# Patient Record
Sex: MALE | Race: WHITE | NOT HISPANIC OR LATINO | URBAN - METROPOLITAN AREA
[De-identification: names, ages, dates, MRNs, and addresses within clinical notes are randomized per-mention and may not be internally consistent; named-entity substitution may affect disease eponyms.]

---

## 2019-03-04 PROBLEM — Z00.00 ENCOUNTER FOR PREVENTIVE HEALTH EXAMINATION: Status: ACTIVE | Noted: 2019-03-04

## 2023-06-22 ENCOUNTER — INPATIENT (INPATIENT)
Facility: HOSPITAL | Age: 73
LOS: 7 days | Discharge: HOME | DRG: 374 | End: 2023-06-30
Attending: SURGERY | Admitting: SURGERY
Payer: COMMERCIAL

## 2023-06-22 VITALS
SYSTOLIC BLOOD PRESSURE: 116 MMHG | DIASTOLIC BLOOD PRESSURE: 77 MMHG | HEART RATE: 86 BPM | RESPIRATION RATE: 17 BRPM | OXYGEN SATURATION: 94 % | TEMPERATURE: 99 F

## 2023-06-22 LAB
ANION GAP SERPL CALC-SCNC: 6 MMOL/L — SIGNIFICANT CHANGE UP (ref 5–17)
APTT BLD: 21.5 SEC — LOW (ref 27.5–35.5)
BLD GP AB SCN SERPL QL: NEGATIVE — SIGNIFICANT CHANGE UP
BLD GP AB SCN SERPL QL: NEGATIVE — SIGNIFICANT CHANGE UP
BUN SERPL-MCNC: 6 MG/DL — LOW (ref 7–23)
CALCIUM SERPL-MCNC: 8.5 MG/DL — SIGNIFICANT CHANGE UP (ref 8.4–10.5)
CHLORIDE SERPL-SCNC: 106 MMOL/L — SIGNIFICANT CHANGE UP (ref 96–108)
CO2 SERPL-SCNC: 25 MMOL/L — SIGNIFICANT CHANGE UP (ref 22–31)
CREAT SERPL-MCNC: 0.81 MG/DL — SIGNIFICANT CHANGE UP (ref 0.5–1.3)
EGFR: 94 ML/MIN/1.73M2 — SIGNIFICANT CHANGE UP
GLUCOSE SERPL-MCNC: 97 MG/DL — SIGNIFICANT CHANGE UP (ref 70–99)
HCT VFR BLD CALC: 24.2 % — LOW (ref 39–50)
HCT VFR BLD CALC: 27.6 % — LOW (ref 39–50)
HGB BLD-MCNC: 6.7 G/DL — CRITICAL LOW (ref 13–17)
HGB BLD-MCNC: 7.8 G/DL — LOW (ref 13–17)
INR BLD: 1.19 — HIGH (ref 0.88–1.16)
MAGNESIUM SERPL-MCNC: 2 MG/DL — SIGNIFICANT CHANGE UP (ref 1.6–2.6)
MCHC RBC-ENTMCNC: 19.2 PG — LOW (ref 27–34)
MCHC RBC-ENTMCNC: 19.9 PG — LOW (ref 27–34)
MCHC RBC-ENTMCNC: 27.7 GM/DL — LOW (ref 32–36)
MCHC RBC-ENTMCNC: 28.3 GM/DL — LOW (ref 32–36)
MCV RBC AUTO: 69.3 FL — LOW (ref 80–100)
MCV RBC AUTO: 70.6 FL — LOW (ref 80–100)
NRBC # BLD: 0 /100 WBCS — SIGNIFICANT CHANGE UP (ref 0–0)
NRBC # BLD: 0 /100 WBCS — SIGNIFICANT CHANGE UP (ref 0–0)
PHOSPHATE SERPL-MCNC: 4.3 MG/DL — SIGNIFICANT CHANGE UP (ref 2.5–4.5)
PLATELET # BLD AUTO: 219 K/UL — SIGNIFICANT CHANGE UP (ref 150–400)
PLATELET # BLD AUTO: 231 K/UL — SIGNIFICANT CHANGE UP (ref 150–400)
POTASSIUM SERPL-MCNC: 3.8 MMOL/L — SIGNIFICANT CHANGE UP (ref 3.5–5.3)
POTASSIUM SERPL-SCNC: 3.8 MMOL/L — SIGNIFICANT CHANGE UP (ref 3.5–5.3)
PROTHROM AB SERPL-ACNC: 14.2 SEC — HIGH (ref 10.5–13.4)
RBC # BLD: 3.49 M/UL — LOW (ref 4.2–5.8)
RBC # BLD: 3.91 M/UL — LOW (ref 4.2–5.8)
RBC # FLD: 30.1 % — HIGH (ref 10.3–14.5)
RBC # FLD: 30.1 % — HIGH (ref 10.3–14.5)
RH IG SCN BLD-IMP: POSITIVE — SIGNIFICANT CHANGE UP
RH IG SCN BLD-IMP: POSITIVE — SIGNIFICANT CHANGE UP
SODIUM SERPL-SCNC: 137 MMOL/L — SIGNIFICANT CHANGE UP (ref 135–145)
WBC # BLD: 3.32 K/UL — LOW (ref 3.8–10.5)
WBC # BLD: 3.45 K/UL — LOW (ref 3.8–10.5)
WBC # FLD AUTO: 3.32 K/UL — LOW (ref 3.8–10.5)
WBC # FLD AUTO: 3.45 K/UL — LOW (ref 3.8–10.5)

## 2023-06-22 PROCEDURE — 74174 CTA ABD&PLVS W/CONTRAST: CPT | Mod: 26

## 2023-06-22 PROCEDURE — 93306 TTE W/DOPPLER COMPLETE: CPT | Mod: 26

## 2023-06-22 PROCEDURE — 99222 1ST HOSP IP/OBS MODERATE 55: CPT

## 2023-06-22 PROCEDURE — 71260 CT THORAX DX C+: CPT | Mod: 26

## 2023-06-22 PROCEDURE — 93970 EXTREMITY STUDY: CPT | Mod: 26

## 2023-06-22 RX ORDER — SERTRALINE 25 MG/1
50 TABLET, FILM COATED ORAL AT BEDTIME
Refills: 0 | Status: DISCONTINUED | OUTPATIENT
Start: 2023-06-22 | End: 2023-06-30

## 2023-06-22 RX ORDER — TAMSULOSIN HYDROCHLORIDE 0.4 MG/1
0.4 CAPSULE ORAL AT BEDTIME
Refills: 0 | Status: DISCONTINUED | OUTPATIENT
Start: 2023-06-22 | End: 2023-06-30

## 2023-06-22 RX ORDER — SODIUM CHLORIDE 9 MG/ML
1000 INJECTION, SOLUTION INTRAVENOUS
Refills: 0 | Status: DISCONTINUED | OUTPATIENT
Start: 2023-06-22 | End: 2023-06-22

## 2023-06-22 RX ORDER — IOHEXOL 300 MG/ML
30 INJECTION, SOLUTION INTRAVENOUS ONCE
Refills: 0 | Status: COMPLETED | OUTPATIENT
Start: 2023-06-22 | End: 2023-06-22

## 2023-06-22 RX ORDER — SOD SULF/SODIUM/NAHCO3/KCL/PEG
4000 SOLUTION, RECONSTITUTED, ORAL ORAL ONCE
Refills: 0 | Status: DISCONTINUED | OUTPATIENT
Start: 2023-06-22 | End: 2023-06-22

## 2023-06-22 RX ORDER — PANTOPRAZOLE SODIUM 20 MG/1
40 TABLET, DELAYED RELEASE ORAL DAILY
Refills: 0 | Status: DISCONTINUED | OUTPATIENT
Start: 2023-06-22 | End: 2023-06-30

## 2023-06-22 RX ORDER — SODIUM CHLORIDE 9 MG/ML
1000 INJECTION, SOLUTION INTRAVENOUS
Refills: 0 | Status: DISCONTINUED | OUTPATIENT
Start: 2023-06-22 | End: 2023-06-28

## 2023-06-22 RX ADMIN — SODIUM CHLORIDE 120 MILLILITER(S): 9 INJECTION, SOLUTION INTRAVENOUS at 22:02

## 2023-06-22 RX ADMIN — TAMSULOSIN HYDROCHLORIDE 0.4 MILLIGRAM(S): 0.4 CAPSULE ORAL at 22:00

## 2023-06-22 RX ADMIN — SERTRALINE 50 MILLIGRAM(S): 25 TABLET, FILM COATED ORAL at 22:01

## 2023-06-22 RX ADMIN — IOHEXOL 30 MILLILITER(S): 300 INJECTION, SOLUTION INTRAVENOUS at 17:47

## 2023-06-22 RX ADMIN — PANTOPRAZOLE SODIUM 40 MILLIGRAM(S): 20 TABLET, DELAYED RELEASE ORAL at 12:08

## 2023-06-22 NOTE — PATIENT PROFILE ADULT - FALL HARM RISK - HARM RISK INTERVENTIONS

## 2023-06-22 NOTE — PROGRESS NOTE ADULT - SUBJECTIVE AND OBJECTIVE BOX
INTERVAL HPI/OVERNIGHT EVENTS: transfer from [facility] for GI bleed. Hgb 6.7, transfuse 1U PRBC    SUBJECTIVE: Pt seen and examined at bedside this am by surgery team. +F, no blood per rectum or recent BM. Denies abdominal pain. Denies f/n/v/cp/sob.    MEDICATIONS  (STANDING):  lactated ringers. 1000 milliLiter(s) (125 mL/Hr) IV Continuous <Continuous>  pantoprazole  Injectable 40 milliGRAM(s) IV Push daily  polyethylene glycol/electrolyte Solution. 4000 milliLiter(s) Oral once  sertraline 50 milliGRAM(s) Oral at bedtime  tamsulosin 0.4 milliGRAM(s) Oral at bedtime    MEDICATIONS  (PRN):    Vital Signs Last 24 Hrs  T(C): 37.2 (22 Jun 2023 05:29), Max: 37.2 (22 Jun 2023 00:56)  T(F): 99 (22 Jun 2023 05:29), Max: 99 (22 Jun 2023 00:56)  HR: 84 (22 Jun 2023 05:29) (84 - 86)  BP: 133/83 (22 Jun 2023 05:29) (116/77 - 133/83)  BP(mean): 90 (22 Jun 2023 00:56) (90 - 90)  RR: 17 (22 Jun 2023 05:29) (17 - 17)  SpO2: 94% (22 Jun 2023 05:29) (94% - 94%)    Parameters below as of 22 Jun 2023 05:29  Patient On (Oxygen Delivery Method): room air    PHYSICAL EXAM:    Constitutional: A&Ox3, NAD    Respiratory: non labored breathing, no respiratory distress    Cardiovascular: NSR, RRR    Gastrointestinal: abdomen soft, mild distended, nt. No rebound or guarding     Extremities: wwp, no calf tenderness or edema. SCDs in place     I&O's Detail    21 Jun 2023 07:01  -  22 Jun 2023 07:00  --------------------------------------------------------  IN:    Lactated Ringers: 625 mL  Total IN: 625 mL    OUT:  Total OUT: 0 mL    Total NET: 625 mL          LABS:                        6.7    3.45  )-----------( 231      ( 22 Jun 2023 04:48 )             24.2     06-22    137  |  106  |  6<L>  ----------------------------<  97  3.8   |  25  |  0.81    Ca    8.5      22 Jun 2023 04:48  Phos  4.3     06-22  Mg     2.0     06-22      PT/INR - ( 22 Jun 2023 05:41 )   PT: 14.2 sec;   INR: 1.19          PTT - ( 22 Jun 2023 05:41 )  PTT:21.5 sec  Urinalysis Basic - ( 22 Jun 2023 04:48 )    Color: x / Appearance: x / SG: x / pH: x  Gluc: 97 mg/dL / Ketone: x  / Bili: x / Urobili: x   Blood: x / Protein: x / Nitrite: x   Leuk Esterase: x / RBC: x / WBC x   Sq Epi: x / Non Sq Epi: x / Bacteria: x        RADIOLOGY & ADDITIONAL STUDIES:

## 2023-06-22 NOTE — H&P ADULT - NSICDXPASTMEDICALHX_GEN_ALL_CORE_FT
PAST MEDICAL HISTORY:  BPH (benign prostatic hyperplasia)     Depression, major     Diverticulosis     Pulmonary embolism     Stroke

## 2023-06-22 NOTE — H&P ADULT - HISTORY OF PRESENT ILLNESS
74 y/o M w/ PMHx of stroke (R sided deficits), PE (Jan 2022, on Eliquis), BPH, depression, and diverticulosis and PSHx of colon tumor removal (1988) presents as a transfer from [facility] for management of GI bleed. Patient began to feel lethargic and wife noting him to be pale on 6/19. She held his dose of Eliquis that day and called his PCP. PCP ordered labs and Hgb resulted as 4.2. He was then instructed to go to OSH for further evaluation. He was transfused 3U PRBCs per wife. He had an EGD on 6/16 and no active bleed visualized. He had a colonoscopy on 6/19, which a large polyp was visualized but then aborted. He was then transferred to [facility] for further management. He denies nausea, vomiting, bloody bowel movements, melena, hematemesis, abdominal pain, dizziness, and unexpected weight loss over the last month. His home attendant does state that FREDI done at OSH was positive for blood. He has had a colonoscopy yearly since 39 y/o due to hx of colon tumor removal. No history of colon resection.

## 2023-06-22 NOTE — CONSULT NOTE ADULT - SUBJECTIVE AND OBJECTIVE BOX
INTERNAL MEDICINE - INITIAL CONSULT NOTE    TOPHER CHAO  [id number]    Patient is a 73y old  Male who presents with a chief complaint of GI bleed (22 Jun 2023 11:13)      HPI:  74 y/o M w/ PMHx of stroke (R sided deficits), PE (Jan 2022, on Eliquis), BPH, depression, and diverticulosis and PSHx of colon tumor removal (1988) presents as a transfer from [facility] for management of GI bleed. Patient began to feel lethargic and wife noting him to be pale on 6/19. She held his dose of Eliquis that day and called his PCP. PCP ordered labs and Hgb resulted as 4.2. He was then instructed to go to OSH for further evaluation. He was transfused 3U PRBCs per wife. He had an EGD on 6/16 and no active bleed visualized. He had a colonoscopy on 6/19, which a large polyp was visualized but then aborted. He was then transferred to [facility] for further management. He denies nausea, vomiting, bloody bowel movements, melena, hematemesis, abdominal pain, dizziness, and unexpected weight loss over the last month. His home attendant does state that FREDI done at OSH was positive for blood. He has had a colonoscopy yearly since 39 y/o due to hx of colon tumor removal. No history of colon resection.  (22 Jun 2023 01:51)      PAST MEDICAL & SURGICAL HISTORY:  Depression, major      Stroke      Pulmonary embolism      BPH (benign prostatic hyperplasia)      Diverticulosis          iohexol 300 mG (iodine)/mL Oral Solution 30 milliLiter(s) Oral once  lactated ringers. 1000 milliLiter(s) IV Continuous <Continuous>  pantoprazole  Injectable 40 milliGRAM(s) IV Push daily  sertraline 50 milliGRAM(s) Oral at bedtime  tamsulosin 0.4 milliGRAM(s) Oral at bedtime      FAMILY HISTORY:      REVIEW OF SYSTEMS:  CONSTITUTIONAL: No weakness, fever, or chills  EYES: No eye pain or discharge  ENMT:  No sinus or throat pain  NECK: No pain or stiffness  RESPIRATORY: No cough, wheezing, chills or hemoptysis; No shortness of breath  CARDIOVASCULAR: No chest pain, palpitations, dizziness, or leg swelling  GASTROINTESTINAL: No abdominal or epigastric pain. No nausea, vomiting, or hematemesis; No diarrhea or constipation. No melena or hematochezia.  GENITOURINARY: No dysuria or incontinence  NEUROLOGICAL: No headaches, memory loss, loss of strength, numbness, or tremors  SKIN: No new rashes  MUSCULOSKELETAL: No joint pain or swelling; No muscle, back, or extremity pain  HEME/LYMPH: No easy bruising, or bleeding gums      PHYSICAL EXAM:  T(C): 37 (06-22-23 @ 13:13), Max: 37.2 (06-22-23 @ 00:56)  HR: 79 (06-22-23 @ 13:13) (74 - 86)  BP: 131/68 (06-22-23 @ 13:13) (116/77 - 136/86)  RR: 17 (06-22-23 @ 13:13) (16 - 17)  SpO2: 97% (06-22-23 @ 13:13) (94% - 97%)    General: NAD, laying in bed, speaking in full sentences  HEENT: head NC/AT, no conjunctival injection, EOMI, MMM  Neck: supple, no JVD  Cardio: RRR, +S1/S2, no M/R/G  Resp: lungs CTAB, no cough/wheezes/rales/rhonchi  Abdo: soft, NT, ND, +bowel sounds x4, no organomegaly or palpable mass    Extremities: WWP, no edema/cyanosis/clubbing   Vasc: 2+ radial and DP pulses b/l  Neuro: A&Ox3  Psych: speech non-pressured, thoughts goal-oriented  Skin: dry, intact, no visible jaundice   MSK: no joint swelling      Consultant(s) Notes Reviewed:  [x ] YES  [ ] NO  Care Discussed with Consultants/Other Providers [ x] YES  [ ] NO    LABS:      06-21-23 @ 07:01  -  06-22-23 @ 07:00  --------------------------------------------------------  IN: 625 mL / OUT: 0 mL / NET: 625 mL    06-22-23 @ 07:01  -  06-22-23 @ 14:20  --------------------------------------------------------  IN: 0 mL / OUT: 800 mL / NET: -800 mL          RADIOLOGY & ADDITIONAL TESTS:    Imaging Personally Reviewed:  [X] YES  [ ] NO

## 2023-06-22 NOTE — CONSULT NOTE ADULT - SUBJECTIVE AND OBJECTIVE BOX
HPI:  72 y/o M w/ PMHx of stroke (R sided deficits), PE (Jan 2022, on Eliquis), BPH, depression, and diverticulosis and PSHx of colon tumor removal (1988) presents as a transfer from [facility] for management of GI bleed. Patient began to feel lethargic and wife noting him to be pale on 6/19. She held his dose of Eliquis that day and called his PCP. PCP ordered labs and Hgb resulted as 4.2. He was then instructed to go to OSH for further evaluation. He was transfused 3U PRBCs per wife. He had an EGD on 6/16 and no active bleed visualized. He had a colonoscopy on 6/19, which a large polyp was visualized but then aborted. He was then transferred to [facility] for further management. He denies nausea, vomiting, bloody bowel movements, melena, hematemesis, abdominal pain, dizziness, and unexpected weight loss over the last month. His home attendant does state that FREDI done at OSH was positive for blood. He has had a colonoscopy yearly since 39 y/o due to hx of colon tumor removal. No history of colon resection.  (22 Jun 2023 01:51)      VASCULAR ADDENDUM: History per above. In brief, 73M w/ PMHx CVA w/ right sided deficits and aphasia, two prior PEs, prior colon mass resection presenting with suspected lower GI bleed identified when the patient developed progressive weakness and pallor over the last two weeks. Patient was admitted to OSH w/ HGB 4.2, transfused 3 units, and underwent colonoscopy revealing large polyp, at which point procedure was aborted and he was transferred to [facility] for advanced GI. Per patient's wife, about 11 years ago he developed DVT/PE and was treated with coumadin for six months. Subsequently in January of last year, he had an apparently unprovoked PE and was hospitalized. At this time, he was placed on indefinite Eliquis This hospitalization occurred in [place], but afterwards given the patient's decline in functional status, his wife relocated them to [place] to be closer to their children. A few months ago, her hematologist from [place] called and suggested decreasing Eliquis dosing to 2.5mg BID, but her GP here disagreed and kept him on a dose of 5mg BID, which he took until about 7 days ago. The patient does have deficits from his stroke, but he is able to walk with a walker and spends little time immobile in bed. He feeds himself.     PAST MEDICAL & SURGICAL HISTORY:  Depression, major  Stroke  Pulmonary embolism  BPH (benign prostatic hyperplasia)  Diverticulosis    MEDICATIONS  (STANDING):  lactated ringers. 1000 milliLiter(s) (125 mL/Hr) IV Continuous <Continuous>  pantoprazole  Injectable 40 milliGRAM(s) IV Push daily  sertraline 50 milliGRAM(s) Oral at bedtime  tamsulosin 0.4 milliGRAM(s) Oral at bedtime    MEDICATIONS  (PRN):  Allergies  No Known Allergies  Intolerances    SOCIAL HISTORY:  FAMILY HISTORY:      Vital Signs Last 24 Hrs  T(C): 37.2 (22 Jun 2023 05:29), Max: 37.2 (22 Jun 2023 00:56)  T(F): 99 (22 Jun 2023 05:29), Max: 99 (22 Jun 2023 00:56)  HR: 84 (22 Jun 2023 05:29) (84 - 86)  BP: 133/83 (22 Jun 2023 05:29) (116/77 - 133/83)  BP(mean): 90 (22 Jun 2023 00:56) (90 - 90)  RR: 17 (22 Jun 2023 05:29) (17 - 17)  SpO2: 94% (22 Jun 2023 05:29) (94% - 94%)    Parameters below as of 22 Jun 2023 05:29  Patient On (Oxygen Delivery Method): room air        PHYSICAL EXAM:    Gen: Awake, alert. NAD.   CV: HDS, WWP  Pulm: On room air breathing comfortably  Abd: S/NT/ND.   Vascular: 2+ pulses fem/pop/pt/dp bilaterally.   Skin: Pallor      LABS:                        6.7    3.45  )-----------( 231      ( 22 Jun 2023 04:48 )             24.2     06-22    137  |  106  |  6<L>  ----------------------------<  97  3.8   |  25  |  0.81    Ca    8.5      22 Jun 2023 04:48  Phos  4.3     06-22  Mg     2.0     06-22      PT/INR - ( 22 Jun 2023 05:41 )   PT: 14.2 sec;   INR: 1.19          PTT - ( 22 Jun 2023 05:41 )  PTT:21.5 sec  Urinalysis Basic - ( 22 Jun 2023 04:48 )    Color: x / Appearance: x / SG: x / pH: x  Gluc: 97 mg/dL / Ketone: x  / Bili: x / Urobili: x   Blood: x / Protein: x / Nitrite: x   Leuk Esterase: x / RBC: x / WBC x   Sq Epi: x / Non Sq Epi: x / Bacteria: x    RADIOLOGY & ADDITIONAL STUDIES:    A/P: 73M w/ PMHx CVA w/ right sided deficits and aphasia, two prior PEs, prior colon mass resection presenting with suspected lower GI bleed identified who is not currently able to be anticoagulated.     - Patient's son is working on sharing thrombosis history collateral     *Incomplete note* HPI:  72 y/o M w/ PMHx of stroke (R sided deficits), PE (Jan 2022, on Eliquis), BPH, depression, and diverticulosis and PSHx of colon tumor removal (1988) presents as a transfer from [facility] for management of GI bleed. Patient began to feel lethargic and wife noting him to be pale on 6/19. She held his dose of Eliquis that day and called his PCP. PCP ordered labs and Hgb resulted as 4.2. He was then instructed to go to OSH for further evaluation. He was transfused 3U PRBCs per wife. He had an EGD on 6/16 and no active bleed visualized. He had a colonoscopy on 6/19, which a large polyp was visualized but then aborted. He was then transferred to [facility] for further management. He denies nausea, vomiting, bloody bowel movements, melena, hematemesis, abdominal pain, dizziness, and unexpected weight loss over the last month. His home attendant does state that FREDI done at OSH was positive for blood. He has had a colonoscopy yearly since 41 y/o due to hx of colon tumor removal. No history of colon resection.  (22 Jun 2023 01:51)      VASCULAR ADDENDUM: History per above. In brief, 73M w/ PMHx CVA w/ right sided deficits and aphasia, two prior PEs, prior colon mass resection presenting with suspected lower GI bleed identified when the patient developed progressive weakness and pallor over the last two weeks. Patient was admitted to OSH w/ HGB 4.2, transfused 3 units, and underwent colonoscopy revealing large polyp, at which point procedure was aborted and he was transferred to [facility] for advanced GI. Per patient's wife, about 11 years ago he developed DVT/PE and was treated with coumadin for six months. Subsequently in January of last year, he had an apparently unprovoked PE and was hospitalized. At this time, he was placed on indefinite Eliquis This hospitalization occurred in [place], but afterwards given the patient's decline in functional status, his wife relocated them to [place] to be closer to their children. A few months ago, her hematologist from [place] called and suggested decreasing Eliquis dosing to 2.5mg BID, but her GP here disagreed and kept him on a dose of 5mg BID, which he took until about 7 days ago. The patient does have deficits from his stroke, but he is able to walk with a walker and spends little time immobile in bed. He feeds himself.     PAST MEDICAL & SURGICAL HISTORY:  Depression, major  Stroke  Pulmonary embolism  BPH (benign prostatic hyperplasia)  Diverticulosis    MEDICATIONS  (STANDING):  lactated ringers. 1000 milliLiter(s) (125 mL/Hr) IV Continuous <Continuous>  pantoprazole  Injectable 40 milliGRAM(s) IV Push daily  sertraline 50 milliGRAM(s) Oral at bedtime  tamsulosin 0.4 milliGRAM(s) Oral at bedtime    MEDICATIONS  (PRN):  Allergies  No Known Allergies  Intolerances    SOCIAL HISTORY:  FAMILY HISTORY:      Vital Signs Last 24 Hrs  T(C): 37.2 (22 Jun 2023 05:29), Max: 37.2 (22 Jun 2023 00:56)  T(F): 99 (22 Jun 2023 05:29), Max: 99 (22 Jun 2023 00:56)  HR: 84 (22 Jun 2023 05:29) (84 - 86)  BP: 133/83 (22 Jun 2023 05:29) (116/77 - 133/83)  BP(mean): 90 (22 Jun 2023 00:56) (90 - 90)  RR: 17 (22 Jun 2023 05:29) (17 - 17)  SpO2: 94% (22 Jun 2023 05:29) (94% - 94%)    Parameters below as of 22 Jun 2023 05:29  Patient On (Oxygen Delivery Method): room air        PHYSICAL EXAM:    Gen: Awake, alert. NAD.   CV: HDS, WWP  Pulm: On room air breathing comfortably  Abd: S/NT/ND.   Vascular: 2+ pulses fem/pop/pt/dp bilaterally.   Skin: Pallor      LABS:                        6.7    3.45  )-----------( 231      ( 22 Jun 2023 04:48 )             24.2     06-22    137  |  106  |  6<L>  ----------------------------<  97  3.8   |  25  |  0.81    Ca    8.5      22 Jun 2023 04:48  Phos  4.3     06-22  Mg     2.0     06-22      PT/INR - ( 22 Jun 2023 05:41 )   PT: 14.2 sec;   INR: 1.19          PTT - ( 22 Jun 2023 05:41 )  PTT:21.5 sec  Urinalysis Basic - ( 22 Jun 2023 04:48 )    Color: x / Appearance: x / SG: x / pH: x  Gluc: 97 mg/dL / Ketone: x  / Bili: x / Urobili: x   Blood: x / Protein: x / Nitrite: x   Leuk Esterase: x / RBC: x / WBC x   Sq Epi: x / Non Sq Epi: x / Bacteria: x    RADIOLOGY & ADDITIONAL STUDIES:    A/P: 73M w/ PMHx CVA w/ right sided deficits and aphasia, two prior PEs, prior colon mass resection presenting with suspected lower GI bleed identified who is not currently able to be anticoagulated. Pending GI intervention and, if unssuccessful, colonic resection for suspected bleeding polyp.     - Patient's son is working on sharing thrombosis history collateral. Please obtain collateral related to history of thrombosis.   - Will follow up lower extremity duplex.   - Consider DVT prophylaxis  - Will follow    Discussed with chief. Staffed w/ Dr. Hough.  HPI:  74 y/o M w/ PMHx of stroke (R sided deficits), PE (Jan 2022, on Eliquis), BPH, depression, and diverticulosis and PSHx of colon tumor removal (1988) presents as a transfer from [facility] for management of GI bleed. Patient began to feel lethargic and wife noting him to be pale on 6/19. She held his dose of Eliquis that day and called his PCP. PCP ordered labs and Hgb resulted as 4.2. He was then instructed to go to OSH for further evaluation. He was transfused 3U PRBCs per wife. He had an EGD on 6/16 and no active bleed visualized. He had a colonoscopy on 6/19, which a large polyp was visualized but then aborted. He was then transferred to [facility] for further management. He denies nausea, vomiting, bloody bowel movements, melena, hematemesis, abdominal pain, dizziness, and unexpected weight loss over the last month. His home attendant does state that FREDI done at OSH was positive for blood. He has had a colonoscopy yearly since 39 y/o due to hx of colon tumor removal. No history of colon resection.  (22 Jun 2023 01:51)      VASCULAR ADDENDUM: History per above. In brief, 73M w/ PMHx CVA w/ right sided deficits and aphasia, two prior PEs, prior colon mass resection presenting with suspected lower GI bleed identified when the patient developed progressive weakness and pallor over the last two weeks. Patient was admitted to OSH w/ HGB 4.2, transfused 3 units, and underwent colonoscopy revealing large polyp, at which point procedure was aborted and he was transferred to [facility] for advanced GI. Per patient's wife, about 11 years ago he developed DVT/PE and was treated with coumadin for six months. Subsequently in January of last year, he had an apparently unprovoked PE and was hospitalized. At this time, he was placed on indefinite Eliquis This hospitalization occurred in [place], but afterwards given the patient's decline in functional status, his wife relocated them to [place] to be closer to their children. A few months ago, her hematologist from [place] called and suggested decreasing Eliquis dosing to 2.5mg BID, but her GP here disagreed and kept him on a dose of 5mg BID, which he took until about 7 days ago. The patient does have deficits from his stroke, but he is able to walk with a walker and spends little time immobile in bed. He feeds himself.     PAST MEDICAL & SURGICAL HISTORY:  Depression, major  Stroke  Pulmonary embolism  BPH (benign prostatic hyperplasia)  Diverticulosis    MEDICATIONS  (STANDING):  lactated ringers. 1000 milliLiter(s) (125 mL/Hr) IV Continuous <Continuous>  pantoprazole  Injectable 40 milliGRAM(s) IV Push daily  sertraline 50 milliGRAM(s) Oral at bedtime  tamsulosin 0.4 milliGRAM(s) Oral at bedtime    MEDICATIONS  (PRN):  Allergies  No Known Allergies  Intolerances    SOCIAL HISTORY:  FAMILY HISTORY:      Vital Signs Last 24 Hrs  T(C): 37.2 (22 Jun 2023 05:29), Max: 37.2 (22 Jun 2023 00:56)  T(F): 99 (22 Jun 2023 05:29), Max: 99 (22 Jun 2023 00:56)  HR: 84 (22 Jun 2023 05:29) (84 - 86)  BP: 133/83 (22 Jun 2023 05:29) (116/77 - 133/83)  BP(mean): 90 (22 Jun 2023 00:56) (90 - 90)  RR: 17 (22 Jun 2023 05:29) (17 - 17)  SpO2: 94% (22 Jun 2023 05:29) (94% - 94%)    Parameters below as of 22 Jun 2023 05:29  Patient On (Oxygen Delivery Method): room air        PHYSICAL EXAM:    Gen: Awake, alert. NAD.   CV: HDS, WWP  Pulm: On room air breathing comfortably  Abd: S/NT/ND.   Vascular: 2+ pulses fem/pop/pt/dp bilaterally.   Skin: Pallor      LABS:                        6.7    3.45  )-----------( 231      ( 22 Jun 2023 04:48 )             24.2     06-22    137  |  106  |  6<L>  ----------------------------<  97  3.8   |  25  |  0.81    Ca    8.5      22 Jun 2023 04:48  Phos  4.3     06-22  Mg     2.0     06-22      PT/INR - ( 22 Jun 2023 05:41 )   PT: 14.2 sec;   INR: 1.19          PTT - ( 22 Jun 2023 05:41 )  PTT:21.5 sec  Urinalysis Basic - ( 22 Jun 2023 04:48 )    Color: x / Appearance: x / SG: x / pH: x  Gluc: 97 mg/dL / Ketone: x  / Bili: x / Urobili: x   Blood: x / Protein: x / Nitrite: x   Leuk Esterase: x / RBC: x / WBC x   Sq Epi: x / Non Sq Epi: x / Bacteria: x    RADIOLOGY & ADDITIONAL STUDIES:    A/P: 73M w/ PMHx CVA w/ right sided deficits and aphasia, two prior PEs, prior colon mass resection presenting with suspected lower GI bleed identified who is not currently able to be anticoagulated. Pending GI intervention and, if unssuccessful, colonic resection for suspected bleeding polyp.     - Patient's son is working on sharing thrombosis history collateral. Please obtain collateral related to history of thrombosis.   - Will follow up lower extremity duplex.   - Assuming lower extremity duplex is negative for DVT, recommend to maintain patient on DVT prophylaxis pending GI/surgical workup.   - If at the conclusion of this workup the patient is not able to be anticoagulated for the long term, vascular surgery is available to place IVC Filter.     Discussed with chief. Staffed w/ Dr. Hough.  Azelaic Acid Pregnancy And Lactation Text: This medication is considered safe during pregnancy and breast feeding.

## 2023-06-22 NOTE — H&P ADULT - NSHPPHYSICALEXAM_GEN_ALL_CORE
General: NAD, resting comfortably in bed.  C/V: NSR.  Pulm: Nonlabored breathing, no respiratory distress on RA.  Abd: soft, nontender, nondistended.   Extrem: WWP, no edema, SCDs in place.

## 2023-06-22 NOTE — H&P ADULT - ASSESSMENT
74 y/o M w/ PMHx of stroke (R sided deficits), PE (Jan 2022, on Eliquis), BPH, depression, and diverticulosis and PSHx of colon tumor removal (1988) presents as a transfer from [facility] for management of GI bleed.    - NPO w/ mIVF   - Pantoprazole IV   - Restart home medications: zoloft and flomax   - Cont to hold Eliquis   - SCDs/IS/hold SQH   - AM labs     Plan discussed with chief resident, Dr. Olea

## 2023-06-22 NOTE — CONSULT NOTE ADULT - ASSESSMENT
HPI:  72 y/o M w/ PMHx of stroke (R sided deficits), PE (Jan 2022, on Eliquis), BPH, depression, and diverticulosis and PSHx of colon tumor removal (1988) presents as a transfer from [facility] for management of GI bleed. Patient began to feel lethargic and wife noting him to be pale on 6/19. She held his dose of Eliquis that day and called his PCP. PCP ordered labs and Hgb resulted as 4.2. He was then instructed to go to OSH for further evaluation. He was transfused 3U PRBCs per wife. He had an EGD on 6/16 and no active bleed visualized. He had a colonoscopy on 6/19, which a large polyp was visualized but then aborted. He was then transferred to [facility] for further management. He denies nausea, vomiting, bloody bowel movements, melena, hematemesis, abdominal pain, dizziness, and unexpected weight loss over the last month. His home attendant does state that FREDI done at OSH was positive for blood. He has had a colonoscopy yearly since 41 y/o due to hx of colon tumor removal. No history of colon resection.  (22 Jun 2023 01:51)      VASCULAR ADDENDUM: History per above. In brief, 73M w/ PMHx CVA w/ right sided deficits and aphasia, two prior PEs, prior colon mass resection presenting with suspected lower GI bleed identified when the patient developed progressive weakness and pallor over the last two weeks. Patient was admitted to OSH w/ HGB 4.2, transfused 3 units, and underwent colonoscopy revealing large polyp, at which point procedure was aborted and he was transferred to [facility] for advanced GI. Per patient's wife, about 11 years ago he developed DVT/PE and was treated with coumadin for six months.     PAST MEDICAL & SURGICAL HISTORY:  Depression, major      Stroke      Pulmonary embolism      BPH (benign prostatic hyperplasia)      Diverticulosis          MEDICATIONS  (STANDING):  lactated ringers. 1000 milliLiter(s) (125 mL/Hr) IV Continuous <Continuous>  pantoprazole  Injectable 40 milliGRAM(s) IV Push daily  sertraline 50 milliGRAM(s) Oral at bedtime  tamsulosin 0.4 milliGRAM(s) Oral at bedtime    MEDICATIONS  (PRN):      Allergies    No Known Allergies    Intolerances        SOCIAL HISTORY:    FAMILY HISTORY:      Vital Signs Last 24 Hrs  T(C): 37.2 (22 Jun 2023 05:29), Max: 37.2 (22 Jun 2023 00:56)  T(F): 99 (22 Jun 2023 05:29), Max: 99 (22 Jun 2023 00:56)  HR: 84 (22 Jun 2023 05:29) (84 - 86)  BP: 133/83 (22 Jun 2023 05:29) (116/77 - 133/83)  BP(mean): 90 (22 Jun 2023 00:56) (90 - 90)  RR: 17 (22 Jun 2023 05:29) (17 - 17)  SpO2: 94% (22 Jun 2023 05:29) (94% - 94%)    Parameters below as of 22 Jun 2023 05:29  Patient On (Oxygen Delivery Method): room air        PHYSICAL EXAM:      Constitutional:    Eyes:    ENMT:    Neck:    Breasts:    Back:    Respiratory:    Cardiovascular:    Gastrointestinal:    Genitourinary:    Rectal:    Extremities:    Vascular:    Neurological:    Skin:    Lymph Nodes:    Musculoskeletal:    Psychiatric:        LABS:                        6.7    3.45  )-----------( 231      ( 22 Jun 2023 04:48 )             24.2     06-22    137  |  106  |  6<L>  ----------------------------<  97  3.8   |  25  |  0.81    Ca    8.5      22 Jun 2023 04:48  Phos  4.3     06-22  Mg     2.0     06-22      PT/INR - ( 22 Jun 2023 05:41 )   PT: 14.2 sec;   INR: 1.19          PTT - ( 22 Jun 2023 05:41 )  PTT:21.5 sec  Urinalysis Basic - ( 22 Jun 2023 04:48 )    Color: x / Appearance: x / SG: x / pH: x  Gluc: 97 mg/dL / Ketone: x  / Bili: x / Urobili: x   Blood: x / Protein: x / Nitrite: x   Leuk Esterase: x / RBC: x / WBC x   Sq Epi: x / Non Sq Epi: x / Bacteria: x        RADIOLOGY & ADDITIONAL STUDIES:

## 2023-06-22 NOTE — CONSULT NOTE ADULT - ASSESSMENT
72 yo M w/ PMHx of CVA vs. TIA, (?R sided deficits), VTE on Eliquis, BPH, MDD, diverticulosis, CRC s/p sigmoidectomy, transferred to [facility] after admission at [facility] for anemia    Recommendations:  Obtain records of EGD / Colonoscopy  Workup anemia - Iron Studies, Ferritin, B12, Folate, Liver Enzymes, LDH, Haptoglobin, UA, Retic Count  Ok for Diet from GI standpoint  Transfusions per primary team  further endoscopic evaluation based on above workup, apparently never had overt GI bleeding    Thank you for the courtesy of this consult. We will follow along with you.    Daniel David M.D.  Gastroenterology Fellow  Weekday 7am-5pm Pager: [phone]  Weeknights/Weekend/Holiday Coverage: Please Call the  for contact info  Follow Up Questions welcome via [place] [employer] Teams Messenger

## 2023-06-22 NOTE — PATIENT PROFILE ADULT - FUNCTIONAL ASSESSMENT - BASIC MOBILITY 6.
2-calculated by average/Not able to assess (calculate score using [facility] averaging method)

## 2023-06-22 NOTE — PROGRESS NOTE ADULT - ASSESSMENT
74 y/o M w/ PMHx of stroke (R sided deficits), PE (Jan 2022, on Eliquis), BPH, depression, and diverticulosis and PSHx of colon tumor removal (1988) presents as a transfer from [facility] for management of GI bleed.    - NPO except meds/IVF  - Pain/nausea control prn  - Restart home medications: zoloft and flomax   - SCDs/holding SQH/Eliquis  - PPI  - F/u CT chest AP w/ IV con  - Stat 1U PRBC, trend CBC   - GI consult for scope on Mon 6/26, golytely prep x2d to start 6/24   - f/u CEA, CA 19-9  - f/u med consult  - f/u cards consult  - f/u vascular consult for possible IVC filter placement  - f/u B/L LE duplex to r/o DVT

## 2023-06-22 NOTE — CONSULT NOTE ADULT - ASSESSMENT
72yo man PMHx CVA (R sided deficits), PE (on eliquis, last taken on 6/18), BPH, depression, diverticulosis, PSHx of colon tumor (s/p ?endoscopic removal 1998 at OSH), who presents as a transfer from OSH for management of LGIB, medicine team consulted for co-management.    Plan/Recommendations:  #LGIB  Pt transferred from [facility] for concern LGIB, underwent colonoscopy however procedure aborted prior to completion since transferred 72yo man PMHx CVA (R sided deficits), PE (on eliquis, last taken on 6/18), BPH, depression, diverticulosis, PSHx of colon tumor (s/p ?endoscopic removal 1998 at OSH), who presents as a transfer from OSH for management of LGIB, medicine team consulted for co-management.    Plan/Recommendations:  #LGIB  Pt transferred from [facility] for concern LGIB, underwent colonoscopy however procedure aborted prior to completion since transferred here to [facility] for repeat endoscopic eval  - f/u post-transfusion CBC  - f/u GI recs for possible scope  - agree with holding home Eliquis for now    #Hx DVT/PE  Pt reports remote hx DVT/PE 11 years w/ recurrence Sept 2021 DVT and PE c/b thromboembolic stroke since w/ residual R sided weakness, and since persistently on Eliquis 5mg BID.  - agree with holding home Eliquis for now in setting of active GI bleed  - after stabilization of active bleed will weight risk vs. benefit of whether to restart Eliquis at reduced dose vs. consideration of IVC filter based on bleeding risk vs. thromboembolic risk

## 2023-06-22 NOTE — CONSULT NOTE ADULT - SUBJECTIVE AND OBJECTIVE BOX
GASTROENTEROLOGY CONSULT NOTE  HPI:  74 yo M w/ PMHx of CVA vs. TIA, (?R sided deficits), VTE on Eliquis, BPH, MDD, diverticulosis, CRC s/p sigmoidectomy, Initially presented to his PCP for pale appearance, cbc then with profound anemia, thus presented to [facility] for transfusion ;   Patient reportedly had EGD 6/16 no blood visualized.   Colonoscopy 6/19, poor prep, apparently some polyps removed, but larger polyp unable to be resected due to poor prep ; no reports of active bleeding     Apparently transferred to [facility] for further management.     Wife at bedside gives majority of the history.   Denies nausea, vomiting, bloody bowel movements, melena, hematemesis, abdominal pain, dizziness, weight loss     Typically has colonoscopy annually and has 2 day bowel prep with 2 gallons of GoLYTELY     Allergies    No Known Allergies    Intolerances      Home Medications:  Eliquis 5 mg oral tablet: 1 orally 2 times a day (22 Jun 2023 02:01)  Flomax 0.4 mg oral capsule: 1 orally once a day (at bedtime) (22 Jun 2023 02:01)  Zoloft 50 mg oral tablet: 1 orally once a day (at bedtime) (22 Jun 2023 02:01)    MEDICATIONS:  MEDICATIONS  (STANDING):  lactated ringers. 1000 milliLiter(s) (125 mL/Hr) IV Continuous <Continuous>  pantoprazole  Injectable 40 milliGRAM(s) IV Push daily  sertraline 50 milliGRAM(s) Oral at bedtime  tamsulosin 0.4 milliGRAM(s) Oral at bedtime    MEDICATIONS  (PRN):    PAST MEDICAL & SURGICAL HISTORY:  Depression, major      Stroke      Pulmonary embolism      BPH (benign prostatic hyperplasia)      Diverticulosis        FAMILY HISTORY: htn    SOCIAL HISTORY:  denies toxic habits    REVIEW OF SYSTEMS:  All other 10 review of systems is negative unless indicated above.    Vital Signs Last 24 Hrs  T(C): 37.2 (22 Jun 2023 05:29), Max: 37.2 (22 Jun 2023 00:56)  T(F): 99 (22 Jun 2023 05:29), Max: 99 (22 Jun 2023 00:56)  HR: 84 (22 Jun 2023 05:29) (84 - 86)  BP: 133/83 (22 Jun 2023 05:29) (116/77 - 133/83)  BP(mean): 90 (22 Jun 2023 00:56) (90 - 90)  RR: 17 (22 Jun 2023 05:29) (17 - 17)  SpO2: 94% (22 Jun 2023 05:29) (94% - 94%)    Parameters below as of 22 Jun 2023 05:29  Patient On (Oxygen Delivery Method): room air        06-21 @ 07:01  -  06-22 @ 07:00  --------------------------------------------------------  IN: 625 mL / OUT: 0 mL / NET: 625 mL        PHYSICAL EXAM:    General: lying in bed, in no acute distress  HEENT: Neck supple, mmm, no jvd  Lungs: Normal respiratory effort, no intercostal retractions  Cardiovascular: regular rate  Abdomen: Soft, non-tender non-distended; No rebound or guarding  Extremities: wwp, no cce  Neurological: ORDAZ, speech fluent  Skin: Warm and dry. No obvious rash    LABS:                        6.7    3.45  )-----------( 231      ( 22 Jun 2023 04:48 )             24.2     06-22    137  |  106  |  6<L>  ----------------------------<  97  3.8   |  25  |  0.81    Ca    8.5      22 Jun 2023 04:48  Phos  4.3     06-22  Mg     2.0     06-22          PT/INR - ( 22 Jun 2023 05:41 )   PT: 14.2 sec;   INR: 1.19          PTT - ( 22 Jun 2023 05:41 )  PTT:21.5 sec    RADIOLOGY & ADDITIONAL STUDIES:     Reviewed

## 2023-06-23 LAB
ALBUMIN SERPL ELPH-MCNC: 3.1 G/DL — LOW (ref 3.3–5)
ALP SERPL-CCNC: 55 U/L — SIGNIFICANT CHANGE UP (ref 40–120)
ALT FLD-CCNC: 8 U/L — LOW (ref 10–45)
ANION GAP SERPL CALC-SCNC: 11 MMOL/L — SIGNIFICANT CHANGE UP (ref 5–17)
AST SERPL-CCNC: 11 U/L — SIGNIFICANT CHANGE UP (ref 10–40)
BILIRUB SERPL-MCNC: 0.4 MG/DL — SIGNIFICANT CHANGE UP (ref 0.2–1.2)
BUN SERPL-MCNC: 4 MG/DL — LOW (ref 7–23)
CALCIUM SERPL-MCNC: 8.1 MG/DL — LOW (ref 8.4–10.5)
CANCER AG19-9 SERPL-ACNC: 19 U/ML — SIGNIFICANT CHANGE UP
CEA SERPL-MCNC: 1.9 NG/ML — SIGNIFICANT CHANGE UP (ref 0–3.8)
CHLORIDE SERPL-SCNC: 104 MMOL/L — SIGNIFICANT CHANGE UP (ref 96–108)
CO2 SERPL-SCNC: 23 MMOL/L — SIGNIFICANT CHANGE UP (ref 22–31)
CREAT SERPL-MCNC: 0.76 MG/DL — SIGNIFICANT CHANGE UP (ref 0.5–1.3)
EGFR: 96 ML/MIN/1.73M2 — SIGNIFICANT CHANGE UP
FERRITIN SERPL-MCNC: 175 NG/ML — SIGNIFICANT CHANGE UP (ref 30–400)
FOLATE SERPL-MCNC: 9.1 NG/ML — SIGNIFICANT CHANGE UP
GLUCOSE SERPL-MCNC: 115 MG/DL — HIGH (ref 70–99)
HAPTOGLOB SERPL-MCNC: 208 MG/DL — HIGH (ref 34–200)
HCT VFR BLD CALC: 28.1 % — LOW (ref 39–50)
HGB BLD-MCNC: 8.1 G/DL — LOW (ref 13–17)
IRON SATN MFR SERPL: 25 UG/DL — LOW (ref 45–165)
IRON SATN MFR SERPL: 7 % — LOW (ref 16–55)
LDH SERPL L TO P-CCNC: 114 U/L — SIGNIFICANT CHANGE UP (ref 50–242)
MAGNESIUM SERPL-MCNC: 2 MG/DL — SIGNIFICANT CHANGE UP (ref 1.6–2.6)
MCHC RBC-ENTMCNC: 20.1 PG — LOW (ref 27–34)
MCHC RBC-ENTMCNC: 28.8 GM/DL — LOW (ref 32–36)
MCV RBC AUTO: 69.9 FL — LOW (ref 80–100)
NRBC # BLD: 0 /100 WBCS — SIGNIFICANT CHANGE UP (ref 0–0)
PHOSPHATE SERPL-MCNC: 3.6 MG/DL — SIGNIFICANT CHANGE UP (ref 2.5–4.5)
PLATELET # BLD AUTO: 259 K/UL — SIGNIFICANT CHANGE UP (ref 150–400)
POTASSIUM SERPL-MCNC: 3.8 MMOL/L — SIGNIFICANT CHANGE UP (ref 3.5–5.3)
POTASSIUM SERPL-SCNC: 3.8 MMOL/L — SIGNIFICANT CHANGE UP (ref 3.5–5.3)
PROT SERPL-MCNC: 6.4 G/DL — SIGNIFICANT CHANGE UP (ref 6–8.3)
RBC # BLD: 4.02 M/UL — LOW (ref 4.2–5.8)
RBC # BLD: 4.02 M/UL — LOW (ref 4.2–5.8)
RBC # FLD: 29.9 % — HIGH (ref 10.3–14.5)
RETICS #: 67.8 K/UL — SIGNIFICANT CHANGE UP (ref 25–125)
RETICS/RBC NFR: 1.7 % — SIGNIFICANT CHANGE UP (ref 0.5–2.5)
SODIUM SERPL-SCNC: 138 MMOL/L — SIGNIFICANT CHANGE UP (ref 135–145)
TIBC SERPL-MCNC: 341 UG/DL — SIGNIFICANT CHANGE UP (ref 220–430)
TRANSFERRIN SERPL-MCNC: 283 MG/DL — SIGNIFICANT CHANGE UP (ref 200–360)
UIBC SERPL-MCNC: 316 UG/DL — SIGNIFICANT CHANGE UP (ref 110–370)
VIT B12 SERPL-MCNC: 574 PG/ML — SIGNIFICANT CHANGE UP (ref 232–1245)
WBC # BLD: 2.81 K/UL — LOW (ref 3.8–10.5)
WBC # FLD AUTO: 2.81 K/UL — LOW (ref 3.8–10.5)

## 2023-06-23 PROCEDURE — 99232 SBSQ HOSP IP/OBS MODERATE 35: CPT

## 2023-06-23 PROCEDURE — 99222 1ST HOSP IP/OBS MODERATE 55: CPT

## 2023-06-23 PROCEDURE — 99221 1ST HOSP IP/OBS SF/LOW 40: CPT

## 2023-06-23 RX ORDER — HEPARIN SODIUM 5000 [USP'U]/ML
5000 INJECTION INTRAVENOUS; SUBCUTANEOUS EVERY 8 HOURS
Refills: 0 | Status: DISCONTINUED | OUTPATIENT
Start: 2023-06-23 | End: 2023-06-27

## 2023-06-23 RX ADMIN — SERTRALINE 50 MILLIGRAM(S): 25 TABLET, FILM COATED ORAL at 22:41

## 2023-06-23 RX ADMIN — HEPARIN SODIUM 5000 UNIT(S): 5000 INJECTION INTRAVENOUS; SUBCUTANEOUS at 17:18

## 2023-06-23 RX ADMIN — TAMSULOSIN HYDROCHLORIDE 0.4 MILLIGRAM(S): 0.4 CAPSULE ORAL at 22:41

## 2023-06-23 RX ADMIN — PANTOPRAZOLE SODIUM 40 MILLIGRAM(S): 20 TABLET, DELAYED RELEASE ORAL at 12:20

## 2023-06-23 RX ADMIN — SODIUM CHLORIDE 120 MILLILITER(S): 9 INJECTION, SOLUTION INTRAVENOUS at 08:26

## 2023-06-23 RX ADMIN — HEPARIN SODIUM 5000 UNIT(S): 5000 INJECTION INTRAVENOUS; SUBCUTANEOUS at 09:51

## 2023-06-23 NOTE — PROGRESS NOTE ADULT - SUBJECTIVE AND OBJECTIVE BOX
SUBJECTIVE:   Patient seen and examined on am rounds. No new complaints. no blood per rectum. -n-v-cp-sob.    Vital Signs Last 24 Hrs  T(C): 36.8 (23 Jun 2023 08:15), Max: 37.2 (23 Jun 2023 05:00)  T(F): 98.3 (23 Jun 2023 08:15), Max: 99 (23 Jun 2023 05:00)  HR: 81 (23 Jun 2023 08:15) (74 - 85)  BP: 147/91 (23 Jun 2023 08:15) (121/76 - 154/87)  BP(mean): 91 (23 Jun 2023 05:00) (7 - 91)  RR: 17 (23 Jun 2023 08:15) (16 - 17)  SpO2: 93% (23 Jun 2023 08:15) (91% - 97%)    Parameters below as of 23 Jun 2023 08:15  Patient On (Oxygen Delivery Method): room air        I&O's Summary    22 Jun 2023 07:01  -  23 Jun 2023 07:00  --------------------------------------------------------  IN: 720 mL / OUT: 1300 mL / NET: -580 mL        Physical Exam:  General Appearance: Appears well, NAD  Pulmonary: Nonlabored breathing, no respiratory distress  Cardiovascular: NSR  Abdomen: Soft, nondistended, nontender.  Extremities: WWP, SCD's in place     LABS:                        8.1    2.81  )-----------( 259      ( 23 Jun 2023 05:30 )             28.1     06-23    138  |  104  |  4<L>  ----------------------------<  115<H>  3.8   |  23  |  0.76    Ca    8.1<L>      23 Jun 2023 05:30  Phos  3.6     06-23  Mg     2.0     06-23    TPro  6.4  /  Alb  3.1<L>  /  TBili  0.4  /  DBili  x   /  AST  11  /  ALT  8<L>  /  AlkPhos  55  06-23    PT/INR - ( 22 Jun 2023 05:41 )   PT: 14.2 sec;   INR: 1.19          PTT - ( 22 Jun 2023 05:41 )  PTT:21.5 sec  Urinalysis Basic - ( 23 Jun 2023 05:30 )    Color: x / Appearance: x / SG: x / pH: x  Gluc: 115 mg/dL / Ketone: x  / Bili: x / Urobili: x   Blood: x / Protein: x / Nitrite: x   Leuk Esterase: x / RBC: x / WBC x   Sq Epi: x / Non Sq Epi: x / Bacteria: x

## 2023-06-23 NOTE — PROGRESS NOTE ADULT - ASSESSMENT
72 y/o M w/ PMHx of stroke (R sided deficits), PE (Jan 2022, on Eliquis), BPH, depression, and diverticulosis and PSHx of colon tumor removal (1988) presents as a transfer from [facility] for management of GI bleed.    - NPO except meds/IVF  - Pain/nausea control prn  - Restart home medications: zoloft and flomax   - SCDs; holding SQH/Eliquis  - PPI  - F/u CT chest AP w/ IV con  - GI consult for scope on Mon 6/26, golytely prep x2d to start 6/24   - f/u CEA, CA 19-9  - f/u med recs  - f/u vascular consult for possible IVC filter placement

## 2023-06-23 NOTE — PROGRESS NOTE ADULT - ATTENDING COMMENTS
74yo man non-smoker, left handed, hx of colon ca more than 3 decades ago, localized had surgery done, no chemo, radiation ( family hx of colon ca )- PE 11 years ago treated with 6 months of Anticoagulation- , had CVA with Right hemiparesis/PE on September 2021- then was told heart ok , not Afib, is determined as thromboembolic stroke, has been on Elqiuis since then-  was noted to have anemia - admitted to OSH - where EGD/Colonoscopy was done ( polyp not removed ?-poor prep procedure aborted-) transferred for further care    record from osh (EGD/colonoscopy reviewed) sub-optimal prep, 6 mm polyp in TC, 8 mm in AC removed. 7 mm multilobulated polyp in Cecum not removed   Anemia -GI bleeding, likely from bleeding polyp- received transfusion , hb now above 8  ( Eliquis held due to bleeding and anemia- LE dvt screen negative 6/22- evaluated by vascular note read  evaluated by cardiology - ECHO - EF 55-60 %, LA normal size, card note read, no further work up, CT chest to moderate to severe arthrosclerosis  waiting for EKG   - intermediate risk for low to intermediate risk procedure.  - fu CBC and transfuse prn   - iron study reviewed, iron is 25, iron saturation is 7 % ( single digit )- ferritin in 175 ( on lower end)- Haptoglobin is high ( no hemolysis )  can give Iv iron venofer 200 mg iv daily for 5 days.     - CVA with right andres- stated was thromboembolic stroke  - PE recurrent - first episode 11 yrs ago, second PE during the time of stroke - has been on eliquis now held due to anemia requiring transfusion  - currently ambulatory with walker , weaker now due to anemia/ hospitalization - need assist in sitting up in bed right now  - fu GI evaluation   - dvt prophylasix   thank you for allowing medicine to participate in the care, dw wife, Dr. Herr.

## 2023-06-23 NOTE — PHYSICAL THERAPY INITIAL EVALUATION ADULT - ADDITIONAL COMMENTS
Pt reports living in house with 2 ED, with wife. Reports ambulating with use of RW and wife for assistance. Reports having HHA over the past year for 4 hours per day to assist with ADLs since prior CVA. Pt reports having grab bars and wheel chair.

## 2023-06-23 NOTE — PROGRESS NOTE ADULT - SUBJECTIVE AND OBJECTIVE BOX
SUBJECTIVE/OVERNIGHT EVENTS: No acute overnight events. Pt seen in AM at bedside, resting comfortably in bed, and does not appear to be in any acute distress. When asked, pt denies any recent or active fever, chills, nausea, vomiting, headache, acute sob, chest pain, abdominal pain, genitourinary sx, extremity pain or swelling.    VITAL SIGNS:  Vital Signs Last 24 Hrs  T(C): 36.8 (23 Jun 2023 08:15), Max: 37.2 (23 Jun 2023 05:00)  T(F): 98.3 (23 Jun 2023 08:15), Max: 99 (23 Jun 2023 05:00)  HR: 81 (23 Jun 2023 08:15) (77 - 85)  BP: 147/91 (23 Jun 2023 08:15) (121/76 - 154/87)  BP(mean): 91 (23 Jun 2023 05:00) (7 - 91)  RR: 17 (23 Jun 2023 08:15) (16 - 17)  SpO2: 93% (23 Jun 2023 08:15) (91% - 94%)    Parameters below as of 23 Jun 2023 08:15  Patient On (Oxygen Delivery Method): room air        PHYSICAL EXAM:  General: NAD; speaking in full sentences  HEENT: NC/AT; PERRL; EOMI; MMM  Neck: supple; no JVD  Cardiac: RRR; +S1/S2  Pulm: CTA B/L; no W/R/R  GI: soft, NT/ND, +BS  Extremities: WWP; no edema, clubbing or cyanosis  Vasc: 2+ radial, DP pulses B/L  Neuro: AAOx3    MEDICATIONS:  MEDICATIONS  (STANDING):  dextrose 5% + sodium chloride 0.45%. 1000 milliLiter(s) (120 mL/Hr) IV Continuous <Continuous>  heparin   Injectable 5000 Unit(s) SubCutaneous every 8 hours  pantoprazole  Injectable 40 milliGRAM(s) IV Push daily  sertraline 50 milliGRAM(s) Oral at bedtime  tamsulosin 0.4 milliGRAM(s) Oral at bedtime    MEDICATIONS  (PRN):      ALLERGIES:  Allergies    No Known Allergies    Intolerances        LABS:                        8.1    2.81  )-----------( 259      ( 23 Jun 2023 05:30 )             28.1     06-23    138  |  104  |  4<L>  ----------------------------<  115<H>  3.8   |  23  |  0.76    Ca    8.1<L>      23 Jun 2023 05:30  Phos  3.6     06-23  Mg     2.0     06-23    TPro  6.4  /  Alb  3.1<L>  /  TBili  0.4  /  DBili  x   /  AST  11  /  ALT  8<L>  /  AlkPhos  55  06-23    PT/INR - ( 22 Jun 2023 05:41 )   PT: 14.2 sec;   INR: 1.19          PTT - ( 22 Jun 2023 05:41 )  PTT:21.5 sec    RADIOLOGY & ADDITIONAL TESTS: Reviewed.

## 2023-06-23 NOTE — CHART NOTE - NSCHARTNOTEFT_GEN_A_CORE
Discussed in afternoon rounds. Patient is for colonoscopy on Monday, which will aim to address the root cause of his anemic episode. His DVT ultrasound is negative. Primary team plans to resume anticoagulation following this procedure if it is successful. If it is not, he will likely need operative intervention, after which he would be able to resume anticoagulation after the perioperative period.     - Recommend to resume anticoagulation as soon as is safe.   - Continue DVT prophylaxis (Right now on mechanical and chemical prophylaxis).   - Please reconsult if the patient is unable to resume anticoagulation.  - Vascular surgery to sign off. Please reconsult PRN. Discussed in afternoon rounds. Patient is for colonoscopy on Monday, which will aim to address the root cause of his anemic episode. His DVT ultrasound is negative. Primary team plans to resume anticoagulation following this procedure if it is successful. If it is not, he will likely need operative intervention, after which he would be able to resume anticoagulation after the perioperative period.     - Recommend to resume anticoagulation as soon as is safe.   - Continue DVT prophylaxis (Right now on mechanical and chemical prophylaxis).   - Please reconsult if the patient is unable to resume anticoagulation, in which case it would be reasonable to reconsider IVC filter.   - Vascular surgery to sign off. Please reconsult PRN.

## 2023-06-23 NOTE — PHYSICAL THERAPY INITIAL EVALUATION ADULT - PERTINENT HX OF CURRENT PROBLEM, REHAB EVAL
72 y/o M w/ PMHx of stroke (R sided deficits), PE (Jan 2022, on Eliquis), BPH, depression, and diverticulosis and PSHx of colon tumor removal (1988) presents as a transfer from [facility] for management of GI bleed.

## 2023-06-23 NOTE — CONSULT NOTE ADULT - ASSESSMENT
74 y/o M w/ PMHx of stroke (R sided deficits), PE (Jan 2022, on Eliquis), BPH, depression, and diverticulosis and PSHx of colon tumor removal (1988) presents as a transfer from [facility] for management of GI bleed, awaiting colonoscopy and possible ex lap.       #Pre op  TTE: Normal LV function. Reduced RV function, mild-mod MR  CT chest with moderate-severe coronary calcification  Limited exercise capacity  Patient without recent or current cardiovascular symptoms  Euvolemic on exam  -Intermediate risk for low-intermediate risk procedure  -No further pre-op cardiac workup  -Obtain EKG

## 2023-06-23 NOTE — PHYSICAL THERAPY INITIAL EVALUATION ADULT - GAIT DEVIATIONS NOTED, PT EVAL
impaired ability to lift and advance RLE; pt c/o fatigue/decreased pedro/decreased step length/decreased weight-shifting ability

## 2023-06-23 NOTE — PHYSICAL THERAPY INITIAL EVALUATION ADULT - NSPTDISCHREC_GEN_A_CORE
Subacute Rehab // Pt wife at bedside refusing LALITHA. PT educated pt wife on pt requiring two-person assist for ambulation, and pt wife endorsing increasing HHA once D/C from [facility]. If pt were to return home, pt to require home PT.

## 2023-06-23 NOTE — CONSULT NOTE ADULT - SUBJECTIVE AND OBJECTIVE BOX
HPI:  72 y/o M w/ PMHx of stroke (R sided deficits), PE (Jan 2022, on Eliquis), BPH, depression, and diverticulosis and PSHx of colon tumor removal (1988) presents as a transfer from [facility] for management of GI bleed. Patient began to feel lethargic and wife noting him to be pale on 6/19. She held his dose of Eliquis that day and called his PCP. PCP ordered labs and Hgb resulted as 4.2. He was then instructed to go to OSH for further evaluation. He was transfused 3U PRBCs per wife. He had an EGD on 6/16 and no active bleed visualized. He had a colonoscopy on 6/19, which a large polyp was visualized but then aborted. He was then transferred to [facility] for further management. He denies nausea, vomiting, bloody bowel movements, melena, hematemesis, abdominal pain, dizziness, and unexpected weight loss over the last month. His home attendant does state that FREDI done at OSH was positive for blood. He has had a colonoscopy yearly since 39 y/o due to hx of colon tumor removal. No history of colon resection.  (22 Jun 2023 01:51)    Pt denies ever having chest pain, and denies orthopnea, PND, leg swelling. Exercise tolerance limited as he uses a walker.     ROS: A 10-point review of systems was otherwise negative.    PAST MEDICAL & SURGICAL HISTORY:  Depression, major      Stroke      Pulmonary embolism      BPH (benign prostatic hyperplasia)      Diverticulosis          SOCIAL HISTORY:  FAMILY HISTORY:      ALLERGIES: 	  No Known Allergies            MEDICATIONS:  dextrose 5% + sodium chloride 0.45%. 1000 milliLiter(s) IV Continuous <Continuous>  heparin   Injectable 5000 Unit(s) SubCutaneous every 8 hours  pantoprazole  Injectable 40 milliGRAM(s) IV Push daily  sertraline 50 milliGRAM(s) Oral at bedtime  tamsulosin 0.4 milliGRAM(s) Oral at bedtime      HOME MEDICATIONS:  Eliquis 5 mg oral tablet: 1 orally 2 times a day  Flomax 0.4 mg oral capsule: 1 orally once a day (at bedtime)  Zoloft 50 mg oral tablet: 1 orally once a day (at bedtime)      PHYSICAL EXAM:      I/O Summary 24H    IN: 1440 mL / OUT: 1800 mL / NET: -360 mL        T(F): 98.3 (06-23-23 @ 08:15), Max: 99 (06-23-23 @ 05:00)  HR: 81 (06-23-23 @ 08:15) (77 - 85)  BP: 147/91 (06-23-23 @ 08:15) (121/76 - 154/87)  BP(mean): 91 (06-23-23 @ 05:00) (7 - 91)  ABP: --  ABP(mean): --  RR: 17 (06-23-23 @ 08:15) (16 - 17)  SpO2: 93% (06-23-23 @ 08:15) (91% - 94%)    GEN: Awake, comfortable. NAD.   HEENT: NCAT, PERRL, EOMI. Mucosa moist. No JVD.   RESP: CTA b/l  CV: RRR, normal s1/s2. No m/r/g.  ABD: Soft, NTND. BS+  EXT: Warm. No edema, clubbing, or cyanosis.   NEURO: AAOx3. No focal deficits.      	  LABS:	 	    Cardiac Markers             CBC 06-23-23 @ 05:30                        8.1    2.81  )-----------( 259                   28.1       Hgb trend: 8.1 <-- , 7.8 <-- , 6.7 <--   WBC trend: 2.81 <-- , 3.32 <-- , 3.45 <--     CMP 06-23-23 @ 05:30    138  |  104  |  4<L>  ----------------------------<  115<H>  3.8   |  23  |  0.76    Ca    8.1<L>      06-23-23 @ 05:30  Phos  3.6     06-23  Mg     2.0     06-23    TPro  6.4  /  Alb  3.1<L>  /  TBili  0.4  /  DBili  x   /  AST  11  /  ALT  8<L>  /  AlkPhos  55  06-23      Serum Cr trend: 0.76 <-- , 0.81 <--   proBNP:   Lipid Profile:   HgA1c:   TSH:     TELEMETRY: 	    ECG:  	  RADIOLOGY:   ECHO:  STRESS:  CATH:

## 2023-06-23 NOTE — PHYSICAL THERAPY INITIAL EVALUATION ADULT - MANUAL MUSCLE TESTING RESULTS, REHAB EVAL
MMT performed: (L)UE and (L)LE 5/5 for all major pivots; (R)shoulder flexion 2-/5, (R)elbow flexion 4+/5, (R)elbow extension 4+/5, (R)wrist extension N/T, (R)wrist flexion N/T; (R)hip flexion 3/5, (R)knee extension 3-/5, (R)knee flexion 4/5, (R)ankle DF 2-/5, (R)ankle PF 3/5

## 2023-06-23 NOTE — PROGRESS NOTE ADULT - SUBJECTIVE AND OBJECTIVE BOX
DAILY PROGRESS NOTE    Interval events: Patient started on DVT prophylaxis w/ HGB stable.     S: No complaints. Discussed plan with wife, who is at bedside.      O:     T(C): 36.8 (06-23-23 @ 08:15), Max: 37.2 (06-23-23 @ 05:00)  HR: 81 (06-23-23 @ 08:15) (76 - 85)  BP: 147/91 (06-23-23 @ 08:15) (121/76 - 154/87)  RR: 17 (06-23-23 @ 08:15) (16 - 17)  SpO2: 93% (06-23-23 @ 08:15) (91% - 97%)     PHYSICAL EXAM:    Gen: Awake, alert. NAD.   CV: HDS, WWP  Pulm: On room air breathing comfortably  Abd: S/NT/ND.   Vascular: 2+ pulses fem/pop/pt/dp bilaterally.   Skin: Pallor    Labs: HGB       Other studies:     A/P:      DAILY PROGRESS NOTE    Interval events: Patient started on DVT prophylaxis w/ HGB stable.     S: No complaints. Discussed plan with wife, who is at bedside.      O:     T(C): 36.8 (06-23-23 @ 08:15), Max: 37.2 (06-23-23 @ 05:00)  HR: 81 (06-23-23 @ 08:15) (76 - 85)  BP: 147/91 (06-23-23 @ 08:15) (121/76 - 154/87)  RR: 17 (06-23-23 @ 08:15) (16 - 17)  SpO2: 93% (06-23-23 @ 08:15) (91% - 97%)     PHYSICAL EXAM:    Gen: Awake, alert. NAD.   CV: HDS, WWP  Pulm: On room air breathing comfortably  Abd: S/NT/ND.   Vascular: 2+ pulses fem/pop/pt/dp bilaterally.   Skin: Pallor    Labs: HGB 6.7 0445 6/22 + 1u PRBC -> 7.8 6/22 1714 -> 8.1 6/23 053 & SQH started    A/P: 73M w/ PMHx CVA w/ right sided deficits and aphasia, two prior PEs, prior colon mass resection presenting with suspected lower GI bleed identified who is not currently able to be anticoagulated. Pending GI intervention and, if unssuccessful, colonic resection for suspected bleeding polyp. DVT ultrasound negative this admission.     - No plan for filter this admission  - Continue mechanical and, if possible, chemical DVT prophylaxis  - Resume anticoagulation once safe, per primary team  - Will follow    Discussed w/ Chief. Staffed w/ Dr. Hough.

## 2023-06-23 NOTE — PHYSICAL THERAPY INITIAL EVALUATION ADULT - GENERAL OBSERVATIONS, REHAB EVAL
PT IE completed. Chart reviewed. Pt received seated EOB, NAD, +IV heplock, wife and HHA at bedside. RN Jadyn cleared pt for PT.

## 2023-06-23 NOTE — PROGRESS NOTE ADULT - ASSESSMENT
72yo man PMHx CVA (R sided deficits), PE (on eliquis, last taken on 6/18), BPH, depression, diverticulosis, PSHx of colon tumor (s/p ?endoscopic removal 1998 at OSH), who presents as a transfer from OSH for management of LGIB, medicine team consulted for co-management.    Plan/Recommendations:  #LGIB  Pt transferred from [facility] for concern LGIB, underwent colonoscopy however procedure aborted prior to completion since transferred here to 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  - agree with holding home Eliquis for now in setting of active GI bleed  - after stabilization of active bleed will weight risk vs. benefit of whether to restart Eliquis at reduced dose vs. consideration of IVC filter based on bleeding risk vs. thromboembolic risk  - f/u TTE to evaluate if there is any underlying Afib

## 2023-06-24 LAB
ALBUMIN SERPL ELPH-MCNC: 3.4 G/DL — SIGNIFICANT CHANGE UP (ref 3.3–5)
ALP SERPL-CCNC: 55 U/L — SIGNIFICANT CHANGE UP (ref 40–120)
ALT FLD-CCNC: 7 U/L — LOW (ref 10–45)
ANION GAP SERPL CALC-SCNC: 13 MMOL/L — SIGNIFICANT CHANGE UP (ref 5–17)
ANION GAP SERPL CALC-SCNC: 9 MMOL/L — SIGNIFICANT CHANGE UP (ref 5–17)
AST SERPL-CCNC: 9 U/L — LOW (ref 10–40)
BILIRUB SERPL-MCNC: 0.3 MG/DL — SIGNIFICANT CHANGE UP (ref 0.2–1.2)
BUN SERPL-MCNC: 4 MG/DL — LOW (ref 7–23)
BUN SERPL-MCNC: 5 MG/DL — LOW (ref 7–23)
CALCIUM SERPL-MCNC: 8.2 MG/DL — LOW (ref 8.4–10.5)
CALCIUM SERPL-MCNC: 8.4 MG/DL — SIGNIFICANT CHANGE UP (ref 8.4–10.5)
CHLORIDE SERPL-SCNC: 104 MMOL/L — SIGNIFICANT CHANGE UP (ref 96–108)
CHLORIDE SERPL-SCNC: 108 MMOL/L — SIGNIFICANT CHANGE UP (ref 96–108)
CK MB CFR SERPL CALC: 1.2 NG/ML — SIGNIFICANT CHANGE UP (ref 0–6.7)
CK SERPL-CCNC: 43 U/L — SIGNIFICANT CHANGE UP (ref 30–200)
CO2 SERPL-SCNC: 23 MMOL/L — SIGNIFICANT CHANGE UP (ref 22–31)
CO2 SERPL-SCNC: 25 MMOL/L — SIGNIFICANT CHANGE UP (ref 22–31)
CREAT SERPL-MCNC: 0.82 MG/DL — SIGNIFICANT CHANGE UP (ref 0.5–1.3)
CREAT SERPL-MCNC: 0.9 MG/DL — SIGNIFICANT CHANGE UP (ref 0.5–1.3)
EGFR: 91 ML/MIN/1.73M2 — SIGNIFICANT CHANGE UP
EGFR: 94 ML/MIN/1.73M2 — SIGNIFICANT CHANGE UP
GLUCOSE SERPL-MCNC: 104 MG/DL — HIGH (ref 70–99)
GLUCOSE SERPL-MCNC: 152 MG/DL — HIGH (ref 70–99)
HCT VFR BLD CALC: 28.9 % — LOW (ref 39–50)
HCT VFR BLD CALC: 30.6 % — LOW (ref 39–50)
HGB BLD-MCNC: 8.3 G/DL — LOW (ref 13–17)
HGB BLD-MCNC: 8.7 G/DL — LOW (ref 13–17)
MAGNESIUM SERPL-MCNC: 2.1 MG/DL — SIGNIFICANT CHANGE UP (ref 1.6–2.6)
MCHC RBC-ENTMCNC: 20.2 PG — LOW (ref 27–34)
MCHC RBC-ENTMCNC: 20.5 PG — LOW (ref 27–34)
MCHC RBC-ENTMCNC: 28.4 GM/DL — LOW (ref 32–36)
MCHC RBC-ENTMCNC: 28.7 GM/DL — LOW (ref 32–36)
MCV RBC AUTO: 71 FL — LOW (ref 80–100)
MCV RBC AUTO: 71.4 FL — LOW (ref 80–100)
NRBC # BLD: 0 /100 WBCS — SIGNIFICANT CHANGE UP (ref 0–0)
NRBC # BLD: 0 /100 WBCS — SIGNIFICANT CHANGE UP (ref 0–0)
PHOSPHATE SERPL-MCNC: 3.9 MG/DL — SIGNIFICANT CHANGE UP (ref 2.5–4.5)
PLATELET # BLD AUTO: 250 K/UL — SIGNIFICANT CHANGE UP (ref 150–400)
PLATELET # BLD AUTO: 322 K/UL — SIGNIFICANT CHANGE UP (ref 150–400)
POTASSIUM SERPL-MCNC: 3.7 MMOL/L — SIGNIFICANT CHANGE UP (ref 3.5–5.3)
POTASSIUM SERPL-MCNC: 4 MMOL/L — SIGNIFICANT CHANGE UP (ref 3.5–5.3)
POTASSIUM SERPL-SCNC: 3.7 MMOL/L — SIGNIFICANT CHANGE UP (ref 3.5–5.3)
POTASSIUM SERPL-SCNC: 4 MMOL/L — SIGNIFICANT CHANGE UP (ref 3.5–5.3)
PROT SERPL-MCNC: 6.6 G/DL — SIGNIFICANT CHANGE UP (ref 6–8.3)
RBC # BLD: 4.05 M/UL — LOW (ref 4.2–5.8)
RBC # BLD: 4.31 M/UL — SIGNIFICANT CHANGE UP (ref 4.2–5.8)
RBC # FLD: 30.2 % — HIGH (ref 10.3–14.5)
RBC # FLD: 30.5 % — HIGH (ref 10.3–14.5)
SODIUM SERPL-SCNC: 138 MMOL/L — SIGNIFICANT CHANGE UP (ref 135–145)
SODIUM SERPL-SCNC: 144 MMOL/L — SIGNIFICANT CHANGE UP (ref 135–145)
TROPONIN T, HIGH SENSITIVITY RESULT: 11 NG/L — SIGNIFICANT CHANGE UP (ref 0–51)
WBC # BLD: 3.15 K/UL — LOW (ref 3.8–10.5)
WBC # BLD: 8.27 K/UL — SIGNIFICANT CHANGE UP (ref 3.8–10.5)
WBC # FLD AUTO: 3.15 K/UL — LOW (ref 3.8–10.5)
WBC # FLD AUTO: 8.27 K/UL — SIGNIFICANT CHANGE UP (ref 3.8–10.5)

## 2023-06-24 PROCEDURE — 99222 1ST HOSP IP/OBS MODERATE 55: CPT | Mod: GC

## 2023-06-24 PROCEDURE — 99232 SBSQ HOSP IP/OBS MODERATE 35: CPT

## 2023-06-24 RX ORDER — IRON SUCROSE 20 MG/ML
200 INJECTION, SOLUTION INTRAVENOUS EVERY 24 HOURS
Refills: 0 | Status: COMPLETED | OUTPATIENT
Start: 2023-06-24 | End: 2023-06-28

## 2023-06-24 RX ORDER — SODIUM CHLORIDE 9 MG/ML
1000 INJECTION, SOLUTION INTRAVENOUS ONCE
Refills: 0 | Status: COMPLETED | OUTPATIENT
Start: 2023-06-24 | End: 2023-06-24

## 2023-06-24 RX ORDER — POTASSIUM CHLORIDE 20 MEQ
20 PACKET (EA) ORAL ONCE
Refills: 0 | Status: COMPLETED | OUTPATIENT
Start: 2023-06-24 | End: 2023-06-24

## 2023-06-24 RX ORDER — SOD SULF/SODIUM/NAHCO3/KCL/PEG
4000 SOLUTION, RECONSTITUTED, ORAL ORAL ONCE
Refills: 0 | Status: COMPLETED | OUTPATIENT
Start: 2023-06-24 | End: 2023-06-24

## 2023-06-24 RX ADMIN — SODIUM CHLORIDE 60 MILLILITER(S): 9 INJECTION, SOLUTION INTRAVENOUS at 01:01

## 2023-06-24 RX ADMIN — IRON SUCROSE 200 MILLIGRAM(S): 20 INJECTION, SOLUTION INTRAVENOUS at 19:20

## 2023-06-24 RX ADMIN — SERTRALINE 50 MILLIGRAM(S): 25 TABLET, FILM COATED ORAL at 11:39

## 2023-06-24 RX ADMIN — Medication 10 MILLIGRAM(S): at 23:25

## 2023-06-24 RX ADMIN — HEPARIN SODIUM 5000 UNIT(S): 5000 INJECTION INTRAVENOUS; SUBCUTANEOUS at 14:46

## 2023-06-24 RX ADMIN — SODIUM CHLORIDE 60 MILLILITER(S): 9 INJECTION, SOLUTION INTRAVENOUS at 19:27

## 2023-06-24 RX ADMIN — SODIUM CHLORIDE 1000 MILLILITER(S): 9 INJECTION, SOLUTION INTRAVENOUS at 22:50

## 2023-06-24 RX ADMIN — PANTOPRAZOLE SODIUM 40 MILLIGRAM(S): 20 TABLET, DELAYED RELEASE ORAL at 11:39

## 2023-06-24 RX ADMIN — HEPARIN SODIUM 5000 UNIT(S): 5000 INJECTION INTRAVENOUS; SUBCUTANEOUS at 01:01

## 2023-06-24 RX ADMIN — TAMSULOSIN HYDROCHLORIDE 0.4 MILLIGRAM(S): 0.4 CAPSULE ORAL at 23:24

## 2023-06-24 RX ADMIN — HEPARIN SODIUM 5000 UNIT(S): 5000 INJECTION INTRAVENOUS; SUBCUTANEOUS at 23:25

## 2023-06-24 RX ADMIN — HEPARIN SODIUM 5000 UNIT(S): 5000 INJECTION INTRAVENOUS; SUBCUTANEOUS at 09:44

## 2023-06-24 RX ADMIN — Medication 4000 MILLILITER(S): at 11:21

## 2023-06-24 RX ADMIN — Medication 20 MILLIEQUIVALENT(S): at 12:34

## 2023-06-24 NOTE — PROGRESS NOTE ADULT - ASSESSMENT
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 Plan for c-scope for polp removal 6/26. cea and ca 19-9 wnl.    - CLD, IVF  - golytely day 1 (history of incomplete prep)  - Pain/nausea control prn  - Restart home medications: zoloft and flomax   - SCDs; holding SQH  - PPI  - GI consult for scope on Mon 6/26  - Cards consult   - Dispo; PT LALITHA  (pt refused), home pt, OT pending

## 2023-06-24 NOTE — PROGRESS NOTE ADULT - ASSESSMENT
72yo man PMHx CVA (R sided deficits), PE (on eliquis, last taken on 6/18), BPH, depression, diverticulosis, PSHx of colon tumor (s/p ?endoscopic removal 1998 at OSH), who presents as a transfer from OSH for management of LGIB, medicine team consulted for co-management.    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 TTE 6/22 negative for signs of afib or LA dilatation, otherwise moderate MR and normal EF.  - agree with holding home Eliquis for now in setting of active GI bleed  - after stabilization of active bleed will weight risk vs. benefit of whether to restart Eliquis at reduced dose vs. consideration of IVC filter based on bleeding risk vs. thromboembolic risk

## 2023-06-24 NOTE — PROGRESS NOTE ADULT - SUBJECTIVE AND OBJECTIVE BOX
SUBJECTIVE/OVERNIGHT EVENTS: No acute overnight events. Pt seen in AM at bedside, resting comfortably in bed, and does not appear to be in any acute distress. When asked, pt denies any recent or active fever, chills, nausea, vomiting, headache, acute sob, chest pain, abdominal pain, genitourinary sx, extremity pain or swelling.    VITAL SIGNS:  Vital Signs Last 24 Hrs  T(C): 37.1 (24 Jun 2023 08:19), Max: 37.1 (24 Jun 2023 08:19)  T(F): 98.7 (24 Jun 2023 08:19), Max: 98.7 (24 Jun 2023 08:19)  HR: 87 (24 Jun 2023 08:19) (83 - 95)  BP: 127/83 (24 Jun 2023 08:19) (120/70 - 135/91)  BP(mean): --  RR: 17 (24 Jun 2023 08:19) (17 - 18)  SpO2: 93% (24 Jun 2023 08:19) (93% - 96%)    Parameters below as of 24 Jun 2023 08:19  Patient On (Oxygen Delivery Method): room air      PHYSICAL EXAM:  General: NAD; elderly; frail-appearing  HEENT: NC/AT; PERRL; EOMI; MMM  Neck: supple; no JVD  Cardiac: RRR; +S1/S2  Pulm: CTA B/L; no W/R/R  GI: soft, NT/ND, +BS  Extremities: WWP; no edema, clubbing or cyanosis  Vasc: 2+ radial, DP pulses B/L  Neuro: AAOx3    MEDICATIONS:  MEDICATIONS  (STANDING):  dextrose 5% + sodium chloride 0.45%. 1000 milliLiter(s) (60 mL/Hr) IV Continuous <Continuous>  heparin   Injectable 5000 Unit(s) SubCutaneous every 8 hours  pantoprazole  Injectable 40 milliGRAM(s) IV Push daily  potassium chloride   Powder 20 milliEquivalent(s) Oral once  sertraline 50 milliGRAM(s) Oral at bedtime  tamsulosin 0.4 milliGRAM(s) Oral at bedtime    MEDICATIONS  (PRN):      ALLERGIES:  Allergies    No Known Allergies    Intolerances        LABS:                        8.3    3.15  )-----------( 250      ( 24 Jun 2023 05:30 )             28.9     06-24    138  |  104  |  5<L>  ----------------------------<  104<H>  3.7   |  25  |  0.90    Ca    8.2<L>      24 Jun 2023 05:30  Phos  3.9     06-24  Mg     2.1     06-24    TPro  6.6  /  Alb  3.4  /  TBili  0.3  /  DBili  x   /  AST  9<L>  /  ALT  7<L>  /  AlkPhos  55  06-24        RADIOLOGY & ADDITIONAL TESTS: Reviewed.

## 2023-06-24 NOTE — PROGRESS NOTE ADULT - ATTENDING COMMENTS
74yo man non-smoker, left handed, hx of colon ca more than 3 decades ago, localized had surgery done, no chemo, radiation ( family hx of colon ca )- PE 11 years ago treated with 6 months of Anticoagulation- , had CVA with Right hemiparesis/PE on September 2021- then was told heart ok , not Afib, is determined as thromboembolic stroke, has been on Elqiuis since then-  was noted to have anemia - admitted to OSH - where EGD/Colonoscopy was done ( polyp not removed ?-poor prep procedure aborted-) transferred for further care  record from osh (EGD/colonoscopy reviewed) sub-optimal prep, 6 mm polyp in TC, 8 mm in AC removed. 7 mm multilobulated polyp in Cecum not removed     pt seen with Dr. Herr, wife and care giver at bedside  seen sitting on bedside chair -praying  no chest pain  good air entry bilaterally -able to lean forward by himself for exam to lung.  no edema on lower ext.       Anemia -GI bleeding, likely from bleeding polyp- received transfusion , hb now above 8  - JAGUAR - ( iron saturating is 7 % single digit, iron is 25 , ferritin is 175 - though obtained after transfusion still considered JAGUAR-   please give iron Venofer 200 iv daily for 5 doses.  ( Eliquis held due to bleeding and anemia- LE dvt screen negative 6/22- evaluated by vascular note read-no plan for IVC  he was rec for long term AC as per doctor in [place] and pmd given recurrent PE -  - PE recurrent - first episode 11 yrs ago, second PE during the time of stroke - has been on eliquis now held due to anemia requiring transfusion    evaluated by cardiology - ECHO - EF 55-60 %, LA normal size, card note read, no further work up, CT chest to moderate to severe arthrosclerosis  waiting for EKG   - intermediate risk for low to intermediate risk procedure.  - fu CBC and transfuse prn     - CVA with right andres- stated was thromboembolic stroke    - currently ambulatory with walker , weaker now due to anemia/ hospitalization - need assist in sitting up in bed right now  - fu GI evaluation -2 day bowel prep - aim for Colonoscopy on Monday   - cw protonix iv daily  home meds resumed   sertraline 50 milliGRAM(s) Oral at bedtime  tamsulosin 0.4 milliGRAM(s) Oral at bedtime    - dvt prophylasix   thank you for allowing medicine to participate in the care, dw wife, Dr. Herr. 74yo man non-smoker, left handed, hx of colon ca more than 3 decades ago, localized had surgery done, no chemo, radiation ( family hx of colon ca )- PE 11 years ago treated with 6 months of Anticoagulation- , had CVA with Right hemiparesis/PE on September 2021- then was told heart ok , not Afib, is determined as thromboembolic stroke, has been on Elqiuis since then-  was noted to have anemia - admitted to OSH - where EGD/Colonoscopy was done ( polyp not removed ?-poor prep procedure aborted-) transferred for further care  record from osh (EGD/colonoscopy reviewed) sub-optimal prep, 6 mm polyp in TC, 8 mm in AC removed. 7 mm multilobulated polyp in Cecum not removed     pt seen with Dr. Herr, wife and care giver at bedside  seen sitting on bedside chair -praying  no chest pain  good air entry bilaterally -able to lean forward by himself for exam to lung.  no edema on lower ext.       Anemia -GI bleeding, likely from bleeding polyp- received transfusion , hb now above 8, CEA/CA 19-9 ( tumor marker negative )  - JAGUAR - ( iron saturating is 7 % single digit, iron is 25 , ferritin is 175 - though obtained after transfusion still considered JAGUAR-   please give iron Venofer 200 iv daily for 5 doses.  ( Eliquis held due to bleeding and anemia- LE dvt screen negative 6/22- evaluated by vascular note read-no plan for IVC  he was rec for long term AC as per doctor in [place] and pmd given recurrent PE -  - PE recurrent - first episode 11 yrs ago, second PE during the time of stroke - has been on eliquis now held due to anemia requiring transfusion    evaluated by cardiology - ECHO - EF 55-60 %, LA normal size, card note read, no further work up, CT chest to moderate to severe arthrosclerosis  waiting for EKG   - intermediate risk for low to intermediate risk procedure.  - fu CBC and transfuse prn     - CVA with right andres- stated was thromboembolic stroke    - currently ambulatory with walker , weaker now due to anemia/ hospitalization - need assist in sitting up in bed right now  - fu GI evaluation -2 day bowel prep - aim for Colonoscopy on Monday   - cw protonix iv daily  home meds resumed   sertraline 50 milliGRAM(s) Oral at bedtime  tamsulosin 0.4 milliGRAM(s) Oral at bedtime    - dvt prophylasix   thank you for allowing medicine to participate in the care, dw wife, Dr. Herr.

## 2023-06-24 NOTE — PROGRESS NOTE ADULT - ASSESSMENT
Patient seen and examined, discussed with surgery resident team.  No evidence of ongoing significant GI bleeding.  Tolerating liquids, no abdominal pain or nausea.  Starting a two day bowel prep today in anticipation of repeat colonoscopy with Dr. Jha Monday.

## 2023-06-24 NOTE — PROGRESS NOTE ADULT - SUBJECTIVE AND OBJECTIVE BOX
SUBJECTIVE:   Patient seen and examined on am rounds. No new complaints. Tolerated diet. OOB w assist. no bloody bm. -n-v-cp-sob.    Vital Signs Last 24 Hrs  T(C): 37.1 (24 Jun 2023 12:38), Max: 37.1 (24 Jun 2023 08:19)  T(F): 98.7 (24 Jun 2023 12:38), Max: 98.7 (24 Jun 2023 08:19)  HR: 78 (24 Jun 2023 12:38) (78 - 95)  BP: 129/83 (24 Jun 2023 12:38) (120/70 - 135/91)  BP(mean): --  RR: 18 (24 Jun 2023 12:38) (17 - 18)  SpO2: 95% (24 Jun 2023 12:38) (93% - 96%)    Parameters below as of 24 Jun 2023 12:38  Patient On (Oxygen Delivery Method): room air        I&O's Summary    23 Jun 2023 07:01  -  24 Jun 2023 07:00  --------------------------------------------------------  IN: 1920 mL / OUT: 650 mL / NET: 1270 mL    24 Jun 2023 07:01  -  24 Jun 2023 12:53  --------------------------------------------------------  IN: 660 mL / OUT: 200 mL / NET: 460 mL        Physical Exam:  General Appearance: Appears well, NAD  Pulmonary: Nonlabored breathing, no respiratory distress  Cardiovascular: NSR  Abdomen: Soft, nondistended, nontender.  Extremities: WWP, SCD's in place     LABS:                        8.3    3.15  )-----------( 250      ( 24 Jun 2023 05:30 )             28.9     06-24    138  |  104  |  5<L>  ----------------------------<  104<H>  3.7   |  25  |  0.90    Ca    8.2<L>      24 Jun 2023 05:30  Phos  3.9     06-24  Mg     2.1     06-24    TPro  6.6  /  Alb  3.4  /  TBili  0.3  /  DBili  x   /  AST  9<L>  /  ALT  7<L>  /  AlkPhos  55  06-24      Urinalysis Basic - ( 24 Jun 2023 05:30 )    Color: x / Appearance: x / SG: x / pH: x  Gluc: 104 mg/dL / Ketone: x  / Bili: x / Urobili: x   Blood: x / Protein: x / Nitrite: x   Leuk Esterase: x / RBC: x / WBC x   Sq Epi: x / Non Sq Epi: x / Bacteria: x

## 2023-06-25 LAB
ALBUMIN SERPL ELPH-MCNC: 3 G/DL — LOW (ref 3.3–5)
ALP SERPL-CCNC: 52 U/L — SIGNIFICANT CHANGE UP (ref 40–120)
ALT FLD-CCNC: 7 U/L — LOW (ref 10–45)
ANION GAP SERPL CALC-SCNC: 9 MMOL/L — SIGNIFICANT CHANGE UP (ref 5–17)
AST SERPL-CCNC: 10 U/L — SIGNIFICANT CHANGE UP (ref 10–40)
BILIRUB SERPL-MCNC: 0.3 MG/DL — SIGNIFICANT CHANGE UP (ref 0.2–1.2)
BUN SERPL-MCNC: 3 MG/DL — LOW (ref 7–23)
CALCIUM SERPL-MCNC: 7.8 MG/DL — LOW (ref 8.4–10.5)
CHLORIDE SERPL-SCNC: 108 MMOL/L — SIGNIFICANT CHANGE UP (ref 96–108)
CO2 SERPL-SCNC: 23 MMOL/L — SIGNIFICANT CHANGE UP (ref 22–31)
CREAT SERPL-MCNC: 0.78 MG/DL — SIGNIFICANT CHANGE UP (ref 0.5–1.3)
EGFR: 95 ML/MIN/1.73M2 — SIGNIFICANT CHANGE UP
GLUCOSE SERPL-MCNC: 116 MG/DL — HIGH (ref 70–99)
HCT VFR BLD CALC: 27.6 % — LOW (ref 39–50)
HGB BLD-MCNC: 7.8 G/DL — LOW (ref 13–17)
MAGNESIUM SERPL-MCNC: 2 MG/DL — SIGNIFICANT CHANGE UP (ref 1.6–2.6)
MCHC RBC-ENTMCNC: 20.2 PG — LOW (ref 27–34)
MCHC RBC-ENTMCNC: 28.3 GM/DL — LOW (ref 32–36)
MCV RBC AUTO: 71.3 FL — LOW (ref 80–100)
NRBC # BLD: 0 /100 WBCS — SIGNIFICANT CHANGE UP (ref 0–0)
PHOSPHATE SERPL-MCNC: 3 MG/DL — SIGNIFICANT CHANGE UP (ref 2.5–4.5)
PLATELET # BLD AUTO: 269 K/UL — SIGNIFICANT CHANGE UP (ref 150–400)
POTASSIUM SERPL-MCNC: 3.7 MMOL/L — SIGNIFICANT CHANGE UP (ref 3.5–5.3)
POTASSIUM SERPL-SCNC: 3.7 MMOL/L — SIGNIFICANT CHANGE UP (ref 3.5–5.3)
PROT SERPL-MCNC: 6.3 G/DL — SIGNIFICANT CHANGE UP (ref 6–8.3)
RBC # BLD: 3.87 M/UL — LOW (ref 4.2–5.8)
RBC # FLD: 30.9 % — HIGH (ref 10.3–14.5)
SODIUM SERPL-SCNC: 140 MMOL/L — SIGNIFICANT CHANGE UP (ref 135–145)
WBC # BLD: 4.85 K/UL — SIGNIFICANT CHANGE UP (ref 3.8–10.5)
WBC # FLD AUTO: 4.85 K/UL — SIGNIFICANT CHANGE UP (ref 3.8–10.5)

## 2023-06-25 PROCEDURE — 99232 SBSQ HOSP IP/OBS MODERATE 35: CPT

## 2023-06-25 RX ORDER — SOD SULF/SODIUM/NAHCO3/KCL/PEG
4000 SOLUTION, RECONSTITUTED, ORAL ORAL ONCE
Refills: 0 | Status: COMPLETED | OUTPATIENT
Start: 2023-06-25 | End: 2023-06-25

## 2023-06-25 RX ORDER — POTASSIUM CHLORIDE 20 MEQ
40 PACKET (EA) ORAL ONCE
Refills: 0 | Status: COMPLETED | OUTPATIENT
Start: 2023-06-25 | End: 2023-06-25

## 2023-06-25 RX ADMIN — SODIUM CHLORIDE 130 MILLILITER(S): 9 INJECTION, SOLUTION INTRAVENOUS at 21:09

## 2023-06-25 RX ADMIN — TAMSULOSIN HYDROCHLORIDE 0.4 MILLIGRAM(S): 0.4 CAPSULE ORAL at 21:08

## 2023-06-25 RX ADMIN — Medication 4000 MILLILITER(S): at 10:12

## 2023-06-25 RX ADMIN — SODIUM CHLORIDE 130 MILLILITER(S): 9 INJECTION, SOLUTION INTRAVENOUS at 17:13

## 2023-06-25 RX ADMIN — SODIUM CHLORIDE 130 MILLILITER(S): 9 INJECTION, SOLUTION INTRAVENOUS at 00:36

## 2023-06-25 RX ADMIN — PANTOPRAZOLE SODIUM 40 MILLIGRAM(S): 20 TABLET, DELAYED RELEASE ORAL at 12:19

## 2023-06-25 RX ADMIN — HEPARIN SODIUM 5000 UNIT(S): 5000 INJECTION INTRAVENOUS; SUBCUTANEOUS at 05:30

## 2023-06-25 RX ADMIN — SERTRALINE 50 MILLIGRAM(S): 25 TABLET, FILM COATED ORAL at 21:08

## 2023-06-25 RX ADMIN — Medication 40 MILLIEQUIVALENT(S): at 10:11

## 2023-06-25 RX ADMIN — HEPARIN SODIUM 5000 UNIT(S): 5000 INJECTION INTRAVENOUS; SUBCUTANEOUS at 13:14

## 2023-06-25 RX ADMIN — SODIUM CHLORIDE 130 MILLILITER(S): 9 INJECTION, SOLUTION INTRAVENOUS at 10:12

## 2023-06-25 RX ADMIN — HEPARIN SODIUM 5000 UNIT(S): 5000 INJECTION INTRAVENOUS; SUBCUTANEOUS at 21:08

## 2023-06-25 RX ADMIN — IRON SUCROSE 200 MILLIGRAM(S): 20 INJECTION, SOLUTION INTRAVENOUS at 19:00

## 2023-06-25 RX ADMIN — Medication 10 MILLIGRAM(S): at 13:14

## 2023-06-25 NOTE — PROGRESS NOTE ADULT - ASSESSMENT
Tolerating the first part of the bowel prep yesterday, moving bowels with very liquid output, not clear yet.  Will complete second half of bowel prep today, plan for colonoscopy with Dr. Jha tomorrow.

## 2023-06-25 NOTE — PROGRESS NOTE ADULT - SUBJECTIVE AND OBJECTIVE BOX
SUBJECTIVE: Pt seen and examined by chief resident. Pt is doing well, resting comfortably on bed. Tolerating bowel prep, having liquid BMs, still brown. No nausea or vomiting. No complaints at this time.    Vital Signs Last 24 Hrs  T(C): 37.1 (25 Jun 2023 05:48), Max: 37.6 (24 Jun 2023 21:46)  T(F): 98.7 (25 Jun 2023 05:48), Max: 99.7 (24 Jun 2023 21:46)  HR: 87 (25 Jun 2023 05:48) (78 - 124)  BP: 129/80 (25 Jun 2023 05:48) (120/79 - 144/82)  BP(mean): --  RR: 18 (25 Jun 2023 05:48) (17 - 18)  SpO2: 95% (25 Jun 2023 05:48) (93% - 95%)    Parameters below as of 25 Jun 2023 05:48  Patient On (Oxygen Delivery Method): room air        I&O's Summary    24 Jun 2023 07:01  -  25 Jun 2023 07:00  --------------------------------------------------------  IN: 3660 mL / OUT: 200 mL / NET: 3460 mL        Physical Exam:  General Appearance: Appears well, NAD  Pulmonary: Nonlabored breathing, no respiratory distress  Cardiovascular: NSR  Abdomen: Soft, nondisteded, nontender  Extremities: WWP, SCD's in place     LABS:                        7.8    4.85  )-----------( 269      ( 25 Jun 2023 08:05 )             27.6     06-24    144  |  108  |  4<L>  ----------------------------<  152<H>  4.0   |  23  |  0.82    Ca    8.4      24 Jun 2023 21:49  Phos  3.9     06-24  Mg     2.1     06-24    TPro  6.6  /  Alb  3.4  /  TBili  0.3  /  DBili  x   /  AST  9<L>  /  ALT  7<L>  /  AlkPhos  55  06-24      Urinalysis Basic - ( 24 Jun 2023 21:49 )    Color: x / Appearance: x / SG: x / pH: x  Gluc: 152 mg/dL / Ketone: x  / Bili: x / Urobili: x   Blood: x / Protein: x / Nitrite: x   Leuk Esterase: x / RBC: x / WBC x   Sq Epi: x / Non Sq Epi: x / Bacteria: x

## 2023-06-25 NOTE — PROGRESS NOTE ADULT - SUBJECTIVE AND OBJECTIVE BOX
TOPHER CHAO , [id number],  [facility] [id number]    Time of encounter  9:50 am.     seen with care giver at bedside.  he appear comfortable, praying  on 2 day bowel prep  per care giver the stool is mostly yellow brown color.  hb stable.       T(C): 36.7 (06-25-23 @ 13:04), Max: 37.6 (06-24-23 @ 21:46)  HR: 82 (06-25-23 @ 13:04) (82 - 124)  BP: 145/84 (06-25-23 @ 13:04) (120/79 - 145/84)  RR: 17 (06-25-23 @ 13:04) (17 - 18)  SpO2: 94% (06-25-23 @ 13:04) (93% - 95%)    bisacodyl Suppository 10 milliGRAM(s) Rectal at bedtime  dextrose 5% + sodium chloride 0.45%. 1000 milliLiter(s) IV Continuous <Continuous>  heparin   Injectable 5000 Unit(s) SubCutaneous every 8 hours  iron sucrose Injectable 200 milliGRAM(s) IV Push every 24 hours  pantoprazole  Injectable 40 milliGRAM(s) IV Push daily  sertraline 50 milliGRAM(s) Oral at bedtime  tamsulosin 0.4 milliGRAM(s) Oral at bedtime      Physical Exam :    General exam :  sitting in bed, praying  RRR   good air entry bilaterally.   right hemiparesis    CBC Full  -  ( 25 Jun 2023 08:05 )  WBC Count : 4.85 K/uL  RBC Count : 3.87 M/uL  Hemoglobin : 7.8 g/dL  Hematocrit : 27.6 %  Platelet Count - Automated : 269 K/uL  Mean Cell Volume : 71.3 fl  Mean Cell Hemoglobin : 20.2 pg  Mean Cell Hemoglobin Concentration : 28.3 gm/dL  Auto Neutrophil # : x  Auto Lymphocyte # : x  Auto Monocyte # : x  Auto Eosinophil # : x  Auto Basophil # : x  Auto Neutrophil % : x  Auto Lymphocyte % : x  Auto Monocyte % : x  Auto Eosinophil % : x  Auto Basophil % : x    CARDIAC MARKERS ( 24 Jun 2023 21:49 )  x     / x     / 43 U/L / x     / 1.2 ng/mL      06-25    140  |  108  |  3<L>  ----------------------------<  116<H>  3.7   |  23  |  0.78    Ca    7.8<L>      25 Jun 2023 08:05  Phos  3.0     06-25  Mg     2.0     06-25    TPro  6.3  /  Alb  3.0<L>  /  TBili  0.3  /  DBili  x   /  AST  10  /  ALT  7<L>  /  AlkPhos  52  06-25    Daily     Daily   CAPILLARY BLOOD GLUCOSE          Urinalysis Basic - ( 25 Jun 2023 08:05 )    Color: x / Appearance: x / SG: x / pH: x  Gluc: 116 mg/dL / Ketone: x  / Bili: x / Urobili: x   Blood: x / Protein: x / Nitrite: x   Leuk Esterase: x / RBC: x / WBC x   Sq Epi: x / Non Sq Epi: x / Bacteria: x

## 2023-06-25 NOTE — PROGRESS NOTE ADULT - ASSESSMENT
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 Plan for c-scope for polp removal 6/26     - CLD/ IVF  - [id number] for cscope tomorrow  - Day 2 of bowel prep today for cscope  - Pain/nausea control prn  - Restart home medications: zoloft and flomax   - SCDs; holding SQH  - PPI  - f/u med recs  - 5d IV iron 200 started 6/24  - Dispo; PT LALITHA  (pt refused), home pt, OT pending

## 2023-06-25 NOTE — PROGRESS NOTE ADULT - ATTENDING COMMENTS
72yo man non-smoker, left handed, hx of colon ca more than 3 decades ago, localized had surgery done, no chemo, radiation ( family hx of colon ca )- PE 11 years ago treated with 6 months of Anticoagulation- , had CVA with Right hemiparesis/PE on September 2021- then was told heart ok , not Afib, is determined as thromboembolic stroke, has been on Elqiuis since then-  was noted to have anemia - admitted to OSH - where EGD/Colonoscopy was done ( polyp not removed ?-poor prep procedure aborted-) transferred for further care  record from osh (EGD/colonoscopy reviewed) sub-optimal prep, 6 mm polyp in TC, 8 mm in AC removed. 7 mm multilobulated polyp in Cecum not removed       Anemia -GI bleeding, likely from bleeding polyp- received transfusion ,   - CEA/CA 19-9 ( tumor marker negative )  - Hb 7.8 6/24   - JAGUAR - ( iron saturating is 7 % single digit, iron is 25 , ferritin is 175 - though obtained after transfusion still considered JAGUAR-   please give iron Venofer 200 iv daily for 5 doses.  ( Eliquis held due to bleeding and anemia- LE dvt screen negative 6/22- evaluated by vascular note read-no plan for IVC  he was rec for long term AC as per doctor in [place] and pmd given recurrent PE -  - PE recurrent - first episode 11 yrs ago, second PE during the time of stroke - has been on eliquis now held due to anemia requiring transfusion    evaluated by cardiology - ECHO - EF 55-60 %, LA normal size, card note read, no further work up, CT chest to moderate to severe arthrosclerosis  waiting for EKG   - intermediate risk for low to intermediate risk procedure.  - fu CBC and transfuse prn     - CVA with right andres- stated was thromboembolic stroke , no known hx of cardiac problem    - currently ambulatory with walker , weaker now due to anemia/ hospitalization - need assist in sitting up in bed right now  - fu GI evaluation -2 day bowel prep - aim for Colonoscopy on Monday   - cw protonix iv daily  home meds resumed   sertraline 50 milliGRAM(s) Oral at bedtime  tamsulosin 0.4 milliGRAM(s) Oral at bedtime    - dvt prophylasix   thank you for allowing medicine to participate in the care, dw care giver.

## 2023-06-26 LAB
ALBUMIN SERPL ELPH-MCNC: 3 G/DL — LOW (ref 3.3–5)
ALP SERPL-CCNC: 54 U/L — SIGNIFICANT CHANGE UP (ref 40–120)
ALT FLD-CCNC: 9 U/L — LOW (ref 10–45)
ANION GAP SERPL CALC-SCNC: 10 MMOL/L — SIGNIFICANT CHANGE UP (ref 5–17)
APTT BLD: 32.2 SEC — SIGNIFICANT CHANGE UP (ref 27.5–35.5)
AST SERPL-CCNC: 13 U/L — SIGNIFICANT CHANGE UP (ref 10–40)
BILIRUB SERPL-MCNC: 0.3 MG/DL — SIGNIFICANT CHANGE UP (ref 0.2–1.2)
BLD GP AB SCN SERPL QL: NEGATIVE — SIGNIFICANT CHANGE UP
BUN SERPL-MCNC: 2 MG/DL — LOW (ref 7–23)
CALCIUM SERPL-MCNC: 8.4 MG/DL — SIGNIFICANT CHANGE UP (ref 8.4–10.5)
CHLORIDE SERPL-SCNC: 109 MMOL/L — HIGH (ref 96–108)
CO2 SERPL-SCNC: 22 MMOL/L — SIGNIFICANT CHANGE UP (ref 22–31)
CREAT SERPL-MCNC: 0.82 MG/DL — SIGNIFICANT CHANGE UP (ref 0.5–1.3)
EGFR: 94 ML/MIN/1.73M2 — SIGNIFICANT CHANGE UP
GLUCOSE SERPL-MCNC: 100 MG/DL — HIGH (ref 70–99)
HCT VFR BLD CALC: 29.5 % — LOW (ref 39–50)
HGB BLD-MCNC: 8.3 G/DL — LOW (ref 13–17)
INR BLD: 1.29 — HIGH (ref 0.88–1.16)
MAGNESIUM SERPL-MCNC: 2 MG/DL — SIGNIFICANT CHANGE UP (ref 1.6–2.6)
MCHC RBC-ENTMCNC: 20.3 PG — LOW (ref 27–34)
MCHC RBC-ENTMCNC: 28.1 GM/DL — LOW (ref 32–36)
MCV RBC AUTO: 72.3 FL — LOW (ref 80–100)
NRBC # BLD: 0 /100 WBCS — SIGNIFICANT CHANGE UP (ref 0–0)
PHOSPHATE SERPL-MCNC: 2.8 MG/DL — SIGNIFICANT CHANGE UP (ref 2.5–4.5)
PLATELET # BLD AUTO: 275 K/UL — SIGNIFICANT CHANGE UP (ref 150–400)
POTASSIUM SERPL-MCNC: 4 MMOL/L — SIGNIFICANT CHANGE UP (ref 3.5–5.3)
POTASSIUM SERPL-SCNC: 4 MMOL/L — SIGNIFICANT CHANGE UP (ref 3.5–5.3)
PROT SERPL-MCNC: 6 G/DL — SIGNIFICANT CHANGE UP (ref 6–8.3)
PROTHROM AB SERPL-ACNC: 15.4 SEC — HIGH (ref 10.5–13.4)
RBC # BLD: 4.08 M/UL — LOW (ref 4.2–5.8)
RBC # FLD: 31.4 % — HIGH (ref 10.3–14.5)
RH IG SCN BLD-IMP: POSITIVE — SIGNIFICANT CHANGE UP
SODIUM SERPL-SCNC: 141 MMOL/L — SIGNIFICANT CHANGE UP (ref 135–145)
WBC # BLD: 3.31 K/UL — LOW (ref 3.8–10.5)
WBC # FLD AUTO: 3.31 K/UL — LOW (ref 3.8–10.5)

## 2023-06-26 PROCEDURE — 99232 SBSQ HOSP IP/OBS MODERATE 35: CPT

## 2023-06-26 PROCEDURE — 88305 TISSUE EXAM BY PATHOLOGIST: CPT | Mod: 26

## 2023-06-26 DEVICE — CLIP RESOLUTION 360 235CM: Type: IMPLANTABLE DEVICE | Status: FUNCTIONAL

## 2023-06-26 RX ADMIN — IRON SUCROSE 200 MILLIGRAM(S): 20 INJECTION, SOLUTION INTRAVENOUS at 18:39

## 2023-06-26 RX ADMIN — SODIUM CHLORIDE 130 MILLILITER(S): 9 INJECTION, SOLUTION INTRAVENOUS at 05:14

## 2023-06-26 RX ADMIN — HEPARIN SODIUM 5000 UNIT(S): 5000 INJECTION INTRAVENOUS; SUBCUTANEOUS at 22:14

## 2023-06-26 RX ADMIN — HEPARIN SODIUM 5000 UNIT(S): 5000 INJECTION INTRAVENOUS; SUBCUTANEOUS at 05:15

## 2023-06-26 RX ADMIN — SERTRALINE 50 MILLIGRAM(S): 25 TABLET, FILM COATED ORAL at 22:14

## 2023-06-26 RX ADMIN — SODIUM CHLORIDE 60 MILLILITER(S): 9 INJECTION, SOLUTION INTRAVENOUS at 22:14

## 2023-06-26 RX ADMIN — Medication 10 MILLIGRAM(S): at 22:15

## 2023-06-26 RX ADMIN — HEPARIN SODIUM 5000 UNIT(S): 5000 INJECTION INTRAVENOUS; SUBCUTANEOUS at 13:59

## 2023-06-26 RX ADMIN — TAMSULOSIN HYDROCHLORIDE 0.4 MILLIGRAM(S): 0.4 CAPSULE ORAL at 22:15

## 2023-06-26 RX ADMIN — PANTOPRAZOLE SODIUM 40 MILLIGRAM(S): 20 TABLET, DELAYED RELEASE ORAL at 13:00

## 2023-06-26 RX ADMIN — SODIUM CHLORIDE 60 MILLILITER(S): 9 INJECTION, SOLUTION INTRAVENOUS at 13:01

## 2023-06-26 NOTE — PROGRESS NOTE ADULT - SUBJECTIVE AND OBJECTIVE BOX
SUBJECTIVE: Pt seen and examined at bedside with chief. Pt denies any complaints. Pain well controlled. Tolerated golyetly last night, admits of clear Bms    MEDICATIONS  (STANDING):  bisacodyl Suppository 10 milliGRAM(s) Rectal at bedtime  dextrose 5% + sodium chloride 0.45%. 1000 milliLiter(s) (130 mL/Hr) IV Continuous <Continuous>  heparin   Injectable 5000 Unit(s) SubCutaneous every 8 hours  iron sucrose Injectable 200 milliGRAM(s) IV Push every 24 hours  pantoprazole  Injectable 40 milliGRAM(s) IV Push daily  sertraline 50 milliGRAM(s) Oral at bedtime  tamsulosin 0.4 milliGRAM(s) Oral at bedtime    MEDICATIONS  (PRN):      Vital Signs Last 24 Hrs  T(C): 37.1 (26 Jun 2023 04:32), Max: 37.1 (26 Jun 2023 04:32)  T(F): 98.7 (26 Jun 2023 04:32), Max: 98.7 (26 Jun 2023 04:32)  HR: 91 (26 Jun 2023 04:32) (74 - 91)  BP: 142/90 (26 Jun 2023 04:32) (128/78 - 145/84)  BP(mean): 91 (25 Jun 2023 22:41) (91 - 91)  RR: 17 (26 Jun 2023 04:32) (17 - 18)  SpO2: 93% (26 Jun 2023 04:32) (93% - 96%)    Parameters below as of 26 Jun 2023 04:32  Patient On (Oxygen Delivery Method): room air        PHYSICAL EXAM:      Constitutional: A&Ox3    Respiratory: non labored breathing, no respiratory distress    Cardiovascular: NSR, RRR    Gastrointestinal: Soft ND, NT, no rebound or guarding    Genitourinary: Voiding     Extremities: (-) edema                  I&O's Detail    25 Jun 2023 07:01  -  26 Jun 2023 07:00  --------------------------------------------------------  IN:    dextrose 5% + sodium chloride 0.45%: 1560 mL    Oral Fluid: 180 mL  Total IN: 1740 mL    OUT:    Voided (mL): 1050 mL  Total OUT: 1050 mL    Total NET: 690 mL          LABS:                        8.3    3.31  )-----------( 275      ( 26 Jun 2023 05:30 )             29.5     06-26    141  |  109<H>  |  2<L>  ----------------------------<  100<H>  4.0   |  22  |  0.82    Ca    8.4      26 Jun 2023 05:30  Phos  2.8     06-26  Mg     2.0     06-26    TPro  6.0  /  Alb  3.0<L>  /  TBili  0.3  /  DBili  x   /  AST  13  /  ALT  9<L>  /  AlkPhos  54  06-26    PT/INR - ( 26 Jun 2023 05:30 )   PT: 15.4 sec;   INR: 1.29          PTT - ( 26 Jun 2023 05:30 )  PTT:32.2 sec  Urinalysis Basic - ( 26 Jun 2023 05:30 )    Color: x / Appearance: x / SG: x / pH: x  Gluc: 100 mg/dL / Ketone: x  / Bili: x / Urobili: x   Blood: x / Protein: x / Nitrite: x   Leuk Esterase: x / RBC: x / WBC x   Sq Epi: x / Non Sq Epi: x / Bacteria: x        RADIOLOGY & ADDITIONAL STUDIES:

## 2023-06-26 NOTE — PROGRESS NOTE ADULT - ASSESSMENT
74 y/o M w/ PMHx of Mejia syndrome, stroke (R sided deficits), PE (Jan 2022, on Eliquis), BPH, depression, and diverticulosis and PSHx of colon tumor removal (1988) presents as a transfer from [facility] for management of GI bleed. Plan for c-scope for polp removal today    NPO for c-scope, may adv post procedure  IVF  Pain/nausea control prn  Restart home medications: zoloft and flomax   5d IV iron 200 started 6/24  PPI  SCDs; holding SQH  f/u med recs  f/u cards consult   f/u vascular consult for possible IVC filter placement  Dispo; PT LALITHA  (pt refused), home pt, OT pending

## 2023-06-26 NOTE — PROGRESS NOTE ADULT - ATTENDING COMMENTS
74yo man non-smoker, left handed, hx of colon ca more than 3 decades ago, localized had surgery done, no chemo, radiation ( family hx of colon ca )- PE 11 years ago treated with 6 months of Anticoagulation- , had CVA with Right hemiparesis/PE on September 2021- then was told heart ok , not Afib, is determined as thromboembolic stroke, has been on Elqiuis since then-  was noted to have anemia - admitted to OSH - where EGD/Colonoscopy was done ( polyp not removed ?-poor prep procedure aborted-) transferred for further care  record from osh (EGD/colonoscopy reviewed) sub-optimal prep, 6 mm polyp in TC, 8 mm in AC removed. 7 mm multilobulated polyp in Cecum not removed     had colonoscopy today, 5 polyp removed, could not reach cecum polyp  plan for capsule endoscopy tomorrow  pt seen post procedure  tolerating oral jelly - hb is 7.8 today   good air entry bilaterally, RRR   right hemiparesis       Anemia -GI bleeding, likely from bleeding polyp- received transfusion , - acute blood loss anemia   - CEA/CA 19-9 ( tumor marker negative )  - Hb 7.8 6/24 , 7.8 6/26  - JAGUAR - ( iron saturating is 7 % single digit, iron is 25 , ferritin is 175 - though obtained after transfusion still considered JAGUAR-   please give iron Venofer 200 iv daily for 5 doses.  ( Eliquis held due to bleeding and anemia- LE dvt screen negative 6/22- evaluated by vascular note read-no plan for IVC  he was rec for long term AC as per doctor in [place] and pmd given recurrent PE -  - PE recurrent - first episode 11 yrs ago, second PE during the time of stroke - has been on eliquis now held due to anemia requiring transfusion    evaluated by cardiology - ECHO - EF 55-60 %, LA normal size, card note read, no further work up, CT chest to moderate to severe arthrosclerosis  waiting for EKG   - intermediate risk for low to intermediate risk procedure.  - fu CBC and transfuse prn     - CVA with right hemiparesis - stated was thromboembolic stroke , no known hx of cardiac problem  physical therapy evaluation.     - prior to admission- ambulatory with walker , weaker now due to anemia/ hospitalization - need assist in sitting up in bed right now  - fu GI evaluation -2 day bowel prep - aim for Colonoscopy again on Wednesday ( to reach cecum polyp- per wife trying to avoid surgical option )  - cw protonix iv daily  home meds resumed   sertraline 50 milliGRAM(s) Oral at bedtime  tamsulosin 0.4 milliGRAM(s) Oral at bedtime    - dvt prophylasix   thank you for allowing medicine to participate in the care, dw wife, GI fellow. .

## 2023-06-26 NOTE — CHART NOTE - NSCHARTNOTEFT_GEN_A_CORE
Patient is s/p Colonoscopy performed in Endoscopy     Findings:  Two sessile polyps ranging in size from 8 mm to 1.4 cm were found in the descending colon and transverse colon. A single-piece polypectomy was performed using a hot snare over a saline pillow in the descending colon and transverse colon. The polyps were completely removed. The polyps were completely retrieved. One endoclip was successfully applied to the transverse colon for the purpose of hemostasis.	  Two sessile polyps ranging in size from 6 mm to 1.2 cm were found in the ascending colon. In an area of prior polypectomy, overlying tattoo and stellate scar. A single-piece polypectomy was performed using a cold snare in the ascending colon. The polyps were completely removed.	  The colon was extremely lengthy and had multiple loops, requiring extensive abdominal pressure, change in patient position, however, cecum was identified but unable to be intubated.  There were 3 cecal polyps identified, one of which was partially resected with Cold Snare  colonoscopy was unable to be completed despite extensive maneuvering and multiple endoscopists involvement    Plan:  Clear Liquid Diet  Avoid AC / Antiplatelets for 3 days  NPO at MN for VCE tomorrow  Repeat GoLYTELY tomorrow evening for repeat Colonoscopy 6/28/23    Thank you for the courtesy of this consult. We will follow along with you.    Daniel David M.D.  Gastroenterology Fellow  Weekday 7am-5pm Pager: [phone]  Weeknights/Weekend/Holiday Coverage: Please Call the  for contact info  Follow Up Questions welcome via [place] [employer] Teams Messenger.

## 2023-06-26 NOTE — PROGRESS NOTE ADULT - ASSESSMENT
72yo man PMHx CVA (R sided deficits), PE (on eliquis, last taken on 6/18), BPH, depression, diverticulosis, PSHx of colon tumor (s/p ?endoscopic removal 1998 at OSH), who presents as a transfer from OSH for management of LGIB, medicine team consulted for co-management.    Plan/Recommendations:  #LGIB  Pt transferred from [facility] for concern LGIB, underwent colonoscopy however procedure aborted prior to completion since transferred here to [facility] for repeat endoscopic eval  - underwent scope 6/26 w/ removal of 4 polyps however unable to reach last polyp, planned to undergo capsule endocopy on 6/28  - agree with holding home Eliquis    #Hx DVT/PE  Pt reports remote hx DVT/PE 11 years w/ recurrence Sept 2021 DVT and PE c/b thromboembolic stroke since w/ residual R sided weakness, and since persistently on Eliquis 5mg BID. TTE 6/22 negative for signs of afib or LA dilatation, otherwise moderate MR and normal EF.  - agree with holding home Eliquis for now in setting of active GI bleed  - after stabilization of active bleed will weight risk vs. benefit of whether to restart Eliquis at reduced dose vs. consideration of IVC filter based on bleeding risk vs. thromboembolic risk

## 2023-06-26 NOTE — PROGRESS NOTE ADULT - SUBJECTIVE AND OBJECTIVE BOX
SUBJECTIVE/OVERNIGHT EVENTS: No acute overnight events. Pt seen in AM at bedside, resting comfortably in bed, and does not appear to be in any acute distress. When asked, pt denies any recent or active fever, chills, nausea, vomiting, headache, acute sob, chest pain, abdominal pain, genitourinary sx, extremity pain or swelling.    VITAL SIGNS:  Vital Signs Last 24 Hrs  T(C): 36.4 (26 Jun 2023 13:27), Max: 37.1 (26 Jun 2023 04:32)  T(F): 97.6 (26 Jun 2023 13:27), Max: 98.7 (26 Jun 2023 04:32)  HR: 67 (26 Jun 2023 13:27) (67 - 91)  BP: 133/83 (26 Jun 2023 13:27) (128/78 - 142/90)  BP(mean): 91 (25 Jun 2023 22:41) (91 - 91)  RR: 17 (26 Jun 2023 13:27) (17 - 18)  SpO2: 95% (26 Jun 2023 13:27) (93% - 96%)    Parameters below as of 26 Jun 2023 13:27  Patient On (Oxygen Delivery Method): room air        PHYSICAL EXAM:  General: NAD; speaking in full sentences  HEENT: NC/AT; PERRL; EOMI; MMM  Neck: supple; no JVD  Cardiac: RRR; +S1/S2  Pulm: CTA B/L; no W/R/R  GI: soft, NT/ND, +BS  Extremities: WWP; no edema, clubbing or cyanosis  Vasc: 2+ radial, DP pulses B/L  Neuro: AAOx3; no focal deficits    MEDICATIONS:  MEDICATIONS  (STANDING):  bisacodyl Suppository 10 milliGRAM(s) Rectal at bedtime  dextrose 5% + sodium chloride 0.45%. 1000 milliLiter(s) (60 mL/Hr) IV Continuous <Continuous>  heparin   Injectable 5000 Unit(s) SubCutaneous every 8 hours  iron sucrose Injectable 200 milliGRAM(s) IV Push every 24 hours  pantoprazole  Injectable 40 milliGRAM(s) IV Push daily  sertraline 50 milliGRAM(s) Oral at bedtime  tamsulosin 0.4 milliGRAM(s) Oral at bedtime    MEDICATIONS  (PRN):      ALLERGIES:  Allergies    No Known Allergies    Intolerances        LABS:                        8.3    3.31  )-----------( 275      ( 26 Jun 2023 05:30 )             29.5     06-26    141  |  109<H>  |  2<L>  ----------------------------<  100<H>  4.0   |  22  |  0.82    Ca    8.4      26 Jun 2023 05:30  Phos  2.8     06-26  Mg     2.0     06-26    TPro  6.0  /  Alb  3.0<L>  /  TBili  0.3  /  DBili  x   /  AST  13  /  ALT  9<L>  /  AlkPhos  54  06-26    PT/INR - ( 26 Jun 2023 05:30 )   PT: 15.4 sec;   INR: 1.29          PTT - ( 26 Jun 2023 05:30 )  PTT:32.2 sec    RADIOLOGY & ADDITIONAL TESTS: Reviewed.

## 2023-06-27 ENCOUNTER — TRANSCRIPTION ENCOUNTER (OUTPATIENT)
Age: 73
End: 2023-06-27

## 2023-06-27 DIAGNOSIS — D50.0 IRON DEFICIENCY ANEMIA SECONDARY TO BLOOD LOSS (CHRONIC): ICD-10-CM

## 2023-06-27 LAB
ANION GAP SERPL CALC-SCNC: 10 MMOL/L — SIGNIFICANT CHANGE UP (ref 5–17)
ANISOCYTOSIS BLD QL: SIGNIFICANT CHANGE UP
APPEARANCE UR: CLEAR — SIGNIFICANT CHANGE UP
BILIRUB UR-MCNC: NEGATIVE — SIGNIFICANT CHANGE UP
BUN SERPL-MCNC: 3 MG/DL — LOW (ref 7–23)
BURR CELLS BLD QL SMEAR: PRESENT — SIGNIFICANT CHANGE UP
CALCIUM SERPL-MCNC: 8.9 MG/DL — SIGNIFICANT CHANGE UP (ref 8.4–10.5)
CHLORIDE SERPL-SCNC: 104 MMOL/L — SIGNIFICANT CHANGE UP (ref 96–108)
CO2 SERPL-SCNC: 22 MMOL/L — SIGNIFICANT CHANGE UP (ref 22–31)
COLOR SPEC: YELLOW — SIGNIFICANT CHANGE UP
CREAT SERPL-MCNC: 0.82 MG/DL — SIGNIFICANT CHANGE UP (ref 0.5–1.3)
DACRYOCYTES BLD QL SMEAR: SLIGHT — SIGNIFICANT CHANGE UP
DIFF PNL FLD: NEGATIVE — SIGNIFICANT CHANGE UP
EGFR: 94 ML/MIN/1.73M2 — SIGNIFICANT CHANGE UP
FERRITIN SERPL-MCNC: 571 NG/ML — HIGH (ref 30–400)
GLUCOSE SERPL-MCNC: 103 MG/DL — HIGH (ref 70–99)
GLUCOSE UR QL: NEGATIVE — SIGNIFICANT CHANGE UP
HAPTOGLOB SERPL-MCNC: 225 MG/DL — HIGH (ref 34–200)
HCT VFR BLD CALC: 33.1 % — LOW (ref 39–50)
HGB BLD-MCNC: 9.3 G/DL — LOW (ref 13–17)
HYPOCHROMIA BLD QL: SIGNIFICANT CHANGE UP
IRON SATN MFR SERPL: 18 % — SIGNIFICANT CHANGE UP (ref 16–55)
IRON SATN MFR SERPL: 61 UG/DL — SIGNIFICANT CHANGE UP (ref 45–165)
KETONES UR-MCNC: NEGATIVE — SIGNIFICANT CHANGE UP
LDH SERPL L TO P-CCNC: 126 U/L — SIGNIFICANT CHANGE UP (ref 50–242)
LEUKOCYTE ESTERASE UR-ACNC: NEGATIVE — SIGNIFICANT CHANGE UP
MACROCYTES BLD QL: SLIGHT — SIGNIFICANT CHANGE UP
MAGNESIUM SERPL-MCNC: 1.9 MG/DL — SIGNIFICANT CHANGE UP (ref 1.6–2.6)
MANUAL SMEAR VERIFICATION: SIGNIFICANT CHANGE UP
MCHC RBC-ENTMCNC: 20.5 PG — LOW (ref 27–34)
MCHC RBC-ENTMCNC: 28.1 GM/DL — LOW (ref 32–36)
MCV RBC AUTO: 73.1 FL — LOW (ref 80–100)
MICROCYTES BLD QL: SIGNIFICANT CHANGE UP
NITRITE UR-MCNC: NEGATIVE — SIGNIFICANT CHANGE UP
NRBC # BLD: 0 /100 WBCS — SIGNIFICANT CHANGE UP (ref 0–0)
OVALOCYTES BLD QL SMEAR: SIGNIFICANT CHANGE UP
PH UR: 6 — SIGNIFICANT CHANGE UP (ref 5–8)
PHOSPHATE SERPL-MCNC: 3.4 MG/DL — SIGNIFICANT CHANGE UP (ref 2.5–4.5)
PLAT MORPH BLD: ABNORMAL
PLATELET # BLD AUTO: 296 K/UL — SIGNIFICANT CHANGE UP (ref 150–400)
POIKILOCYTOSIS BLD QL AUTO: SIGNIFICANT CHANGE UP
POLYCHROMASIA BLD QL SMEAR: SLIGHT — SIGNIFICANT CHANGE UP
POTASSIUM SERPL-MCNC: 3.8 MMOL/L — SIGNIFICANT CHANGE UP (ref 3.5–5.3)
POTASSIUM SERPL-SCNC: 3.8 MMOL/L — SIGNIFICANT CHANGE UP (ref 3.5–5.3)
PROT UR-MCNC: NEGATIVE MG/DL — SIGNIFICANT CHANGE UP
RBC # BLD: 4.53 M/UL — SIGNIFICANT CHANGE UP (ref 4.2–5.8)
RBC # FLD: 31.8 % — HIGH (ref 10.3–14.5)
RBC BLD AUTO: ABNORMAL
SCHISTOCYTES BLD QL AUTO: SIGNIFICANT CHANGE UP
SODIUM SERPL-SCNC: 136 MMOL/L — SIGNIFICANT CHANGE UP (ref 135–145)
SP GR SPEC: 1.01 — SIGNIFICANT CHANGE UP (ref 1–1.03)
SPHEROCYTES BLD QL SMEAR: SLIGHT — SIGNIFICANT CHANGE UP
TARGETS BLD QL SMEAR: SLIGHT — SIGNIFICANT CHANGE UP
TIBC SERPL-MCNC: 348 UG/DL — SIGNIFICANT CHANGE UP (ref 220–430)
TRANSFERRIN SERPL-MCNC: 284 MG/DL — SIGNIFICANT CHANGE UP (ref 200–360)
UIBC SERPL-MCNC: 287 UG/DL — SIGNIFICANT CHANGE UP (ref 110–370)
UROBILINOGEN FLD QL: 0.2 E.U./DL — SIGNIFICANT CHANGE UP
WBC # BLD: 4.92 K/UL — SIGNIFICANT CHANGE UP (ref 3.8–10.5)
WBC # FLD AUTO: 4.92 K/UL — SIGNIFICANT CHANGE UP (ref 3.8–10.5)

## 2023-06-27 PROCEDURE — 99222 1ST HOSP IP/OBS MODERATE 55: CPT

## 2023-06-27 PROCEDURE — 99232 SBSQ HOSP IP/OBS MODERATE 35: CPT | Mod: GC

## 2023-06-27 PROCEDURE — 99232 SBSQ HOSP IP/OBS MODERATE 35: CPT

## 2023-06-27 RX ORDER — POTASSIUM CHLORIDE 20 MEQ
20 PACKET (EA) ORAL ONCE
Refills: 0 | Status: COMPLETED | OUTPATIENT
Start: 2023-06-27 | End: 2023-06-27

## 2023-06-27 RX ORDER — HEPARIN SODIUM 5000 [USP'U]/ML
5000 INJECTION INTRAVENOUS; SUBCUTANEOUS ONCE
Refills: 0 | Status: COMPLETED | OUTPATIENT
Start: 2023-06-27 | End: 2023-06-27

## 2023-06-27 RX ORDER — SOD SULF/SODIUM/NAHCO3/KCL/PEG
4000 SOLUTION, RECONSTITUTED, ORAL ORAL ONCE
Refills: 0 | Status: COMPLETED | OUTPATIENT
Start: 2023-06-27 | End: 2023-06-27

## 2023-06-27 RX ADMIN — SERTRALINE 50 MILLIGRAM(S): 25 TABLET, FILM COATED ORAL at 22:25

## 2023-06-27 RX ADMIN — Medication 10 MILLIGRAM(S): at 22:25

## 2023-06-27 RX ADMIN — Medication 20 MILLIEQUIVALENT(S): at 10:46

## 2023-06-27 RX ADMIN — SODIUM CHLORIDE 125 MILLILITER(S): 9 INJECTION, SOLUTION INTRAVENOUS at 13:42

## 2023-06-27 RX ADMIN — TAMSULOSIN HYDROCHLORIDE 0.4 MILLIGRAM(S): 0.4 CAPSULE ORAL at 22:25

## 2023-06-27 RX ADMIN — IRON SUCROSE 200 MILLIGRAM(S): 20 INJECTION, SOLUTION INTRAVENOUS at 18:29

## 2023-06-27 RX ADMIN — HEPARIN SODIUM 5000 UNIT(S): 5000 INJECTION INTRAVENOUS; SUBCUTANEOUS at 13:42

## 2023-06-27 RX ADMIN — PANTOPRAZOLE SODIUM 40 MILLIGRAM(S): 20 TABLET, DELAYED RELEASE ORAL at 13:42

## 2023-06-27 RX ADMIN — HEPARIN SODIUM 5000 UNIT(S): 5000 INJECTION INTRAVENOUS; SUBCUTANEOUS at 22:25

## 2023-06-27 RX ADMIN — SODIUM CHLORIDE 125 MILLILITER(S): 9 INJECTION, SOLUTION INTRAVENOUS at 04:44

## 2023-06-27 RX ADMIN — Medication 4000 MILLILITER(S): at 16:42

## 2023-06-27 RX ADMIN — HEPARIN SODIUM 5000 UNIT(S): 5000 INJECTION INTRAVENOUS; SUBCUTANEOUS at 05:01

## 2023-06-27 NOTE — CONSULT NOTE ADULT - SUBJECTIVE AND OBJECTIVE BOX
Hematology Oncology Consult Note      HPI:  72 y/o M w/ PMHx of stroke (R sided deficits), PE (Jan 2022, on Eliquis), BPH, depression, and diverticulosis and PSHx of colon tumor removal (1988) presents as a transfer from [facility] for management of GI bleed. Patient began to feel lethargic and wife noting him to be pale on 6/19. She held his dose of Eliquis that day and called his PCP. PCP ordered labs and Hgb resulted as 4.2. He was then instructed to go to OSH for further evaluation. He was transfused 3U PRBCs per wife. He had an EGD on 6/16 and no active bleed visualized. He had a colonoscopy on 6/19, which a large polyp was visualized but then aborted. He was then transferred to [facility] for further management. He denies nausea, vomiting, bloody bowel movements, melena, hematemesis, abdominal pain, dizziness, and unexpected weight loss over the last month. His home attendant does state that FREDI done at OSH was positive for blood. He has had a colonoscopy yearly since 41 y/o due to hx of colon tumor removal. No history of colon resection.  (22 Jun 2023 01:51)      Interval History: s/p colonoscopy    SUBJECTIVE: Patient seen and examined at bedside. Denies fever, headache, nausea, vomiting, chest pain, shortness of breath, abdominal pain, changes in bowel movements or urination.     OBJECTIVE:    VITAL SIGNS:  ICU Vital Signs Last 24 Hrs  T(C): 37.1 (27 Jun 2023 13:39), Max: 37.1 (27 Jun 2023 04:35)  T(F): 98.7 (27 Jun 2023 13:39), Max: 98.8 (27 Jun 2023 04:35)  HR: 89 (27 Jun 2023 13:39) (79 - 93)  BP: 118/82 (27 Jun 2023 13:39) (100/65 - 138/78)  BP(mean): --  ABP: --  ABP(mean): --  RR: 17 (27 Jun 2023 13:39) (17 - 17)  SpO2: 96% (27 Jun 2023 13:39) (93% - 96%)    O2 Parameters below as of 27 Jun 2023 13:39  Patient On (Oxygen Delivery Method): room air              06-26 @ 07:01 - 06-27 @ 07:00  --------------------------------------------------------  IN: 1020 mL / OUT: 900 mL / NET: 120 mL    06-27 @ 07:01 - 06-27 @ 17:22  --------------------------------------------------------  IN: 1275 mL / OUT: 900 mL / NET: 375 mL      CAPILLARY BLOOD GLUCOSE          PHYSICAL EXAM:  General: Pale, NAD, answering questions, pleasantly conversant  HEENT: NC/AT; PERRL, clear conjunctiva  Neck: supple  Respiratory: CTA b/l  Cardiovascular: +S1/S2; RRR  Abdomen: soft, NT/ND; +BS x4  Extremities: WWP, 2+ peripheral pulses b/l; no LE edema  Skin: normal color and turgor; no rash  Neurological: AAOx3    MEDICATIONS:  MEDICATIONS  (STANDING):  bisacodyl Suppository 10 milliGRAM(s) Rectal at bedtime  dextrose 5% + sodium chloride 0.45%. 1000 milliLiter(s) (125 mL/Hr) IV Continuous <Continuous>  heparin   Injectable 5000 Unit(s) SubCutaneous once  iron sucrose Injectable 200 milliGRAM(s) IV Push every 24 hours  pantoprazole  Injectable 40 milliGRAM(s) IV Push daily  sertraline 50 milliGRAM(s) Oral at bedtime  tamsulosin 0.4 milliGRAM(s) Oral at bedtime    MEDICATIONS  (PRN):      ALLERGIES:  Allergies    No Known Allergies    Intolerances        LABS:                        9.3    4.92  )-----------( 296      ( 27 Jun 2023 08:23 )             33.1     06-27    136  |  104  |  3<L>  ----------------------------<  103<H>  3.8   |  22  |  0.82    Ca    8.9      27 Jun 2023 08:23  Phos  3.4     06-27  Mg     1.9     06-27    TPro  6.0  /  Alb  3.0<L>  /  TBili  0.3  /  DBili  x   /  AST  13  /  ALT  9<L>  /  AlkPhos  54  06-26    PT/INR - ( 26 Jun 2023 05:30 )   PT: 15.4 sec;   INR: 1.29          PTT - ( 26 Jun 2023 05:30 )  PTT:32.2 sec  Urinalysis Basic - ( 27 Jun 2023 08:23 )    Color: x / Appearance: x / SG: x / pH: x  Gluc: 103 mg/dL / Ketone: x  / Bili: x / Urobili: x   Blood: x / Protein: x / Nitrite: x   Leuk Esterase: x / RBC: x / WBC x   Sq Epi: x / Non Sq Epi: x / Bacteria: x                  RADIOLOGY & ADDITIONAL TESTS: Reviewed.

## 2023-06-27 NOTE — OCCUPATIONAL THERAPY INITIAL EVALUATION ADULT - NSOTDISCHREC_GEN_A_CORE
LALITHA. However Pt's family is adamant for home d/c. It pt were to be d/c'd home, would benefit from Home OT w/ cont'd HHA assist and family support.

## 2023-06-27 NOTE — DISCHARGE NOTE PROVIDER - CARE PROVIDER_API CALL
Saul Spann  Surgery  [facility]  [place] [address]  Phone: [phone]  Fax: [phone]  Follow Up Time:    Saul Spann  Surgery  [facility]  [place] [address]  Phone: [phone]  Fax: [phone]  Follow Up Time:     Fantasma hJa  Gastroenterology  [address] [place] [address]  Phone: [phone]  Fax: [phone]  Follow Up Time:     Lety Rodrigues  Medical Oncology  [address]  [place] [address]  Phone: [phone]  Fax: [phone]  Follow Up Time:

## 2023-06-27 NOTE — PROGRESS NOTE ADULT - SUBJECTIVE AND OBJECTIVE BOX
SUBJECTIVE: Pt seen and examined at bedside with chief. Pt denies any complaints. Pain well controlled. Tolerating diet without N/V, has been NPO since MN for capsule study. Admits to flatus, denies any BMs since yesterday morning.    MEDICATIONS  (STANDING):  bisacodyl Suppository 10 milliGRAM(s) Rectal at bedtime  dextrose 5% + sodium chloride 0.45%. 1000 milliLiter(s) (125 mL/Hr) IV Continuous <Continuous>  heparin   Injectable 5000 Unit(s) SubCutaneous every 8 hours  iron sucrose Injectable 200 milliGRAM(s) IV Push every 24 hours  pantoprazole  Injectable 40 milliGRAM(s) IV Push daily  sertraline 50 milliGRAM(s) Oral at bedtime  tamsulosin 0.4 milliGRAM(s) Oral at bedtime    MEDICATIONS  (PRN):      Vital Signs Last 24 Hrs  T(C): 37.1 (27 Jun 2023 04:35), Max: 37.1 (27 Jun 2023 04:35)  T(F): 98.8 (27 Jun 2023 04:35), Max: 98.8 (27 Jun 2023 04:35)  HR: 88 (27 Jun 2023 04:35) (67 - 88)  BP: 119/78 (27 Jun 2023 04:35) (119/78 - 143/81)  BP(mean): --  RR: 17 (27 Jun 2023 04:35) (17 - 17)  SpO2: 93% (27 Jun 2023 04:35) (93% - 96%)    Parameters below as of 27 Jun 2023 04:35  Patient On (Oxygen Delivery Method): room air        PHYSICAL EXAM:      Constitutional: A&Ox3    Respiratory: non labored breathing, no respiratory distress    Cardiovascular: NSR, RRR    Gastrointestinal: Soft ND,NT    Genitourinary: Voiding    Extremities: (-) edema                  I&O's Detail    26 Jun 2023 07:01  -  27 Jun 2023 07:00  --------------------------------------------------------  IN:    dextrose 5% + sodium chloride 0.45%: 1020 mL  Total IN: 1020 mL    OUT:    Voided (mL): 900 mL  Total OUT: 900 mL    Total NET: 120 mL          LABS:                        8.3    3.31  )-----------( 275      ( 26 Jun 2023 05:30 )             29.5     06-26    141  |  109<H>  |  2<L>  ----------------------------<  100<H>  4.0   |  22  |  0.82    Ca    8.4      26 Jun 2023 05:30  Phos  2.8     06-26  Mg     2.0     06-26    TPro  6.0  /  Alb  3.0<L>  /  TBili  0.3  /  DBili  x   /  AST  13  /  ALT  9<L>  /  AlkPhos  54  06-26    PT/INR - ( 26 Jun 2023 05:30 )   PT: 15.4 sec;   INR: 1.29          PTT - ( 26 Jun 2023 05:30 )  PTT:32.2 sec  Urinalysis Basic - ( 26 Jun 2023 05:30 )    Color: x / Appearance: x / SG: x / pH: x  Gluc: 100 mg/dL / Ketone: x  / Bili: x / Urobili: x   Blood: x / Protein: x / Nitrite: x   Leuk Esterase: x / RBC: x / WBC x   Sq Epi: x / Non Sq Epi: x / Bacteria: x        RADIOLOGY & ADDITIONAL STUDIES:

## 2023-06-27 NOTE — DISCHARGE NOTE PROVIDER - NSDCMRMEDTOKEN_GEN_ALL_CORE_FT
Eliquis 5 mg oral tablet: 1 orally 2 times a day  Flomax 0.4 mg oral capsule: 1 orally once a day (at bedtime)  Zoloft 50 mg oral tablet: 1 orally once a day (at bedtime)   acetaminophen 325 mg oral tablet: 2 tab(s) orally every 6 hours As needed Temp greater or equal to 38C (100.4F), Mild Pain (1 - 3), Moderate Pain (4 - 6), Severe Pain (7 - 10)  apixaban 2.5 mg oral tablet: 1 tab(s) orally every 12 hours Last dose 7/2/2023 evening. Stop taking Eliquis starting 7/3/2023.  apixaban 2.5 mg oral tablet: 1 tab(s) orally every 12 hours  Flomax 0.4 mg oral capsule: 1 orally once a day (at bedtime)  Home Physical Therapy, OT, Speech Therapy: Continue home care  pantoprazole 40 mg oral delayed release tablet: 1 tab(s) orally once a day (at bedtime)  Zoloft 50 mg oral tablet: 1 orally once a day (at bedtime)   acetaminophen 325 mg oral tablet: 2 tab(s) orally every 6 hours As needed Temp greater or equal to 38C (100.4F), Mild Pain (1 - 3), Moderate Pain (4 - 6), Severe Pain (7 - 10)  Flomax 0.4 mg oral capsule: 1 orally once a day (at bedtime)  Home Physical Therapy, OT, Speech Therapy: Continue home care  pantoprazole 40 mg oral delayed release tablet: 1 tab(s) orally once a day (at bedtime)  Zoloft 50 mg oral tablet: 1 orally once a day (at bedtime)

## 2023-06-27 NOTE — CHART NOTE - NSCHARTNOTEFT_GEN_A_CORE
Video Capsule Administered by Endoscopy RN this am 0710    NPO for first 2 hours  Ok for clears and meds at hour 2 and on  Capsule recorder will be picked up this afternoon at Hour 8, interpretation to follow    Daniel David M.D.  Gastroenterology Fellow  Weekday 7am-5pm Pager: [phone]  Weeknights/Weekend/Holiday Coverage: Please Call the  for contact info  Follow Up Questions welcome via [facility] [employer] Messenger Video Capsule Administered by Endoscopy RN this am 0710    NPO for first 2 hours  Ok for clears and meds at hour 2 and on  Capsule recorder will be picked up this afternoon at Hour 8, interpretation to follow  No MRIs until capsule excreted    Daniel David M.D.  Gastroenterology Fellow  Weekday 7am-5pm Pager: [phone]  Weeknights/Weekend/Holiday Coverage: Please Call the  for contact info  Follow Up Questions welcome via [facility] [employer] Messenger

## 2023-06-27 NOTE — DISCHARGE NOTE PROVIDER - PROVIDER TOKENS
PROVIDER:[TOKEN:[[id number] PROVIDER:[TOKEN:[[id number]]],PROVIDER:[TOKEN:[[id number]]],PROVIDER:[TOKEN:[[id number]]]

## 2023-06-27 NOTE — DISCHARGE NOTE PROVIDER - NSDCCPTREATMENT_GEN_ALL_CORE_FT
PRINCIPAL PROCEDURE  Procedure: Colonoscopy  Findings and Treatment:       SECONDARY PROCEDURE  Procedure: EGD, with push enteroscopy  Findings and Treatment:

## 2023-06-27 NOTE — DIETITIAN INITIAL EVALUATION ADULT - ADD RECOMMEND
1. Continue with current diet order (CLD)  > diet progression per team  2. Monitor PO intake/tolerance; honor pt food preferences as able   3. Monitor GI fxn, skin integrity, wts, chemistry   4. RD to re-attempt full nutrition assessment at f/u as able   5. RD to remain available for recs adjustment prn or will follow up pt per organizational policy  1. Continue with current diet order (CLD)  > diet progression per team  2. Monitor PO intake/tolerance; honor pt food preferences as able   3. Monitor GI fxn, skin integrity, wts, chemistry   4. RD to re-attempt full nutrition assessment at f/u as able   5. RD to remain available for recs adjustment prn or will follow up pt per organizational policy     *Team paged

## 2023-06-27 NOTE — PROGRESS NOTE ADULT - ASSESSMENT
74yo man PMHx CVA (R sided deficits), PE (on eliquis, last taken on 6/18), BPH, depression, diverticulosis, PSHx of colon tumor (s/p ?endoscopic removal 1998 at OSH), who presents as a transfer from OSH for management of LGIB, medicine team consulted for co-management.    Plan/Recommendations:  #LGIB  Pt transferred from [facility] for concern LGIB, underwent colonoscopy however procedure aborted prior to completion since transferred here to [facility] for repeat endoscopic eval  - underwent scope 6/26 w/ removal of 4 polyps however unable to reach last polyp, planned to undergo capsule endocopy on 6/28  - agree with holding home Eliquis    #Hx DVT/PE  Pt reports remote hx DVT/PE 11 years w/ recurrence Sept 2021 DVT and PE c/b thromboembolic stroke since w/ residual R sided weakness, and since persistently on Eliquis 5mg BID. TTE 6/22 negative for signs of afib or LA dilatation, otherwise moderate MR and normal EF.  - agree with holding home Eliquis for now in setting of active GI bleed  - after stabilization of active bleed will weight risk vs. benefit of whether to restart Eliquis at reduced dose vs. consideration of IVC filter based on bleeding risk vs. thromboembolic risk

## 2023-06-27 NOTE — PROGRESS NOTE ADULT - ASSESSMENT
74 yo M w/ PMHx of Mejia, CVA vs. TIA, (?R sided deficits), VTE on Eliquis, BPH, MDD, diverticulosis, CRC s/p sigmoidectomy, transferred to [facility] after admission at [facility] for anemia    Patient with extremely difficult colonoscopy to complete ;     Colonoscopy 6/26/23  Two sessile polyps ranging in size from 8 mm to 1.4 cm were found in the descending colon and transverse colon. A single-piece polypectomy was performed using a hot snare over a saline pillow in the descending colon and transverse colon. The polyps were completely removed. The polyps were completely retrieved. One endoclip was successfully applied to the transverse colon for the purpose of hemostasis.	  Two sessile polyps ranging in size from 6 mm to 1.2 cm were found in the ascending colon. In an area of prior polypectomy, overlying tattoo and stellate scar. A single-piece polypectomy was performed using a cold snare in the ascending colon. The polyps were completely removed.	  The colon was extremely lengthy and had multiple loops, requiring extensive abdominal pressure, change in patient position, however, cecum was identified but unable to be intubated.  There were 3 cecal polyps identified, one of which was partially resected with Cold Snare  colonoscopy was unable to be completed despite extensive maneuvering and multiple endoscopists involvement    Plan:  Clear Liquid Diet  Avoid AC / Antiplatelets given multiple polypectomies   NPO at [facility] for Colonoscopy tomorrow  VCE interpretation to follow  GoLYTELY this evening for repeat Colonoscopy 6/28/23    Thank you for the courtesy of this consult. We will follow along with you.    Daniel David M.D.  Gastroenterology Fellow  Weekday 7am-5pm Pager: [phone]  Weeknights/Weekend/Holiday Coverage: Please Call the  for contact info  Follow Up Questions welcome via [employer] Teams Messenger   72 yo M w/ PMHx of Mejia, CVA vs. TIA, (?R sided deficits), VTE on Eliquis, BPH, MDD, diverticulosis, CRC s/p sigmoidectomy, transferred to [facility] after admission at [facility] for anemia    Patient with extremely difficult colonoscopy to complete ;     Colonoscopy 6/26/23  Two sessile polyps ranging in size from 8 mm to 1.4 cm were found in the descending colon and transverse colon. A single-piece polypectomy was performed using a hot snare over a saline pillow in the descending colon and transverse colon. The polyps were completely removed. The polyps were completely retrieved. One endoclip was successfully applied to the transverse colon for the purpose of hemostasis.	  Two sessile polyps ranging in size from 6 mm to 1.2 cm were found in the ascending colon. In an area of prior polypectomy, overlying tattoo and stellate scar. A single-piece polypectomy was performed using a cold snare in the ascending colon. The polyps were completely removed.	  The colon was extremely lengthy and had multiple loops, requiring extensive abdominal pressure, change in patient position, however, cecum was identified but unable to be intubated.  There were 3 cecal polyps identified, one of which was partially resected with Cold Snare  colonoscopy was unable to be completed despite extensive maneuvering and multiple endoscopists involvement    Plan:  Clear Liquid Diet  Avoid AC / Antiplatelets given multiple polypectomies   NPO at MN for Colonoscopy tomorrow  VCE interpretation to follow  GoLYTELY this evening for repeat Colonoscopy 6/28/23    Thank you for the courtesy of this consult. We will follow along with you.    Daniel David M.D.  Gastroenterology Fellow  Weekday 7am-5pm Pager: [phone]  Weeknights/Weekend/Holiday Coverage: Please Call the  for contact info  Follow Up Questions welcome via [employer] Teams Messenger   74 yo M w/ PMHx of Mejia, CVA vs. TIA, (?R sided deficits), VTE on Eliquis, BPH, MDD, diverticulosis, CRC s/p sigmoidectomy, transferred to [facility] after admission at [facility] for anemia    Patient with extremely difficult colonoscopy to complete ;     Colonoscopy 6/26/23  Two sessile polyps ranging in size from 8 mm to 1.4 cm were found in the descending colon and transverse colon. A single-piece polypectomy was performed using a hot snare over a saline pillow in the descending colon and transverse colon. The polyps were completely removed. The polyps were completely retrieved. One endoclip was successfully applied to the transverse colon for the purpose of hemostasis.	  Two sessile polyps ranging in size from 6 mm to 1.2 cm were found in the ascending colon. In an area of prior polypectomy, overlying tattoo and stellate scar. A single-piece polypectomy was performed using a cold snare in the ascending colon. The polyps were completely removed.	  The colon was extremely lengthy and had multiple loops, requiring extensive abdominal pressure, change in patient position, however, cecum was identified but unable to be intubated.  There were 3 cecal polyps identified, one of which was partially resected with Cold Snare  colonoscopy was unable to be completed despite extensive maneuvering and multiple endoscopists involvement    Plan:  Clear Liquid Diet  Avoid AC / Antiplatelets given multiple polypectomies   NPO at MN for Colonoscopy tomorrow  VCE interpretation to follow  GoLYTELY this evening for repeat Colonoscopy 6/28/23    Thank you for the courtesy of this consult. We will follow along with you.    Daniel David M.D.  Gastroenterology Fellow  Weekday 7am-5pm Pager: [phone]  Weeknights/Weekend/Holiday Coverage: Please Call the  for contact info  Follow Up Questions welcome via [employer] Teams Messenger

## 2023-06-27 NOTE — PROGRESS NOTE ADULT - ATTENDING COMMENTS
74yo man non-smoker, left handed, hx of colon ca more than 3 decades ago, localized had surgery done, no chemo, radiation ( family hx of colon ca )- PE 11 years ago treated with 6 months of Anticoagulation- , had CVA with Right hemiparesis/PE on September 2021- then was told heart ok , not Afib, is determined as thromboembolic stroke, has been on Elqiuis since then-  was noted to have anemia - admitted to OSH - where EGD/Colonoscopy was done ( polyp not removed ?-poor prep procedure aborted-) transferred for further care  record from osh (EGD/colonoscopy reviewed) sub-optimal prep, 6 mm polyp in TC, 8 mm in AC removed. 7 mm multilobulated polyp in Cecum not removed     pt seen and examined with Dr. Herr  wife and care giver at bedside  has capsule endoscopy on -  getting ivf  for another bowel prep for colonoscopy tomorrow.      Anemia -GI bleeding, likely from bleeding polyp- received transfusion , - acute blood loss anemia   - CEA/CA 19-9 ( tumor marker negative )  - Hb 7.8 6/24 , 7.8 6/26, 9 on 6/27   - JAGUAR - ( iron saturating is 7 % single digit, iron is 25 , ferritin is 175 - though obtained after transfusion still considered JAGUAR-   please give iron Venofer 200 iv daily for 5 doses.  ( Eliquis held due to bleeding and anemia- LE dvt screen negative 6/22- evaluated by vascular note read-no plan for IVC  he was rec for long term AC as per doctor in [place] and pmd given recurrent PE -  - PE recurrent - first episode 11 yrs ago, second PE during the time of stroke - has been on eliquis now held due to anemia requiring transfusion, o decide to restart when GI work up complete.    evaluated by cardiology - ECHO - EF 55-60 %, LA normal size, card note read, no further work up, CT chest to moderate to severe arthrosclerosis  waiting for EKG   - intermediate risk for low to intermediate risk procedure.  - fu CBC and transfuse prn   - IVF prn -caution for fluid overload, currently lung clear.     - CVA with right hemiparesis - stated was thromboembolic stroke , no known hx of cardiac problem  physical therapy evaluation.     - prior to admission- ambulatory with walker , weaker now due to anemia/ hospitalization - need assist in sitting up in bed right now  - fu GI evaluation -2 day bowel prep - aim for Colonoscopy again on Wednesday ( to reach cecum polyp- per wife trying to avoid surgical option )  - cw protonix iv daily  home meds resumed   sertraline 50 milliGRAM(s) Oral at bedtime  tamsulosin 0.4 milliGRAM(s) Oral at bedtime    - dvt prophylasix   thank you for allowing medicine to participate in the care, dw wife, Dr. Herr.

## 2023-06-27 NOTE — DIETITIAN INITIAL EVALUATION ADULT - OTHER CALCULATIONS
Dr. Donato Pop- patient is going to be scheduled in April for peripheral angiogram.   Is patient clear? Do we need to prehydrate? Ideal body weight used to calculate energy needs due to pt's current body weight >120% ideal body weight. Adjusted for age, clinical course

## 2023-06-27 NOTE — DISCHARGE NOTE PROVIDER - HOSPITAL COURSE
73M PMHx CVA (R sided deficits), PE (on eliquis, last taken on 6/18), BPH, depression, diverticulosis, PSHx of colon tumor (s/p ?endoscopic removal 1998 at OSH), who presents as a transfer from outside hospital for management of lower GI bleed. At outside hospital: Hgb 4.2, was given 3U pRBCs, had negative EGD on 6/16, colonoscopy on 6/19 showing large polyp but no intervention was performed (report not sent with patient). Hgb prior to transfer was 7.3. Admitted to surgery for further management. On admission patient was asymptomatic and denied recent melena, hematochezia, dizziness, chest pain, unexpected wt loss. Plan for close monitoring by surgery team with serial exams, serial CBC checks, hold home Eliquis, transfused 1U PRBC on day of admission for acute anemia.  Gastroenterology team was consulted, iron studies were obtained and patient was appropriately prepped for repeat colonoscopy. Medicine team and vascular teams were consulted given medical history, bilateral lower extremity duplex was performed showing no evidence of active DVTs therefore IVC filter not indicated. IV iron was started x 5 day course. CT chest abdomen pelvis was obtained: no evidence of new collection, no AAA or aortic dissection, no local tumor recurrence. Cardiology consulted for pre procedure clearance, no further workup needed. Patient taken for colonoscopy on 6/26: multiple polypectomies performed in ascending, transverse and descending colon, no evidence of active bleeding, 3 cecal polyps identified but unable to remove. Tolerated procedure well. Hematology was consulted for anemia of unknown origin. Taken for capsule study on 6/27 and repeat completion colonoscopy on 6/28..........        **LAST UPDATED 6/27**     73M PMHx CVA (R sided deficits), PE (on eliquis, last taken on 6/18), BPH, depression, diverticulosis, PSHx of colon tumor (s/p ?endoscopic removal 1998 at OSH), who presents as a transfer from outside hospital for management of lower GI bleed. At outside hospital: Hgb 4.2, was given 3U pRBCs, had negative EGD on 6/16, colonoscopy on 6/19 showing large polyp but no intervention was performed (report not sent with patient). Hgb prior to transfer was 7.3. Admitted to surgery for further management. On admission patient was asymptomatic and denied recent melena, hematochezia, dizziness, chest pain, unexpected wt loss. Plan for close monitoring by surgery team with serial exams, serial CBC checks, hold home Eliquis, transfused 1U PRBC on day of admission for acute anemia.  Gastroenterology team was consulted, iron studies were obtained and patient was appropriately prepped for repeat colonoscopy. Medicine team and vascular teams were consulted given medical history, bilateral lower extremity duplex was performed showing no evidence of active DVTs therefore IVC filter not indicated. IV iron was started x 5 day course. CT chest abdomen pelvis was obtained: no evidence of new collection, no AAA or aortic dissection, no local tumor recurrence. Cardiology consulted for pre procedure clearance, no further workup needed. Patient taken for colonoscopy on 6/26: multiple polypectomies performed in ascending, transverse and descending colon, no evidence of active bleeding, 3 cecal polyps identified but unable to remove. Tolerated procedure well. Hematology was consulted for anemia of unknown origin. Taken for capsule study on 6/27 and repeat completion colonoscopy on 6/28 showed 2 polyps in ascending colon, 1 polyp in cecum s/p polypectomies, L colon dilated/atonic, overall difficult to traverse colon. Course complicated by fever, treated with tylenol, repeat labs unremarkable, CBC stable. Fever resolved. Patient was taken for enteroscopy on 6/29 which noted non erosive gastritis and mid jejunal mass w/ recent bleeding likely source of anemia. Diet advanced as tolerated. Eliquis restarted on 6/30, will instruct end date 7/2. Plan for ex lap, small bowel resection on 7/6/2023, cardiology and medicine pre op clearance were obtained on 6/30.    At time of discharge pt is tolerating diet, pain well controlled, pt is ambulating/voiding freely, having adequate bowel function. Pt is HD stable and medically ready for discharge.         73M PMHx CVA (R sided deficits), PE (on eliquis, last taken on 6/18), BPH, depression, diverticulosis, PSHx of colon tumor (s/p ?endoscopic removal 1998 at OSH), who presents as a transfer from outside hospital for management of lower GI bleed. At outside hospital: Hgb 4.2, was given 3U pRBCs, had negative EGD on 6/16, colonoscopy on 6/19 showing large polyp but no intervention was performed (report not sent with patient). Hgb prior to transfer was 7.3. Admitted to surgery for further management. On admission patient was asymptomatic and denied recent melena, hematochezia, dizziness, chest pain, unexpected wt loss. Plan for close monitoring by surgery team with serial exams, serial CBC checks, hold home Eliquis, transfused 1U PRBC on day of admission for acute anemia.  Gastroenterology team was consulted, iron studies were obtained and patient was appropriately prepped for repeat colonoscopy. Medicine team and vascular teams were consulted given medical history, bilateral lower extremity duplex was performed showing no evidence of active DVTs therefore IVC filter not indicated. IV iron was started x 5 day course. CT chest abdomen pelvis was obtained: no evidence of new collection, no AAA or aortic dissection, no local tumor recurrence. Cardiology consulted for pre procedure clearance, no further workup needed. Patient taken for colonoscopy on 6/26: multiple polypectomies performed in ascending, transverse and descending colon, no evidence of active bleeding, 3 cecal polyps identified but unable to remove. Tolerated procedure well. Hematology was consulted for anemia of unknown origin. Taken for capsule study on 6/27 and repeat completion colonoscopy on 6/28 showed 2 polyps in ascending colon, 1 polyp in cecum s/p polypectomies, L colon dilated/atonic, overall difficult to traverse colon. Course complicated by fever, treated with tylenol, repeat labs unremarkable, CBC stable. Fever resolved. Patient was taken for enteroscopy on 6/29 which noted non erosive gastritis and mid jejunal mass w/ recent bleeding likely source of anemia. Diet advanced as tolerated. Plan for ex lap, small bowel resection on 7/6/2023, pre op clearance was obtained on 6/30.    At time of discharge pt is tolerating diet, pain well controlled, pt is ambulating/voiding freely, having adequate bowel function. Pt is HD stable and medically ready for discharge.

## 2023-06-27 NOTE — DIETITIAN INITIAL EVALUATION ADULT - OTHER INFO
72 y/o M w/ PMHx of Mejia syndrome, stroke (R sided deficits), PE (Jan 2022, on Eliquis), BPH, depression, and diverticulosis and PSHx of colon tumor removal (1988) presents as a transfer from [facility] for management of GI bleed. s/p c-scope w/ multiple polypectomies    Visited pt at bedside this AM on 9UR, caregiver in the room. On assessment pt is awake and alert however not amenable to interview. Unable to obtain diet/wt hx at this time given pt not amenable. RD to re-attempt at f/u prn. Currently on CLD (kosher). Current diet order does not meet pt estimated nutritional needs. Discussed trialing oral nutrition supplement regimen to better meet Kcal/energy needs. View recs below. Denies nvdc; bowel regimen ordered. Nutrition related labs reviewed. No overt fat/muscle wasting noted. No edema noted; no PUs noted; valentine scale 16. No pain noted during interview. Please view full recs below. Clinical nutrition services will continue to follow up pt per organizational policy. Please place new consult for any acute nutrition-related issues that may arise prior to follow up

## 2023-06-27 NOTE — DISCHARGE NOTE PROVIDER - NSDCFUADDAPPT_GEN_ALL_CORE_FT
-Please follow up with Dr. Jha (GI) in 1-2 weeks from discharge or as directed. Call to schedule an appt.  -Please follow up with Dr. Rodrigues (hematology) in 1 week from discharge or as directed. Call to schedule an appt or you may follow up with a hematologist at [facility] ([facility]) which have offices near [place] to trend your hemoglobin and to see if repeat Iron Infusions are required  -Please follow up with your PCP after discharge. You were cleared for surgery by inpatient medical services at [facility].  -Please follow up with your outpatient cardiologist after discharge. You were cleared for surgery by inpatient cardiology services at [facility].

## 2023-06-27 NOTE — OCCUPATIONAL THERAPY INITIAL EVALUATION ADULT - ADDITIONAL COMMENTS
Pt lives w/ his wife in private house w/ 2 stairs to enter. Pt has [facility] assist 4hrs daily for ADLs and transfers. Pt's wife is able to provide support/assist for the rest of the day. Pt has a RW< w/c and  chair.

## 2023-06-27 NOTE — DIETITIAN INITIAL EVALUATION ADULT - PERTINENT LABORATORY DATA
06-27    136  |  104  |  3<L>  ----------------------------<  103<H>  3.8   |  22  |  0.82    Ca    8.9      27 Jun 2023 08:23  Phos  3.4     06-27  Mg     1.9     06-27    TPro  6.0  /  Alb  3.0<L>  /  TBili  0.3  /  DBili  x   /  AST  13  /  ALT  9<L>  /  AlkPhos  54  06-26

## 2023-06-27 NOTE — DISCHARGE NOTE PROVIDER - NSDCFUADDINST_GEN_ALL_CORE_FT
Follow up with Dr. Spann in 1-2 weeks. Call the office at [phone] to schedule your appointment. You should be urinating at least 3-4x per day. Call the office if you experience increasing abdominal pain, nausea, vomiting, or temperature >101 F.    Warning Signs:  Please call your doctor or nurse practitioner if you experience the following:  *You experience new chest pain, pressure, squeezing or tightness.  *New or worsening cough, shortness of breath, or wheeze.  *If you are vomiting and cannot keep down fluids or your medications.  *You are getting dehydrated due to continued vomiting, diarrhea, or other reasons. Signs of dehydration include dry mouth, rapid heartbeat, or feeling dizzy or faint when standing.  *You see blood or dark/black material when you vomit or have a bowel movement.  *You experience burning when you urinate, have blood in your urine, or experience a discharge.  *Your pain is not improving within 8-12 hours or is not gone within 24 hours. Call or return immediately if your pain is getting worse, changes location, or moves to your chest or back.  *You have shaking chills, or fever greater than 101.5 degrees Fahrenheit or 38 degrees Celsius.  *Any change in your symptoms, or any new symptoms that concern you.    New medications:  Follow up with Dr. Spann in 1-2 weeks. Call the office at [phone] to schedule your appointment. You should be urinating at least 3-4x per day. Call the office if you experience increasing abdominal pain, nausea, vomiting, or temperature >101 F.    Your surgery is planned for Thursday 7/6/2023. Please remain NPO (nothing by mouth) after midnight prior to surgery date. Call Dr. Spann's office for further instructions.     Warning Signs:  Please call your doctor or nurse practitioner if you experience the following:  *You experience new chest pain, pressure, squeezing or tightness.  *New or worsening cough, shortness of breath, or wheeze.  *If you are vomiting and cannot keep down fluids or your medications.  *You are getting dehydrated due to continued vomiting, diarrhea, or other reasons. Signs of dehydration include dry mouth, rapid heartbeat, or feeling dizzy or faint when standing.  *You see blood or dark/black material when you vomit or have a bowel movement.  *You experience burning when you urinate, have blood in your urine, or experience a discharge.  *Your pain is not improving within 8-12 hours or is not gone within 24 hours. Call or return immediately if your pain is getting worse, changes location, or moves to your chest or back.  *You have shaking chills, or fever greater than 101.5 degrees Fahrenheit or 38 degrees Celsius.  *Any change in your symptoms, or any new symptoms that concern you.    New medications:   - Take protonix 1 tablet once daily   - Continue taking Eliquis 2.5mg every 12 hours through Sunday 7/2/23. Last dose will be 7/2/23 PM. Please hold off on taking Eliquis starting Monday 7/3/23. You will need to discontinue it a few days prior to surgery date.  - Take over the counter tylenol 1000mg every 6 hours as needed for pain control. Do not exceed 4000mg of tylenol in 24 hours.  Follow up with Dr. Spann in 1-2 weeks. Call the office at [phone] to schedule your appointment. You should be urinating at least 3-4x per day. Call the office if you experience increasing abdominal pain, nausea, vomiting, or temperature >101 F.    Your surgery is planned for Thursday 7/6/2023. Please remain NPO (nothing by mouth) after midnight prior to surgery date. Call Dr. Spann's office for further instructions.     Warning Signs:  Please call your doctor or nurse practitioner if you experience the following:  *You experience new chest pain, pressure, squeezing or tightness.  *New or worsening cough, shortness of breath, or wheeze.  *If you are vomiting and cannot keep down fluids or your medications.  *You are getting dehydrated due to continued vomiting, diarrhea, or other reasons. Signs of dehydration include dry mouth, rapid heartbeat, or feeling dizzy or faint when standing.  *You see blood or dark/black material when you vomit or have a bowel movement.  *You experience burning when you urinate, have blood in your urine, or experience a discharge.  *Your pain is not improving within 8-12 hours or is not gone within 24 hours. Call or return immediately if your pain is getting worse, changes location, or moves to your chest or back.  *You have shaking chills, or fever greater than 101.5 degrees Fahrenheit or 38 degrees Celsius.  *Any change in your symptoms, or any new symptoms that concern you.    New medications:   - Take protonix 1 tablet once daily   - Discontinue taking Eliquis.  - Take over the counter tylenol 1000mg every 6 hours as needed for pain control. Do not exceed 4000mg of tylenol in 24 hours.

## 2023-06-27 NOTE — CONSULT NOTE ADULT - ASSESSMENT
72 y/o M w/ PMHx of stroke (R sided deficits), PE (Jan 2022, on Eliquis), BPH, depression, and diverticulosis and PSHx of colon tumor removal (1988) presents as a transfer from [facility] for management of GI bleed. Hematology consulted for anemia.    # Anemia, Microcytic  Reports annual colonoscopies after "tumor removal" in 1980s. Polyps removed, but non cancerous. Reported to have normal EGD in past month. Presents with hemoglobin 6.7 with appropriate correction post transfusion. MCV noted to be very microcytic. Iron studies consistent with some degree of iron deficiency Iron Sat 7%, but ferritin 175 (can be acute phase reactant). No bleed identified on colonoscopy, but incomplete exam. Multiple polyps taken.  and Haptolgobin 225 argue against hemolysis. B12 and Folate WNL.    Plan:  - Agree with iron sucrose 200mg IV x 5 days  - Will follow up pathology from polyps  - Will follow results of VCE and repeat colonoscopy  - When medically ready for discharge, patient should follow up with a hematologist to trend Hgb and to see if repeat Iron Infusions are required. As patient lives in [place], recommend assisting family by calling for an appointment with [facility] ([facility] which have offices near [place]    Discussed with hematology oncology attending Dr. Lety Rodrigues. Recommendations are considered final after attending attestation.

## 2023-06-27 NOTE — OCCUPATIONAL THERAPY INITIAL EVALUATION ADULT - GENERAL OBSERVATIONS, REHAB EVAL
Pt's RN Alley aware of intent to eval/tx; cleared Pt. Pt received in chair - +IVs, heplock, GI ext camera. Pt's private aide present. PT Elvria ARMSTRONG. present.

## 2023-06-27 NOTE — PROGRESS NOTE ADULT - SUBJECTIVE AND OBJECTIVE BOX
GASTROENTEROLOGY PROGRESS NOTE  Patient seen and examined at bedside. VCE administered this morning. DW wife at bedside.     PERTINENT REVIEW OF SYSTEMS:  CONSTITUTIONAL: No weakness, fevers or chills  HEENT: No visual changes; No vertigo or throat pain   GASTROINTESTINAL: As above.  NEUROLOGICAL: No numbness or weakness  SKIN: No itching, burning, rashes, or lesions     Allergies    No Known Allergies    Intolerances      MEDICATIONS:  MEDICATIONS  (STANDING):  bisacodyl Suppository 10 milliGRAM(s) Rectal at bedtime  dextrose 5% + sodium chloride 0.45%. 1000 milliLiter(s) (125 mL/Hr) IV Continuous <Continuous>  heparin   Injectable 5000 Unit(s) SubCutaneous every 8 hours  iron sucrose Injectable 200 milliGRAM(s) IV Push every 24 hours  pantoprazole  Injectable 40 milliGRAM(s) IV Push daily  polyethylene glycol/electrolyte Solution. 4000 milliLiter(s) Oral once  sertraline 50 milliGRAM(s) Oral at bedtime  tamsulosin 0.4 milliGRAM(s) Oral at bedtime    MEDICATIONS  (PRN):    Vital Signs Last 24 Hrs  T(C): 37.1 (27 Jun 2023 13:39), Max: 37.1 (27 Jun 2023 04:35)  T(F): 98.7 (27 Jun 2023 13:39), Max: 98.8 (27 Jun 2023 04:35)  HR: 89 (27 Jun 2023 13:39) (79 - 93)  BP: 118/82 (27 Jun 2023 13:39) (100/65 - 143/81)  BP(mean): --  RR: 17 (27 Jun 2023 13:39) (17 - 17)  SpO2: 96% (27 Jun 2023 13:39) (93% - 96%)    Parameters below as of 27 Jun 2023 13:39  Patient On (Oxygen Delivery Method): room air        06-26 @ 07:01 - 06-27 @ 07:00  --------------------------------------------------------  IN: 1020 mL / OUT: 900 mL / NET: 120 mL    06-27 @ 07:01  -  06-27 @ 15:45  --------------------------------------------------------  IN: 1275 mL / OUT: 900 mL / NET: 375 mL      PHYSICAL EXAM:    General: lying in bed, in no acute distress  HEENT: MMM, conjunctiva and sclera clear  Gastrointestinal: Soft non-tender non-distended; No rebound or guarding  Skin: Warm and dry. No obvious rash    LABS:                        9.3    4.92  )-----------( 296      ( 27 Jun 2023 08:23 )             33.1     06-27    136  |  104  |  3<L>  ----------------------------<  103<H>  3.8   |  22  |  0.82    Ca    8.9      27 Jun 2023 08:23  Phos  3.4     06-27  Mg     1.9     06-27    TPro  6.0  /  Alb  3.0<L>  /  TBili  0.3  /  DBili  x   /  AST  13  /  ALT  9<L>  /  AlkPhos  54  06-26    PT/INR - ( 26 Jun 2023 05:30 )   PT: 15.4 sec;   INR: 1.29          PTT - ( 26 Jun 2023 05:30 )  PTT:32.2 sec      Urinalysis Basic - ( 27 Jun 2023 08:23 )    Color: x / Appearance: x / SG: x / pH: x  Gluc: 103 mg/dL / Ketone: x  / Bili: x / Urobili: x   Blood: x / Protein: x / Nitrite: x   Leuk Esterase: x / RBC: x / WBC x   Sq Epi: x / Non Sq Epi: x / Bacteria: x                RADIOLOGY & ADDITIONAL STUDIES:  Reviewed

## 2023-06-27 NOTE — CONSULT NOTE ADULT - ATTENDING COMMENTS
Initial attending contact date  6/23/23    . See fellow note written above for details. I reviewed the fellow documentation. I have personally seen and examined this patient. I reviewed vitals, labs, medications, cardiac studies, and additional imaging. I agree with the above fellow's findings and plans as written above with the following additions/statements.    73 M hx of colon ca more than 3 decades ago, localized had surgery done, no chemo, radiation ( family hx of colon ca )- PE 11 years ago treated with 6 months of Anticoagulation- , had CVA with Right hemiparesis/PE on September 2021 on eliquis admitted to OSH - where EGD/Colonoscopy was done ( polyp not removed ?-poor prep) procedure aborted- transferred for further care to [facility]  -Cardiology consulted for pre-op risk assessment for C-scope, poss ex lap  -pt with limited functional capacity however denies anginal equivalents  -Pls obtain baseline EKG  -CT chest - moderate-severe coronary calcifications noted, however ECHO with normal LVEF  -No need for ischemic eval at this time, can consider as outpatient  -Pt considered intermediate risk for low/intermediate risk procedure
74yo man non-smoker, left handed, hx of colon ca more than 3 decades ago, localized had surgery done, no chemo, radiation ( family hx of colon ca )- PE 11 years ago treated with 6 months of Anticoagulation- , had CVA with Right hemiparesis/PE on September 2021- then was told heart ok , not Afib, is determined as thromboembolic stroke, has been on Elqiuis since then-  was noted to have anemia - admitted to OSH - where EGD/Colonoscopy was done ( polyp not removed ?-poor prep procedure aborted- transferred for further care, anemia getting blood transfusion    - anemia -getting blood transfusion, Eliquis held ,   - CVA with right andres- stated was thromboembolic stroke  - PE recurrent - first episode 11 yrs ago, second PE during the time of stroke - has been on eliquis now held due to anemia requiring transfusion  - currently ambulatory with walker , weaker now due to anemia/ hospitalization - need assist in sitting up in bed right now  - fu GI evaluation   - EKG   - please get TTE - would need to access cardiac function , LA size etc   - team getting collateral from OSH.  - LE duplex sonogram.  - closely following cbc and transfuse goal to keep hb >7   - no lovenox /no heparin, depending on record/ risk -to decide on further anticoagulation vs need for IVC filter.  thank you for allowing medicine to participate in the care, dw surgery , wife, Dr. Herr.
I evaluated the patient with the Hematology fellow, Dr Padlila.  The patient is admitted w/ severe anemia.  Labs (post transfusion) suggest a component of iron deficiency.  This is being addressed w/ parenteral iron.  GI is evaluating for a cause of blood loss - pt has had a colonoscopy and capsule endoscopy is ongoing.  Plan of care as above by Dr Padilla.  Strongly encourage outpt f/u with Hematology, perhaps closer to his home in [place] if that is more practical.
Patient seen and d/w Dr. David. Agree with plan above.

## 2023-06-27 NOTE — PROGRESS NOTE ADULT - ASSESSMENT
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 s/p c-scope w/ multiple polypectomies on 6/26    Capsule study with GI today  NPO for capsule study today/IVF  Pain/nausea control prn  Restart home medications: zoloft and flomax   5d IV iron 200 started 6/24  PPI  SCDs; holding SQH  f/u med recs  f/u cards consult   f/u vascular consult for possible IVC filter placement  Dispo; PT LALITHA  (pt refused), home pt

## 2023-06-27 NOTE — DIETITIAN INITIAL EVALUATION ADULT - NS FNS DIET ORDER
Diet, Clear Liquid:   Kosher (06-27-23 @ 10:15)  Diet, NPO after Midnight:      NPO Start Date: 27-Jun-2023,   NPO Start Time: 23:59 (06-27-23 @ 10:02)

## 2023-06-27 NOTE — PROGRESS NOTE ADULT - SUBJECTIVE AND OBJECTIVE BOX
SUBJECTIVE/OVERNIGHT EVENTS: No acute overnight events. Pt seen in AM at bedside, resting comfortably in bed, and does not appear to be in any acute distress. When asked, pt denies any recent or active fever, chills, nausea, vomiting, headache, acute sob, chest pain, abdominal pain, genitourinary sx, extremity pain or swelling.    VITAL SIGNS:  Vital Signs Last 24 Hrs  T(C): 36.6 (27 Jun 2023 09:20), Max: 37.1 (27 Jun 2023 04:35)  T(F): 97.8 (27 Jun 2023 09:20), Max: 98.8 (27 Jun 2023 04:35)  HR: 93 (27 Jun 2023 09:20) (67 - 93)  BP: 100/65 (27 Jun 2023 09:20) (100/65 - 143/81)  BP(mean): --  RR: 17 (27 Jun 2023 09:20) (17 - 17)  SpO2: 95% (27 Jun 2023 09:20) (93% - 96%)    Parameters below as of 27 Jun 2023 09:20  Patient On (Oxygen Delivery Method): room air        PHYSICAL EXAM:  General: NAD; speaking in full sentences  HEENT: NC/AT; PERRL; EOMI; MMM  Neck: supple; no JVD  Cardiac: RRR; +S1/S2  Pulm: CTA B/L; no W/R/R  GI: soft, NT/ND, +BS  Extremities: WWP; no edema, clubbing or cyanosis  Vasc: 2+ radial, DP pulses B/L  Neuro: AAOx3; no focal deficits    MEDICATIONS:  MEDICATIONS  (STANDING):  bisacodyl Suppository 10 milliGRAM(s) Rectal at bedtime  dextrose 5% + sodium chloride 0.45%. 1000 milliLiter(s) (125 mL/Hr) IV Continuous <Continuous>  heparin   Injectable 5000 Unit(s) SubCutaneous every 8 hours  iron sucrose Injectable 200 milliGRAM(s) IV Push every 24 hours  pantoprazole  Injectable 40 milliGRAM(s) IV Push daily  sertraline 50 milliGRAM(s) Oral at bedtime  tamsulosin 0.4 milliGRAM(s) Oral at bedtime    MEDICATIONS  (PRN):      ALLERGIES:  Allergies    No Known Allergies    Intolerances        LABS:                        9.3    4.92  )-----------( 296      ( 27 Jun 2023 08:23 )             33.1     06-27    136  |  104  |  3<L>  ----------------------------<  103<H>  3.8   |  22  |  0.82    Ca    8.9      27 Jun 2023 08:23  Phos  3.4     06-27  Mg     1.9     06-27    TPro  6.0  /  Alb  3.0<L>  /  TBili  0.3  /  DBili  x   /  AST  13  /  ALT  9<L>  /  AlkPhos  54  06-26    PT/INR - ( 26 Jun 2023 05:30 )   PT: 15.4 sec;   INR: 1.29          PTT - ( 26 Jun 2023 05:30 )  PTT:32.2 sec    RADIOLOGY & ADDITIONAL TESTS: Reviewed.

## 2023-06-27 NOTE — DISCHARGE NOTE PROVIDER - NSDCCPCAREPLAN_GEN_ALL_CORE_FT
PRINCIPAL DISCHARGE DIAGNOSIS  Diagnosis: Anemia  Assessment and Plan of Treatment:      PRINCIPAL DISCHARGE DIAGNOSIS  Diagnosis: Anemia  Assessment and Plan of Treatment:       SECONDARY DISCHARGE DIAGNOSES  Diagnosis: Neoplasm of jejunum  Assessment and Plan of Treatment:

## 2023-06-27 NOTE — OCCUPATIONAL THERAPY INITIAL EVALUATION ADULT - PERTINENT HX OF CURRENT PROBLEM, REHAB EVAL
74yo man PMHx CVA (R sided deficits), PE (on eliquis, last taken on 6/18), BPH, depression, diverticulosis, PSHx of colon tumor (s/p ?endoscopic removal 1998 at OSH), who presents as a transfer from OSH for management of LGIB, medicine team consulted for co-management.

## 2023-06-28 ENCOUNTER — TRANSCRIPTION ENCOUNTER (OUTPATIENT)
Age: 73
End: 2023-06-28

## 2023-06-28 ENCOUNTER — RESULT REVIEW (OUTPATIENT)
Age: 73
End: 2023-06-28

## 2023-06-28 ENCOUNTER — APPOINTMENT (OUTPATIENT)
Dept: COLORECTAL SURGERY | Facility: HOSPITAL | Age: 73
End: 2023-06-28

## 2023-06-28 LAB
ANION GAP SERPL CALC-SCNC: 8 MMOL/L — SIGNIFICANT CHANGE UP (ref 5–17)
BUN SERPL-MCNC: 2 MG/DL — LOW (ref 7–23)
CALCIUM SERPL-MCNC: 8.4 MG/DL — SIGNIFICANT CHANGE UP (ref 8.4–10.5)
CHLORIDE SERPL-SCNC: 108 MMOL/L — SIGNIFICANT CHANGE UP (ref 96–108)
CO2 SERPL-SCNC: 25 MMOL/L — SIGNIFICANT CHANGE UP (ref 22–31)
CREAT SERPL-MCNC: 0.78 MG/DL — SIGNIFICANT CHANGE UP (ref 0.5–1.3)
EGFR: 95 ML/MIN/1.73M2 — SIGNIFICANT CHANGE UP
FOLATE SERPL-MCNC: 8.7 NG/ML — SIGNIFICANT CHANGE UP
GLUCOSE SERPL-MCNC: 115 MG/DL — HIGH (ref 70–99)
HCT VFR BLD CALC: 31.4 % — LOW (ref 39–50)
HGB BLD-MCNC: 8.9 G/DL — LOW (ref 13–17)
MAGNESIUM SERPL-MCNC: 2 MG/DL — SIGNIFICANT CHANGE UP (ref 1.6–2.6)
MCHC RBC-ENTMCNC: 20.7 PG — LOW (ref 27–34)
MCHC RBC-ENTMCNC: 28.3 GM/DL — LOW (ref 32–36)
MCV RBC AUTO: 73 FL — LOW (ref 80–100)
NRBC # BLD: 0 /100 WBCS — SIGNIFICANT CHANGE UP (ref 0–0)
PHOSPHATE SERPL-MCNC: 2.4 MG/DL — LOW (ref 2.5–4.5)
PLATELET # BLD AUTO: 282 K/UL — SIGNIFICANT CHANGE UP (ref 150–400)
POTASSIUM SERPL-MCNC: 3.7 MMOL/L — SIGNIFICANT CHANGE UP (ref 3.5–5.3)
POTASSIUM SERPL-SCNC: 3.7 MMOL/L — SIGNIFICANT CHANGE UP (ref 3.5–5.3)
RBC # BLD: 4.3 M/UL — SIGNIFICANT CHANGE UP (ref 4.2–5.8)
RBC # FLD: 31.8 % — HIGH (ref 10.3–14.5)
SODIUM SERPL-SCNC: 141 MMOL/L — SIGNIFICANT CHANGE UP (ref 135–145)
VIT B12 SERPL-MCNC: 628 PG/ML — SIGNIFICANT CHANGE UP (ref 232–1245)
WBC # BLD: 4.04 K/UL — SIGNIFICANT CHANGE UP (ref 3.8–10.5)
WBC # FLD AUTO: 4.04 K/UL — SIGNIFICANT CHANGE UP (ref 3.8–10.5)

## 2023-06-28 PROCEDURE — 88305 TISSUE EXAM BY PATHOLOGIST: CPT | Mod: 26

## 2023-06-28 PROCEDURE — 45390 COLONOSCOPY W/RESECTION: CPT | Mod: 59

## 2023-06-28 PROCEDURE — 99232 SBSQ HOSP IP/OBS MODERATE 35: CPT | Mod: GC

## 2023-06-28 PROCEDURE — 45385 COLONOSCOPY W/LESION REMOVAL: CPT | Mod: 59

## 2023-06-28 PROCEDURE — 45388 COLONOSCOPY W/ABLATION: CPT | Mod: 59

## 2023-06-28 PROCEDURE — 99232 SBSQ HOSP IP/OBS MODERATE 35: CPT

## 2023-06-28 DEVICE — GEL PURASTAT 3ML: Type: IMPLANTABLE DEVICE | Status: FUNCTIONAL

## 2023-06-28 RX ORDER — SODIUM CHLORIDE 9 MG/ML
1000 INJECTION, SOLUTION INTRAVENOUS
Refills: 0 | Status: DISCONTINUED | OUTPATIENT
Start: 2023-06-28 | End: 2023-06-30

## 2023-06-28 RX ORDER — TAMSULOSIN HYDROCHLORIDE 0.4 MG/1
1 CAPSULE ORAL
Refills: 0 | DISCHARGE

## 2023-06-28 RX ORDER — SERTRALINE 25 MG/1
1 TABLET, FILM COATED ORAL
Refills: 0 | DISCHARGE

## 2023-06-28 RX ORDER — SODIUM,POTASSIUM PHOSPHATES 278-250MG
1 POWDER IN PACKET (EA) ORAL ONCE
Refills: 0 | Status: COMPLETED | OUTPATIENT
Start: 2023-06-28 | End: 2023-06-28

## 2023-06-28 RX ORDER — ACETAMINOPHEN 500 MG
650 TABLET ORAL EVERY 6 HOURS
Refills: 0 | Status: DISCONTINUED | OUTPATIENT
Start: 2023-06-28 | End: 2023-06-30

## 2023-06-28 RX ADMIN — SODIUM CHLORIDE 120 MILLILITER(S): 9 INJECTION, SOLUTION INTRAVENOUS at 21:51

## 2023-06-28 RX ADMIN — PANTOPRAZOLE SODIUM 40 MILLIGRAM(S): 20 TABLET, DELAYED RELEASE ORAL at 13:03

## 2023-06-28 RX ADMIN — SODIUM CHLORIDE 125 MILLILITER(S): 9 INJECTION, SOLUTION INTRAVENOUS at 09:39

## 2023-06-28 RX ADMIN — TAMSULOSIN HYDROCHLORIDE 0.4 MILLIGRAM(S): 0.4 CAPSULE ORAL at 21:08

## 2023-06-28 RX ADMIN — Medication 650 MILLIGRAM(S): at 22:07

## 2023-06-28 RX ADMIN — Medication 650 MILLIGRAM(S): at 21:07

## 2023-06-28 RX ADMIN — Medication 1 PACKET(S): at 10:10

## 2023-06-28 RX ADMIN — IRON SUCROSE 200 MILLIGRAM(S): 20 INJECTION, SOLUTION INTRAVENOUS at 18:46

## 2023-06-28 RX ADMIN — SERTRALINE 50 MILLIGRAM(S): 25 TABLET, FILM COATED ORAL at 21:08

## 2023-06-28 NOTE — PROGRESS NOTE ADULT - ASSESSMENT
72yo man PMHx CVA (R sided deficits), PE (on eliquis, last taken on 6/18), BPH, depression, diverticulosis, PSHx of colon tumor (s/p ?endoscopic removal 1998 at OSH), who presents as a transfer from OSH for management of LGIB, medicine team consulted for co-management.    Plan/Recommendations:  #LGIB  #Anemia  Pt transferred from [facility] for concern LGIB, underwent colonoscopy however procedure aborted prior to completion since transferred here to [facility] for repeat endoscopic eval  - underwent scope 6/26 w/ removal of 4 polyps however unable to reach last polyp. Repeat C-scope in AM of 6/28 with polypectomy -- 2 poyps in ascending colon and one in cecum which was able to be reached. Pathology sent, awaiting results.  - agree with holding home Eliquis per GI recs -- will resume per GI recs. Holding at this time also i/s/o polypectomy in AM   - diet advanced to clears per GI -- IV fluids held at this time.   - continue to monitor for bowel movements -- none at this time per patient   - Pt receiving IV Venofer 200mg IV qd -- last dose today to complete 5 days total     #Hx DVT/PE  Pt reports remote hx DVT/PE 11 years w/ recurrence Sept 2021 DVT and PE c/b thromboembolic stroke since w/ residual R sided weakness, and since persistently on Eliquis 5mg BID. TTE 6/22 negative for signs of afib or LA dilatation, otherwise moderate MR and normal EF.  - agree with holding home Eliquis for now in setting of active GI bleed  - after stabilization of active bleed will weight risk vs. benefit of whether to restart Eliquis at reduced dose vs. consideration of IVC filter based on bleeding risk vs. thromboembolic risk. Will discuss with GI as well.     Final recommendations pending attending attestation

## 2023-06-28 NOTE — PROGRESS NOTE ADULT - SUBJECTIVE AND OBJECTIVE BOX
SUBJECTIVE: Pt seen and examined at bedside with chief. Pt denies any complaints. Pain well controlled. Tolerating bowel prep without N/V. Admits to clear BMs    MEDICATIONS  (STANDING):  bisacodyl Suppository 10 milliGRAM(s) Rectal at bedtime  dextrose 5% + sodium chloride 0.45%. 1000 milliLiter(s) (125 mL/Hr) IV Continuous <Continuous>  iron sucrose Injectable 200 milliGRAM(s) IV Push every 24 hours  pantoprazole  Injectable 40 milliGRAM(s) IV Push daily  sertraline 50 milliGRAM(s) Oral at bedtime  tamsulosin 0.4 milliGRAM(s) Oral at bedtime    MEDICATIONS  (PRN):      Vital Signs Last 24 Hrs  T(C): 36.9 (2023 04:54), Max: 37.4 (2023 17:28)  T(F): 98.4 (2023 04:54), Max: 99.4 (2023 17:28)  HR: 99 (2023 04:54) (88 - 100)  BP: 119/82 (2023 04:54) (100/65 - 136/92)  BP(mean): --  RR: 18 (2023 04:54) (17 - 18)  SpO2: 95% (2023 04:54) (95% - 96%)    Parameters below as of 2023 04:54  Patient On (Oxygen Delivery Method): room air        PHYSICAL EXAM:      Constitutional: A&Ox3    Respiratory: non labored breathing, no respiratory distress    Cardiovascular: NSR, RRR    Gastrointestinal: soft ND, NT                  Genitourinary: VOIDING     Extremities: (-) edema                  I&O's Detail    2023 07:01  -  2023 07:00  --------------------------------------------------------  IN:    dextrose 5% + sodium chloride 0.45%: 2875 mL    Oral Fluid: 2320 mL  Total IN: 5195 mL    OUT:    Voided (mL): 1100 mL  Total OUT: 1100 mL    Total NET: 4095 mL          LABS:                        9.3    4.92  )-----------( 296      ( 2023 08:23 )             33.1         136  |  104  |  3<L>  ----------------------------<  103<H>  3.8   |  22  |  0.82    Ca    8.9      2023 08:23  Phos  3.4       Mg     1.9             Urinalysis Basic - ( 2023 17:08 )    Color: Yellow / Appearance: Clear / S.015 / pH: x  Gluc: x / Ketone: NEGATIVE  / Bili: Negative / Urobili: 0.2 E.U./dL   Blood: x / Protein: NEGATIVE mg/dL / Nitrite: NEGATIVE   Leuk Esterase: NEGATIVE / RBC: x / WBC x   Sq Epi: x / Non Sq Epi: x / Bacteria: x        RADIOLOGY & ADDITIONAL STUDIES:

## 2023-06-28 NOTE — CHART NOTE - NSCHARTNOTEFT_GEN_A_CORE
Hematology Off Service Chart Note    74 y/o M w/ PMHx of stroke (R sided deficits), PE (Jan 2022, on Eliquis), BPH, depression, and diverticulosis and PSHx of colon tumor removal (1988) presents as a transfer from [facility] for management of GI bleed. Hematology consulted for anemia.    # Anemia, Microcytic  Reports annual colonoscopies after "tumor removal" in 1980s. Polyps removed, but non cancerous. Reported to have normal EGD in past month. Presents with hemoglobin 6.7 with appropriate correction post transfusion. MCV noted to be very microcytic. Iron studies consistent with some degree of iron deficiency Iron Sat 7%, but ferritin 175 (can be acute phase reactant). No bleed identified on colonoscopy, but incomplete exam. Multiple polyps taken.  and Haptoglobin 225 argue against hemolysis. B12 and Folate WNL. Polyps on initial biopsy showing tubular adenomas    Plan:  - Agree with iron sucrose 200mg IV x 5 days  - Follow results of VCE and repeat colonoscopy  - When medically ready for discharge, patient should follow up with a hematologist to trend Hgb and to see if repeat Iron Infusions are required. As patient lives in [place], recommend assisting family by calling for an appointment with [facility] ([facility] which have offices near [place]. Provided [facility] information to patient's wife. They can continue to monitor CBC and assess for further IV iron infusions.    Will sign off. Please re-consult with questions. Discussed with hematology oncology attending Dr. Lety Rodrigues.

## 2023-06-28 NOTE — PROGRESS NOTE ADULT - SUBJECTIVE AND OBJECTIVE BOX
TOPHER CHAO, 73y, Male  MRN-[id number]  Patient is a 73y old  Male who presents with a chief complaint of GI bleed (28 Jun 2023 07:27)      SUBJECTIVE: NAEO. Pt seen/examined at bedside. Family at bedside as well. Pt stating he feels well after his colonoscopy this AM. Denies any abdominal pain, nausea/vomiting, cough, SOB. Pt stating he has not had any additional bowel movements at this time post-procedure. Wife at bedside concerned about resuming anticoagulation.     12 Point ROS Negative unless noted otherwise above.  -------------------------------------------------------------------------------  VITAL SIGNS:  Vital Signs Last 24 Hrs  T(C): 36.6 (28 Jun 2023 09:15), Max: 37.4 (27 Jun 2023 17:28)  T(F): 97.8 (28 Jun 2023 09:15), Max: 99.4 (27 Jun 2023 17:28)  HR: 75 (28 Jun 2023 09:15) (75 - 100)  BP: 122/70 (28 Jun 2023 09:15) (118/82 - 136/92)  BP(mean): 91 (28 Jun 2023 07:20) (91 - 91)  RR: 18 (28 Jun 2023 09:15) (17 - 18)  SpO2: 95% (28 Jun 2023 09:15) (95% - 96%)    Parameters below as of 28 Jun 2023 09:15  Patient On (Oxygen Delivery Method): room air      I&O's Summary    27 Jun 2023 07:01  -  28 Jun 2023 07:00  --------------------------------------------------------  IN: 5195 mL / OUT: 1100 mL / NET: 4095 mL    28 Jun 2023 07:01  -  28 Jun 2023 11:54  --------------------------------------------------------  IN: 250 mL / OUT: 125 mL / NET: 125 mL        PHYSICAL EXAM:    General: NAD  HEENT: NC/AT; moist mucosal membranes.  Neck: supple, trachea midline  Cardiovascular: RRR, +S1/S2; NO M/R/G  Respiratory: CTA B/L; no W/R/R  Gastrointestinal: soft, NT/ND; +BSx4  Extremities: WWP; no edema or cyanosis  Vascular: 2+ radial, DP/PT pulses B/L  Neurological: AAOx3    ALLERGIES:  Allergies    No Known Allergies    Intolerances      MEDICATIONS:  MEDICATIONS  (STANDING):  bisacodyl Suppository 10 milliGRAM(s) Rectal at bedtime  iron sucrose Injectable 200 milliGRAM(s) IV Push every 24 hours  pantoprazole  Injectable 40 milliGRAM(s) IV Push daily  sertraline 50 milliGRAM(s) Oral at bedtime  tamsulosin 0.4 milliGRAM(s) Oral at bedtime    MEDICATIONS  (PRN):      -------------------------------------------------------------------------------  LABS:                        8.9    4.04  )-----------( 282      ( 28 Jun 2023 07:22 )             31.4     06-28    141  |  108  |  2<L>  ----------------------------<  115<H>  3.7   |  25  |  0.78    Ca    8.4      28 Jun 2023 07:22  Phos  2.4     06-28  Mg     2.0     06-28          Urinalysis Basic - ( 28 Jun 2023 07:22 )    Color: x / Appearance: x / SG: x / pH: x  Gluc: 115 mg/dL / Ketone: x  / Bili: x / Urobili: x   Blood: x / Protein: x / Nitrite: x   Leuk Esterase: x / RBC: x / WBC x   Sq Epi: x / Non Sq Epi: x / Bacteria: x      CAPILLARY BLOOD GLUCOSE              RADIOLOGY & ADDITIONAL TESTS: Reviewed.

## 2023-06-28 NOTE — PROGRESS NOTE ADULT - ATTENDING COMMENTS
Initial attending contact date  6/23/23    . See fellow note written above for details. I reviewed the fellow documentation. I have personally seen and examined this patient. I reviewed vitals, labs, medications, cardiac studies, and additional imaging. I agree with the above fellow's findings and plans as written above with the following additions/statements.    73 M hx of colon ca more than 3 decades ago, localized had surgery done, no chemo, radiation ( family hx of colon ca )- PE 11 years ago treated with 6 months of Anticoagulation- , had CVA with Right hemiparesis/PE on September 2021 on eliquis admitted to OSH - where EGD/Colonoscopy was done ( polyp not removed ?-poor prep) procedure aborted- transferred for further care to [facility]  -Cardiology consulted for pre-op risk assessment for C-scope  - now s/p scope  -Without active cardiac issues, euvolemic on exam  -Pls reconsult as needed

## 2023-06-28 NOTE — PROGRESS NOTE ADULT - SUBJECTIVE AND OBJECTIVE BOX
INTERVAL EVENTS: No o/n events. Denies CP, dyspnea, palpitations, presyncope, syncope, f/c/n/v.     s/p colonoscopy. Feels well w/o edema or sob or cp    REVIEW OF SYSTEMS:  10 systems reviewed and negative unless otherwise stated.      PHYSICAL EXAM:  GENERAL: No acute distress  ENT: JVP 5 cmH2O  CHEST/LUNG: Clear to auscultation bilaterally  HEART:  No murmurs, S1+s2   ABDOMEN: Soft, Nontender  EXTREMITIES: Warm and well perfused. No edema  NEUROLOGY: AAOx3    LABS:                        8.9    4.04  )-----------( 282      ( 28 Jun 2023 07:22 )             31.4     06-28    141  |  108  |  2<L>  ----------------------------<  115<H>  3.7   |  25  |  0.78    Ca    8.4      28 Jun 2023 07:22  Phos  2.4     06-28  Mg     2.0     06-28        Radiographic Imaging, ECG, echocardiography personally reviewed.

## 2023-06-28 NOTE — PROGRESS NOTE ADULT - ATTENDING COMMENTS
72yo man non-smoker, left handed, hx of colon ca more than 3 decades ago, localized had surgery done, no chemo, radiation ( family hx of colon ca )- PE 11 years ago treated with 6 months of Anticoagulation- , had CVA with Right hemiparesis/PE on September 2021- then was told heart ok , not Afib, is determined as thromboembolic stroke, has been on Elqiuis since then-  was noted to have anemia - admitted to OSH - where EGD/Colonoscopy was done ( polyp not removed ?-poor prep procedure aborted-) transferred for further care  record from osh (EGD/colonoscopy reviewed) sub-optimal prep, 6 mm polyp in TC, 8 mm in AC removed. 7 mm multilobulated polyp in Cecum not removed -requires 2 days bowel prep with colonoscopy on 6/26- 5 polyps removed not able to reach cecum , capsule endoscopy 6/27, repeat colonoscopy 6/28 with successful removal of cecum polyp-     pt seen and examined with Dr. Gomez  wife and care giver at bedside  had colonoscopy with polyp removal this am ( including polyp in cecum)  good air entry, awake, alert.       Anemia -GI bleeding, likely from bleeding polyp- received transfusion , - acute blood loss anemia   - CEA/CA 19-9 ( tumor marker negative )  - Hb 7.8 6/24 , 7.8 6/26, 9 on 6/27   - JAGUAR - ( iron saturating is 7 % single digit, iron is 25 , ferritin is 175 - though obtained after transfusion still considered JAGUAR-   please give iron Venofer 200 iv daily for 5 doses.- # 5/5  ( Eliquis held due to bleeding and anemia- LE dvt screen negative 6/22- evaluated by vascular note read-no plan for IVC  he was rec for long term AC as per doctor in [facility] and pmd given recurrent PE -  - PE recurrent - first episode 11 yrs ago, second PE during the time of stroke - has been on eliquis now held due to anemia requiring transfusion, o decide to restart when GI work up complete.    evaluated by cardiology - ECHO - EF 55-60 %, LA normal size, card note read, no further work up, CT chest to moderate to severe arthrosclerosis  waiting for EKG   - intermediate risk for low to intermediate risk procedure.  - fu CBC and transfuse prn   - IVF prn -caution for fluid overload, currently lung clear.     - CVA with right hemiparesis - stated was thromboembolic stroke , no known hx of cardiac problem  physical therapy evaluation.     - prior to admission- ambulatory with walker , weaker now due to anemia/ hospitalization - need assist in sitting up in bed right now  - 2nd colonoscopy this admission , removal of polyp, fu capsule endoscopy result, as per GI continue to hold AC   when GI felt safe to resume AC     home meds resumed   sertraline 50 milliGRAM(s) Oral at bedtime  tamsulosin 0.4 milliGRAM(s) Oral at bedtime    - dvt prophylasix   thank you for allowing medicine to participate in the care, dw wife, Dr. Gomez and surgery team 1 PA.

## 2023-06-28 NOTE — CHART NOTE - NSCHARTNOTEFT_GEN_A_CORE
Patient is s/p Colonoscopy performed in Endoscopy Unit    Impressions:  VCE was noted in the transverse colon. Preliminary Interpretation without obvious small bowel lesion to explain anemia  Polyp (1.5 cm to 2 cm) in the ascending colon. (Polypectomy, Hemostasis).  Polyp (9 mm to 1.5 cm) in the cecum. (Polypectomy, Hemostasis).  Polyp (5 mm to 1 cm) in the ascending colon. (Polypectomy, Hemostasis).  The left side of the colon was dilated and appeared atonic.  The colon was particularly difficult to traverse. Stiffening Wire, position change, and extensive abdominal pressure utilized to intubate the cecum.    Plan:	  Avoid AC and Anti-platelets post polypectomy  Advance diet as tolerated  Await pathology results.  Return to floor for further management  Repeat Colonoscopy in 6-12 months    Thank you for the courtesy of this consult. We will follow along with you.    Daniel David M.D.  Gastroenterology Fellow  Weekday 7am-5pm Pager: [phone]  Weeknights/Weekend/Holiday Coverage: Please Call the  for contact info  Follow Up Questions welcome via [employer] Teams Messenger Patient is s/p Colonoscopy performed in Endoscopy Unit    Impressions:  VCE was noted in the transverse colon. Preliminary Interpretation without obvious small bowel lesion to explain anemia  Polyp (1.5 cm to 2 cm) in the ascending colon. (Polypectomy, Hemostasis).  Polyp (9 mm to 1.5 cm) in the cecum. (Polypectomy, Hemostasis).  Polyp (5 mm to 1 cm) in the ascending colon. (Polypectomy, Hemostasis).  The left side of the colon was dilated and appeared atonic.  The colon was particularly difficult to traverse. Stiffening Wire, position change, and extensive abdominal pressure utilized to intubate the cecum.    Plan:	  Avoid AC and Anti-platelets post polypectomy  Advance diet as tolerated, liquids today to ensure hgb stability post polypectomy  Await pathology results.  Return to floor for further management  Repeat Colonoscopy in 6-12 months    Thank you for the courtesy of this consult. We will follow along with you.    Daniel David M.D.  Gastroenterology Fellow  Weekday 7am-5pm Pager: [phone]  Weeknights/Weekend/Holiday Coverage: Please Call the  for contact info  Follow Up Questions welcome via [facility] Teams Messenger Patient is s/p Colonoscopy performed in Endoscopy Unit    Impressions:  VCE was noted in the transverse colon.   Polyp (1.5 cm to 2 cm) in the ascending colon. (Polypectomy, Hemostasis).  Polyp (9 mm to 1.5 cm) in the cecum. (Polypectomy, Hemostasis).  Polyp (5 mm to 1 cm) in the ascending colon. (Polypectomy, Hemostasis).  The left side of the colon was dilated and appeared atonic.  The colon was particularly difficult to traverse. Stiffening Wire, position change, and extensive abdominal pressure utilized to intubate the cecum.    Plan:	  Avoid AC and Anti-platelets post polypectomy  Advance diet as tolerated, liquids today to ensure hgb stability post polypectomy  Await pathology results.  Return to floor for further management  Repeat Colonoscopy in 6-12 months    ---VCE reviewed and small amount of red blood noted in likely mid - proximal jejunum  Plan for Push Enteroscopy tomorrow, please make NPO at [facility]---    Thank you for the courtesy of this consult. We will follow along with you.    Daniel David M.D.  Gastroenterology Fellow  Weekday 7am-5pm Pager: [phone]  Weeknights/Weekend/Holiday Coverage: Please Call the  for contact info  Follow Up Questions welcome via [facility] Teams Messenger

## 2023-06-28 NOTE — PRE-ANESTHESIA EVALUATION ADULT - NSRADCARDRESULTSFT_GEN_ALL_CORE
TTE 6/22/2023  "CONCLUSIONS:     1. Technically difficult study.   2. Normal left ventricular size and systolic function.   3. Reduced right ventricular systolic function.   4. Normal atria.   5. Mild-to-moderate mitral regurgitation.   6. No evidence of pulmonary hypertension.   7. No pericardial effusion.   8. No prior echo is available for comparison."

## 2023-06-28 NOTE — PROGRESS NOTE ADULT - ASSESSMENT
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 s/p c-scope w/ multiple polypectomies on 6/26 and capsule study on 6/27.    NPO for c-scope  Possibl d/c home post procedure  IVF  Pain/nausea control prn  Restart home medications: zoloft and flomax   5d IV iron 200 started 6/24  PPI  SCDs; holding HSQ for cscope 6/28  f/u med recs  f/u cards recs  f/u vascular recs  Dispo; PT [facility]  (pt refused), home pt

## 2023-06-28 NOTE — PROGRESS NOTE ADULT - ASSESSMENT
73 M hx of colon ca localized had surgery done, no chemo, radiation ( family hx of colon ca )- PE,  CVA with admitted to OSH - where EGD/Colonoscopy was done which was challenging so transferred here for colonsocopy    Patient is euvolemic, doing well post op without cardiac symptoms.    Please re consult as needed

## 2023-06-29 ENCOUNTER — RESULT REVIEW (OUTPATIENT)
Age: 73
End: 2023-06-29

## 2023-06-29 LAB
ANION GAP SERPL CALC-SCNC: 10 MMOL/L — SIGNIFICANT CHANGE UP (ref 5–17)
BUN SERPL-MCNC: 3 MG/DL — LOW (ref 7–23)
CALCIUM SERPL-MCNC: 8.5 MG/DL — SIGNIFICANT CHANGE UP (ref 8.4–10.5)
CHLORIDE SERPL-SCNC: 105 MMOL/L — SIGNIFICANT CHANGE UP (ref 96–108)
CO2 SERPL-SCNC: 23 MMOL/L — SIGNIFICANT CHANGE UP (ref 22–31)
CREAT SERPL-MCNC: 0.84 MG/DL — SIGNIFICANT CHANGE UP (ref 0.5–1.3)
EGFR: 93 ML/MIN/1.73M2 — SIGNIFICANT CHANGE UP
GLUCOSE SERPL-MCNC: 95 MG/DL — SIGNIFICANT CHANGE UP (ref 70–99)
HCT VFR BLD CALC: 31.2 % — LOW (ref 39–50)
HCT VFR BLD CALC: 31.9 % — LOW (ref 39–50)
HGB BLD-MCNC: 8.9 G/DL — LOW (ref 13–17)
HGB BLD-MCNC: 9 G/DL — LOW (ref 13–17)
MAGNESIUM SERPL-MCNC: 1.8 MG/DL — SIGNIFICANT CHANGE UP (ref 1.6–2.6)
MCHC RBC-ENTMCNC: 20.9 PG — LOW (ref 27–34)
MCHC RBC-ENTMCNC: 21 PG — LOW (ref 27–34)
MCHC RBC-ENTMCNC: 28.2 GM/DL — LOW (ref 32–36)
MCHC RBC-ENTMCNC: 28.5 GM/DL — LOW (ref 32–36)
MCV RBC AUTO: 73.4 FL — LOW (ref 80–100)
MCV RBC AUTO: 74.4 FL — LOW (ref 80–100)
NRBC # BLD: 0 /100 WBCS — SIGNIFICANT CHANGE UP (ref 0–0)
NRBC # BLD: 0 /100 WBCS — SIGNIFICANT CHANGE UP (ref 0–0)
PHOSPHATE SERPL-MCNC: 3.1 MG/DL — SIGNIFICANT CHANGE UP (ref 2.5–4.5)
PLATELET # BLD AUTO: 272 K/UL — SIGNIFICANT CHANGE UP (ref 150–400)
PLATELET # BLD AUTO: 305 K/UL — SIGNIFICANT CHANGE UP (ref 150–400)
POTASSIUM SERPL-MCNC: 3.8 MMOL/L — SIGNIFICANT CHANGE UP (ref 3.5–5.3)
POTASSIUM SERPL-SCNC: 3.8 MMOL/L — SIGNIFICANT CHANGE UP (ref 3.5–5.3)
RBC # BLD: 4.25 M/UL — SIGNIFICANT CHANGE UP (ref 4.2–5.8)
RBC # BLD: 4.29 M/UL — SIGNIFICANT CHANGE UP (ref 4.2–5.8)
RBC # FLD: 32.1 % — HIGH (ref 10.3–14.5)
RBC # FLD: SIGNIFICANT CHANGE UP (ref 10.3–14.5)
SODIUM SERPL-SCNC: 138 MMOL/L — SIGNIFICANT CHANGE UP (ref 135–145)
SURGICAL PATHOLOGY STUDY: SIGNIFICANT CHANGE UP
WBC # BLD: 7.37 K/UL — SIGNIFICANT CHANGE UP (ref 3.8–10.5)
WBC # BLD: 8.56 K/UL — SIGNIFICANT CHANGE UP (ref 3.8–10.5)
WBC # FLD AUTO: 7.37 K/UL — SIGNIFICANT CHANGE UP (ref 3.8–10.5)
WBC # FLD AUTO: 8.56 K/UL — SIGNIFICANT CHANGE UP (ref 3.8–10.5)

## 2023-06-29 PROCEDURE — 99232 SBSQ HOSP IP/OBS MODERATE 35: CPT

## 2023-06-29 PROCEDURE — 99233 SBSQ HOSP IP/OBS HIGH 50: CPT | Mod: GC

## 2023-06-29 PROCEDURE — 99233 SBSQ HOSP IP/OBS HIGH 50: CPT

## 2023-06-29 RX ORDER — MAGNESIUM SULFATE 500 MG/ML
1 VIAL (ML) INJECTION ONCE
Refills: 0 | Status: COMPLETED | OUTPATIENT
Start: 2023-06-29 | End: 2023-06-29

## 2023-06-29 RX ORDER — POTASSIUM CHLORIDE 20 MEQ
10 PACKET (EA) ORAL
Refills: 0 | Status: COMPLETED | OUTPATIENT
Start: 2023-06-29 | End: 2023-06-29

## 2023-06-29 RX ADMIN — SERTRALINE 50 MILLIGRAM(S): 25 TABLET, FILM COATED ORAL at 21:38

## 2023-06-29 RX ADMIN — Medication 100 MILLIEQUIVALENT(S): at 15:22

## 2023-06-29 RX ADMIN — Medication 100 MILLIEQUIVALENT(S): at 16:56

## 2023-06-29 RX ADMIN — Medication 100 GRAM(S): at 18:18

## 2023-06-29 RX ADMIN — SODIUM CHLORIDE 120 MILLILITER(S): 9 INJECTION, SOLUTION INTRAVENOUS at 05:27

## 2023-06-29 RX ADMIN — TAMSULOSIN HYDROCHLORIDE 0.4 MILLIGRAM(S): 0.4 CAPSULE ORAL at 21:38

## 2023-06-29 NOTE — PROGRESS NOTE ADULT - SUBJECTIVE AND OBJECTIVE BOX
INTERVAL HPI/OVERNIGHT EVENTS: tachycardic, temp 102. tylenol and ice packs ordered. started on mIVF. temp improved, but continued tachycardia. STAT CBC (WBC 8, H/H 8.9/31). npo @ [facility].     STATUS POST:    6/26: c-scope w/ multiple Polypectomies  6/28: completion c-scope, polypectomy x3    SUBJECTIVE: Pt seen and examined at bedside this am by surgery team. No acute complaints. +F/-BM, denies bloody BMs. Pain well controlled. Denies f/n/v/cp/sob.    MEDICATIONS  (STANDING):  lactated ringers. 1000 milliLiter(s) (120 mL/Hr) IV Continuous <Continuous>  magnesium sulfate  IVPB 1 Gram(s) IV Intermittent once  pantoprazole  Injectable 40 milliGRAM(s) IV Push daily  potassium chloride  10 mEq/100 mL IVPB 10 milliEquivalent(s) IV Intermittent every 1 hour  sertraline 50 milliGRAM(s) Oral at bedtime  tamsulosin 0.4 milliGRAM(s) Oral at bedtime    MEDICATIONS  (PRN):  acetaminophen     Tablet .. 650 milliGRAM(s) Oral every 6 hours PRN Temp greater or equal to 38C (100.4F), Mild Pain (1 - 3), Moderate Pain (4 - 6), Severe Pain (7 - 10)      Vital Signs Last 24 Hrs  T(C): 36.8 (29 Jun 2023 14:29), Max: 38.9 (28 Jun 2023 20:35)  T(F): 98.3 (29 Jun 2023 14:29), Max: 102.1 (28 Jun 2023 20:35)  HR: 87 (29 Jun 2023 14:29) (78 - 117)  BP: 126/80 (29 Jun 2023 14:29) (112/76 - 143/84)  BP(mean): 91 (29 Jun 2023 11:51) (91 - 91)  RR: 18 (29 Jun 2023 14:29) (16 - 18)  SpO2: 94% (29 Jun 2023 14:29) (93% - 97%)    Parameters below as of 29 Jun 2023 14:29  Patient On (Oxygen Delivery Method): room air    PHYSICAL EXAM:    Constitutional: A&Ox3, NAD    Respiratory: non labored breathing, no respiratory distress    Cardiovascular: NSR, RRR    Gastrointestinal: abdomen soft, nd, mildly ttp to L abdomen, no rebound or guarding     Extremities: wwp, no calf tenderness or edema. SCDs in place     I&O's Detail    28 Jun 2023 07:01  -  29 Jun 2023 07:00  --------------------------------------------------------  IN:    dextrose 5% + sodium chloride 0.45%: 250 mL    Lactated Ringers: 1200 mL    Oral Fluid: 930 mL  Total IN: 2380 mL    OUT:    Voided (mL): 525 mL  Total OUT: 525 mL    Total NET: 1855 mL      29 Jun 2023 07:01  -  29 Jun 2023 15:42  --------------------------------------------------------  IN:    Lactated Ringers: 120 mL  Total IN: 120 mL    OUT:  Total OUT: 0 mL    Total NET: 120 mL          LABS:                        9.0    7.37  )-----------( 272      ( 29 Jun 2023 05:30 )             31.9     06-29    138  |  105  |  3<L>  ----------------------------<  95  3.8   |  23  |  0.84    Ca    8.5      29 Jun 2023 05:30  Phos  3.1     06-29  Mg     1.8     06-29        Urinalysis Basic - ( 29 Jun 2023 05:30 )    Color: x / Appearance: x / SG: x / pH: x  Gluc: 95 mg/dL / Ketone: x  / Bili: x / Urobili: x   Blood: x / Protein: x / Nitrite: x   Leuk Esterase: x / RBC: x / WBC x   Sq Epi: x / Non Sq Epi: x / Bacteria: x        RADIOLOGY & ADDITIONAL STUDIES:

## 2023-06-29 NOTE — PROGRESS NOTE ADULT - ATTENDING COMMENTS
74yo man non-smoker, left handed, hx of colon ca more than 3 decades ago, localized had surgery done, no chemo, radiation ( family hx of colon ca )- PE 11 years ago treated with 6 months of Anticoagulation- , had CVA with Right hemiparesis/PE on September 2021- then was told heart ok , not Afib, is determined as thromboembolic stroke, has been on Elqiuis since then-  was noted to have anemia - admitted to OSH - where EGD/Colonoscopy was done ( polyp not removed ?-poor prep procedure aborted-) transferred for further care  record from osh (EGD/colonoscopy reviewed) sub-optimal prep, 6 mm polyp in TC, 8 mm in AC removed. 7 mm multilobulated polyp in Cecum not removed -requires 2 days bowel prep with colonoscopy on 6/26- 5 polyps removed not able to reach cecum , capsule endoscopy 6/27, repeat colonoscopy 6/28 with successful removal of cecum polyp-     pt seen and examined with Dr. Jason eisenberg for push enteroscopy today  hb 9.0   do not endorse any pain, no shortness of breath.        Anemia -GI bleeding, likely from bleeding polyp/ capsule endoscopy with bleeding seen in mid-jejunal area, plan for push enteroscopy 6/29  - received transfusion , - acute blood loss anemia   - path from polyp removed 6/26- all tubular adenoma.  - CEA/CA 19-9 ( tumor marker negative )  - Hb 7.8 6/24 , 7.8 6/26, 9 on 6/27 , 9.0 on 6/29  - JAGUAR - ( iron saturating is 7 % single digit, iron is 25 , ferritin is 175 - though obtained after transfusion still considered JAGUAR-   please give iron Venofer 200 iv daily for 5 doses.- # 5/5  heme/onc evaluated patient -pt to follow up with heme for evaluation for further need for iv iron infusion as out-patient.    ( Eliquis held due to bleeding and anemia- LE dvt screen negative 6/22- evaluated by vascular note read-no plan for IVC  he was rec for long term AC as per doctor in [place] and pmd given recurrent PE -  - PE recurrent - first episode 11 yrs ago, second PE during the time of stroke - has been on eliquis now held due to anemia requiring transfusion, o decide to restart when GI work up complete.    evaluated by cardiology - ECHO - EF 55-60 %, LA normal size, card note read, no further work up, CT chest to moderate to severe arthrosclerosis  waiting for EKG   - intermediate risk for low to intermediate risk procedure.  - fu CBC and transfuse prn   - IVF prn -caution for fluid overload, currently lung clear.     - CVA with right hemiparesis - stated was thromboembolic stroke , no known hx of cardiac problem  physical therapy evaluation.     - prior to admission- ambulatory with walker , weaker now due to anemia/ hospitalization - need assist in sitting up in bed right now  - 2nd colonoscopy this admission , removal of polyp, fu capsule endoscopy result, as per GI continue to hold AC   when GI felt safe to resume AC     home meds resumed   sertraline 50 milliGRAM(s) Oral at bedtime  tamsulosin 0.4 milliGRAM(s) Oral at bedtime    - dvt prophylasix   thank you for allowing medicine to participate in the care, dw wife, Dr. Gomez

## 2023-06-29 NOTE — PROGRESS NOTE ADULT - ASSESSMENT
74yo man PMHx CVA (R sided deficits), PE (on eliquis, last taken on 6/18), BPH, depression, diverticulosis, PSHx of colon tumor (s/p ?endoscopic removal 1998 at OSH), who presents as a transfer from OSH for management of LGIB, medicine team consulted for co-management.    Plan/Recommendations:  #LGIB  #Anemia  Pt transferred from [facility] for concern LGIB, underwent colonoscopy however procedure aborted prior to completion since transferred here to [facility] for repeat endoscopic eval. S/p IV Venofer 200mg IV x5 days.   - underwent scope 6/26 w/ removal of 4 polyps however unable to reach last polyp. Repeat C-scope in AM of 6/28 with polypectomy -- 2 poyps in ascending colon and one in cecum which was able to be reached. Pathology sent, awaiting results.  - Capsule study showing blood in jejunum -- patient undergoing push enteroscopy today 6/29 with GI. NPO since midnight.   - agree with holding home Eliquis per GI recs   - continue to monitor for bowel movements -- none at this time per patient       #Hx DVT/PE  Pt reports remote hx DVT/PE 11 years w/ recurrence Sept 2021 DVT and PE c/b thromboembolic stroke since w/ residual R sided weakness, and since persistently on Eliquis 5mg BID. TTE 6/22 negative for signs of afib or LA dilatation, otherwise moderate MR and normal EF.  - agree with holding home Eliquis for now in setting of active GI bleed  - after stabilization of active bleed will weight risk vs. benefit of whether to restart Eliquis at reduced dose vs. consideration of IVC filter based on bleeding risk vs. thromboembolic risk. Will discuss with GI as well.     Final recommendations pending attending attestation

## 2023-06-29 NOTE — PRE-ANESTHESIA EVALUATION ADULT - NSANTHPMHFT_GEN_ALL_CORE
Cardiac: Denies HTN, HLD, MI/Angina/Heart Failure, Arrhythmia, Murmur/Valvular Disorder. <4 METS  Pulmonary: Positive for pulmonary embolism January 2022 on eliquis. Denies Asthma, COPD, CARYN  Renal: Positive for BPH. Denies kidney dysfunction  Hepatic: Denies liver dysfunction  Gastrointestinal: Concern for gastrointestinal bleeding and history of diverticulosis with past surgical history of colon tumor removal 1988.   Endocrine: Denies DM or thyroid dysfunction  Neurologic: Past cerebrovascular accident with residual right sided deficits  Hematologic: Positive for eliquis use secondary to pumonary embolism.   Psychiatric: Positive for depression.    PSH: Colonoscopy, colon tumor removal
Reviewed

## 2023-06-29 NOTE — PRE-ANESTHESIA EVALUATION ADULT - NSANTHPEFT_GEN_ALL_CORE
normal respiratory effort, hemodynamically stable normal respiratory effort, hemodynamically stable, RUE/RLE weakness

## 2023-06-29 NOTE — PROGRESS NOTE ADULT - SUBJECTIVE AND OBJECTIVE BOX
TOPHER CHAO, 73y, Male  [id number]  Patient is a 73y old  Male who presents with a chief complaint of GI bleed (28 Jun 2023 15:43)      OVERNIGHT EVENTS: NAEO. Pt NPO for procedure today.     SUBJECTIVE: Pt seen.examined at bedside. Pt denying any abdominal pain, nausea/ Vomitting. Pt stating he is ready to undergo push enteroscopy today with GI. No other acute changes at this time. ROS negative.     12 Point ROS Negative unless noted otherwise above.  -------------------------------------------------------------------------------  VITAL SIGNS:  Vital Signs Last 24 Hrs  T(C): 36.8 (29 Jun 2023 09:00), Max: 38.9 (28 Jun 2023 20:35)  T(F): 98.3 (29 Jun 2023 09:00), Max: 102.1 (28 Jun 2023 20:35)  HR: 99 (29 Jun 2023 09:00) (78 - 117)  BP: 112/76 (29 Jun 2023 09:00) (112/76 - 143/84)  BP(mean): --  RR: 18 (29 Jun 2023 09:00) (16 - 18)  SpO2: 95% (29 Jun 2023 09:00) (93% - 97%)    Parameters below as of 29 Jun 2023 09:00  Patient On (Oxygen Delivery Method): room air      I&O's Summary    28 Jun 2023 07:01  -  29 Jun 2023 07:00  --------------------------------------------------------  IN: 2380 mL / OUT: 525 mL / NET: 1855 mL    29 Jun 2023 07:01  -  29 Jun 2023 11:31  --------------------------------------------------------  IN: 120 mL / OUT: 0 mL / NET: 120 mL        PHYSICAL EXAM:    General: NAD  HEENT: NC/AT; moist mucosal membranes.  Cardiovascular: RRR, +S1/S2; NO M/R/G  Respiratory: CTA B/L; no W/R/R  Gastrointestinal: soft, NT; +BSx4; mild distension noted   Extremities: WWP; no edema or cyanosis  Vascular: 2+ radial, DP/PT pulses B/L  Neurological: AAOx3; no focal deficits    ALLERGIES:  Allergies    No Known Allergies    Intolerances    MEDICATIONS:  MEDICATIONS  (STANDING):  lactated ringers. 1000 milliLiter(s) (120 mL/Hr) IV Continuous <Continuous>  pantoprazole  Injectable 40 milliGRAM(s) IV Push daily  sertraline 50 milliGRAM(s) Oral at bedtime  tamsulosin 0.4 milliGRAM(s) Oral at bedtime    MEDICATIONS  (PRN):  acetaminophen     Tablet .. 650 milliGRAM(s) Oral every 6 hours PRN Temp greater or equal to 38C (100.4F), Mild Pain (1 - 3), Moderate Pain (4 - 6), Severe Pain (7 - 10)  -------------------------------------------------------------------------------  LABS:                        9.0    7.37  )-----------( 272      ( 29 Jun 2023 05:30 )             31.9     06-29    138  |  105  |  3<L>  ----------------------------<  95  3.8   |  23  |  0.84    Ca    8.5      29 Jun 2023 05:30  Phos  3.1     06-29  Mg     1.8     06-29          Urinalysis Basic - ( 29 Jun 2023 05:30 )    Color: x / Appearance: x / SG: x / pH: x  Gluc: 95 mg/dL / Ketone: x  / Bili: x / Urobili: x   Blood: x / Protein: x / Nitrite: x   Leuk Esterase: x / RBC: x / WBC x   Sq Epi: x / Non Sq Epi: x / Bacteria: x      CAPILLARY BLOOD GLUCOSE              RADIOLOGY & ADDITIONAL TESTS: Reviewed.

## 2023-06-29 NOTE — CHART NOTE - NSCHARTNOTEFT_GEN_A_CORE
Patient is s/p Enteroscopy performed in Endoscopy Unit    Findings:  Esophagus  Normal esophagus.    Stomach  Diffuse mild erythema of the mucosa was noted in the stomach. These findings are compatible with non-erosive gastritis.    Jejunum  Localized nodule with ulceration and heaped up edges of the mucosa with stigmata of recent bleeding was noted in the mid jejunum. Located approximately 90cm from incisors, and approximately 20cm from the Ligament of Treitz. Suspicious for the source of Anemia. Multiple cold forceps both targeted and random biopsies were performed for histology in the mid jejunum. Tattooed proximally and distally    Plan:  clear liquid diet  f/u path  return to floor for further management per Surgical Services    Thank you for the courtesy of this consult. We will follow along with you.    Daniel David M.D.  Gastroenterology Fellow  Weekday 7am-5pm Pager: [phone]  Weeknights/Weekend/Holiday Coverage: Please Call the  for contact info  Follow Up Questions welcome via [employer] Teams Messenger

## 2023-06-29 NOTE — PRE-ANESTHESIA EVALUATION ADULT - NSANTHOSAYNRD_GEN_A_CORE
No. CARYN screening performed.  STOP BANG Legend: 0-2 = LOW Risk; 3-4 = INTERMEDIATE Risk; 5-8 = HIGH Risk

## 2023-06-29 NOTE — PROGRESS NOTE ADULT - ASSESSMENT
74 y/o M w/ PMHx of Mejia syndrome, stroke (R sided deficits), PE (Jan 2022, on Eliquis), BPH, depression, and diverticulosis and PSHx of colon tumor removal (1988) presents as a transfer from [facility] for management of GI bleed. s/p c-scope w/ multiple polypectomies on 6/27. S/p completion c-scope, polypectomy x3 on 6/28.     CLD/NPO @ [facility] for GI enteroscopy 6/29  Pain/nausea control prn  Restart home medications: zoloft and flomax   5d IV iron 200 started 6/24  PPI  SCDs; holding HSQ   f/u med recs  f/u cards recs  f/u vascular recs  Dispo; PT [facility]  (pt refused), home pt

## 2023-06-30 ENCOUNTER — TRANSCRIPTION ENCOUNTER (OUTPATIENT)
Age: 73
End: 2023-06-30

## 2023-06-30 VITALS
HEART RATE: 94 BPM | OXYGEN SATURATION: 97 % | RESPIRATION RATE: 18 BRPM | SYSTOLIC BLOOD PRESSURE: 118 MMHG | TEMPERATURE: 98 F | DIASTOLIC BLOOD PRESSURE: 75 MMHG

## 2023-06-30 PROBLEM — F32.9 MAJOR DEPRESSIVE DISORDER, SINGLE EPISODE, UNSPECIFIED: Chronic | Status: ACTIVE | Noted: 2023-06-22

## 2023-06-30 PROBLEM — I63.9 CEREBRAL INFARCTION, UNSPECIFIED: Chronic | Status: ACTIVE | Noted: 2023-06-22

## 2023-06-30 PROBLEM — N40.0 BENIGN PROSTATIC HYPERPLASIA WITHOUT LOWER URINARY TRACT SYMPTOMS: Chronic | Status: ACTIVE | Noted: 2023-06-22

## 2023-06-30 PROBLEM — K57.90 DIVERTICULOSIS OF INTESTINE, PART UNSPECIFIED, WITHOUT PERFORATION OR ABSCESS WITHOUT BLEEDING: Chronic | Status: ACTIVE | Noted: 2023-06-22

## 2023-06-30 PROBLEM — I26.99 OTHER PULMONARY EMBOLISM WITHOUT ACUTE COR PULMONALE: Chronic | Status: ACTIVE | Noted: 2023-06-22

## 2023-06-30 LAB
ANION GAP SERPL CALC-SCNC: 10 MMOL/L — SIGNIFICANT CHANGE UP (ref 5–17)
BLD GP AB SCN SERPL QL: NEGATIVE — SIGNIFICANT CHANGE UP
BUN SERPL-MCNC: 3 MG/DL — LOW (ref 7–23)
CALCIUM SERPL-MCNC: 8.3 MG/DL — LOW (ref 8.4–10.5)
CHLORIDE SERPL-SCNC: 104 MMOL/L — SIGNIFICANT CHANGE UP (ref 96–108)
CO2 SERPL-SCNC: 24 MMOL/L — SIGNIFICANT CHANGE UP (ref 22–31)
CREAT SERPL-MCNC: 0.83 MG/DL — SIGNIFICANT CHANGE UP (ref 0.5–1.3)
EGFR: 94 ML/MIN/1.73M2 — SIGNIFICANT CHANGE UP
GLUCOSE SERPL-MCNC: 97 MG/DL — SIGNIFICANT CHANGE UP (ref 70–99)
HCT VFR BLD CALC: 31.2 % — LOW (ref 39–50)
HGB BLD-MCNC: 8.9 G/DL — LOW (ref 13–17)
MAGNESIUM SERPL-MCNC: 2.1 MG/DL — SIGNIFICANT CHANGE UP (ref 1.6–2.6)
MCHC RBC-ENTMCNC: 21.4 PG — LOW (ref 27–34)
MCHC RBC-ENTMCNC: 28.5 GM/DL — LOW (ref 32–36)
MCV RBC AUTO: 75 FL — LOW (ref 80–100)
NRBC # BLD: 0 /100 WBCS — SIGNIFICANT CHANGE UP (ref 0–0)
PHOSPHATE SERPL-MCNC: 3.1 MG/DL — SIGNIFICANT CHANGE UP (ref 2.5–4.5)
PLATELET # BLD AUTO: 275 K/UL — SIGNIFICANT CHANGE UP (ref 150–400)
POTASSIUM SERPL-MCNC: 3.9 MMOL/L — SIGNIFICANT CHANGE UP (ref 3.5–5.3)
POTASSIUM SERPL-SCNC: 3.9 MMOL/L — SIGNIFICANT CHANGE UP (ref 3.5–5.3)
RBC # BLD: 4.16 M/UL — LOW (ref 4.2–5.8)
RBC # FLD: SIGNIFICANT CHANGE UP (ref 10.3–14.5)
RH IG SCN BLD-IMP: POSITIVE — SIGNIFICANT CHANGE UP
SODIUM SERPL-SCNC: 138 MMOL/L — SIGNIFICANT CHANGE UP (ref 135–145)
SURGICAL PATHOLOGY STUDY: SIGNIFICANT CHANGE UP
WBC # BLD: 5.79 K/UL — SIGNIFICANT CHANGE UP (ref 3.8–10.5)
WBC # FLD AUTO: 5.79 K/UL — SIGNIFICANT CHANGE UP (ref 3.8–10.5)

## 2023-06-30 PROCEDURE — 99233 SBSQ HOSP IP/OBS HIGH 50: CPT | Mod: GC

## 2023-06-30 PROCEDURE — 99232 SBSQ HOSP IP/OBS MODERATE 35: CPT

## 2023-06-30 RX ORDER — ACETAMINOPHEN 500 MG
2 TABLET ORAL
Qty: 0 | Refills: 0 | DISCHARGE
Start: 2023-06-30

## 2023-06-30 RX ORDER — APIXABAN 2.5 MG/1
1 TABLET, FILM COATED ORAL
Qty: 0 | Refills: 0 | DISCHARGE
Start: 2023-06-30

## 2023-06-30 RX ORDER — APIXABAN 2.5 MG/1
1 TABLET, FILM COATED ORAL
Refills: 0 | DISCHARGE

## 2023-06-30 RX ORDER — APIXABAN 2.5 MG/1
1 TABLET, FILM COATED ORAL
Qty: 5 | Refills: 0
Start: 2023-06-30 | End: 2023-07-02

## 2023-06-30 RX ORDER — APIXABAN 2.5 MG/1
2.5 TABLET, FILM COATED ORAL EVERY 12 HOURS
Refills: 0 | Status: DISCONTINUED | OUTPATIENT
Start: 2023-06-30 | End: 2023-06-30

## 2023-06-30 RX ORDER — PANTOPRAZOLE SODIUM 20 MG/1
1 TABLET, DELAYED RELEASE ORAL
Qty: 30 | Refills: 0
Start: 2023-06-30 | End: 2023-07-29

## 2023-06-30 RX ADMIN — PANTOPRAZOLE SODIUM 40 MILLIGRAM(S): 20 TABLET, DELAYED RELEASE ORAL at 13:35

## 2023-06-30 RX ADMIN — APIXABAN 2.5 MILLIGRAM(S): 2.5 TABLET, FILM COATED ORAL at 09:55

## 2023-06-30 NOTE — PROGRESS NOTE ADULT - ATTENDING COMMENTS
72yo man non-smoker, left handed, hx of colon ca more than 3 decades ago, localized had surgery done, no chemo, radiation ( family hx of colon ca )- PE 11 years ago treated with 6 months of Anticoagulation- , had CVA with Right hemiparesis/PE on September 2021- then was told heart ok , not Afib, is determined as thromboembolic stroke, has been on Elqiuis since then-  was noted to have anemia - admitted to OSH - where EGD/Colonoscopy was done ( polyp not removed ?-poor prep procedure aborted-) transferred for further care  record from osh (EGD/colonoscopy reviewed) sub-optimal prep, 6 mm polyp in TC, 8 mm in AC removed. 7 mm multilobulated polyp in Cecum not removed -requires 2 days bowel prep with colonoscopy on 6/26- 5 polyps removed not able to reach cecum , capsule endoscopy 6/27, repeat colonoscopy 6/28 with successful removal of cecum polyp- capsule endoscopy with bleeding seen in mid-jejunal area, 6/29 push enteroscopy  -ulcer in jejunum with possible recent bleeding area tatooed,      pt seen and examined with Dr. Gomez  wife at bedside  was given eliquis lower dose 2.5 mg this am  clinically stable, no abdominal pain reported.  good air entry bilaterally and no edema noted on bilateral lower ext.        Anemia -GI bleeding, likely from bleeding polyp/ capsule endoscopy with bleeding seen in mid-jejunal area, plan for push enteroscopy 6/29  - received transfusion , - acute blood loss anemia   - path from polyp removed 6/26- all tubular adenoma.  - CEA/CA 19-9 ( tumor marker negative )  - Hb 7.8 6/24 , 7.8 6/26, 9 on 6/27 , 9.0 on 6/29  - JAGUAR - ( iron saturating is 7 % single digit, iron is 25 , ferritin is 175 - though obtained after transfusion still considered JAGUAR-   please give iron Venofer 200 iv daily for 5 doses.- # 5/5 completed  heme/onc evaluated patient -pt to follow up with heme for evaluation for further need for iv iron infusion as out-patient.    ( Eliquis held due to bleeding and anemia- LE dvt screen negative 6/22- evaluated by vascular note read-no plan for IVC  he was rec for long term AC as per doctor in [place] and pmd given recurrent PE -  - PE recurrent - first episode 11 yrs ago, second PE during the time of stroke -since admission- eliquis  held due to anemia requiring transfusion,, risk of bleeding and risk of PE , family discussed with surgery team -now restarted on eliquis 2.5 mg ( lower dosage ) bid till Sunday and then to hold for anticipated surgery thursday ( pending path )- pt and family educated to watch out for bowel movement ( no bleeding since admission )    evaluated by cardiology - ECHO - EF 55-60 %, LA normal size, card note read, no further work up, CT chest to moderate to severe arthrosclerosis  - CVA with right hemiparesis - stated was thromboembolic stroke , no known hx of cardiac problem  physical therapy evaluation.     - prior to admission- ambulatory with walker , weaker now due to anemia/ hospitalization - need assist in sitting up in bed right now  - 2nd colonoscopy this admission , removal of polyp, fu capsule endoscopy result, as per GI continue to hold AC   when GI felt safe to resume AC     home meds resumed   sertraline 50 milliGRAM(s) Oral at bedtime  tamsulosin 0.4 milliGRAM(s) Oral at bedtime    - tentative plan for surgery thursday ( potential resection of partial -affected part of jejunum  pt is at low to intermediate risk for intermediate risk for surgery   to hold Eliquis 3 days before procedure  avoid ACEI/ ARB  pt is medically optimized       - dvt prophylasix   thank you for allowing medicine to participate in the care, dw wife, Dr. Gomez, colorectal surgery

## 2023-06-30 NOTE — PROGRESS NOTE ADULT - SUBJECTIVE AND OBJECTIVE BOX
GASTROENTEROLOGY PROGRESS NOTE  Patient seen and examined at bedside. Planning on being discharged. Got a dose of eliquis.    PERTINENT REVIEW OF SYSTEMS:  CONSTITUTIONAL: No weakness, fevers or chills  HEENT: No visual changes; No vertigo or throat pain   GASTROINTESTINAL: As above.  NEUROLOGICAL: No numbness or weakness  SKIN: No itching, burning, rashes, or lesions     Allergies    No Known Allergies    Intolerances      MEDICATIONS:  MEDICATIONS  (STANDING):  apixaban 2.5 milliGRAM(s) Oral every 12 hours  lactated ringers. 1000 milliLiter(s) (120 mL/Hr) IV Continuous <Continuous>  pantoprazole  Injectable 40 milliGRAM(s) IV Push daily  sertraline 50 milliGRAM(s) Oral at bedtime  tamsulosin 0.4 milliGRAM(s) Oral at bedtime    MEDICATIONS  (PRN):  acetaminophen     Tablet .. 650 milliGRAM(s) Oral every 6 hours PRN Temp greater or equal to 38C (100.4F), Mild Pain (1 - 3), Moderate Pain (4 - 6), Severe Pain (7 - 10)    Vital Signs Last 24 Hrs  T(C): 36.9 (30 Jun 2023 08:33), Max: 37.2 (29 Jun 2023 17:53)  T(F): 98.5 (30 Jun 2023 08:33), Max: 99 (30 Jun 2023 05:17)  HR: 94 (30 Jun 2023 08:33) (85 - 99)  BP: 118/75 (30 Jun 2023 08:33) (114/75 - 126/80)  BP(mean): --  RR: 18 (30 Jun 2023 08:33) (17 - 18)  SpO2: 97% (30 Jun 2023 08:33) (93% - 97%)    Parameters below as of 30 Jun 2023 08:33  Patient On (Oxygen Delivery Method): room air        06-29 @ 07:01  -  06-30 @ 07:00  --------------------------------------------------------  IN: 1560 mL / OUT: 800 mL / NET: 760 mL    06-30 @ 07:01  -  06-30 @ 12:44  --------------------------------------------------------  IN: 720 mL / OUT: 0 mL / NET: 720 mL      PHYSICAL EXAM:    General: lying in bed, in no acute distress  HEENT: MMM, conjunctiva and sclera clear  Gastrointestinal: Soft non-tender non-distended; No rebound or guarding  Skin: Warm and dry. No obvious rash    LABS:                        8.9    5.79  )-----------( 275      ( 30 Jun 2023 05:30 )             31.2     06-30    138  |  104  |  3<L>  ----------------------------<  97  3.9   |  24  |  0.83    Ca    8.3<L>      30 Jun 2023 05:30  Phos  3.1     06-30  Mg     2.1     06-30            Urinalysis Basic - ( 30 Jun 2023 05:30 )    Color: x / Appearance: x / SG: x / pH: x  Gluc: 97 mg/dL / Ketone: x  / Bili: x / Urobili: x   Blood: x / Protein: x / Nitrite: x   Leuk Esterase: x / RBC: x / WBC x   Sq Epi: x / Non Sq Epi: x / Bacteria: x                RADIOLOGY & ADDITIONAL STUDIES:  Reviewed

## 2023-06-30 NOTE — PROGRESS NOTE ADULT - SUBJECTIVE AND OBJECTIVE BOX
INTERVAL HPI/OVERNIGHT EVENTS: paramjit CLD. afebrile. unsaved voids.     STATUS POST:    6/26: c-scope w/ multiple Polypectomies  6/28: completion c-scope, polypectomy x3  6/29: Enteroscopy     SUBJECTIVE: Pt seen and examined at bedside this am by surgery team. No acute complaints. Tolerating diet, pain well controlled. +F/-BM. Denies f/n/v/cp/sob.    MEDICATIONS  (STANDING):  apixaban 2.5 milliGRAM(s) Oral every 12 hours  lactated ringers. 1000 milliLiter(s) (120 mL/Hr) IV Continuous <Continuous>  pantoprazole  Injectable 40 milliGRAM(s) IV Push daily  sertraline 50 milliGRAM(s) Oral at bedtime  tamsulosin 0.4 milliGRAM(s) Oral at bedtime    MEDICATIONS  (PRN):  acetaminophen     Tablet .. 650 milliGRAM(s) Oral every 6 hours PRN Temp greater or equal to 38C (100.4F), Mild Pain (1 - 3), Moderate Pain (4 - 6), Severe Pain (7 - 10)      Vital Signs Last 24 Hrs  T(C): 36.9 (30 Jun 2023 08:33), Max: 37.2 (29 Jun 2023 17:53)  T(F): 98.5 (30 Jun 2023 08:33), Max: 99 (30 Jun 2023 05:17)  HR: 94 (30 Jun 2023 08:33) (85 - 99)  BP: 118/75 (30 Jun 2023 08:33) (112/76 - 126/80)  BP(mean): 91 (29 Jun 2023 11:51) (91 - 91)  RR: 18 (30 Jun 2023 08:33) (17 - 18)  SpO2: 97% (30 Jun 2023 08:33) (93% - 97%)    Parameters below as of 30 Jun 2023 08:33  Patient On (Oxygen Delivery Method): room air    PHYSICAL EXAM:    Constitutional: A&Ox3, NAD    Respiratory: non labored breathing, no respiratory distress    Cardiovascular: NSR, RRR    Gastrointestinal: abdomen soft, nd, nt, no rebound or guarding     Extremities: wwp, no calf tenderness or edema. SCDs in place       I&O's Detail    29 Jun 2023 07:01  -  30 Jun 2023 07:00  --------------------------------------------------------  IN:    Lactated Ringers: 1560 mL  Total IN: 1560 mL    OUT:    Voided (mL): 800 mL  Total OUT: 800 mL    Total NET: 760 mL          LABS:                        8.9    5.79  )-----------( 275      ( 30 Jun 2023 05:30 )             31.2     06-30    138  |  104  |  3<L>  ----------------------------<  97  3.9   |  24  |  0.83    Ca    8.3<L>      30 Jun 2023 05:30  Phos  3.1     06-30  Mg     2.1     06-30        Urinalysis Basic - ( 30 Jun 2023 05:30 )    Color: x / Appearance: x / SG: x / pH: x  Gluc: 97 mg/dL / Ketone: x  / Bili: x / Urobili: x   Blood: x / Protein: x / Nitrite: x   Leuk Esterase: x / RBC: x / WBC x   Sq Epi: x / Non Sq Epi: x / Bacteria: x        RADIOLOGY & ADDITIONAL STUDIES:

## 2023-06-30 NOTE — PROGRESS NOTE ADULT - SUBJECTIVE AND OBJECTIVE BOX
TOPHER CHAO, 73y, Male  [id number]  Patient is a 73y old  Male who presents with a chief complaint of GI bleed (30 Jun 2023 11:34)      OVERNIGHT EVENTS: NAEO     SUBJECTIVE: Pt seen/examined with wife at bedside. Pt denying any acute pain and/or bloody bowel movements at this time. Per wife, she is concerned about getting patient home in timely manner. Pt denying cough, SOB, abdominal pain, N/V.     12 Point ROS Negative unless noted otherwise above.  -------------------------------------------------------------------------------  VITAL SIGNS:  Vital Signs Last 24 Hrs  T(C): 36.9 (30 Jun 2023 08:33), Max: 37.2 (29 Jun 2023 17:53)  T(F): 98.5 (30 Jun 2023 08:33), Max: 99 (30 Jun 2023 05:17)  HR: 94 (30 Jun 2023 08:33) (85 - 99)  BP: 118/75 (30 Jun 2023 08:33) (114/75 - 126/80)  RR: 18 (30 Jun 2023 08:33) (17 - 18)  SpO2: 97% (30 Jun 2023 08:33) (93% - 97%)    Parameters below as of 30 Jun 2023 08:33  Patient On (Oxygen Delivery Method): room air    I&O's Summary    29 Jun 2023 07:01  -  30 Jun 2023 07:00  --------------------------------------------------------  IN: 1560 mL / OUT: 800 mL / NET: 760 mL        PHYSICAL EXAM:    General: NAD   HEENT: NC/AT; moist mucosal membranes.  Cardiovascular: RRR, +S1/S2; NO M/R/G  Respiratory: CTA B/L; no W/R/R  Gastrointestinal: soft, NT/ND; +BSx4  Extremities: WWP; no edema or cyanosis  Vascular: 2+ radial, DP/PT pulses B/L  Neurological: AAOx3; no focal deficits    ALLERGIES:  Allergies    No Known Allergies    Intolerances      MEDICATIONS:  MEDICATIONS  (STANDING):  apixaban 2.5 milliGRAM(s) Oral every 12 hours  lactated ringers. 1000 milliLiter(s) (120 mL/Hr) IV Continuous <Continuous>  pantoprazole  Injectable 40 milliGRAM(s) IV Push daily  sertraline 50 milliGRAM(s) Oral at bedtime  tamsulosin 0.4 milliGRAM(s) Oral at bedtime    MEDICATIONS  (PRN):  acetaminophen     Tablet .. 650 milliGRAM(s) Oral every 6 hours PRN Temp greater or equal to 38C (100.4F), Mild Pain (1 - 3), Moderate Pain (4 - 6), Severe Pain (7 - 10)      -------------------------------------------------------------------------------  LABS:                        8.9    5.79  )-----------( 275      ( 30 Jun 2023 05:30 )             31.2     06-30    138  |  104  |  3<L>  ----------------------------<  97  3.9   |  24  |  0.83    Ca    8.3<L>      30 Jun 2023 05:30  Phos  3.1     06-30  Mg     2.1     06-30        Urinalysis Basic - ( 30 Jun 2023 05:30 )    Color: x / Appearance: x / SG: x / pH: x  Gluc: 97 mg/dL / Ketone: x  / Bili: x / Urobili: x   Blood: x / Protein: x / Nitrite: x   Leuk Esterase: x / RBC: x / WBC x   Sq Epi: x / Non Sq Epi: x / Bacteria: x      CAPILLARY BLOOD GLUCOSE              RADIOLOGY & ADDITIONAL TESTS: Reviewed.

## 2023-06-30 NOTE — PROGRESS NOTE ADULT - PROVIDER SPECIALTY LIST ADULT
Hospitalist
Internal Medicine
Vascular Surgery
Colorectal Surgery
Gastroenterology
Internal Medicine
Surgery
Colorectal Surgery
Surgery
Internal Medicine
Surgery
Gastroenterology
Cardiology

## 2023-06-30 NOTE — CHART NOTE - NSCHARTNOTESELECT_GEN_ALL_CORE
Cardiology/Event Note
GI - Colonoscopy
Hematology/Off Service Note
GI - Colonoscopy
GI - Enteroscopy
GI - VCE
Sign off

## 2023-06-30 NOTE — PROGRESS NOTE ADULT - ASSESSMENT
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 s/p c-scope w/ multiple polypectomies on 6/27. S/p completion c-scope, polypectomy x3 on 6/28. Pt is now s/p enteroscopy on 6/28 showing mid jejunal mass, likely source of anemia.    Regular diet  Pain/nausea control prn  Restart home medications: zoloft and flomax   IV iron (6/24-6/28)  PPI  SCDs; Eliquis  f/u med pre op clearance   f/u cards pre op clearance  f/u vascular recs  Dispo: refused [facility], continue home PT/OT/speech   Plan for dc home today and plan for re admission next wk for OR (ex lap, SBR)

## 2023-06-30 NOTE — DISCHARGE NOTE NURSING/CASE MANAGEMENT/SOCIAL WORK - NSDCPEFALRISK_GEN_ALL_CORE
For information on Fall & Injury Prevention, visit: [email or web address] OR  [email or web address] OR  https://www.cdc.gov/steadi/patient.html

## 2023-06-30 NOTE — DISCHARGE NOTE NURSING/CASE MANAGEMENT/SOCIAL WORK - NSDCFUADDAPPT_GEN_ALL_CORE_FT
-Please follow up with Dr. Jha (GI) in 1-2 weeks from discharge or as directed. Call to schedule an appt.  -Please follow up with Dr. Rodrigues (hematology) in 1 week from discharge or as directed. Call to schedule an appt or you may follow up with a hematologist at [facility] ([facility]) which have offices near [place] to trend your hemoglobin and to see if repeat Iron Infusions are required  -Please follow up with your PCP after discharge. You were cleared for surgery by inpatient medical services at [facility].  -Please follow up with your outpatient cardiologist after discharge. You were cleared for surgery by inpatient cardiology services at [facility].

## 2023-06-30 NOTE — PROGRESS NOTE ADULT - ASSESSMENT
74yo man PMHx CVA (R sided deficits), PE (on eliquis, last taken on 6/18), BPH, depression, diverticulosis, PSHx of colon tumor (s/p ?endoscopic removal 1998 at OSH), who presents as a transfer from OSH for management of LGIB, medicine team consulted for co-management.    Plan/Recommendations:  #LGIB  #Anemia  Pt transferred from [facility] for concern LGIB, underwent colonoscopy however procedure aborted prior to completion since transferred here to [facility] for repeat endoscopic eval. S/p IV Venofer 200mg IV x5 days.   - underwent scope 6/26 w/ removal of 4 polyps however unable to reach last polyp. Repeat C-scope in AM of 6/28 with polypectomy -- 2 poyps in ascending colon and one in cecum which was able to be reached. Pathology sent, awaiting results.  - Push enteroscopy on 6/29 showing blood in jejunum -- cold forceps and tattooed. Diet advanced per GI.   - Eliquis 2.5mg BID resumed per primary team -- educated family on monitoring for bloody/tarry bowel movements       #Hx DVT/PE  Pt reports remote hx DVT/PE 11 years w/ recurrence Sept 2021 DVT and PE c/b thromboembolic stroke since w/ residual R sided weakness, and since persistently on Eliquis 5mg BID. TTE 6/22 negative for signs of afib or LA dilatation, otherwise moderate MR and normal EF.  - Eliquis 2.5mg BID resumed per primary team     #Pre-Op Clearance  Pt to undergo exploratory laparotomy next week with Colorectal Surgery. RCRI 2. Michel 0. No adverse reactions to anesthesia in the past.   - Pt to undergo clearance from Cardiology prior to procedure  - Patient intermediate risk for intermediate risk procedure.     Final recommendations pending attending attestation

## 2023-06-30 NOTE — DISCHARGE NOTE NURSING/CASE MANAGEMENT/SOCIAL WORK - PATIENT PORTAL LINK FT
You can access the FollowMyHealth Patient Portal offered by [facility] by registering at the following website: [email or web address]. By joining [employer]’s FollowMyHealth portal, you will also be able to view your health information using other applications (apps) compatible with our system.

## 2023-06-30 NOTE — PROGRESS NOTE ADULT - ASSESSMENT
72 yo M w/ PMHx of Mejia, CVA vs. TIA, (?R sided deficits), VTE on Eliquis, BPH, MDD, diverticulosis, CRC s/p sigmoidectomy, transferred to [facility] after admission at [facility] for anemia    Patient with extremely difficult colonoscopy to complete ;     Colonoscopy 6/26/23  Two sessile polyps ranging in size from 8 mm to 1.4 cm were found in the descending colon and transverse colon. A single-piece polypectomy was performed using a hot snare over a saline pillow in the descending colon and transverse colon. The polyps were completely removed. The polyps were completely retrieved. One endoclip was successfully applied to the transverse colon for the purpose of hemostasis.	  Two sessile polyps ranging in size from 6 mm to 1.2 cm were found in the ascending colon. In an area of prior polypectomy, overlying tattoo and stellate scar. A single-piece polypectomy was performed using a cold snare in the ascending colon. The polyps were completely removed.	  The colon was extremely lengthy and had multiple loops, requiring extensive abdominal pressure, change in patient position, however, cecum was identified but unable to be intubated.  There were 3 cecal polyps identified, one of which was partially resected with Cold Snare  colonoscopy was unable to be completed despite extensive maneuvering and multiple endoscopists involvement    Colonoscopy 6/27/23   3 polypectomies with purastat applied for prophylaxis    Enteroscopy 6/28/23  Jejunal nodule suspicious for source of Anemia , s/p multiple biopsies    Plan:  Advance diet   Would generally Avoid full AC for 72 hours post polypectomy, especially in light of difficult nature to re-access if re-bleeding occurs, consider Lovenox BID as alternative  f/u path  Surgical planning per Surgical Services    Thank you for allowing us to participate in the care of this patient. GI will sign off the case.  Please call us if you have any further questions or concerns    Daniel David M.D.  Gastroenterology Fellow  Weekday 7am-5pm Pager: [phone]  Weeknights/Weekend/Holiday Coverage: Please Call the  for contact info  Follow Up Questions welcome via [employer] Teams Messenger   72 yo M w/ PMHx of Mejia, CVA vs. TIA, (?R sided deficits), VTE on Eliquis, BPH, MDD, diverticulosis, CRC s/p sigmoidectomy, transferred to [facility] after admission at [facility] for anemia    Patient with extremely difficult colonoscopy to complete ;     Colonoscopy 6/26/23  Two sessile polyps ranging in size from 8 mm to 1.4 cm were found in the descending colon and transverse colon. A single-piece polypectomy was performed using a hot snare over a saline pillow in the descending colon and transverse colon. The polyps were completely removed. The polyps were completely retrieved. One endoclip was successfully applied to the transverse colon for the purpose of hemostasis.	  Two sessile polyps ranging in size from 6 mm to 1.2 cm were found in the ascending colon. In an area of prior polypectomy, overlying tattoo and stellate scar. A single-piece polypectomy was performed using a cold snare in the ascending colon. The polyps were completely removed.	  The colon was extremely lengthy and had multiple loops, requiring extensive abdominal pressure, change in patient position, however, cecum was identified but unable to be intubated.  There were 3 cecal polyps identified, one of which was partially resected with Cold Snare  colonoscopy was unable to be completed despite extensive maneuvering and multiple endoscopists involvement    Colonoscopy 6/27/23   3 polypectomies with purastat applied for prophylaxis    Enteroscopy 6/28/23  Jejunal nodule suspicious for source of Anemia , s/p multiple biopsies    Plan:  Advance diet   Would generally Avoid full AC for 72 hours post polypectomy, especially in light of difficult nature to re-access if re-bleeding occurs, consider Lovenox BID as alternative  f/u path  Surgical planning per Surgical Services    Thank you for allowing us to participate in the care of this patient. GI will sign off the case.  Please call us if you have any further questions or concerns    Daniel David M.D.  Gastroenterology Fellow  Weekday 7am-5pm Pager: [phone]  Weeknights/Weekend/Holiday Coverage: Please Call the  for contact info  Follow Up Questions welcome via [employer] Teams Messenger   72 yo M w/ PMHx of Mejia, CVA vs. TIA, (?R sided deficits), VTE on Eliquis, BPH, MDD, diverticulosis, CRC s/p sigmoidectomy, transferred to [facility] after admission at [facility] for anemia    Patient with extremely difficult colonoscopy to complete ;     Colonoscopy 6/26/23  Two sessile polyps ranging in size from 8 mm to 1.4 cm were found in the descending colon and transverse colon. A single-piece polypectomy was performed using a hot snare over a saline pillow in the descending colon and transverse colon. The polyps were completely removed. The polyps were completely retrieved. One endoclip was successfully applied to the transverse colon for the purpose of hemostasis.	  Two sessile polyps ranging in size from 6 mm to 1.2 cm were found in the ascending colon. In an area of prior polypectomy, overlying tattoo and stellate scar. A single-piece polypectomy was performed using a cold snare in the ascending colon. The polyps were completely removed.	  The colon was extremely lengthy and had multiple loops, requiring extensive abdominal pressure, change in patient position, however, cecum was identified but unable to be intubated.  There were 3 cecal polyps identified, one of which was partially resected with Cold Snare  colonoscopy was unable to be completed despite extensive maneuvering and multiple endoscopists involvement    Colonoscopy 6/27/23   3 polypectomies with purastat applied for prophylaxis    Enteroscopy 6/28/23  Jejunal nodule suspicious for source of Anemia , s/p multiple biopsies    Plan:  Advance diet   Would generally Avoid full AC for 72 hours post polypectomy, especially in light of difficult nature to re-access if re-bleeding occurs, consider Lovenox BID as alternative  f/u path  Surgical planning per Surgical Services    Thank you for allowing us to participate in the care of this patient. GI will sign off the case.  Please call us if you have any further questions or concerns    Daniel David M.D.  Gastroenterology Fellow  Weekday 7am-5pm Pager: [phone]  Weeknights/Weekend/Holiday Coverage: Please Call the  for contact info  Follow Up Questions welcome via [place] [employer] Teams Messenger

## 2023-06-30 NOTE — CHART NOTE - NSCHARTNOTEFT_GEN_A_CORE
73 M hx of colon ca localized had surgery done, no chemo, radiation ( family hx of colon ca )- PE,  CVA with admitted to OSH - where EGD/Colonoscopy was done which was challenging so transferred here for colonsocopy  now s/p colonosocpy and polypectomy    plan for ex lap and small bowel resection.     Patient w/ no active cardiac issues. No cardiac contraindications to proceed with small bowel resection. Skin Substitute Units (Will Override Primary Defect Units If Greater Than 0): 0

## 2023-07-03 ENCOUNTER — TRANSCRIPTION ENCOUNTER (OUTPATIENT)
Age: 73
End: 2023-07-03

## 2023-07-05 ENCOUNTER — TRANSCRIPTION ENCOUNTER (OUTPATIENT)
Age: 73
End: 2023-07-05

## 2023-07-05 ENCOUNTER — INPATIENT (INPATIENT)
Facility: HOSPITAL | Age: 73
LOS: 25 days | Discharge: HOME HEALTH | DRG: 329 | End: 2023-07-31
Attending: SURGERY | Admitting: SURGERY
Payer: COMMERCIAL

## 2023-07-05 VITALS
OXYGEN SATURATION: 95 % | WEIGHT: 195.11 LBS | DIASTOLIC BLOOD PRESSURE: 80 MMHG | RESPIRATION RATE: 18 BRPM | HEART RATE: 74 BPM | TEMPERATURE: 98 F | SYSTOLIC BLOOD PRESSURE: 127 MMHG | HEIGHT: 67 IN

## 2023-07-05 LAB
ALBUMIN SERPL ELPH-MCNC: 3.5 G/DL — SIGNIFICANT CHANGE UP (ref 3.3–5)
ALP SERPL-CCNC: 60 U/L — SIGNIFICANT CHANGE UP (ref 40–120)
ALT FLD-CCNC: 13 U/L — SIGNIFICANT CHANGE UP (ref 10–45)
ANION GAP SERPL CALC-SCNC: 11 MMOL/L — SIGNIFICANT CHANGE UP (ref 5–17)
ANISOCYTOSIS BLD QL: SIGNIFICANT CHANGE UP
APTT BLD: 30.6 SEC — SIGNIFICANT CHANGE UP (ref 27.5–35.5)
AST SERPL-CCNC: 17 U/L — SIGNIFICANT CHANGE UP (ref 10–40)
BASOPHILS # BLD AUTO: 0 K/UL — SIGNIFICANT CHANGE UP (ref 0–0.2)
BASOPHILS NFR BLD AUTO: 0 % — SIGNIFICANT CHANGE UP (ref 0–2)
BILIRUB SERPL-MCNC: 0.3 MG/DL — SIGNIFICANT CHANGE UP (ref 0.2–1.2)
BLD GP AB SCN SERPL QL: NEGATIVE — SIGNIFICANT CHANGE UP
BUN SERPL-MCNC: 11 MG/DL — SIGNIFICANT CHANGE UP (ref 7–23)
BURR CELLS BLD QL SMEAR: PRESENT — SIGNIFICANT CHANGE UP
CALCIUM SERPL-MCNC: 8.9 MG/DL — SIGNIFICANT CHANGE UP (ref 8.4–10.5)
CHLORIDE SERPL-SCNC: 104 MMOL/L — SIGNIFICANT CHANGE UP (ref 96–108)
CO2 SERPL-SCNC: 24 MMOL/L — SIGNIFICANT CHANGE UP (ref 22–31)
CREAT SERPL-MCNC: 0.79 MG/DL — SIGNIFICANT CHANGE UP (ref 0.5–1.3)
DACRYOCYTES BLD QL SMEAR: SLIGHT — SIGNIFICANT CHANGE UP
EGFR: 95 ML/MIN/1.73M2 — SIGNIFICANT CHANGE UP
ELLIPTOCYTES BLD QL SMEAR: SLIGHT — SIGNIFICANT CHANGE UP
EOSINOPHIL # BLD AUTO: 0.1 K/UL — SIGNIFICANT CHANGE UP (ref 0–0.5)
EOSINOPHIL NFR BLD AUTO: 1.7 % — SIGNIFICANT CHANGE UP (ref 0–6)
GLUCOSE SERPL-MCNC: 105 MG/DL — HIGH (ref 70–99)
HCT VFR BLD CALC: 37.6 % — LOW (ref 39–50)
HGB BLD-MCNC: 10.7 G/DL — LOW (ref 13–17)
HYPOCHROMIA BLD QL: SIGNIFICANT CHANGE UP
INR BLD: 1.17 — HIGH (ref 0.88–1.16)
LIDOCAIN IGE QN: 37 U/L — SIGNIFICANT CHANGE UP (ref 7–60)
LYMPHOCYTES # BLD AUTO: 0.27 K/UL — LOW (ref 1–3.3)
LYMPHOCYTES # BLD AUTO: 4.4 % — LOW (ref 13–44)
MACROCYTES BLD QL: SLIGHT — SIGNIFICANT CHANGE UP
MANUAL SMEAR VERIFICATION: SIGNIFICANT CHANGE UP
MCHC RBC-ENTMCNC: 22.2 PG — LOW (ref 27–34)
MCHC RBC-ENTMCNC: 28.5 GM/DL — LOW (ref 32–36)
MCV RBC AUTO: 77.8 FL — LOW (ref 80–100)
MICROCYTES BLD QL: SIGNIFICANT CHANGE UP
MONOCYTES # BLD AUTO: 1.23 K/UL — HIGH (ref 0–0.9)
MONOCYTES NFR BLD AUTO: 20.2 % — HIGH (ref 2–14)
NEUTROPHILS # BLD AUTO: 4.5 K/UL — SIGNIFICANT CHANGE UP (ref 1.8–7.4)
NEUTROPHILS NFR BLD AUTO: 73.7 % — SIGNIFICANT CHANGE UP (ref 43–77)
OVALOCYTES BLD QL SMEAR: SIGNIFICANT CHANGE UP
PLAT MORPH BLD: NORMAL — SIGNIFICANT CHANGE UP
PLATELET # BLD AUTO: 264 K/UL — SIGNIFICANT CHANGE UP (ref 150–400)
POIKILOCYTOSIS BLD QL AUTO: SIGNIFICANT CHANGE UP
POLYCHROMASIA BLD QL SMEAR: SLIGHT — SIGNIFICANT CHANGE UP
POTASSIUM SERPL-MCNC: 4 MMOL/L — SIGNIFICANT CHANGE UP (ref 3.5–5.3)
POTASSIUM SERPL-SCNC: 4 MMOL/L — SIGNIFICANT CHANGE UP (ref 3.5–5.3)
PROT SERPL-MCNC: 7.2 G/DL — SIGNIFICANT CHANGE UP (ref 6–8.3)
PROTHROM AB SERPL-ACNC: 14 SEC — HIGH (ref 10.5–13.4)
RBC # BLD: 4.83 M/UL — SIGNIFICANT CHANGE UP (ref 4.2–5.8)
RBC # FLD: SIGNIFICANT CHANGE UP (ref 10.3–14.5)
RBC BLD AUTO: ABNORMAL
RH IG SCN BLD-IMP: POSITIVE — SIGNIFICANT CHANGE UP
SCHISTOCYTES BLD QL AUTO: SLIGHT — SIGNIFICANT CHANGE UP
SODIUM SERPL-SCNC: 139 MMOL/L — SIGNIFICANT CHANGE UP (ref 135–145)
SPHEROCYTES BLD QL SMEAR: SLIGHT — SIGNIFICANT CHANGE UP
WBC # BLD: 6.1 K/UL — SIGNIFICANT CHANGE UP (ref 3.8–10.5)
WBC # FLD AUTO: 6.1 K/UL — SIGNIFICANT CHANGE UP (ref 3.8–10.5)

## 2023-07-05 PROCEDURE — 71045 X-RAY EXAM CHEST 1 VIEW: CPT | Mod: 26

## 2023-07-05 PROCEDURE — 99285 EMERGENCY DEPT VISIT HI MDM: CPT

## 2023-07-05 RX ORDER — HEPARIN SODIUM 5000 [USP'U]/ML
5000 INJECTION INTRAVENOUS; SUBCUTANEOUS EVERY 8 HOURS
Refills: 0 | Status: DISCONTINUED | OUTPATIENT
Start: 2023-07-05 | End: 2023-07-06

## 2023-07-05 RX ORDER — ACETAMINOPHEN 500 MG
650 TABLET ORAL EVERY 6 HOURS
Refills: 0 | Status: DISCONTINUED | OUTPATIENT
Start: 2023-07-05 | End: 2023-07-06

## 2023-07-05 RX ORDER — ONDANSETRON 8 MG/1
4 TABLET, FILM COATED ORAL ONCE
Refills: 0 | Status: DISCONTINUED | OUTPATIENT
Start: 2023-07-05 | End: 2023-07-06

## 2023-07-05 RX ORDER — ONDANSETRON 8 MG/1
4 TABLET, FILM COATED ORAL ONCE
Refills: 0 | Status: DISCONTINUED | OUTPATIENT
Start: 2023-07-05 | End: 2023-07-05

## 2023-07-05 RX ORDER — SODIUM CHLORIDE 9 MG/ML
1000 INJECTION, SOLUTION INTRAVENOUS
Refills: 0 | Status: DISCONTINUED | OUTPATIENT
Start: 2023-07-05 | End: 2023-07-05

## 2023-07-05 RX ORDER — SODIUM CHLORIDE 9 MG/ML
1000 INJECTION, SOLUTION INTRAVENOUS
Refills: 0 | Status: DISCONTINUED | OUTPATIENT
Start: 2023-07-05 | End: 2023-07-06

## 2023-07-05 RX ADMIN — HEPARIN SODIUM 5000 UNIT(S): 5000 INJECTION INTRAVENOUS; SUBCUTANEOUS at 19:14

## 2023-07-05 RX ADMIN — SODIUM CHLORIDE 120 MILLILITER(S): 9 INJECTION, SOLUTION INTRAVENOUS at 11:05

## 2023-07-05 RX ADMIN — SODIUM CHLORIDE 120 MILLILITER(S): 9 INJECTION, SOLUTION INTRAVENOUS at 23:59

## 2023-07-05 RX ADMIN — HEPARIN SODIUM 5000 UNIT(S): 5000 INJECTION INTRAVENOUS; SUBCUTANEOUS at 11:05

## 2023-07-05 NOTE — H&P ADULT - NSHPADDITIONALINFOADULT_GEN_ALL_CORE
***Chief Addendum***    Agree w above assessment and plan. Briefly ***Chief Addendum***    Agree w above assessment and plan. Briefly, 72yo M remote history CVA residual R hemiparesis, PE (on eliquis, last dose 6/20), PSx sigmoid colectomy 2/2 diverticular disease, recent admission to surgical service 6/22-6/30 2/2 GIB, During w/up for GIB, cscope (6/26) multiple polyps noted t/out colon, w polypectomies performed in descending, transverse, ascending colon, 3 polyps noted in cecum but not amenable to endoscopic resection. Enteroscopy performed 6/29 notable for jejunal mass w stigmata recent bleeding, Bx proven mod differentiated jejunal adenocarcinoma. Pt medically optimized/stratified by medicine + cards, intermittent risk for intermittent procedure. Pt represents today for planned admission, VSS, labs pending. Will admit, begin CLD/IVF, followed by NPO at [facility]. pt scheduled for OR tomorrow, SBR. Discussed w attending surgeon ***Chief Addendum***    Agree w above assessment and plan. Briefly, 72yo M remote history CVA residual R hemiparesis, PE (on eliquis, last dose 6/20), PSx sigmoid colectomy 2/2 diverticular disease, recent admission to surgical service 6/22-6/30 2/2 GIB, During w/up for GIB, cscope (6/26) multiple polyps noted t/out colon, w polypectomies performed in descending, transverse, ascending colon, 3 polyps noted in cecum but not amenable to endoscopic resection. Enteroscopy performed 6/29 notable for jejunal mass w stigmata recent bleeding, Bx proven mod differentiated jejunal adenocarcinoma. Pt medically optimized/stratified by medicine + cards, intermittent risk for intermittent procedure. Pt represents today for planned admission, VSS, benign exam, labs pending. Will admit, begin CLD/IVF, followed by NPO at [facility]. pt scheduled for OR tomorrow, SBR. Discussed w attending surgeon

## 2023-07-05 NOTE — ED PROVIDER NOTE - OBJECTIVE STATEMENT
73M PMHx CVA (R sided deficits), PE (on eliquis, last taken on 6/18), BPH, depression, diverticulosis, PSHx of colon tumor (s/p ?endoscopic removal 1998 at OSH),recent admission for GI, colonoscopy on 6/26- jejunal mass +adenocarcnioma, here for admission for resection tomorrow with Dr. pSann. Admits to generalized abd pain since yesterday. No f/c, cp, sob, n/v, black or boody stools. Pt has not had a BM since prior to the colonoscopy but is still passing gas.

## 2023-07-05 NOTE — H&P ADULT - ASSESSMENT
73 year old male with pmh of stroke (R hemiparesis), recurrent PE (last Jan 2022), diverticulosis and PSHx of colon tumor removal (1988) presenting with abdominal pain going  to OR 7/6 for Jejunal mass resection.     -CLD/IVF  -Pain/nausea control   -SCDs, OOB/A, IS  -HSQ DVT ppx  -Pre op labs   -OR tomorrow for bowel resection        Patient seen and case discussed in detail with chief resident Diaz Barcenas

## 2023-07-05 NOTE — H&P ADULT - HISTORY OF PRESENT ILLNESS
73 year old male with pmh of stroke (R hemiparesis), recurrent PE (last Jan 2022), diverticulosis and PSHx of colon tumor removal (1988) presenting to the emergency department this morning with abdominal pain. Patient describes the pain as a mild discomfort that started last night, pt saw DR Spann today and was instructed to come to the ER. Patient was transferred from [facility] to [facility] on June 22 for GI bleed. Patient during hospital stay given 3U pRBC. Patient had colonoscopy 6/26 where multiple polypectomies were performed in ascending, transverse, and descending colon, 3 cecal polyps were found and unable to be removed. 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 Patient cleared by cardiology and internal medicine during his stay at [facility] on 6/30. Patient’s last bowel movement was June 28. Wife reports he is still passing gas. Patient takes eliquis for PE prophylaxis, reports his last dose was June 20th. Patient denies any current nausea, vomiting, bloody bowel movements, melena, hematemesis, dizziness, chest pain, shortness of breath.      Home medications-   Zoloft 50 mg PO once a day   Pantoprazole 40 mg once a day   Flomax 0.4 mg once a day   Eliquis 5 mg PO tablet, last dose taken June 20th    Physical exam:    General- NAD, resting comfortably in bed.  C/V- Normal rate and rhythm.  Pulm- Breath sounds are equal bilaterally. No adventitious sounds.  ABD- Bowel sounds normoactive. Abdomen is soft, nontender, and nondistended.  Extremities: no edema, pulses 2+ upper and lower extremities.      Vital Signs Last 24 Hrs  T(C): 36.9 (05 Jul 2023 11:27), Max: 36.9 (05 Jul 2023 11:27)  T(F): 98.5 (05 Jul 2023 11:27), Max: 98.5 (05 Jul 2023 11:27)  HR: 69 (05 Jul 2023 11:27) (69 - 74)  BP: 122/81 (05 Jul 2023 11:27) (122/81 - 127/80)  BP(mean): --  RR: 16 (05 Jul 2023 11:27) (16 - 18)  SpO2: 95% (05 Jul 2023 11:27) (95% - 95%)    Parameters below as of 05 Jul 2023 11:27  Patient On (Oxygen Delivery Method): room air                            10.7   6.10  )-----------( 264      ( 05 Jul 2023 10:48 )             37.6     07-05    139  |  104  |  11  ----------------------------<  105<H>  4.0   |  24  |  0.79    Ca    8.9      05 Jul 2023 10:48    TPro  7.2  /  Alb  3.5  /  TBili  0.3  /  DBili  x   /  AST  17  /  ALT  13  /  AlkPhos  60  07-05

## 2023-07-05 NOTE — ED ADULT NURSE NOTE - NSFALLHARMRISKINTERV_ED_ALL_ED

## 2023-07-05 NOTE — PATIENT PROFILE ADULT - FALL HARM RISK - HARM RISK INTERVENTIONS

## 2023-07-05 NOTE — ED PROVIDER NOTE - CLINICAL SUMMARY MEDICAL DECISION MAKING FREE TEXT BOX
73M PMHx CVA (R sided deficits), PE (on eliquis, last taken on 6/18), BPH, depression, diverticulosis, PSHx of colon tumor (s/p ?endoscopic removal 1998 at OSH),recent admission for GI, colonoscopy on 6/26- jejunal mass +adenocarcnioma, here for admission for resection tomorrow with Dr. Spann. Admits to generalized abd pain since yesterday. No f/c, cp, sob, n/v, black or boody stools. Pt has not had aBM since prior to the colonoscopy but is still passing gas.  VSS. Abd soft, nt, nd. preop, admission to surg

## 2023-07-05 NOTE — PATIENT PROFILE ADULT - FUNCTIONAL ASSESSMENT - BASIC MOBILITY 6.
2-calculated by average/Not able to assess (calculate score using [facility] averaging method)

## 2023-07-05 NOTE — ED ADULT TRIAGE NOTE - BIRTH SEX
78yMale c/o R wrist pain s/p a fall today. Patient denies head hit or LOC. Patient denies numbness or tingling in the RUE. Patient denies any other injuries.    PMH:  Urinary urgency  Urinary frequency  HLD (hyperlipidemia)  Overactive bladder  HTN (hypertension)  Type 2 diabetes mellitus    PSH:  H/O colonoscopy with polypectomy  History of prostate surgery  S/P TURP (status post transurethral resection of prostate)  H/O left inguinal hernia repair    AH:    Meds: See med rec    T(C): 36.6 (03-17-19 @ 10:30)  HR: 66 (03-17-19 @ 10:30)  BP: 159/87 (03-17-19 @ 10:30)  RR: 18 (03-17-19 @ 10:30)  SpO2: 98% (03-17-19 @ 10:30)  Wt(kg): --    Gen: NAD  PE RUE:  Skin intact,   SILT med/rad/ulnar/axillary  +AIN/PIN/Ulnar/Radial/Musc/Median,   +shoulder/elbow ROM,   wrist ROM limited 2/2 pain,   +radial pulse, soft compartments,    Secondary:  No TTP over bony landmarks, SILT BL, ROM intact BL, distal pulses palpable.    Imaging:  XR demonstrating R distal radius fracture    Procedure:  A well molded fiberglass cast was placed The patient tolerated the procedure well and there we no complications. The patient was neurovascularly intact following casting. Post-casting xrays demonstrated acceptable alignment. Male

## 2023-07-05 NOTE — ED ADULT NURSE NOTE - OBJECTIVE STATEMENT
Pt arrived to ED c/o abdominal pain. Pt stated "Dr Spann told me to come to the ED for admission". Team 1 surgery at bedside with pt. Pt to be admitted pre-op for OR for tomorrow for SBO. Pt reports pain in bl lower abdomen. Pt unsteady on feet, as per daughter pt ambulates with walker. Pt HHA at bedside. Pt arrived to ED c/o abdominal pain. Pt stated "Dr Spann told me to come to the ED for admission". Team 1 surgery at bedside with pt. Pt to be admitted pre-op for OR for tomorrow for SBO. Pt reports last BM 6/28. Pt reports pain in bl lower abdomen. Pt unsteady on feet, as per daughter pt ambulates with walker. Pt HHA at bedside.

## 2023-07-05 NOTE — ED ADULT NURSE NOTE - NS ED NOTE ABUSE RESPONSE YN
DATE OF SERVICE:  01/10/2018    FINDINGS:  1.  _____ 2.65 liters, which is 78% of predicted.  The FEV1/FVC ratio was   normal.  MVV is 62% of predicted.  2.  After the administration of an inhaled bronchodilator, FEV1 improves to   3.17 liters, which is 94% of predicted.  This is a relative 19% improvement.  3.  Lung volumes demonstrate mild hyperinflation.  4.  Diffusing capacity is normal.  5.  Airway resistance is increased.    IMPRESSION:  Moderate obstructive lung disease consistent with a clinical   diagnosis of asthma.  Compared to a prior study in 2013, there has been a   decrease in baseline FEV1.       ____________________________________     MD KARIE GREY / NTS    DD:  01/11/2018 13:12:26  DT:  01/11/2018 14:54:53    D#:  [id number]  Job#:  [id number]    cc: Sonja Lin MD  
Yes

## 2023-07-06 ENCOUNTER — TRANSCRIPTION ENCOUNTER (OUTPATIENT)
Age: 73
End: 2023-07-06

## 2023-07-06 ENCOUNTER — APPOINTMENT (OUTPATIENT)
Dept: COLORECTAL SURGERY | Facility: HOSPITAL | Age: 73
End: 2023-07-06

## 2023-07-06 ENCOUNTER — RESULT REVIEW (OUTPATIENT)
Age: 73
End: 2023-07-06

## 2023-07-06 LAB
ANION GAP SERPL CALC-SCNC: 11 MMOL/L — SIGNIFICANT CHANGE UP (ref 5–17)
BLD GP AB SCN SERPL QL: NEGATIVE — SIGNIFICANT CHANGE UP
BUN SERPL-MCNC: 7 MG/DL — SIGNIFICANT CHANGE UP (ref 7–23)
CALCIUM SERPL-MCNC: 8.7 MG/DL — SIGNIFICANT CHANGE UP (ref 8.4–10.5)
CHLORIDE SERPL-SCNC: 105 MMOL/L — SIGNIFICANT CHANGE UP (ref 96–108)
CO2 SERPL-SCNC: 23 MMOL/L — SIGNIFICANT CHANGE UP (ref 22–31)
CREAT SERPL-MCNC: 0.83 MG/DL — SIGNIFICANT CHANGE UP (ref 0.5–1.3)
EGFR: 94 ML/MIN/1.73M2 — SIGNIFICANT CHANGE UP
GLUCOSE SERPL-MCNC: 80 MG/DL — SIGNIFICANT CHANGE UP (ref 70–99)
HCT VFR BLD CALC: 34.1 % — LOW (ref 39–50)
HCV AB S/CO SERPL IA: 0.04 S/CO — SIGNIFICANT CHANGE UP
HCV AB SERPL-IMP: SIGNIFICANT CHANGE UP
HGB BLD-MCNC: 10 G/DL — LOW (ref 13–17)
MAGNESIUM SERPL-MCNC: 2 MG/DL — SIGNIFICANT CHANGE UP (ref 1.6–2.6)
MCHC RBC-ENTMCNC: 23 PG — LOW (ref 27–34)
MCHC RBC-ENTMCNC: 29.3 GM/DL — LOW (ref 32–36)
MCV RBC AUTO: 78.4 FL — LOW (ref 80–100)
NRBC # BLD: 0 /100 WBCS — SIGNIFICANT CHANGE UP (ref 0–0)
PHOSPHATE SERPL-MCNC: 2.9 MG/DL — SIGNIFICANT CHANGE UP (ref 2.5–4.5)
PLATELET # BLD AUTO: 289 K/UL — SIGNIFICANT CHANGE UP (ref 150–400)
POTASSIUM SERPL-MCNC: 4.2 MMOL/L — SIGNIFICANT CHANGE UP (ref 3.5–5.3)
POTASSIUM SERPL-SCNC: 4.2 MMOL/L — SIGNIFICANT CHANGE UP (ref 3.5–5.3)
RBC # BLD: 4.35 M/UL — SIGNIFICANT CHANGE UP (ref 4.2–5.8)
RBC # FLD: SIGNIFICANT CHANGE UP (ref 10.3–14.5)
RH IG SCN BLD-IMP: POSITIVE — SIGNIFICANT CHANGE UP
SODIUM SERPL-SCNC: 139 MMOL/L — SIGNIFICANT CHANGE UP (ref 135–145)
WBC # BLD: 3.43 K/UL — LOW (ref 3.8–10.5)
WBC # FLD AUTO: 3.43 K/UL — LOW (ref 3.8–10.5)

## 2023-07-06 PROCEDURE — 71260 CT THORAX DX C+: CPT

## 2023-07-06 PROCEDURE — 83735 ASSAY OF MAGNESIUM: CPT

## 2023-07-06 PROCEDURE — 82550 ASSAY OF CK (CPK): CPT

## 2023-07-06 PROCEDURE — 83540 ASSAY OF IRON: CPT

## 2023-07-06 PROCEDURE — 99232 SBSQ HOSP IP/OBS MODERATE 35: CPT | Mod: 57

## 2023-07-06 PROCEDURE — 81003 URINALYSIS AUTO W/O SCOPE: CPT

## 2023-07-06 PROCEDURE — 85610 PROTHROMBIN TIME: CPT

## 2023-07-06 PROCEDURE — 86901 BLOOD TYPING SEROLOGIC RH(D): CPT

## 2023-07-06 PROCEDURE — 88309 TISSUE EXAM BY PATHOLOGIST: CPT | Mod: 26

## 2023-07-06 PROCEDURE — 86301 IMMUNOASSAY TUMOR CA 19-9: CPT

## 2023-07-06 PROCEDURE — C8929: CPT

## 2023-07-06 PROCEDURE — 44120 REMOVAL OF SMALL INTESTINE: CPT | Mod: GC,62

## 2023-07-06 PROCEDURE — 74174 CTA ABD&PLVS W/CONTRAST: CPT

## 2023-07-06 PROCEDURE — 86923 COMPATIBILITY TEST ELECTRIC: CPT

## 2023-07-06 PROCEDURE — 85045 AUTOMATED RETICULOCYTE COUNT: CPT

## 2023-07-06 PROCEDURE — 84484 ASSAY OF TROPONIN QUANT: CPT

## 2023-07-06 PROCEDURE — 97110 THERAPEUTIC EXERCISES: CPT

## 2023-07-06 PROCEDURE — 82553 CREATINE MB FRACTION: CPT

## 2023-07-06 PROCEDURE — P9016: CPT

## 2023-07-06 PROCEDURE — 88305 TISSUE EXAM BY PATHOLOGIST: CPT

## 2023-07-06 PROCEDURE — 36415 COLL VENOUS BLD VENIPUNCTURE: CPT

## 2023-07-06 PROCEDURE — 83010 ASSAY OF HAPTOGLOBIN QUANT: CPT

## 2023-07-06 PROCEDURE — 97116 GAIT TRAINING THERAPY: CPT

## 2023-07-06 PROCEDURE — 84466 ASSAY OF TRANSFERRIN: CPT

## 2023-07-06 PROCEDURE — 83550 IRON BINDING TEST: CPT

## 2023-07-06 PROCEDURE — 93970 EXTREMITY STUDY: CPT

## 2023-07-06 PROCEDURE — 82607 VITAMIN B-12: CPT

## 2023-07-06 PROCEDURE — 36430 TRANSFUSION BLD/BLD COMPNT: CPT

## 2023-07-06 PROCEDURE — 97161 PT EVAL LOW COMPLEX 20 MIN: CPT

## 2023-07-06 PROCEDURE — 93010 ELECTROCARDIOGRAM REPORT: CPT

## 2023-07-06 PROCEDURE — 85730 THROMBOPLASTIN TIME PARTIAL: CPT

## 2023-07-06 PROCEDURE — 80053 COMPREHEN METABOLIC PANEL: CPT

## 2023-07-06 PROCEDURE — 86850 RBC ANTIBODY SCREEN: CPT

## 2023-07-06 PROCEDURE — 82378 CARCINOEMBRYONIC ANTIGEN: CPT

## 2023-07-06 PROCEDURE — 80048 BASIC METABOLIC PNL TOTAL CA: CPT

## 2023-07-06 PROCEDURE — 86900 BLOOD TYPING SEROLOGIC ABO: CPT

## 2023-07-06 PROCEDURE — 85027 COMPLETE CBC AUTOMATED: CPT

## 2023-07-06 PROCEDURE — 83615 LACTATE (LD) (LDH) ENZYME: CPT

## 2023-07-06 PROCEDURE — 82728 ASSAY OF FERRITIN: CPT

## 2023-07-06 PROCEDURE — 84100 ASSAY OF PHOSPHORUS: CPT

## 2023-07-06 PROCEDURE — 88341 IMHCHEM/IMCYTCHM EA ADD ANTB: CPT | Mod: 26

## 2023-07-06 PROCEDURE — C1889: CPT

## 2023-07-06 PROCEDURE — 82962 GLUCOSE BLOOD TEST: CPT

## 2023-07-06 PROCEDURE — 97165 OT EVAL LOW COMPLEX 30 MIN: CPT

## 2023-07-06 PROCEDURE — 88342 IMHCHEM/IMCYTCHM 1ST ANTB: CPT | Mod: 26

## 2023-07-06 PROCEDURE — 82746 ASSAY OF FOLIC ACID SERUM: CPT

## 2023-07-06 RX ORDER — PANTOPRAZOLE SODIUM 20 MG/1
40 TABLET, DELAYED RELEASE ORAL DAILY
Refills: 0 | Status: DISCONTINUED | OUTPATIENT
Start: 2023-07-06 | End: 2023-07-19

## 2023-07-06 RX ORDER — HYDROMORPHONE HYDROCHLORIDE 2 MG/ML
0.5 INJECTION INTRAMUSCULAR; INTRAVENOUS; SUBCUTANEOUS EVERY 4 HOURS
Refills: 0 | Status: DISCONTINUED | OUTPATIENT
Start: 2023-07-06 | End: 2023-07-12

## 2023-07-06 RX ORDER — SODIUM CHLORIDE 9 MG/ML
1000 INJECTION, SOLUTION INTRAVENOUS
Refills: 0 | Status: DISCONTINUED | OUTPATIENT
Start: 2023-07-06 | End: 2023-07-07

## 2023-07-06 RX ORDER — KETOROLAC TROMETHAMINE 30 MG/ML
15 SYRINGE (ML) INJECTION EVERY 8 HOURS
Refills: 0 | Status: DISCONTINUED | OUTPATIENT
Start: 2023-07-06 | End: 2023-07-08

## 2023-07-06 RX ORDER — HEPARIN SODIUM 5000 [USP'U]/ML
5000 INJECTION INTRAVENOUS; SUBCUTANEOUS EVERY 8 HOURS
Refills: 0 | Status: DISCONTINUED | OUTPATIENT
Start: 2023-07-06 | End: 2023-07-11

## 2023-07-06 RX ORDER — ONDANSETRON 8 MG/1
4 TABLET, FILM COATED ORAL EVERY 6 HOURS
Refills: 0 | Status: DISCONTINUED | OUTPATIENT
Start: 2023-07-06 | End: 2023-07-31

## 2023-07-06 RX ORDER — KETOROLAC TROMETHAMINE 30 MG/ML
15 SYRINGE (ML) INJECTION EVERY 6 HOURS
Refills: 0 | Status: DISCONTINUED | OUTPATIENT
Start: 2023-07-06 | End: 2023-07-06

## 2023-07-06 RX ORDER — KETOROLAC TROMETHAMINE 30 MG/ML
15 SYRINGE (ML) INJECTION EVERY 8 HOURS
Refills: 0 | Status: DISCONTINUED | OUTPATIENT
Start: 2023-07-06 | End: 2023-07-06

## 2023-07-06 RX ORDER — ACETAMINOPHEN 500 MG
650 TABLET ORAL EVERY 6 HOURS
Refills: 0 | Status: DISCONTINUED | OUTPATIENT
Start: 2023-07-06 | End: 2023-07-07

## 2023-07-06 RX ADMIN — Medication 650 MILLIGRAM(S): at 18:08

## 2023-07-06 RX ADMIN — PANTOPRAZOLE SODIUM 40 MILLIGRAM(S): 20 TABLET, DELAYED RELEASE ORAL at 12:41

## 2023-07-06 RX ADMIN — SODIUM CHLORIDE 100 MILLILITER(S): 9 INJECTION, SOLUTION INTRAVENOUS at 18:08

## 2023-07-06 RX ADMIN — Medication 15 MILLIGRAM(S): at 12:41

## 2023-07-06 RX ADMIN — HEPARIN SODIUM 5000 UNIT(S): 5000 INJECTION INTRAVENOUS; SUBCUTANEOUS at 03:26

## 2023-07-06 RX ADMIN — HEPARIN SODIUM 5000 UNIT(S): 5000 INJECTION INTRAVENOUS; SUBCUTANEOUS at 18:08

## 2023-07-06 RX ADMIN — Medication 15 MILLIGRAM(S): at 22:21

## 2023-07-06 RX ADMIN — HYDROMORPHONE HYDROCHLORIDE 0.5 MILLIGRAM(S): 2 INJECTION INTRAMUSCULAR; INTRAVENOUS; SUBCUTANEOUS at 10:23

## 2023-07-06 RX ADMIN — SODIUM CHLORIDE 100 MILLILITER(S): 9 INJECTION, SOLUTION INTRAVENOUS at 12:41

## 2023-07-06 RX ADMIN — HYDROMORPHONE HYDROCHLORIDE 0.5 MILLIGRAM(S): 2 INJECTION INTRAMUSCULAR; INTRAVENOUS; SUBCUTANEOUS at 21:15

## 2023-07-06 RX ADMIN — HYDROMORPHONE HYDROCHLORIDE 0.5 MILLIGRAM(S): 2 INJECTION INTRAMUSCULAR; INTRAVENOUS; SUBCUTANEOUS at 21:00

## 2023-07-06 RX ADMIN — HYDROMORPHONE HYDROCHLORIDE 0.5 MILLIGRAM(S): 2 INJECTION INTRAMUSCULAR; INTRAVENOUS; SUBCUTANEOUS at 10:53

## 2023-07-06 NOTE — BRIEF OPERATIVE NOTE - OPERATION/FINDINGS
Midline laparotomy through previous scar, combination of sharp dissection + electrocautery used to perform MONE. Tattooed margins of proximal jejunum identified both proximally + distally to jejunal mass. Mesentery of involved segment divided w Ligasure. Proximal + distal margins sharply divided, electrocautery used to achieved hemostasis. Handsewn end to end small anastomosis created using 2-0 PDO quill suture x2. Mesenteric defect closed w 2-0 PDO quill x1. Punctate bleeders oversewn w 3-0 vicryl. Hemostasis achieved. Fascia closed w running #1 PDS. Skin stapled. Indication: Jejunal adenocarcinoma, stage pending path.    Midline laparotomy through previous scar, combination of sharp dissection + electrocautery used to perform MONE. Tattooed margins of proximal jejunum identified both proximally + distally to jejunal mass. Mesentery of involved segment divided w Ligasure. Proximal + distal margins sharply divided, electrocautery used to achieved hemostasis. Handsewn end to end small anastomosis created using 2-0 PDO quill suture x2. Mesenteric defect closed w 2-0 PDO quill x1. Punctate bleeders oversewn w 3-0 vicryl. Hemostasis achieved. Fascia closed w running #1 PDS. Skin stapled.

## 2023-07-06 NOTE — PROGRESS NOTE ADULT - SUBJECTIVE AND OBJECTIVE BOX
INTERVAL HPI/OVERNIGHT EVENTS: paramjit CLD. NPO @ midnight.     SUBJECTIVE: Pt seen and examined at bedside this am by surgery team. No acute complaints. No abd pain. Denies f/n/v/cp/sob. Plan for OR today    MEDICATIONS  (STANDING):  heparin   Injectable 5000 Unit(s) SubCutaneous every 8 hours  lactated ringers. 1000 milliLiter(s) (120 mL/Hr) IV Continuous <Continuous>    MEDICATIONS  (PRN):  acetaminophen     Tablet .. 650 milliGRAM(s) Oral every 6 hours PRN Severe Pain (7 - 10)  ondansetron Injectable 4 milliGRAM(s) IV Push once PRN Nausea    Vital Signs Last 24 Hrs  T(C): 36.6 (2023 07:11), Max: 37 (2023 20:40)  T(F): 97.8 (2023 05:16), Max: 98.6 (2023 20:40)  HR: 81 (2023 07:11) (67 - 86)  BP: 142/87 (2023 07:11) (119/83 - 142/87)  BP(mean): --  RR: 17 (2023 07:11) (16 - 18)  SpO2: 94% (2023 07:11) (93% - 95%)    Parameters below as of 2023 05:16  Patient On (Oxygen Delivery Method): room air    PHYSICAL EXAM:    Constitutional: A&Ox3, NAD    Respiratory: non labored breathing, no respiratory distress    Cardiovascular: NSR, RRR    Gastrointestinal: abdomen soft, nd, nt. No rebound or guarding     Extremities: wwp, no calf tenderness or edema. SCDs in place    I&O's Detail    2023 07:01  -  2023 07:00  --------------------------------------------------------  IN:    Lactated Ringers: 720 mL    Lactated Ringers: 840 mL    Oral Fluid: 480 mL  Total IN: 2040 mL    OUT:    Voided (mL): 450 mL  Total OUT: 450 mL    Total NET: 1590 mL          LABS:                        10.7   6.10  )-----------( 264      ( 2023 10:48 )             37.6     07-    139  |  104  |  11  ----------------------------<  105<H>  4.0   |  24  |  0.79    Ca    8.9      2023 10:48    TPro  7.2  /  Alb  3.5  /  TBili  0.3  /  DBili  x   /  AST  17  /  ALT  13  /  AlkPhos  60  07-05    PT/INR - ( 2023 10:54 )   PT: 14.0 sec;   INR: 1.17          PTT - ( 2023 10:54 )  PTT:30.6 sec  Urinalysis Basic - ( 2023 12:30 )    Color: Yellow / Appearance: Clear / S.020 / pH: x  Gluc: x / Ketone: NEGATIVE  / Bili: Negative / Urobili: 1.0 E.U./dL   Blood: x / Protein: NEGATIVE mg/dL / Nitrite: NEGATIVE   Leuk Esterase: NEGATIVE / RBC: x / WBC x   Sq Epi: x / Non Sq Epi: x / Bacteria: x        RADIOLOGY & ADDITIONAL STUDIES:

## 2023-07-06 NOTE — BRIEF OPERATIVE NOTE - NSICDXBRIEFPROCEDURE_GEN_ALL_CORE_FT
PROCEDURES:  Exploratory laparotomy with small bowel resection 06-Jul-2023 09:54:38  Diaz Barcenas

## 2023-07-06 NOTE — PROGRESS NOTE ADULT - ASSESSMENT
73 year old male with pmh of stroke (R hemiparesis), recurrent PE (last Jan 2022), diverticulosis and PSHx of colon tumor removal (1988), known jejunal mass presenting with abdominal pain. Pt is now s/p ex lap, SBR, primary anastamosis on 7/6.     -NPO/IVF   -Pain/nausea control   -HSQ/SCDs  -OOBA/IS  -Hernandez

## 2023-07-06 NOTE — PRE-ANESTHESIA EVALUATION ADULT - NSRADCARDRESULTSFT_GEN_ALL_CORE
CONCLUSIONS: 6/22/23  1. Technically difficult study.  2. Normal left ventricular size and systolic function.  3. Reduced right ventricular systolic function.  4. Normal atria.  5. Mild-to-moderate mitral regurgitation.  6. No evidence of pulmonary hypertension.  7. No pericardial effusion.  8. No prior echo is available for comparison.

## 2023-07-06 NOTE — PRE-ANESTHESIA EVALUATION ADULT - NSANTHPEFT_GEN_ALL_CORE
General: Appearance is consistent with chronological age. No abnormal facies.  Constitutional: Alert and in no acute distress.  Eyes: The sclera and conjunctiva were normal, pupils were equal in size, round, and reactive to light and extraocular movements were intact.   ENT: The ears and nose were normal in appearance; oropharynx clear, moist mucus membranes.  Neck: The appearance of the neck was normal, no neck mass was observed. There was no jugular-venous distention.   Cardiovascular:  Rate and rhythm evaluated.  Respiratory: Unlabored breathing.  Neurological: No focal deficit, moves all extremities.  Psychiatric: Oriented to person, place, and time, insight and judgment were intact and the affect was normal.  Exercise Tolerance: MET<4., ambulates with walker

## 2023-07-06 NOTE — PROGRESS NOTE ADULT - SUBJECTIVE AND OBJECTIVE BOX
Team 1 Surgery Post-Op Note, PCN:     Pre-Op Dx: Jejunal adenocarcinoma  Procedure: Exploratory laparotomy with small bowel resection      Surgeon: Dr. Spann  Co-surgeon: Dr. Stafford    Subjective:  Patient seen at bedside. Patient sleepy but able to communicated with one to two word answers. Patient denies any nausea or vomiting. Patient denies any flatus or BM.       Vital Signs Last 24 Hrs  T(C): 36.6 (06 Jul 2023 10:22), Max: 37 (05 Jul 2023 20:40)  T(F): 97.8 (06 Jul 2023 10:22), Max: 98.6 (05 Jul 2023 20:40)  HR: 74 (06 Jul 2023 10:37) (67 - 86)  BP: 159/93 (06 Jul 2023 10:13) (119/83 - 163/102)  BP(mean): 118 (06 Jul 2023 10:13) (113 - 127)  RR: 17 (06 Jul 2023 10:37) (17 - 21)  SpO2: 100% (06 Jul 2023 10:37) (93% - 100%)    Parameters below as of 06 Jul 2023 10:22  Patient On (Oxygen Delivery Method): mask, nonrebreather  O2 Flow (L/min): 15      Physical Exam:  General: NAD, resting comfortably in bed, with Non rebreather mask  Pulmonary: Nonlabored breathing, no respiratory distress  Cardiovascular: NSR  Abdominal: soft, ND, Appropriate incisional tenderness,   Extremities: WWP, normal strength  Neuro: A/O to self and place, did not know the year or season   Pulses: palpable distal pulses      LABS:                        10.0   3.43  )-----------( 289      ( 06 Jul 2023 08:25 )             34.1     07-06    139  |  105  |  7   ----------------------------<  80  4.2   |  23  |  0.83    Ca    8.7      06 Jul 2023 09:21  Phos  2.9     07-06  Mg     2.0     07-06    TPro  7.2  /  Alb  3.5  /  TBili  0.3  /  DBili  x   /  AST  17  /  ALT  13  /  AlkPhos  60  07-05    PT/INR - ( 05 Jul 2023 10:54 )   PT: 14.0 sec;   INR: 1.17          PTT - ( 05 Jul 2023 10:54 )  PTT:30.6 sec  CAPILLARY BLOOD GLUCOSE      POCT Blood Glucose.: 125 mg/dL (06 Jul 2023 09:32)    Urinalysis Basic - ( 06 Jul 2023 09:21 )    Color: x / Appearance: x / SG: x / pH: x  Gluc: 80 mg/dL / Ketone: x  / Bili: x / Urobili: x   Blood: x / Protein: x / Nitrite: x   Leuk Esterase: x / RBC: x / WBC x   Sq Epi: x / Non Sq Epi: x / Bacteria: x      LIVER FUNCTIONS - ( 05 Jul 2023 10:48 )  Alb: 3.5 g/dL / Pro: 7.2 g/dL / ALK PHOS: 60 U/L / ALT: 13 U/L / AST: 17 U/L / GGT: x           ABO Interpretation: A (07-06 @ 08:37)        Radiology and Additional Studies:

## 2023-07-06 NOTE — PRE-ANESTHESIA EVALUATION ADULT - NSANTHPMHFT_GEN_ALL_CORE
73 year old male with pmh of stroke 2021 (R hemiparesis), recurrent PE (last Jan 2022), diverticulosis and PSHx of colon tumor removal (1988) presenting to the emergency department this morning with abdominal pain. Patient describes the pain as a mild discomfort that started last night, pt saw DR Spann today and was instructed to come to the ER. Patient was transferred from [facility] to [facility] on June 22 for GI bleed. Patient during hospital stay given 3U pRBC. Patient had colonoscopy 6/26 where multiple polypectomies were performed in ascending, transverse, and descending colon, 3 cecal polyps were found and unable to be removed. 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 Patient cleared by cardiology and internal medicine during his stay at [facility] on 6/30. Patient’s last bowel movement was June 28. Wife reports he is still passing gas. Patient takes eliquis for PE prophylaxis, reports his last dose was June 20th. Patient denies any current nausea, vomiting, bloody bowel movements, melena, hematemesis, dizziness, chest pain, shortness of breath.

## 2023-07-06 NOTE — PROGRESS NOTE ADULT - ASSESSMENT
73 year old male with pmh of stroke (R hemiparesis), recurrent PE (last Jan 2022), diverticulosis and PSHx of colon tumor removal (1988) presenting with abdominal pain going  to OR 7/6 for Jejunal mass resection.     -NPO @ [place]/IVF  -Pain/nausea control prn  -SCDs, OOB/A, IS  -HSQ DVT ppx  -OR 7/6 for bowel resection

## 2023-07-07 LAB
ANION GAP SERPL CALC-SCNC: 11 MMOL/L — SIGNIFICANT CHANGE UP (ref 5–17)
BUN SERPL-MCNC: 10 MG/DL — SIGNIFICANT CHANGE UP (ref 7–23)
CALCIUM SERPL-MCNC: 8.3 MG/DL — LOW (ref 8.4–10.5)
CHLORIDE SERPL-SCNC: 102 MMOL/L — SIGNIFICANT CHANGE UP (ref 96–108)
CO2 SERPL-SCNC: 23 MMOL/L — SIGNIFICANT CHANGE UP (ref 22–31)
CREAT SERPL-MCNC: 0.81 MG/DL — SIGNIFICANT CHANGE UP (ref 0.5–1.3)
EGFR: 94 ML/MIN/1.73M2 — SIGNIFICANT CHANGE UP
GLUCOSE SERPL-MCNC: 111 MG/DL — HIGH (ref 70–99)
HCT VFR BLD CALC: 34.3 % — LOW (ref 39–50)
HGB BLD-MCNC: 10 G/DL — LOW (ref 13–17)
MAGNESIUM SERPL-MCNC: 1.7 MG/DL — SIGNIFICANT CHANGE UP (ref 1.6–2.6)
MCHC RBC-ENTMCNC: 22.6 PG — LOW (ref 27–34)
MCHC RBC-ENTMCNC: 29.2 GM/DL — LOW (ref 32–36)
MCV RBC AUTO: 77.6 FL — LOW (ref 80–100)
NRBC # BLD: 0 /100 WBCS — SIGNIFICANT CHANGE UP (ref 0–0)
PHOSPHATE SERPL-MCNC: 2.7 MG/DL — SIGNIFICANT CHANGE UP (ref 2.5–4.5)
PLATELET # BLD AUTO: 294 K/UL — SIGNIFICANT CHANGE UP (ref 150–400)
POTASSIUM SERPL-MCNC: 4 MMOL/L — SIGNIFICANT CHANGE UP (ref 3.5–5.3)
POTASSIUM SERPL-SCNC: 4 MMOL/L — SIGNIFICANT CHANGE UP (ref 3.5–5.3)
RBC # BLD: 4.42 M/UL — SIGNIFICANT CHANGE UP (ref 4.2–5.8)
RBC # FLD: SIGNIFICANT CHANGE UP (ref 10.3–14.5)
SODIUM SERPL-SCNC: 136 MMOL/L — SIGNIFICANT CHANGE UP (ref 135–145)
WBC # BLD: 9.67 K/UL — SIGNIFICANT CHANGE UP (ref 3.8–10.5)
WBC # FLD AUTO: 9.67 K/UL — SIGNIFICANT CHANGE UP (ref 3.8–10.5)

## 2023-07-07 RX ORDER — ACETAMINOPHEN 500 MG
650 TABLET ORAL EVERY 6 HOURS
Refills: 0 | Status: DISCONTINUED | OUTPATIENT
Start: 2023-07-08 | End: 2023-07-11

## 2023-07-07 RX ORDER — ACETAMINOPHEN 500 MG
1000 TABLET ORAL ONCE
Refills: 0 | Status: COMPLETED | OUTPATIENT
Start: 2023-07-08 | End: 2023-07-08

## 2023-07-07 RX ORDER — SODIUM CHLORIDE 9 MG/ML
1000 INJECTION, SOLUTION INTRAVENOUS
Refills: 0 | Status: DISCONTINUED | OUTPATIENT
Start: 2023-07-07 | End: 2023-07-12

## 2023-07-07 RX ORDER — MAGNESIUM SULFATE 500 MG/ML
1 VIAL (ML) INJECTION ONCE
Refills: 0 | Status: COMPLETED | OUTPATIENT
Start: 2023-07-07 | End: 2023-07-07

## 2023-07-07 RX ORDER — TAMSULOSIN HYDROCHLORIDE 0.4 MG/1
0.4 CAPSULE ORAL AT BEDTIME
Refills: 0 | Status: DISCONTINUED | OUTPATIENT
Start: 2023-07-07 | End: 2023-07-11

## 2023-07-07 RX ADMIN — Medication 650 MILLIGRAM(S): at 19:06

## 2023-07-07 RX ADMIN — Medication 15 MILLIGRAM(S): at 06:32

## 2023-07-07 RX ADMIN — Medication 650 MILLIGRAM(S): at 13:00

## 2023-07-07 RX ADMIN — Medication 15 MILLIGRAM(S): at 13:00

## 2023-07-07 RX ADMIN — PANTOPRAZOLE SODIUM 40 MILLIGRAM(S): 20 TABLET, DELAYED RELEASE ORAL at 13:00

## 2023-07-07 RX ADMIN — HEPARIN SODIUM 5000 UNIT(S): 5000 INJECTION INTRAVENOUS; SUBCUTANEOUS at 09:35

## 2023-07-07 RX ADMIN — Medication 100 GRAM(S): at 09:34

## 2023-07-07 RX ADMIN — SODIUM CHLORIDE 100 MILLILITER(S): 9 INJECTION, SOLUTION INTRAVENOUS at 12:59

## 2023-07-07 RX ADMIN — Medication 15 MILLIGRAM(S): at 21:58

## 2023-07-07 RX ADMIN — HYDROMORPHONE HYDROCHLORIDE 0.5 MILLIGRAM(S): 2 INJECTION INTRAMUSCULAR; INTRAVENOUS; SUBCUTANEOUS at 21:22

## 2023-07-07 RX ADMIN — Medication 650 MILLIGRAM(S): at 18:36

## 2023-07-07 RX ADMIN — HYDROMORPHONE HYDROCHLORIDE 0.5 MILLIGRAM(S): 2 INJECTION INTRAMUSCULAR; INTRAVENOUS; SUBCUTANEOUS at 21:07

## 2023-07-07 RX ADMIN — TAMSULOSIN HYDROCHLORIDE 0.4 MILLIGRAM(S): 0.4 CAPSULE ORAL at 21:59

## 2023-07-07 RX ADMIN — Medication 62.5 MILLIMOLE(S): at 11:58

## 2023-07-07 RX ADMIN — SODIUM CHLORIDE 100 MILLILITER(S): 9 INJECTION, SOLUTION INTRAVENOUS at 14:21

## 2023-07-07 RX ADMIN — HEPARIN SODIUM 5000 UNIT(S): 5000 INJECTION INTRAVENOUS; SUBCUTANEOUS at 02:08

## 2023-07-07 RX ADMIN — HEPARIN SODIUM 5000 UNIT(S): 5000 INJECTION INTRAVENOUS; SUBCUTANEOUS at 18:36

## 2023-07-07 NOTE — PHYSICAL THERAPY INITIAL EVALUATION ADULT - IMPAIRED TRANSFERS: SIT/STAND, REHAB EVAL
inability to achieve full knee extension on BLE (R>L)/impaired balance/narrow base of support/impaired postural control/decreased strength

## 2023-07-07 NOTE — PHYSICAL THERAPY INITIAL EVALUATION ADULT - GENERAL OBSERVATIONS, REHAB EVAL
PT IE completed. Chart reviewed. Pt received semi-supine, NAD, +abdominal dressing C/D/I, +IV, HHA at bedside. MEHDI Mclean cleared pt for PT.

## 2023-07-07 NOTE — PROGRESS NOTE ADULT - SUBJECTIVE AND OBJECTIVE BOX
INTERVAL HPI/OVERNIGHT EVENTS:  mildly tachy 105, c/o abd discomfort. given breakthrough dilaudid. EKG done showing sinus tachy. afebrile, normotensive.     STATUS POST:  7/6: Ex lap with small bowel resection and anastomosis, EBL 10cc, IVF 1L,     POST OPERATIVE DAY #: 1    SUBJECTIVE: Pt seen and examined at bedside this am by surgery team. No acute complaints. +F/-BMs. Pain well controlled. Denies f/n/v/cp/sob.    MEDICATIONS  (STANDING):  acetaminophen     Tablet .. 650 milliGRAM(s) Oral every 6 hours  heparin   Injectable 5000 Unit(s) SubCutaneous every 8 hours  ketorolac   Injectable 15 milliGRAM(s) IV Push every 8 hours  lactated ringers. 1000 milliLiter(s) (100 mL/Hr) IV Continuous <Continuous>  pantoprazole  Injectable 40 milliGRAM(s) IV Push daily    MEDICATIONS  (PRN):  HYDROmorphone  Injectable 0.5 milliGRAM(s) IV Push every 4 hours PRN Severe Pain (7 - 10)  ondansetron Injectable 4 milliGRAM(s) IV Push every 6 hours PRN Nausea and/or Vomiting    Vital Signs Last 24 Hrs  T(C): 36.6 (07 Jul 2023 04:57), Max: 37.4 (06 Jul 2023 23:38)  T(F): 97.9 (07 Jul 2023 04:57), Max: 99.3 (06 Jul 2023 23:38)  HR: 101 (07 Jul 2023 04:57) (74 - 105)  BP: 114/79 (07 Jul 2023 04:57) (108/76 - 163/102)  BP(mean): 118 (06 Jul 2023 10:13) (113 - 127)  RR: 17 (07 Jul 2023 04:57) (16 - 21)  SpO2: 95% (07 Jul 2023 04:57) (94% - 100%)    Parameters below as of 07 Jul 2023 04:57  Patient On (Oxygen Delivery Method): room air    PHYSICAL EXAM:    Constitutional: A&Ox3, NAD    Respiratory: non labored breathing, no respiratory distress    Cardiovascular: NSR, RRR    Gastrointestinal: abdomen soft, nd, appropriately ttp to incisions. Dressings c/d/i. No rebound or guarding    Genitourinary: Hernandez in place draining clear yellow urine     Extremities: wwp, no calf tenderness or edema. SCDs in place        I&O's Detail    06 Jul 2023 07:01  -  07 Jul 2023 07:00  --------------------------------------------------------  IN:    Lactated Ringers: 3000 mL  Total IN: 3000 mL    OUT:    Blood Loss (mL): 10 mL    Indwelling Catheter - Urethral (mL): 1400 mL    Oral Fluid: 0 mL  Total OUT: 1410 mL    Total NET: 1590 mL          LABS:                        10.0   9.67  )-----------( 294      ( 07 Jul 2023 06:10 )             34.3     07-07    136  |  102  |  10  ----------------------------<  111<H>  4.0   |  23  |  0.81    Ca    8.3<L>      07 Jul 2023 06:10  Phos  2.7     07-07  Mg     1.7     07-07    TPro  7.2  /  Alb  3.5  /  TBili  0.3  /  DBili  x   /  AST  17  /  ALT  13  /  AlkPhos  60  07-05    PT/INR - ( 05 Jul 2023 10:54 )   PT: 14.0 sec;   INR: 1.17          PTT - ( 05 Jul 2023 10:54 )  PTT:30.6 sec  Urinalysis Basic - ( 07 Jul 2023 06:10 )    Color: x / Appearance: x / SG: x / pH: x  Gluc: 111 mg/dL / Ketone: x  / Bili: x / Urobili: x   Blood: x / Protein: x / Nitrite: x   Leuk Esterase: x / RBC: x / WBC x   Sq Epi: x / Non Sq Epi: x / Bacteria: x        RADIOLOGY & ADDITIONAL STUDIES:

## 2023-07-07 NOTE — PHYSICAL THERAPY INITIAL EVALUATION ADULT - PERTINENT HX OF CURRENT PROBLEM, REHAB EVAL
Patient arrives via EMS with chief complaint of abdominal pain and episodes of syncope. Per EMS, patient went to [facility] for abdominal pain and called EMS after discovering it was not open. Upon EMS arrival patient had originally refused care but then had syncopal episode in car. 73 year old male with pmh of stroke (R hemiparesis), recurrent PE (last Jan 2022), diverticulosis and PSHx of colon tumor removal (1988), known jejunal mass presenting with abdominal pain. Pt is now s/p ex lap, SBR, primary anastamosis on 7/6.

## 2023-07-07 NOTE — PHYSICAL THERAPY INITIAL EVALUATION ADULT - THERAPY FREQUENCY, PT EVAL
Patient educated on frequency of inpatient physical therapy at [facility], patient verbalized understanding./2-3x/week

## 2023-07-07 NOTE — PROGRESS NOTE ADULT - ASSESSMENT
73 year old male with pmh of stroke (R hemiparesis), recurrent PE (last Jan 2022), diverticulosis and PSHx of colon tumor removal (1988), known jejunal mass presenting with abdominal pain. Pt is now s/p ex lap, SBR, primary anastamosis on 7/6.     -NPO except meds/IVF   -Pain/nausea control prn  -HSQ/SCDs  -OOBA/IS  -Hernandez removed, TOV pending   -Dispo; PT pending

## 2023-07-07 NOTE — PHYSICAL THERAPY INITIAL EVALUATION ADULT - LEVEL OF INDEPENDENCE: GAIT, REHAB EVAL
Pt unable to maintain standing position after two trials of sit to stand transfers/unable to perform

## 2023-07-07 NOTE — PHYSICAL THERAPY INITIAL EVALUATION ADULT - ADDITIONAL COMMENTS
Pt reports living in house with 2 ED, with wife. Reports ambulating with use of RW and wife + HHA for assistance. Reports having HHA over the past year for 4 hours per day to assist with ADLs since prior CVA. Pt reports having grab bars and wheel chair.

## 2023-07-08 LAB
ANION GAP SERPL CALC-SCNC: 9 MMOL/L — SIGNIFICANT CHANGE UP (ref 5–17)
BUN SERPL-MCNC: 8 MG/DL — SIGNIFICANT CHANGE UP (ref 7–23)
CALCIUM SERPL-MCNC: 8.4 MG/DL — SIGNIFICANT CHANGE UP (ref 8.4–10.5)
CHLORIDE SERPL-SCNC: 103 MMOL/L — SIGNIFICANT CHANGE UP (ref 96–108)
CO2 SERPL-SCNC: 23 MMOL/L — SIGNIFICANT CHANGE UP (ref 22–31)
CREAT SERPL-MCNC: 0.84 MG/DL — SIGNIFICANT CHANGE UP (ref 0.5–1.3)
EGFR: 93 ML/MIN/1.73M2 — SIGNIFICANT CHANGE UP
GLUCOSE SERPL-MCNC: 118 MG/DL — HIGH (ref 70–99)
HCT VFR BLD CALC: 34.4 % — LOW (ref 39–50)
HGB BLD-MCNC: 9.9 G/DL — LOW (ref 13–17)
MAGNESIUM SERPL-MCNC: 2 MG/DL — SIGNIFICANT CHANGE UP (ref 1.6–2.6)
MCHC RBC-ENTMCNC: 23.1 PG — LOW (ref 27–34)
MCHC RBC-ENTMCNC: 28.8 GM/DL — LOW (ref 32–36)
MCV RBC AUTO: 80.4 FL — SIGNIFICANT CHANGE UP (ref 80–100)
NRBC # BLD: 0 /100 WBCS — SIGNIFICANT CHANGE UP (ref 0–0)
PHOSPHATE SERPL-MCNC: 3 MG/DL — SIGNIFICANT CHANGE UP (ref 2.5–4.5)
PLATELET # BLD AUTO: 204 K/UL — SIGNIFICANT CHANGE UP (ref 150–400)
POTASSIUM SERPL-MCNC: 3.8 MMOL/L — SIGNIFICANT CHANGE UP (ref 3.5–5.3)
POTASSIUM SERPL-SCNC: 3.8 MMOL/L — SIGNIFICANT CHANGE UP (ref 3.5–5.3)
RBC # BLD: 4.28 M/UL — SIGNIFICANT CHANGE UP (ref 4.2–5.8)
RBC # FLD: SIGNIFICANT CHANGE UP (ref 10.3–14.5)
SODIUM SERPL-SCNC: 135 MMOL/L — SIGNIFICANT CHANGE UP (ref 135–145)
WBC # BLD: 8.18 K/UL — SIGNIFICANT CHANGE UP (ref 3.8–10.5)
WBC # FLD AUTO: 8.18 K/UL — SIGNIFICANT CHANGE UP (ref 3.8–10.5)

## 2023-07-08 RX ADMIN — Medication 400 MILLIGRAM(S): at 01:10

## 2023-07-08 RX ADMIN — SODIUM CHLORIDE 100 MILLILITER(S): 9 INJECTION, SOLUTION INTRAVENOUS at 21:10

## 2023-07-08 RX ADMIN — PANTOPRAZOLE SODIUM 40 MILLIGRAM(S): 20 TABLET, DELAYED RELEASE ORAL at 13:19

## 2023-07-08 RX ADMIN — HEPARIN SODIUM 5000 UNIT(S): 5000 INJECTION INTRAVENOUS; SUBCUTANEOUS at 10:13

## 2023-07-08 RX ADMIN — Medication 650 MILLIGRAM(S): at 18:00

## 2023-07-08 RX ADMIN — Medication 400 MILLIGRAM(S): at 07:14

## 2023-07-08 RX ADMIN — HEPARIN SODIUM 5000 UNIT(S): 5000 INJECTION INTRAVENOUS; SUBCUTANEOUS at 17:50

## 2023-07-08 RX ADMIN — Medication 650 MILLIGRAM(S): at 13:24

## 2023-07-08 RX ADMIN — Medication 15 MILLIGRAM(S): at 06:13

## 2023-07-08 RX ADMIN — HEPARIN SODIUM 5000 UNIT(S): 5000 INJECTION INTRAVENOUS; SUBCUTANEOUS at 02:08

## 2023-07-08 RX ADMIN — HYDROMORPHONE HYDROCHLORIDE 0.5 MILLIGRAM(S): 2 INJECTION INTRAMUSCULAR; INTRAVENOUS; SUBCUTANEOUS at 05:22

## 2023-07-08 RX ADMIN — TAMSULOSIN HYDROCHLORIDE 0.4 MILLIGRAM(S): 0.4 CAPSULE ORAL at 21:15

## 2023-07-08 RX ADMIN — Medication 650 MILLIGRAM(S): at 13:19

## 2023-07-08 RX ADMIN — Medication 650 MILLIGRAM(S): at 17:50

## 2023-07-08 RX ADMIN — HYDROMORPHONE HYDROCHLORIDE 0.5 MILLIGRAM(S): 2 INJECTION INTRAMUSCULAR; INTRAVENOUS; SUBCUTANEOUS at 05:07

## 2023-07-08 NOTE — PROGRESS NOTE ADULT - SUBJECTIVE AND OBJECTIVE BOX
SUBJECTIVE:      MEDICATIONS  (STANDING):  acetaminophen     Tablet .. 650 milliGRAM(s) Oral every 6 hours  dextrose 5% + sodium chloride 0.45%. 1000 milliLiter(s) (100 mL/Hr) IV Continuous <Continuous>  heparin   Injectable 5000 Unit(s) SubCutaneous every 8 hours  pantoprazole  Injectable 40 milliGRAM(s) IV Push daily  tamsulosin 0.4 milliGRAM(s) Oral at bedtime    MEDICATIONS  (PRN):  HYDROmorphone  Injectable 0.5 milliGRAM(s) IV Push every 4 hours PRN Severe Pain (7 - 10)  ondansetron Injectable 4 milliGRAM(s) IV Push every 6 hours PRN Nausea and/or Vomiting      Vital Signs Last 24 Hrs  T(C): 36.9 (08 Jul 2023 05:03), Max: 37.8 (07 Jul 2023 20:35)  T(F): 98.4 (08 Jul 2023 05:03), Max: 100 (07 Jul 2023 20:35)  HR: 89 (08 Jul 2023 05:03) (89 - 115)  BP: 127/83 (08 Jul 2023 05:03) (121/82 - 139/91)  BP(mean): --  RR: 17 (08 Jul 2023 05:03) (17 - 18)  SpO2: 97% (08 Jul 2023 05:03) (93% - 97%)    Parameters below as of 08 Jul 2023 05:03  Patient On (Oxygen Delivery Method): nasal cannula        Physical Exam:  General: NAD, resting comfortably in bed  Pulmonary: Nonlabored breathing, no respiratory distress  Cardiovascular: NSR  Abdominal: soft, NT/ND  Extremities: WWP, normal strength  Neuro: A/O x 3, CNs II-XII grossly intact, no focal deficits    I&O's Summary    07 Jul 2023 07:01  -  08 Jul 2023 07:00  --------------------------------------------------------  IN: 2000 mL / OUT: 450 mL / NET: 1550 mL        LABS:                        9.9    8.18  )-----------( 204      ( 08 Jul 2023 05:01 )             34.4     07-08    135  |  103  |  8   ----------------------------<  118<H>  3.8   |  23  |  0.84    Ca    8.4      08 Jul 2023 05:01  Phos  3.0     07-08  Mg     2.0     07-08        Urinalysis Basic - ( 08 Jul 2023 05:01 )    Color: x / Appearance: x / SG: x / pH: x  Gluc: 118 mg/dL / Ketone: x  / Bili: x / Urobili: x   Blood: x / Protein: x / Nitrite: x   Leuk Esterase: x / RBC: x / WBC x   Sq Epi: x / Non Sq Epi: x / Bacteria: x      CAPILLARY BLOOD GLUCOSE            RADIOLOGY & ADDITIONAL STUDIES:   SUBJECTIVE:  KAREN. o/n: No bowel f(x). Tachy to 115, saturating high 80's, placed on 2LNC. Pt seen at bedside this AM. -n/-v, -f/-bm. Denies SOB, CP    MEDICATIONS  (STANDING):  acetaminophen     Tablet .. 650 milliGRAM(s) Oral every 6 hours  dextrose 5% + sodium chloride 0.45%. 1000 milliLiter(s) (100 mL/Hr) IV Continuous <Continuous>  heparin   Injectable 5000 Unit(s) SubCutaneous every 8 hours  pantoprazole  Injectable 40 milliGRAM(s) IV Push daily  tamsulosin 0.4 milliGRAM(s) Oral at bedtime    MEDICATIONS  (PRN):  HYDROmorphone  Injectable 0.5 milliGRAM(s) IV Push every 4 hours PRN Severe Pain (7 - 10)  ondansetron Injectable 4 milliGRAM(s) IV Push every 6 hours PRN Nausea and/or Vomiting      Vital Signs Last 24 Hrs  T(C): 36.9 (08 Jul 2023 05:03), Max: 37.8 (07 Jul 2023 20:35)  T(F): 98.4 (08 Jul 2023 05:03), Max: 100 (07 Jul 2023 20:35)  HR: 89 (08 Jul 2023 05:03) (89 - 115)  BP: 127/83 (08 Jul 2023 05:03) (121/82 - 139/91)  BP(mean): --  RR: 17 (08 Jul 2023 05:03) (17 - 18)  SpO2: 97% (08 Jul 2023 05:03) (93% - 97%)    Parameters below as of 08 Jul 2023 05:03  Patient On (Oxygen Delivery Method): nasal cannula        Physical Exam:  General: NAD, resting comfortably in bed  Pulmonary: Nonlabored breathing, no respiratory distress  Cardiovascular: NSR  Abdominal: soft, NT/ND  Extremities: WWP, normal strength  Neuro: A/O x 3, CNs II-XII grossly intact, no focal deficits    I&O's Summary    07 Jul 2023 07:01  -  08 Jul 2023 07:00  --------------------------------------------------------  IN: 2000 mL / OUT: 450 mL / NET: 1550 mL        LABS:                        9.9    8.18  )-----------( 204      ( 08 Jul 2023 05:01 )             34.4     07-08    135  |  103  |  8   ----------------------------<  118<H>  3.8   |  23  |  0.84    Ca    8.4      08 Jul 2023 05:01  Phos  3.0     07-08  Mg     2.0     07-08        Urinalysis Basic - ( 08 Jul 2023 05:01 )    Color: x / Appearance: x / SG: x / pH: x  Gluc: 118 mg/dL / Ketone: x  / Bili: x / Urobili: x   Blood: x / Protein: x / Nitrite: x   Leuk Esterase: x / RBC: x / WBC x   Sq Epi: x / Non Sq Epi: x / Bacteria: x      CAPILLARY BLOOD GLUCOSE            RADIOLOGY & ADDITIONAL STUDIES:

## 2023-07-08 NOTE — PROGRESS NOTE ADULT - ASSESSMENT
73 year old male with pmh of stroke (R hemiparesis), recurrent PE (last Jan 2022), diverticulosis and PSHx of colon tumor removal (1988), known jejunal mass presenting with abdominal pain. Pt is now s/p ex lap, SBR, primary anastamosis on 7/6.     -NPO until bowel f(x) except meds/IVF   -Pain/nausea control prn  -HSQ/SCDs  -OOBA/IS  -Hernandez removed, TOV pending   -Dispo; PT pending

## 2023-07-08 NOTE — PROGRESS NOTE ADULT - ATTENDING COMMENTS
CRS Attending Coverage for Dr Spann  Seen and examined on morning rounds, caretaker at bedside  Tm100 overnight  HR to 115  Normotensive  WBC 8  Patient denies pain  Has not been OOB  Gen NAD  Abdomen soft, nontender  OOB, ambulate, physical therapy  Incentive spirometer  DVT prophylaxis  Monitor fever curve  NPO  Discussed with surgical housestaff

## 2023-07-09 LAB
ANION GAP SERPL CALC-SCNC: 8 MMOL/L — SIGNIFICANT CHANGE UP (ref 5–17)
BUN SERPL-MCNC: 8 MG/DL — SIGNIFICANT CHANGE UP (ref 7–23)
CALCIUM SERPL-MCNC: 8.1 MG/DL — LOW (ref 8.4–10.5)
CHLORIDE SERPL-SCNC: 103 MMOL/L — SIGNIFICANT CHANGE UP (ref 96–108)
CO2 SERPL-SCNC: 24 MMOL/L — SIGNIFICANT CHANGE UP (ref 22–31)
CREAT SERPL-MCNC: 0.72 MG/DL — SIGNIFICANT CHANGE UP (ref 0.5–1.3)
EGFR: 98 ML/MIN/1.73M2 — SIGNIFICANT CHANGE UP
GLUCOSE SERPL-MCNC: 125 MG/DL — HIGH (ref 70–99)
HCT VFR BLD CALC: 30.5 % — LOW (ref 39–50)
HCT VFR BLD CALC: 34.6 % — LOW (ref 39–50)
HGB BLD-MCNC: 10.2 G/DL — LOW (ref 13–17)
HGB BLD-MCNC: 9 G/DL — LOW (ref 13–17)
MAGNESIUM SERPL-MCNC: 2 MG/DL — SIGNIFICANT CHANGE UP (ref 1.6–2.6)
MCHC RBC-ENTMCNC: 23.1 PG — LOW (ref 27–34)
MCHC RBC-ENTMCNC: 23.2 PG — LOW (ref 27–34)
MCHC RBC-ENTMCNC: 29.5 GM/DL — LOW (ref 32–36)
MCHC RBC-ENTMCNC: 29.5 GM/DL — LOW (ref 32–36)
MCV RBC AUTO: 78.4 FL — LOW (ref 80–100)
MCV RBC AUTO: 78.8 FL — LOW (ref 80–100)
NRBC # BLD: 0 /100 WBCS — SIGNIFICANT CHANGE UP (ref 0–0)
NRBC # BLD: 0 /100 WBCS — SIGNIFICANT CHANGE UP (ref 0–0)
PHOSPHATE SERPL-MCNC: 3 MG/DL — SIGNIFICANT CHANGE UP (ref 2.5–4.5)
PLATELET # BLD AUTO: 169 K/UL — SIGNIFICANT CHANGE UP (ref 150–400)
PLATELET # BLD AUTO: 284 K/UL — SIGNIFICANT CHANGE UP (ref 150–400)
POTASSIUM SERPL-MCNC: 3.4 MMOL/L — LOW (ref 3.5–5.3)
POTASSIUM SERPL-SCNC: 3.4 MMOL/L — LOW (ref 3.5–5.3)
RBC # BLD: 3.89 M/UL — LOW (ref 4.2–5.8)
RBC # BLD: 4.39 M/UL — SIGNIFICANT CHANGE UP (ref 4.2–5.8)
RBC # FLD: SIGNIFICANT CHANGE UP (ref 10.3–14.5)
RBC # FLD: SIGNIFICANT CHANGE UP (ref 10.3–14.5)
SODIUM SERPL-SCNC: 135 MMOL/L — SIGNIFICANT CHANGE UP (ref 135–145)
WBC # BLD: 10.69 K/UL — HIGH (ref 3.8–10.5)
WBC # BLD: 7.77 K/UL — SIGNIFICANT CHANGE UP (ref 3.8–10.5)
WBC # FLD AUTO: 10.69 K/UL — HIGH (ref 3.8–10.5)
WBC # FLD AUTO: 7.77 K/UL — SIGNIFICANT CHANGE UP (ref 3.8–10.5)

## 2023-07-09 PROCEDURE — 71260 CT THORAX DX C+: CPT | Mod: 26

## 2023-07-09 PROCEDURE — 74177 CT ABD & PELVIS W/CONTRAST: CPT | Mod: 26

## 2023-07-09 RX ORDER — POTASSIUM CHLORIDE 20 MEQ
40 PACKET (EA) ORAL ONCE
Refills: 0 | Status: COMPLETED | OUTPATIENT
Start: 2023-07-09 | End: 2023-07-09

## 2023-07-09 RX ORDER — POTASSIUM CHLORIDE 20 MEQ
10 PACKET (EA) ORAL
Refills: 0 | Status: DISCONTINUED | OUTPATIENT
Start: 2023-07-09 | End: 2023-07-09

## 2023-07-09 RX ORDER — DIATRIZOATE MEGLUMINE 180 MG/ML
30 INJECTION, SOLUTION INTRAVESICAL ONCE
Refills: 0 | Status: COMPLETED | OUTPATIENT
Start: 2023-07-09 | End: 2023-07-09

## 2023-07-09 RX ADMIN — HYDROMORPHONE HYDROCHLORIDE 0.5 MILLIGRAM(S): 2 INJECTION INTRAMUSCULAR; INTRAVENOUS; SUBCUTANEOUS at 18:00

## 2023-07-09 RX ADMIN — HEPARIN SODIUM 5000 UNIT(S): 5000 INJECTION INTRAVENOUS; SUBCUTANEOUS at 16:32

## 2023-07-09 RX ADMIN — HEPARIN SODIUM 5000 UNIT(S): 5000 INJECTION INTRAVENOUS; SUBCUTANEOUS at 00:08

## 2023-07-09 RX ADMIN — Medication 650 MILLIGRAM(S): at 08:02

## 2023-07-09 RX ADMIN — TAMSULOSIN HYDROCHLORIDE 0.4 MILLIGRAM(S): 0.4 CAPSULE ORAL at 22:26

## 2023-07-09 RX ADMIN — Medication 650 MILLIGRAM(S): at 00:04

## 2023-07-09 RX ADMIN — SODIUM CHLORIDE 100 MILLILITER(S): 9 INJECTION, SOLUTION INTRAVENOUS at 17:41

## 2023-07-09 RX ADMIN — PANTOPRAZOLE SODIUM 40 MILLIGRAM(S): 20 TABLET, DELAYED RELEASE ORAL at 11:30

## 2023-07-09 RX ADMIN — Medication 650 MILLIGRAM(S): at 07:32

## 2023-07-09 RX ADMIN — Medication 650 MILLIGRAM(S): at 19:50

## 2023-07-09 RX ADMIN — Medication 650 MILLIGRAM(S): at 13:16

## 2023-07-09 RX ADMIN — SODIUM CHLORIDE 100 MILLILITER(S): 9 INJECTION, SOLUTION INTRAVENOUS at 07:35

## 2023-07-09 RX ADMIN — HYDROMORPHONE HYDROCHLORIDE 0.5 MILLIGRAM(S): 2 INJECTION INTRAMUSCULAR; INTRAVENOUS; SUBCUTANEOUS at 17:41

## 2023-07-09 RX ADMIN — DIATRIZOATE MEGLUMINE 30 MILLILITER(S): 180 INJECTION, SOLUTION INTRAVESICAL at 20:40

## 2023-07-09 RX ADMIN — HEPARIN SODIUM 5000 UNIT(S): 5000 INJECTION INTRAVENOUS; SUBCUTANEOUS at 08:37

## 2023-07-09 RX ADMIN — Medication 40 MILLIEQUIVALENT(S): at 11:29

## 2023-07-09 NOTE — CHART NOTE - NSCHARTNOTEFT_GEN_A_CORE
Patient began not feeling well around 7pm. He was complaining of incisional abdominal pain and was tachy to 120, had a temperature of 100.3, O2 sat 91% on RA. Abdominal pain was mildly relieved with Dialudid dose over an hour earlier. Repeat vitals he had a temperature of 101, still tachy to 118 and O2 sat of 98% on 2LNC. BP remained stable and wnl. Tylenol was given for pain control and patient was made NPO. On exam his abdomen is soft, mildly distended and mildly TTP around his incision. +F/-BM. He denies any chest pain, sob, nausea, or emesis. A STAT CBC was ordered as well as a CT chest, abdomen and pelvis w/ IV + PO contrast. Will continue to monitor patient. Patient began not feeling well around 7pm. He was complaining of incisional abdominal pain and was tachy to 120, had a temperature of 100.3, O2 sat 91% on RA. Abdominal pain was mildly relieved with Dialudid dose over an hour earlier. Repeat vitals he had a temperature of 101, still tachy to 118 and O2 sat of 98% on 2LNC. BP remained stable and wnl. Tylenol was given for pain control and patient was made NPO. On exam his abdomen is soft, mildly distended and mildly TTP around his incision. +F/-BM. He denies any chest pain, sob, nausea, or emesis. A STAT CBC was ordered as well as a CT chest, abdomen and pelvis w/ IV + PO contrast. Will continue to monitor patient.      ***Chief Addendum***    Agree w above, alerted to hemodynamic changes in pt by floor PA. Pt seen and examined, Continues to endorse passing flatus, adequate UOP, + productive cough. Vitals as above, abd soft, stew-incisional TTP w/o R/G. DDx atelectasis vs PNA,  early for leak (POD3) but within realm of possibility, would expect change in abd exam, more toxic appearance. Will f/up STAT CBC, proceed w CT C/A/P w PO/IV contrast. Made NPO. Discussed w attending surgeon on call. Patient began not feeling well around 7pm. He was complaining of incisional abdominal pain and was tachy to 120, had a temperature of 100.3, O2 sat 91% on RA. Abdominal pain was mildly relieved with Dialudid dose over an hour earlier. Repeat vitals he had a temperature of 101, still tachy to 118 and O2 sat of 98% on 2LNC. BP remained stable and wnl. Tylenol was given for pain control and patient was made NPO. On exam his abdomen is soft, mildly distended and mildly TTP around his incision. +F/-BM. He denies any chest pain, sob, nausea, or emesis. A STAT CBC was ordered as well as a CT chest, abdomen and pelvis w/ IV + PO contrast. Will continue to monitor patient.      ***Chief Addendum***    Agree w above, alerted to hemodynamic changes in pt by floor PA. Pt seen and examined, Continues to endorse passing flatus, adequate UOP, + productive cough. Vitals as above, abd soft, stew-incisional TTP w/o R/G. DDx atelectasis vs PNA,  early for leak (POD3) but within realm of possibility, would expect change in abd exam, more toxic appearance. Stat CBC w leukocytosis 10.69 (7.77), CT C/A/P w bibasilar atelectasis, no evidence of leak, dilated loops + air in colon, ileus probable. BCx sent, UA sent, stepped up to telemetry. Zosyn started. Discussed w attending surgeon.

## 2023-07-09 NOTE — PROGRESS NOTE ADULT - SUBJECTIVE AND OBJECTIVE BOX
POST-OP DAY: X s/p      SUBJECTIVE: Patient seen and examined bedside by Surgical resident.    heparin   Injectable 5000 Unit(s) SubCutaneous every 8 hours    MEDICATIONS  (PRN):  HYDROmorphone  Injectable 0.5 milliGRAM(s) IV Push every 4 hours PRN Severe Pain (7 - 10)  ondansetron Injectable 4 milliGRAM(s) IV Push every 6 hours PRN Nausea and/or Vomiting      I&O's Detail    08 Jul 2023 07:01  -  09 Jul 2023 07:00  --------------------------------------------------------  IN:    dextrose 5% + sodium chloride 0.45%: 2000 mL  Total IN: 2000 mL    OUT:    Voided (mL): 80 mL  Total OUT: 80 mL    Total NET: 1920 mL          Vital Signs Last 24 Hrs  T(C): 36.9 (09 Jul 2023 05:00), Max: 37.2 (08 Jul 2023 20:48)  T(F): 98.4 (09 Jul 2023 05:00), Max: 99 (08 Jul 2023 20:48)  HR: 97 (09 Jul 2023 05:00) (81 - 106)  BP: 126/85 (09 Jul 2023 05:00) (100/67 - 126/85)  BP(mean): 99 (09 Jul 2023 05:00) (99 - 99)  RR: 17 (09 Jul 2023 05:00) (17 - 18)  SpO2: 94% (09 Jul 2023 05:00) (93% - 99%)    Parameters below as of 09 Jul 2023 05:00  Patient On (Oxygen Delivery Method): room air        General: NAD, resting comfortably in bed  C/V: NSR  Pulm: Nonlabored breathing, no respiratory distress  Abd: soft, NT/ND  Extrem: WWP, no edema, SCDs in place    LABS:                        9.9    8.18  )-----------( 204      ( 08 Jul 2023 05:01 )             34.4     07-09    135  |  103  |  8   ----------------------------<  125<H>  x    |  24  |  0.72    Ca    8.4      08 Jul 2023 05:01  Phos  3.0     07-09  Mg     2.0     07-09        Urinalysis Basic - ( 09 Jul 2023 05:30 )    Color: x / Appearance: x / SG: x / pH: x  Gluc: 125 mg/dL / Ketone: x  / Bili: x / Urobili: x   Blood: x / Protein: x / Nitrite: x   Leuk Esterase: x / RBC: x / WBC x   Sq Epi: x / Non Sq Epi: x / Bacteria: x        RADIOLOGY & ADDITIONAL STUDIES:     POST-OP DAY: 3 s/p ex lap, SBR, primary anastamosis on 7/6.      O/N: sat 93 on RA placed on, 2LNC    SUBJECTIVE: Patient seen and examined bedside by Surgical resident. Diet advanced to clears today.     heparin   Injectable 5000 Unit(s) SubCutaneous every 8 hours    MEDICATIONS  (PRN):  HYDROmorphone  Injectable 0.5 milliGRAM(s) IV Push every 4 hours PRN Severe Pain (7 - 10)  ondansetron Injectable 4 milliGRAM(s) IV Push every 6 hours PRN Nausea and/or Vomiting      I&O's Detail    08 Jul 2023 07:01  -  09 Jul 2023 07:00  --------------------------------------------------------  IN:    dextrose 5% + sodium chloride 0.45%: 2000 mL  Total IN: 2000 mL    OUT:    Voided (mL): 80 mL  Total OUT: 80 mL    Total NET: 1920 mL          Vital Signs Last 24 Hrs  T(C): 36.9 (09 Jul 2023 05:00), Max: 37.2 (08 Jul 2023 20:48)  T(F): 98.4 (09 Jul 2023 05:00), Max: 99 (08 Jul 2023 20:48)  HR: 97 (09 Jul 2023 05:00) (81 - 106)  BP: 126/85 (09 Jul 2023 05:00) (100/67 - 126/85)  BP(mean): 99 (09 Jul 2023 05:00) (99 - 99)  RR: 17 (09 Jul 2023 05:00) (17 - 18)  SpO2: 94% (09 Jul 2023 05:00) (93% - 99%)    Parameters below as of 09 Jul 2023 05:00  Patient On (Oxygen Delivery Method): room air        General: NAD, resting comfortably in bed  C/V: NSR  Pulm: Nonlabored breathing, no respiratory distress  Abd: soft, NT/ND  Extrem: WWP, no edema, SCDs in place    LABS:                        9.9    8.18  )-----------( 204      ( 08 Jul 2023 05:01 )             34.4     07-09    135  |  103  |  8   ----------------------------<  125<H>  x    |  24  |  0.72    Ca    8.4      08 Jul 2023 05:01  Phos  3.0     07-09  Mg     2.0     07-09        Urinalysis Basic - ( 09 Jul 2023 05:30 )    Color: x / Appearance: x / SG: x / pH: x  Gluc: 125 mg/dL / Ketone: x  / Bili: x / Urobili: x   Blood: x / Protein: x / Nitrite: x   Leuk Esterase: x / RBC: x / WBC x   Sq Epi: x / Non Sq Epi: x / Bacteria: x        RADIOLOGY & ADDITIONAL STUDIES:

## 2023-07-09 NOTE — PROGRESS NOTE ADULT - ASSESSMENT
73 year old male with pmh of stroke (R hemiparesis), recurrent PE (last Jan 2022), diverticulosis and PSHx of colon tumor removal (1988), known jejunal mass presenting with abdominal pain. Pt is now s/p ex lap, SBR, primary anastamosis on 7/6.     -NPO/IVF   -Pain/nausea control   -HSQ/SCDs  -OOBA/IS  -Hernandez removed  -Dispo: PT LALITHA

## 2023-07-09 NOTE — PROGRESS NOTE ADULT - ATTENDING COMMENTS
CRS Attending Coverage for Dr Spann  Seen and examined on morning rounds  Per aide at bedside, patient passed flatus and was out of bed to chair yesterday  Denies pain  Abdomen soft, nontender  Advanced to clear liquids  OOB, ambulate, physical therapy

## 2023-07-10 DIAGNOSIS — J98.11 ATELECTASIS: ICD-10-CM

## 2023-07-10 DIAGNOSIS — N39.0 URINARY TRACT INFECTION, SITE NOT SPECIFIED: ICD-10-CM

## 2023-07-10 LAB
ANION GAP SERPL CALC-SCNC: 12 MMOL/L — SIGNIFICANT CHANGE UP (ref 5–17)
APPEARANCE UR: ABNORMAL
BACTERIA # UR AUTO: PRESENT /HPF
BILIRUB UR-MCNC: NEGATIVE — SIGNIFICANT CHANGE UP
BUN SERPL-MCNC: 8 MG/DL — SIGNIFICANT CHANGE UP (ref 7–23)
CALCIUM SERPL-MCNC: 8.5 MG/DL — SIGNIFICANT CHANGE UP (ref 8.4–10.5)
CHLORIDE SERPL-SCNC: 104 MMOL/L — SIGNIFICANT CHANGE UP (ref 96–108)
CO2 SERPL-SCNC: 22 MMOL/L — SIGNIFICANT CHANGE UP (ref 22–31)
COLOR SPEC: YELLOW — SIGNIFICANT CHANGE UP
COMMENT - URINE: SIGNIFICANT CHANGE UP
CREAT SERPL-MCNC: 0.72 MG/DL — SIGNIFICANT CHANGE UP (ref 0.5–1.3)
DIFF PNL FLD: ABNORMAL
EGFR: 98 ML/MIN/1.73M2 — SIGNIFICANT CHANGE UP
EPI CELLS # UR: SIGNIFICANT CHANGE UP /HPF (ref 0–5)
GLUCOSE SERPL-MCNC: 141 MG/DL — HIGH (ref 70–99)
GLUCOSE UR QL: NEGATIVE — SIGNIFICANT CHANGE UP
HCT VFR BLD CALC: 34.6 % — LOW (ref 39–50)
HGB BLD-MCNC: 10.2 G/DL — LOW (ref 13–17)
HYALINE CASTS # UR AUTO: SIGNIFICANT CHANGE UP /LPF (ref 0–2)
KETONES UR-MCNC: NEGATIVE — SIGNIFICANT CHANGE UP
LEUKOCYTE ESTERASE UR-ACNC: ABNORMAL
MAGNESIUM SERPL-MCNC: 2 MG/DL — SIGNIFICANT CHANGE UP (ref 1.6–2.6)
MCHC RBC-ENTMCNC: 23.7 PG — LOW (ref 27–34)
MCHC RBC-ENTMCNC: 29.5 GM/DL — LOW (ref 32–36)
MCV RBC AUTO: 80.3 FL — SIGNIFICANT CHANGE UP (ref 80–100)
NITRITE UR-MCNC: NEGATIVE — SIGNIFICANT CHANGE UP
NRBC # BLD: 0 /100 WBCS — SIGNIFICANT CHANGE UP (ref 0–0)
PH UR: 5 — SIGNIFICANT CHANGE UP (ref 5–8)
PHOSPHATE SERPL-MCNC: 3.3 MG/DL — SIGNIFICANT CHANGE UP (ref 2.5–4.5)
PLATELET # BLD AUTO: 267 K/UL — SIGNIFICANT CHANGE UP (ref 150–400)
POTASSIUM SERPL-MCNC: 3.8 MMOL/L — SIGNIFICANT CHANGE UP (ref 3.5–5.3)
POTASSIUM SERPL-SCNC: 3.8 MMOL/L — SIGNIFICANT CHANGE UP (ref 3.5–5.3)
PROT UR-MCNC: 30 MG/DL
RBC # BLD: 4.31 M/UL — SIGNIFICANT CHANGE UP (ref 4.2–5.8)
RBC # FLD: SIGNIFICANT CHANGE UP (ref 10.3–14.5)
RBC CASTS # UR COMP ASSIST: ABNORMAL /HPF
SODIUM SERPL-SCNC: 138 MMOL/L — SIGNIFICANT CHANGE UP (ref 135–145)
SP GR SPEC: >=1.03 — SIGNIFICANT CHANGE UP (ref 1–1.03)
UROBILINOGEN FLD QL: 0.2 E.U./DL — SIGNIFICANT CHANGE UP
WBC # BLD: 11.78 K/UL — HIGH (ref 3.8–10.5)
WBC # FLD AUTO: 11.78 K/UL — HIGH (ref 3.8–10.5)
WBC UR QL: > 10 /HPF

## 2023-07-10 PROCEDURE — 99223 1ST HOSP IP/OBS HIGH 75: CPT

## 2023-07-10 PROCEDURE — 87040 BLOOD CULTURE FOR BACTERIA: CPT

## 2023-07-10 RX ORDER — PIPERACILLIN AND TAZOBACTAM 4; .5 G/20ML; G/20ML
3.38 INJECTION, POWDER, LYOPHILIZED, FOR SOLUTION INTRAVENOUS ONCE
Refills: 0 | Status: COMPLETED | OUTPATIENT
Start: 2023-07-10 | End: 2023-07-10

## 2023-07-10 RX ORDER — PIPERACILLIN AND TAZOBACTAM 4; .5 G/20ML; G/20ML
3.38 INJECTION, POWDER, LYOPHILIZED, FOR SOLUTION INTRAVENOUS EVERY 8 HOURS
Refills: 0 | Status: DISCONTINUED | OUTPATIENT
Start: 2023-07-10 | End: 2023-07-12

## 2023-07-10 RX ORDER — SODIUM CHLORIDE 9 MG/ML
500 INJECTION, SOLUTION INTRAVENOUS ONCE
Refills: 0 | Status: COMPLETED | OUTPATIENT
Start: 2023-07-10 | End: 2023-07-10

## 2023-07-10 RX ORDER — PIPERACILLIN AND TAZOBACTAM 4; .5 G/20ML; G/20ML
3.38 INJECTION, POWDER, LYOPHILIZED, FOR SOLUTION INTRAVENOUS ONCE
Refills: 0 | Status: DISCONTINUED | OUTPATIENT
Start: 2023-07-10 | End: 2023-07-10

## 2023-07-10 RX ORDER — POTASSIUM CHLORIDE 20 MEQ
10 PACKET (EA) ORAL
Refills: 0 | Status: COMPLETED | OUTPATIENT
Start: 2023-07-10 | End: 2023-07-10

## 2023-07-10 RX ADMIN — HEPARIN SODIUM 5000 UNIT(S): 5000 INJECTION INTRAVENOUS; SUBCUTANEOUS at 16:49

## 2023-07-10 RX ADMIN — SODIUM CHLORIDE 100 MILLILITER(S): 9 INJECTION, SOLUTION INTRAVENOUS at 01:47

## 2023-07-10 RX ADMIN — SODIUM CHLORIDE 100 MILLILITER(S): 9 INJECTION, SOLUTION INTRAVENOUS at 22:39

## 2023-07-10 RX ADMIN — Medication 100 MILLIEQUIVALENT(S): at 12:00

## 2023-07-10 RX ADMIN — Medication 650 MILLIGRAM(S): at 06:30

## 2023-07-10 RX ADMIN — Medication 650 MILLIGRAM(S): at 16:50

## 2023-07-10 RX ADMIN — PANTOPRAZOLE SODIUM 40 MILLIGRAM(S): 20 TABLET, DELAYED RELEASE ORAL at 11:40

## 2023-07-10 RX ADMIN — SODIUM CHLORIDE 100 MILLILITER(S): 9 INJECTION, SOLUTION INTRAVENOUS at 15:41

## 2023-07-10 RX ADMIN — Medication 100 MILLIEQUIVALENT(S): at 14:00

## 2023-07-10 RX ADMIN — SODIUM CHLORIDE 100 MILLILITER(S): 9 INJECTION, SOLUTION INTRAVENOUS at 17:11

## 2023-07-10 RX ADMIN — Medication 650 MILLIGRAM(S): at 22:40

## 2023-07-10 RX ADMIN — Medication 650 MILLIGRAM(S): at 01:25

## 2023-07-10 RX ADMIN — HEPARIN SODIUM 5000 UNIT(S): 5000 INJECTION INTRAVENOUS; SUBCUTANEOUS at 00:55

## 2023-07-10 RX ADMIN — HEPARIN SODIUM 5000 UNIT(S): 5000 INJECTION INTRAVENOUS; SUBCUTANEOUS at 09:47

## 2023-07-10 RX ADMIN — SODIUM CHLORIDE 1000 MILLILITER(S): 9 INJECTION, SOLUTION INTRAVENOUS at 18:31

## 2023-07-10 RX ADMIN — PIPERACILLIN AND TAZOBACTAM 25 GRAM(S): 4; .5 INJECTION, POWDER, LYOPHILIZED, FOR SOLUTION INTRAVENOUS at 09:47

## 2023-07-10 RX ADMIN — PIPERACILLIN AND TAZOBACTAM 200 GRAM(S): 4; .5 INJECTION, POWDER, LYOPHILIZED, FOR SOLUTION INTRAVENOUS at 05:51

## 2023-07-10 RX ADMIN — PIPERACILLIN AND TAZOBACTAM 25 GRAM(S): 4; .5 INJECTION, POWDER, LYOPHILIZED, FOR SOLUTION INTRAVENOUS at 22:38

## 2023-07-10 RX ADMIN — Medication 650 MILLIGRAM(S): at 12:30

## 2023-07-10 RX ADMIN — Medication 100 MILLIEQUIVALENT(S): at 09:46

## 2023-07-10 RX ADMIN — SODIUM CHLORIDE 500 MILLILITER(S): 9 INJECTION, SOLUTION INTRAVENOUS at 17:13

## 2023-07-10 RX ADMIN — PIPERACILLIN AND TAZOBACTAM 25 GRAM(S): 4; .5 INJECTION, POWDER, LYOPHILIZED, FOR SOLUTION INTRAVENOUS at 15:40

## 2023-07-10 RX ADMIN — TAMSULOSIN HYDROCHLORIDE 0.4 MILLIGRAM(S): 0.4 CAPSULE ORAL at 22:40

## 2023-07-10 RX ADMIN — Medication 650 MILLIGRAM(S): at 00:55

## 2023-07-10 RX ADMIN — Medication 650 MILLIGRAM(S): at 05:51

## 2023-07-10 RX ADMIN — Medication 650 MILLIGRAM(S): at 11:40

## 2023-07-10 NOTE — PROVIDER CONTACT NOTE (OTHER) - ACTION/TREATMENT ORDERED:
MD Cannon advised to continue to monitor the patients temp and HR. If HR goes above 130's please call team.

## 2023-07-10 NOTE — PROGRESS NOTE ADULT - SUBJECTIVE AND OBJECTIVE BOX
SUBJECTIVE:  Patient reports pain at site of abdominal incision. Denies nausea or vomiting. Denies flatus and BM. Denies pain at this time.     Objective: Patient spiked fever to 101 with tachycardia to 130. Held tylenol to monitor temperatures. UA and BCx collected. Patient started on zosyn overnight. Required 2L NC overnight due to asymptomatic desaturation to 91%.    MEDICATIONS  (STANDING):  acetaminophen     Tablet .. 650 milliGRAM(s) Oral every 6 hours  dextrose 5% + sodium chloride 0.45%. 1000 milliLiter(s) (100 mL/Hr) IV Continuous <Continuous>  heparin   Injectable 5000 Unit(s) SubCutaneous every 8 hours  pantoprazole  Injectable 40 milliGRAM(s) IV Push daily  piperacillin/tazobactam IVPB.- 3.375 Gram(s) IV Intermittent once  piperacillin/tazobactam IVPB.- 3.375 Gram(s) IV Intermittent once  piperacillin/tazobactam IVPB.. 3.375 Gram(s) IV Intermittent every 8 hours  potassium chloride  10 mEq/100 mL IVPB 10 milliEquivalent(s) IV Intermittent every 1 hour  tamsulosin 0.4 milliGRAM(s) Oral at bedtime    MEDICATIONS  (PRN):  HYDROmorphone  Injectable 0.5 milliGRAM(s) IV Push every 4 hours PRN Severe Pain (7 - 10)  ondansetron Injectable 4 milliGRAM(s) IV Push every 6 hours PRN Nausea and/or Vomiting      Vital Signs Last 24 Hrs  T(C): 37.7 (10 Jul 2023 05:32), Max: 38.3 (09 Jul 2023 19:34)  T(F): 99.8 (10 Jul 2023 05:32), Max: 101 (09 Jul 2023 19:34)  HR: 126 (10 Jul 2023 04:09) (94 - 130)  BP: 140/88 (10 Jul 2023 04:09) (108/76 - 158/92)  BP(mean): 110 (10 Jul 2023 04:09) (108 - 114)  RR: 17 (10 Jul 2023 04:09) (17 - 18)  SpO2: 96% (10 Jul 2023 04:09) (91% - 98%)    Parameters below as of 10 Jul 2023 04:09  Patient On (Oxygen Delivery Method): nasal cannula  O2 Flow (L/min): 2      Physical Exam:  General: NAD, resting comfortably in bed  Pulmonary: Nonlabored breathing, no respiratory distress  Cardiovascular: NSR  Abdominal: soft, appropriately tender at incisional site, slightly distended, soft  Extremities: WWP, normal strength  Neuro: A/O x 3, CNs II-XII grossly intact, no focal deficits, normal motor/sensation  Pulses: palpable distal pulses    I&O's Summary    09 Jul 2023 07:01  -  10 Jul 2023 07:00  --------------------------------------------------------  IN: 2000 mL / OUT: 200 mL / NET: 1800 mL        LABS:                        10.2   11.78 )-----------( 267      ( 10 Jul 2023 05:30 )             34.6     07-10    138  |  104  |  8   ----------------------------<  141<H>  3.8   |  22  |  0.72    Ca    8.5      10 Jul 2023 05:30  Phos  3.3     07-10  Mg     2.0     07-10        Urinalysis Basic - ( 10 Jul 2023 05:30 )    Color: x / Appearance: x / SG: x / pH: x  Gluc: 141 mg/dL / Ketone: x  / Bili: x / Urobili: x   Blood: x / Protein: x / Nitrite: x   Leuk Esterase: x / RBC: x / WBC x   Sq Epi: x / Non Sq Epi: x / Bacteria: x      CAPILLARY BLOOD GLUCOSE            RADIOLOGY & ADDITIONAL STUDIES:

## 2023-07-10 NOTE — PROVIDER CONTACT NOTE (OTHER) - ACTION/TREATMENT ORDERED:
MD Cannon placed an order for an additional 500cc Lr bolus to be given to the patient. Continue to monitor.

## 2023-07-10 NOTE — PROVIDER CONTACT NOTE (OTHER) - SITUATION
Pt tachy w/ 
Pt still tachy to 124
, VS taken after 500 cc bolus given. Requesting intervention for pts ST HR.
-120's, Parameter . Pt diaphoretic
Pt returned from CT scan w/ 
Suspected UTI and Pt tachy to 114

## 2023-07-10 NOTE — CONSULT NOTE ADULT - PROBLEM SELECTOR RECOMMENDATION 9
I discussed the case with the surgery and the wife.  The patient has no underlying pneumonia.  His condition related to atelectasis from splinting.  The patient is doing 500 cc on the incentive spirometry.  Agree with increase activity out of bed in chair.  The oxygen saturation is 94% on room air.  No evidence of thromboembolic disease.  The patient has a history of PE in the past and need to start Lovenox for prophylaxis until the patient can get back on full anticoagulation

## 2023-07-10 NOTE — PROVIDER CONTACT NOTE (OTHER) - ACTION/TREATMENT ORDERED:
MD Cannon advised she is waiting for cultures to result and to continue to monitor. Pt is on Zosyn to samuel infection. HR acceptable at this time, continue to monitor.

## 2023-07-10 NOTE — PROVIDER CONTACT NOTE (OTHER) - ACTION/TREATMENT ORDERED:
MD Cannon advised she will assess the patients EKG . BS can be performed. will come to bedside shortly. Cardiology will be consulted.

## 2023-07-10 NOTE — CONSULT NOTE ADULT - SUBJECTIVE AND OBJECTIVE BOX
Patient is a 73y old  Male who presents with a chief complaint of     HPI:  73 year old male with pmh of stroke (R hemiparesis), recurrent PE (last Jan 2022), diverticulosis and PSHx of colon tumor removal (1988) presenting to the emergency department this morning with abdominal pain. Patient describes the pain as a mild discomfort that started last night, pt saw DR Spann today and was instructed to come to the ER. Patient was transferred from [facility] to [facility] on June 22 for GI bleed. Patient during hospital stay given 3U pRBC. Patient had colonoscopy 6/26 where multiple polypectomies were performed in ascending, transverse, and descending colon, 3 cecal polyps were found and unable to be removed. 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 Patient cleared by cardiology and internal medicine during his stay at [facility] on 6/30. Patient’s last bowel movement was June 28. Wife reports he is still passing gas. Patient takes eliquis for PE prophylaxis, reports his last dose was June 20th. Patient denies any current nausea, vomiting, bloody bowel movements, melena, hematemesis, dizziness, chest pain, shortness of breath.      Home medications-   Zoloft 50 mg PO once a day   Pantoprazole 40 mg once a day   Flomax 0.4 mg once a day   Eliquis 5 mg PO tablet, last dose taken June 20th    Physical exam:    General- NAD, resting comfortably in bed.  C/V- Normal rate and rhythm.  Pulm- Breath sounds are equal bilaterally. No adventitious sounds.  ABD- Bowel sounds normoactive. Abdomen is soft, nontender, and nondistended.  Extremities: no edema, pulses 2+ upper and lower extremities.      Vital Signs Last 24 Hrs  T(C): 36.9 (05 Jul 2023 11:27), Max: 36.9 (05 Jul 2023 11:27)  T(F): 98.5 (05 Jul 2023 11:27), Max: 98.5 (05 Jul 2023 11:27)  HR: 69 (05 Jul 2023 11:27) (69 - 74)  BP: 122/81 (05 Jul 2023 11:27) (122/81 - 127/80)  BP(mean): --  RR: 16 (05 Jul 2023 11:27) (16 - 18)  SpO2: 95% (05 Jul 2023 11:27) (95% - 95%)    Parameters below as of 05 Jul 2023 11:27  Patient On (Oxygen Delivery Method): room air                            10.7   6.10  )-----------( 264      ( 05 Jul 2023 10:48 )             37.6     07-05    139  |  104  |  11  ----------------------------<  105<H>  4.0   |  24  |  0.79    Ca    8.9      05 Jul 2023 10:48    TPro  7.2  /  Alb  3.5  /  TBili  0.3  /  DBili  x   /  AST  17  /  ALT  13  /  AlkPhos  60  07-05   (05 Jul 2023 11:32)      PAST MEDICAL & SURGICAL HISTORY:  Depression, major      Stroke      Pulmonary embolism      BPH (benign prostatic hyperplasia)      Diverticulosis          FAMILY HISTORY:      SOCIAL HISTORY:  Smoking Status: [ ] Current, [ ] Former, [ ] Never  Pack Years:    MEDICATIONS:  Pulmonary:    Antimicrobials:  piperacillin/tazobactam IVPB.. 3.375 Gram(s) IV Intermittent every 8 hours    Anticoagulants:  heparin   Injectable 5000 Unit(s) SubCutaneous every 8 hours    Onc:    GI/:  pantoprazole  Injectable 40 milliGRAM(s) IV Push daily  tamsulosin 0.4 milliGRAM(s) Oral at bedtime    Endocrine:    Cardiac:    Other Medications:  acetaminophen     Tablet .. 650 milliGRAM(s) Oral every 6 hours  dextrose 5% + sodium chloride 0.45%. 1000 milliLiter(s) IV Continuous <Continuous>  HYDROmorphone  Injectable 0.5 milliGRAM(s) IV Push every 4 hours PRN  ondansetron Injectable 4 milliGRAM(s) IV Push every 6 hours PRN      Allergies    No Known Allergies    Intolerances        Review of Systems:   •	General: negative  •	Skin/Breast: negative  •	Ophthalmologic: negative  •	ENMT: negative  •	Respiratory and Thorax: negative  •	Cardiovascular: negative  •	Gastrointestinal: negative  •	Genitourinary: negative  •	Musculoskeletal: negative  •	Neurological: negative  •	Psychiatric: negative  •	Hematology/Lymphatics: negative  •	Endocrine: negative  •	Allergic/Immunologic: negative    Physical Exam:   • Constitutional:	refer to dietitian/nutritionist note  • Eyes:	EOMI; PERRL; no drainage or redness  • ENMT:	No oral lesions; no gross abnormalities  • Neck	No bruits; no thyromegaly or nodules  • Breasts:	not examined  • Back:	No deformity or limitation of movement  • Respiratory:	Breath Sounds equal & clear to percussion & auscultation, no accessory muscle use  • Cardiovascular:	Regular rate & rhythm, normal S1, S2; no murmurs, gallops or rubs; no S3, S4  • Gastrointestinal:	Soft, non-tender, no hepatosplenomegaly, normal bowel sounds  • Genitourinary:	not examined  • Rectal: not examined  • Extremities:	No cyanosis, clubbing or edema  • Vascular:	Equal and normal pulses (carotid, femoral, dorsalis pedis)  • Neurologica:l	not examined  • Skin:	No lesions; no rash  • Lymph Nodes:	No lymphadedenopathy  • Musculoskeletal:	No joint pain, swelling or deformity; no limitation of movement      Vital Signs Last 24 Hrs  T(C): 36.5 (10 Jul 2023 14:00), Max: 38.3 (09 Jul 2023 19:34)  T(F): 97.7 (10 Jul 2023 14:00), Max: 101 (09 Jul 2023 19:34)  HR: 114 (10 Jul 2023 11:11) (114 - 130)  BP: 127/87 (10 Jul 2023 11:11) (108/76 - 158/92)  BP(mean): 103 (10 Jul 2023 11:11) (96 - 110)  RR: 16 (10 Jul 2023 11:11) (16 - 18)  SpO2: 95% (10 Jul 2023 11:11) (91% - 98%)    Parameters below as of 10 Jul 2023 11:11  Patient On (Oxygen Delivery Method): nasal cannula  O2 Flow (L/min): 2      07-09 @ 07:01  -  07-10 @ 07:00  --------------------------------------------------------  IN: 2000 mL / OUT: 200 mL / NET: 1800 mL    07-10 @ 07:01  -  07-10 @ 17:45  --------------------------------------------------------  IN: 1225 mL / OUT: 325 mL / NET: 900 mL          LABS:      CBC Full  -  ( 10 Jul 2023 05:30 )  WBC Count : 11.78 K/uL  RBC Count : 4.31 M/uL  Hemoglobin : 10.2 g/dL  Hematocrit : 34.6 %  Platelet Count - Automated : 267 K/uL  Mean Cell Volume : 80.3 fl  Mean Cell Hemoglobin : 23.7 pg  Mean Cell Hemoglobin Concentration : 29.5 gm/dL  Auto Neutrophil # : x  Auto Lymphocyte # : x  Auto Monocyte # : x  Auto Eosinophil # : x  Auto Basophil # : x  Auto Neutrophil % : x  Auto Lymphocyte % : x  Auto Monocyte % : x  Auto Eosinophil % : x  Auto Basophil % : x    07-10    138  |  104  |  8   ----------------------------<  141<H>  3.8   |  22  |  0.72    Ca    8.5      10 Jul 2023 05:30  Phos  3.3     07-10  Mg     2.0     07-10            Urinalysis Basic - ( 10 Jul 2023 05:30 )    Color: x / Appearance: x / SG: x / pH: x  Gluc: 141 mg/dL / Ketone: x  / Bili: x / Urobili: x   Blood: x / Protein: x / Nitrite: x   Leuk Esterase: x / RBC: x / WBC x   Sq Epi: x / Non Sq Epi: x / Bacteria: x      < from: Xray Chest 1 View AP/PA (07.05.23 @ 11:19) >  ACC: [id number] EXAM:  XR CHEST AP OR PA 1V   ORDERED BY: SRINIVASAN DOW     PROCEDURE DATE:  07/05/2023          INTERPRETATION:  Chest one view    HISTORY: Preop for abdominal pain    IMPRESSION:    Lungs clear. No infiltrate pleural effusion or pneumothorax. Unremarkable   cardiac silhouette. No acute bone abnormality.    < end of copied text >  CXR elevated right hemidiaphragm and no infiltrate  < from: CT Chest w/ IV Cont (07.09.23 @ 23:41) >  IMPRESSION:  Findings favor postoperative small bowel ileus. Also gaseous distention   of stomach and transverse colon could contribute to abdominal discomfort.   Mesenteric stranding and edema in the left mid abdominal mesentery not   expected after jejunal mass resection.  Video capsule endoscopy device within rectum at level of anastomosis    < end of copied text >            RADIOLOGY & ADDITIONAL STUDIES (The following images were personally reviewed):

## 2023-07-10 NOTE — PROGRESS NOTE ADULT - ASSESSMENT
73 year old male with pmh of stroke (R hemiparesis), recurrent PE (last Jan 2022), diverticulosis and PSHx of colon tumor removal (1988), known jejunal mass presenting with abdominal pain. Pt is now s/p ex lap, SBR, primary anastamosis on 7/6.     NPO/IVF   Zosyn for positive UA  F/up BCx  F/up CT C/A/P  Consult pulm  Chest PT  Pain/nausea control PRN  HSQ/SCDs  OOBA/IS  Dispo: PT LALITHA

## 2023-07-10 NOTE — PROVIDER CONTACT NOTE (OTHER) - RECOMMENDATIONS
One time BS to assess if pt is retaining. HR medication.
Additional 500cc bolus of LR
PA to assess pt. Monitor pt for change in status.
chest x-ray, albuterol, parameters to be changed.
Additional UA, bolus of fluids.
PA at bedside to assess pt. Place pt on zoll.

## 2023-07-11 LAB
ANION GAP SERPL CALC-SCNC: 15 MMOL/L — SIGNIFICANT CHANGE UP (ref 5–17)
APTT BLD: 35 SEC — SIGNIFICANT CHANGE UP (ref 27.5–35.5)
BLD GP AB SCN SERPL QL: NEGATIVE — SIGNIFICANT CHANGE UP
BUN SERPL-MCNC: 14 MG/DL — SIGNIFICANT CHANGE UP (ref 7–23)
CALCIUM SERPL-MCNC: 9.2 MG/DL — SIGNIFICANT CHANGE UP (ref 8.4–10.5)
CHLORIDE SERPL-SCNC: 100 MMOL/L — SIGNIFICANT CHANGE UP (ref 96–108)
CO2 SERPL-SCNC: 21 MMOL/L — LOW (ref 22–31)
CREAT SERPL-MCNC: 0.76 MG/DL — SIGNIFICANT CHANGE UP (ref 0.5–1.3)
EGFR: 96 ML/MIN/1.73M2 — SIGNIFICANT CHANGE UP
GLUCOSE SERPL-MCNC: 143 MG/DL — HIGH (ref 70–99)
HCT VFR BLD CALC: 36 % — LOW (ref 39–50)
HGB BLD-MCNC: 11 G/DL — LOW (ref 13–17)
INR BLD: 1.9 — HIGH (ref 0.88–1.16)
MAGNESIUM SERPL-MCNC: 2.1 MG/DL — SIGNIFICANT CHANGE UP (ref 1.6–2.6)
MCHC RBC-ENTMCNC: 23.9 PG — LOW (ref 27–34)
MCHC RBC-ENTMCNC: 30.6 GM/DL — LOW (ref 32–36)
MCV RBC AUTO: 78.3 FL — LOW (ref 80–100)
NRBC # BLD: 0 /100 WBCS — SIGNIFICANT CHANGE UP (ref 0–0)
PHOSPHATE SERPL-MCNC: 3.7 MG/DL — SIGNIFICANT CHANGE UP (ref 2.5–4.5)
PLATELET # BLD AUTO: 308 K/UL — SIGNIFICANT CHANGE UP (ref 150–400)
POTASSIUM SERPL-MCNC: 3.9 MMOL/L — SIGNIFICANT CHANGE UP (ref 3.5–5.3)
POTASSIUM SERPL-SCNC: 3.9 MMOL/L — SIGNIFICANT CHANGE UP (ref 3.5–5.3)
PROTHROM AB SERPL-ACNC: 22.8 SEC — HIGH (ref 10.5–13.4)
RBC # BLD: 4.6 M/UL — SIGNIFICANT CHANGE UP (ref 4.2–5.8)
RBC # FLD: SIGNIFICANT CHANGE UP (ref 10.3–14.5)
RH IG SCN BLD-IMP: POSITIVE — SIGNIFICANT CHANGE UP
SODIUM SERPL-SCNC: 136 MMOL/L — SIGNIFICANT CHANGE UP (ref 135–145)
WBC # BLD: 13.25 K/UL — HIGH (ref 3.8–10.5)
WBC # FLD AUTO: 13.25 K/UL — HIGH (ref 3.8–10.5)

## 2023-07-11 PROCEDURE — 74177 CT ABD & PELVIS W/CONTRAST: CPT | Mod: 26

## 2023-07-11 PROCEDURE — 71045 X-RAY EXAM CHEST 1 VIEW: CPT | Mod: 26

## 2023-07-11 PROCEDURE — 99232 SBSQ HOSP IP/OBS MODERATE 35: CPT

## 2023-07-11 RX ORDER — SODIUM CHLORIDE 9 MG/ML
1000 INJECTION, SOLUTION INTRAVENOUS ONCE
Refills: 0 | Status: COMPLETED | OUTPATIENT
Start: 2023-07-11 | End: 2023-07-11

## 2023-07-11 RX ORDER — DOXAZOSIN MESYLATE 4 MG
1 TABLET ORAL AT BEDTIME
Refills: 0 | Status: DISCONTINUED | OUTPATIENT
Start: 2023-07-11 | End: 2023-07-21

## 2023-07-11 RX ORDER — ENOXAPARIN SODIUM 100 MG/ML
40 INJECTION SUBCUTANEOUS EVERY 24 HOURS
Refills: 0 | Status: DISCONTINUED | OUTPATIENT
Start: 2023-07-11 | End: 2023-07-28

## 2023-07-11 RX ORDER — POTASSIUM CHLORIDE 20 MEQ
40 PACKET (EA) ORAL ONCE
Refills: 0 | Status: DISCONTINUED | OUTPATIENT
Start: 2023-07-11 | End: 2023-07-11

## 2023-07-11 RX ORDER — SODIUM CHLORIDE 9 MG/ML
2000 INJECTION, SOLUTION INTRAVENOUS ONCE
Refills: 0 | Status: COMPLETED | OUTPATIENT
Start: 2023-07-11 | End: 2023-07-11

## 2023-07-11 RX ORDER — POTASSIUM CHLORIDE 20 MEQ
10 PACKET (EA) ORAL
Refills: 0 | Status: COMPLETED | OUTPATIENT
Start: 2023-07-11 | End: 2023-07-11

## 2023-07-11 RX ADMIN — SODIUM CHLORIDE 1000 MILLILITER(S): 9 INJECTION, SOLUTION INTRAVENOUS at 08:51

## 2023-07-11 RX ADMIN — Medication 650 MILLIGRAM(S): at 06:16

## 2023-07-11 RX ADMIN — Medication 1 MILLIGRAM(S): at 22:17

## 2023-07-11 RX ADMIN — Medication 650 MILLIGRAM(S): at 06:46

## 2023-07-11 RX ADMIN — ENOXAPARIN SODIUM 40 MILLIGRAM(S): 100 INJECTION SUBCUTANEOUS at 17:59

## 2023-07-11 RX ADMIN — Medication 650 MILLIGRAM(S): at 06:27

## 2023-07-11 RX ADMIN — Medication 650 MILLIGRAM(S): at 11:43

## 2023-07-11 RX ADMIN — PIPERACILLIN AND TAZOBACTAM 25 GRAM(S): 4; .5 INJECTION, POWDER, LYOPHILIZED, FOR SOLUTION INTRAVENOUS at 06:17

## 2023-07-11 RX ADMIN — SODIUM CHLORIDE 100 MILLILITER(S): 9 INJECTION, SOLUTION INTRAVENOUS at 10:41

## 2023-07-11 RX ADMIN — Medication 650 MILLIGRAM(S): at 10:43

## 2023-07-11 RX ADMIN — PIPERACILLIN AND TAZOBACTAM 25 GRAM(S): 4; .5 INJECTION, POWDER, LYOPHILIZED, FOR SOLUTION INTRAVENOUS at 14:10

## 2023-07-11 RX ADMIN — PANTOPRAZOLE SODIUM 40 MILLIGRAM(S): 20 TABLET, DELAYED RELEASE ORAL at 11:56

## 2023-07-11 RX ADMIN — HEPARIN SODIUM 5000 UNIT(S): 5000 INJECTION INTRAVENOUS; SUBCUTANEOUS at 01:07

## 2023-07-11 RX ADMIN — HEPARIN SODIUM 5000 UNIT(S): 5000 INJECTION INTRAVENOUS; SUBCUTANEOUS at 08:27

## 2023-07-11 RX ADMIN — Medication 100 MILLIEQUIVALENT(S): at 10:43

## 2023-07-11 RX ADMIN — Medication 650 MILLIGRAM(S): at 17:20

## 2023-07-11 RX ADMIN — SODIUM CHLORIDE 1000 MILLILITER(S): 9 INJECTION, SOLUTION INTRAVENOUS at 11:56

## 2023-07-11 RX ADMIN — Medication 100 MILLIEQUIVALENT(S): at 11:55

## 2023-07-11 RX ADMIN — SODIUM CHLORIDE 1000 MILLILITER(S): 9 INJECTION, SOLUTION INTRAVENOUS at 17:33

## 2023-07-11 NOTE — PROVIDER CONTACT NOTE (OTHER) - ASSESSMENT
Pt denies pain or discomfort or SOB. . /76. Oral temp 99.7. On 2L NC, sating 96%.
Pt was resting comfortably up in a chair.
Pt sitting up in chair. St to 130s
Pt denies pain, discomfort or SOB. On 2L NC, sating 95%. . /86. Oral temp 100.8
Pt was resting comfortably in his chair. Pt was tachy to 118. Pt diaphoretic.  Lung sounds diminished. Pt taught how to us incentive spirometer and chest Pt performed.
HR sustaining at 135. /100. Denies shortness of breath, chest pain, or any pain. No labored breathing noted.
Pt was resting comfortably up in a chair. BP Normotensive, Hr still elevated after 500cc bolus given. No c/o chest pain. EKG performed.

## 2023-07-11 NOTE — PROGRESS NOTE ADULT - ASSESSMENT
73 year old male with pmh of stroke (R hemiparesis), recurrent PE (last Jan 2022), diverticulosis and PSHx of colon tumor removal (1988), known jejunal mass presenting with abdominal pain. Pt is now s/p ex lap, SBR, primary anastomosis on 7/6.     NPO/IVF   Pain/nausea control PRN  Zosyn  HSQ/SCDs  OOBA/IS  CT Abd/Pel w IV  Dispo: PT LALITHA

## 2023-07-11 NOTE — PROVIDER CONTACT NOTE (OTHER) - DATE AND TIME:
10-Jul-2023 18:10
10-Jul-2023 19:41
11-Jul-2023 08:41
10-Jul-2023 00:15
10-Jul-2023 12:51
10-Jul-2023 10:39
09-Jul-2023 21:00

## 2023-07-11 NOTE — PROGRESS NOTE ADULT - SUBJECTIVE AND OBJECTIVE BOX
Seen and examined  Patient without complaints   Persistentl tachycardia 120-130  bp 130/90  abd- soft, distended, non tender    ileus/UTI  ? source of tachycardia  will repeat CT this am. If evidence of leak will plan for exploration-  Risk-benefits discussed with patient and wife.

## 2023-07-11 NOTE — PROVIDER CONTACT NOTE (OTHER) - REASON
Pt still tachy to 124
-120's, Parameter . Pt diaphoretic
Suspected UTI and Pt tachy to 114
HR 130s
, VS taken after 500 cc bolus given

## 2023-07-11 NOTE — PROGRESS NOTE ADULT - SUBJECTIVE AND OBJECTIVE BOX
Overnight events: : tachy to 130s after 1L of fluids. bladder scan 47, EKG sinus tach with RVH per cardiology, soft, moderately distended, NT      POD#5 ex lap with small bowel resection and anastomosis    SUBJECTIVE: Patient seen at bedside with chief resident. Patient denies any nausea or vomiting. Patient denies nay BM or flatus.       MEDICATIONS  (STANDING):  acetaminophen     Tablet .. 650 milliGRAM(s) Oral every 6 hours  dextrose 5% + sodium chloride 0.45%. 1000 milliLiter(s) (100 mL/Hr) IV Continuous <Continuous>  heparin   Injectable 5000 Unit(s) SubCutaneous every 8 hours  pantoprazole  Injectable 40 milliGRAM(s) IV Push daily  piperacillin/tazobactam IVPB.. 3.375 Gram(s) IV Intermittent every 8 hours  tamsulosin 0.4 milliGRAM(s) Oral at bedtime    MEDICATIONS  (PRN):  HYDROmorphone  Injectable 0.5 milliGRAM(s) IV Push every 4 hours PRN Severe Pain (7 - 10)  ondansetron Injectable 4 milliGRAM(s) IV Push every 6 hours PRN Nausea and/or Vomiting      Vital Signs Last 24 Hrs  T(C): 36.7 (11 Jul 2023 05:18), Max: 37.1 (10 Jul 2023 21:48)  T(F): 98 (11 Jul 2023 05:18), Max: 98.8 (10 Jul 2023 21:48)  HR: 120 (11 Jul 2023 03:56) (114 - 128)  BP: 120/80 (11 Jul 2023 03:56) (120/80 - 128/88)  BP(mean): 96 (11 Jul 2023 03:56) (96 - 103)  RR: 18 (11 Jul 2023 03:56) (16 - 18)  SpO2: 93% (11 Jul 2023 03:56) (93% - 100%)    Parameters below as of 11 Jul 2023 03:56  Patient On (Oxygen Delivery Method): room air        Physical Exam:  General: NAD, resting comfortably in bed  Pulmonary: Nonlabored breathing, no respiratory distress  Cardiovascular: NSR  Abdominal: soft, NT, mildly distended. Appropriate incisional tenderness. mild bruising along incision site  Extremities: WWP, normal strength  Neuro: A/O x 3, CNs II-XII grossly intact, no focal deficits, normal motor/sensation  Pulses: palpable distal pulses    I&O's Summary    10 Jul 2023 07:01  -  11 Jul 2023 07:00  --------------------------------------------------------  IN: 3850 mL / OUT: 500 mL / NET: 3350 mL        LABS:                        10.2   11.78 )-----------( 267      ( 10 Jul 2023 05:30 )             34.6     07-11    136  |  100  |  14  ----------------------------<  143<H>  3.9   |  21<L>  |  0.76    Ca    9.2      11 Jul 2023 05:30  Phos  3.7     07-11  Mg     2.1     07-11      PT/INR - ( 11 Jul 2023 05:30 )   PT: 22.8 sec;   INR: 1.90          PTT - ( 11 Jul 2023 05:30 )  PTT:35.0 sec  Urinalysis Basic - ( 11 Jul 2023 05:30 )    Color: x / Appearance: x / SG: x / pH: x  Gluc: 143 mg/dL / Ketone: x  / Bili: x / Urobili: x   Blood: x / Protein: x / Nitrite: x   Leuk Esterase: x / RBC: x / WBC x   Sq Epi: x / Non Sq Epi: x / Bacteria: x      CAPILLARY BLOOD GLUCOSE            RADIOLOGY & ADDITIONAL STUDIES:

## 2023-07-11 NOTE — PROVIDER CONTACT NOTE (OTHER) - BACKGROUND
As per report. Pts HR has been tachy for the past 12-24 hours. Monitoring pt for sepsis. Cultures performed prior. Pt temp this morning was 99.8, Tylenol given
Pt had jejunal resection surgery on 7/5. Hx of stroke w/ R hemiparesis. Pt febrile at 1700 w/ temp 101F.
Patient resting in bed comfortably. HR has been in the 120s overnight.
ST throughout the day. Pt has UTI
Pt was febrile and tachy at 130.
Pts WBCs are elevated. Monitoring for infection, UTI present.
UA performed this morning. Pt being worked up for infection. Cultures and UA taken prior.

## 2023-07-11 NOTE — PROGRESS NOTE ADULT - SUBJECTIVE AND OBJECTIVE BOX
Interval Events: Reviewed  Patient seen and examined at bedside.    Patient is a 73y old  Male who presents with a chief complaint of small bowel tumor (11 Jul 2023 07:43)    he has a cough  PAST MEDICAL & SURGICAL HISTORY:  Depression, major      Stroke      Pulmonary embolism      BPH (benign prostatic hyperplasia)      Diverticulosis          MEDICATIONS:  Pulmonary:    Antimicrobials:  piperacillin/tazobactam IVPB.. 3.375 Gram(s) IV Intermittent every 8 hours    Anticoagulants:  heparin   Injectable 5000 Unit(s) SubCutaneous every 8 hours    Cardiac:      Allergies    No Known Allergies    Intolerances        Vital Signs Last 24 Hrs  T(C): 36.7 (11 Jul 2023 13:36), Max: 37.1 (10 Jul 2023 21:48)  T(F): 98.1 (11 Jul 2023 13:36), Max: 98.8 (10 Jul 2023 21:48)  HR: 116 (11 Jul 2023 11:45) (116 - 134)  BP: 114/79 (11 Jul 2023 11:45) (114/79 - 138/96)  BP(mean): 91 (11 Jul 2023 11:45) (91 - 110)  RR: 19 (11 Jul 2023 11:45) (16 - 19)  SpO2: 93% (11 Jul 2023 11:45) (93% - 100%)    Parameters below as of 11 Jul 2023 10:10  Patient On (Oxygen Delivery Method): room air        07-10 @ 07:01 - 07-11 @ 07:00  --------------------------------------------------------  IN: 3850 mL / OUT: 500 mL / NET: 3350 mL    07-11 @ 07:01 - 07-11 @ 14:43  --------------------------------------------------------  IN: 3900 mL / OUT: 2600 mL / NET: 1300 mL          Review of Systems:   •	General: negative  •	Skin/Breast: negative  •	Ophthalmologic: negative  •	ENMT: negative  •	Respiratory and Thorax: negative  •	Cardiovascular: negative  •	Gastrointestinal: negative  •	Genitourinary: negative  •	Musculoskeletal: negative  •	Neurological: negative  •	Psychiatric: negative  •	Hematology/Lymphatics: negative  •	Endocrine: negative  •	Allergic/Immunologic: negative    Physical Exam:   • Constitutional:	refer to the dietion /Nutritionist note  • Eyes:	EOMI; PERRL; no drainage or redness  • ENMT:	No oral lesions; no gross abnormalities  • Neck	No bruits; no thyromegaly or nodules  • Breasts:	not examined  • Back:	No deformity or limitation of movement  • Respiratory:	Breath Sounds equal & clear to percussion & auscultation, no accessory muscle use  • Cardiovascular:	Regular rate & rhythm, normal S1, S2; no murmurs, gallops or rubs; no S3, S4  • Gastrointestinal:	Soft, non-tender, no hepatosplenomegaly, normal bowel sounds  • Genitourinary:	not examined  • Rectal: not examined  • Extremities:	No cyanosis, clubbing or edema  • Vascular:	Equal and normal pulses (carotid, femoral, dorsalis pedis)  • Neurologica:l	not examined  • Skin:	No lesions; no rash  • Lymph Nodes:	No lymphadedenopathy  • Musculoskeletal:	No joint pain, swelling or deformity; no limitation of movement        LABS:      CBC Full  -  ( 11 Jul 2023 05:30 )  WBC Count : 13.25 K/uL  RBC Count : 4.60 M/uL  Hemoglobin : 11.0 g/dL  Hematocrit : 36.0 %  Platelet Count - Automated : 308 K/uL  Mean Cell Volume : 78.3 fl  Mean Cell Hemoglobin : 23.9 pg  Mean Cell Hemoglobin Concentration : 30.6 gm/dL  Auto Neutrophil # : x  Auto Lymphocyte # : x  Auto Monocyte # : x  Auto Eosinophil # : x  Auto Basophil # : x  Auto Neutrophil % : x  Auto Lymphocyte % : x  Auto Monocyte % : x  Auto Eosinophil % : x  Auto Basophil % : x    07-11    136  |  100  |  14  ----------------------------<  143<H>  3.9   |  21<L>  |  0.76    Ca    9.2      11 Jul 2023 05:30  Phos  3.7     07-11  Mg     2.1     07-11      PT/INR - ( 11 Jul 2023 05:30 )   PT: 22.8 sec;   INR: 1.90          PTT - ( 11 Jul 2023 05:30 )  PTT:35.0 sec      Urinalysis Basic - ( 11 Jul 2023 05:30 )    Color: x / Appearance: x / SG: x / pH: x  Gluc: 143 mg/dL / Ketone: x  / Bili: x / Urobili: x   Blood: x / Protein: x / Nitrite: x   Leuk Esterase: x / RBC: x / WBC x   Sq Epi: x / Non Sq Epi: x / Bacteria: x              Culture Results:   No growth at 1 day. (07-10 @ 00:24)  Culture Results:   No growth at 1 day. (07-10 @ 00:24)  Culture Results:   >100,000 CFU/ml Pseudomonas aeruginosa  >100,000 CFU/ml Enterococcus faecalis  Susceptibility to follow. (07-10 @ 00:24)      RADIOLOGY & ADDITIONAL STUDIES (The following images were personally reviewed):

## 2023-07-11 NOTE — PROVIDER CONTACT NOTE (OTHER) - ACTION/TREATMENT ORDERED:
JONATHAN Tamayo aware; no intervention at this time. PA to speak to team and will call RN back with intervention. MD aware; no intervention at this time. MD to speak to team and will call RN back with intervention.

## 2023-07-12 ENCOUNTER — TRANSCRIPTION ENCOUNTER (OUTPATIENT)
Age: 73
End: 2023-07-12

## 2023-07-12 LAB
-  AMPICILLIN: SIGNIFICANT CHANGE UP
-  AZTREONAM: SIGNIFICANT CHANGE UP
-  CEFEPIME: SIGNIFICANT CHANGE UP
-  CIPROFLOXACIN: SIGNIFICANT CHANGE UP
-  LEVOFLOXACIN: SIGNIFICANT CHANGE UP
-  MEROPENEM: SIGNIFICANT CHANGE UP
-  NITROFURANTOIN: SIGNIFICANT CHANGE UP
-  PIPERACILLIN/TAZOBACTAM: SIGNIFICANT CHANGE UP
-  TETRACYCLINE: SIGNIFICANT CHANGE UP
-  TOBRAMYCIN: SIGNIFICANT CHANGE UP
-  VANCOMYCIN: SIGNIFICANT CHANGE UP
ANION GAP SERPL CALC-SCNC: 7 MMOL/L — SIGNIFICANT CHANGE UP (ref 5–17)
BUN SERPL-MCNC: 9 MG/DL — SIGNIFICANT CHANGE UP (ref 7–23)
CALCIUM SERPL-MCNC: 8 MG/DL — LOW (ref 8.4–10.5)
CHLORIDE SERPL-SCNC: 106 MMOL/L — SIGNIFICANT CHANGE UP (ref 96–108)
CO2 SERPL-SCNC: 25 MMOL/L — SIGNIFICANT CHANGE UP (ref 22–31)
CREAT SERPL-MCNC: 0.84 MG/DL — SIGNIFICANT CHANGE UP (ref 0.5–1.3)
EGFR: 93 ML/MIN/1.73M2 — SIGNIFICANT CHANGE UP
GLUCOSE SERPL-MCNC: 107 MG/DL — HIGH (ref 70–99)
HCT VFR BLD CALC: 27.1 % — LOW (ref 39–50)
HGB BLD-MCNC: 8.2 G/DL — LOW (ref 13–17)
MAGNESIUM SERPL-MCNC: 1.8 MG/DL — SIGNIFICANT CHANGE UP (ref 1.6–2.6)
MCHC RBC-ENTMCNC: 23.8 PG — LOW (ref 27–34)
MCHC RBC-ENTMCNC: 30.3 GM/DL — LOW (ref 32–36)
MCV RBC AUTO: 78.8 FL — LOW (ref 80–100)
METHOD TYPE: SIGNIFICANT CHANGE UP
NRBC # BLD: 0 /100 WBCS — SIGNIFICANT CHANGE UP (ref 0–0)
PHOSPHATE SERPL-MCNC: 3 MG/DL — SIGNIFICANT CHANGE UP (ref 2.5–4.5)
PLATELET # BLD AUTO: 253 K/UL — SIGNIFICANT CHANGE UP (ref 150–400)
POTASSIUM SERPL-MCNC: 3.5 MMOL/L — SIGNIFICANT CHANGE UP (ref 3.5–5.3)
POTASSIUM SERPL-SCNC: 3.5 MMOL/L — SIGNIFICANT CHANGE UP (ref 3.5–5.3)
RBC # BLD: 3.44 M/UL — LOW (ref 4.2–5.8)
RBC # FLD: SIGNIFICANT CHANGE UP (ref 10.3–14.5)
SODIUM SERPL-SCNC: 138 MMOL/L — SIGNIFICANT CHANGE UP (ref 135–145)
WBC # BLD: 6 K/UL — SIGNIFICANT CHANGE UP (ref 3.8–10.5)
WBC # FLD AUTO: 6 K/UL — SIGNIFICANT CHANGE UP (ref 3.8–10.5)

## 2023-07-12 PROCEDURE — 99232 SBSQ HOSP IP/OBS MODERATE 35: CPT

## 2023-07-12 PROCEDURE — 99222 1ST HOSP IP/OBS MODERATE 55: CPT

## 2023-07-12 PROCEDURE — 71045 X-RAY EXAM CHEST 1 VIEW: CPT | Mod: 26

## 2023-07-12 PROCEDURE — 93971 EXTREMITY STUDY: CPT | Mod: 26,RT

## 2023-07-12 RX ORDER — SODIUM CHLORIDE 9 MG/ML
1000 INJECTION, SOLUTION INTRAVENOUS
Refills: 0 | Status: DISCONTINUED | OUTPATIENT
Start: 2023-07-12 | End: 2023-07-17

## 2023-07-12 RX ORDER — PIPERACILLIN AND TAZOBACTAM 4; .5 G/20ML; G/20ML
4.5 INJECTION, POWDER, LYOPHILIZED, FOR SOLUTION INTRAVENOUS ONCE
Refills: 0 | Status: COMPLETED | OUTPATIENT
Start: 2023-07-12 | End: 2023-07-12

## 2023-07-12 RX ORDER — MAGNESIUM SULFATE 500 MG/ML
1 VIAL (ML) INJECTION ONCE
Refills: 0 | Status: COMPLETED | OUTPATIENT
Start: 2023-07-12 | End: 2023-07-12

## 2023-07-12 RX ORDER — SODIUM CHLORIDE 9 MG/ML
500 INJECTION, SOLUTION INTRAVENOUS ONCE
Refills: 0 | Status: COMPLETED | OUTPATIENT
Start: 2023-07-12 | End: 2023-07-12

## 2023-07-12 RX ORDER — PIPERACILLIN AND TAZOBACTAM 4; .5 G/20ML; G/20ML
4.5 INJECTION, POWDER, LYOPHILIZED, FOR SOLUTION INTRAVENOUS EVERY 8 HOURS
Refills: 0 | Status: DISCONTINUED | OUTPATIENT
Start: 2023-07-13 | End: 2023-07-16

## 2023-07-12 RX ORDER — POTASSIUM CHLORIDE 20 MEQ
10 PACKET (EA) ORAL
Refills: 0 | Status: COMPLETED | OUTPATIENT
Start: 2023-07-12 | End: 2023-07-12

## 2023-07-12 RX ADMIN — Medication 100 MILLIEQUIVALENT(S): at 10:57

## 2023-07-12 RX ADMIN — PIPERACILLIN AND TAZOBACTAM 25 GRAM(S): 4; .5 INJECTION, POWDER, LYOPHILIZED, FOR SOLUTION INTRAVENOUS at 22:34

## 2023-07-12 RX ADMIN — Medication 1 MILLIGRAM(S): at 22:35

## 2023-07-12 RX ADMIN — Medication 100 MILLIEQUIVALENT(S): at 15:37

## 2023-07-12 RX ADMIN — PIPERACILLIN AND TAZOBACTAM 25 GRAM(S): 4; .5 INJECTION, POWDER, LYOPHILIZED, FOR SOLUTION INTRAVENOUS at 02:35

## 2023-07-12 RX ADMIN — SODIUM CHLORIDE 1000 MILLILITER(S): 9 INJECTION, SOLUTION INTRAVENOUS at 05:19

## 2023-07-12 RX ADMIN — PANTOPRAZOLE SODIUM 40 MILLIGRAM(S): 20 TABLET, DELAYED RELEASE ORAL at 11:00

## 2023-07-12 RX ADMIN — Medication 100 MILLIEQUIVALENT(S): at 13:34

## 2023-07-12 RX ADMIN — PIPERACILLIN AND TAZOBACTAM 25 GRAM(S): 4; .5 INJECTION, POWDER, LYOPHILIZED, FOR SOLUTION INTRAVENOUS at 10:57

## 2023-07-12 RX ADMIN — SODIUM CHLORIDE 120 MILLILITER(S): 9 INJECTION, SOLUTION INTRAVENOUS at 13:33

## 2023-07-12 RX ADMIN — PIPERACILLIN AND TAZOBACTAM 200 GRAM(S): 4; .5 INJECTION, POWDER, LYOPHILIZED, FOR SOLUTION INTRAVENOUS at 18:52

## 2023-07-12 RX ADMIN — ENOXAPARIN SODIUM 40 MILLIGRAM(S): 100 INJECTION SUBCUTANEOUS at 17:35

## 2023-07-12 RX ADMIN — Medication 100 GRAM(S): at 10:57

## 2023-07-12 NOTE — PROGRESS NOTE ADULT - SUBJECTIVE AND OBJECTIVE BOX
SUBJECTIVE: Pt seen and examined at bedside with chief. Abd Pain well controlled. NGT in place.  Denies any BF     MEDICATIONS  (STANDING):  dextrose 5% + sodium chloride 0.45%. 1000 milliLiter(s) (100 mL/Hr) IV Continuous <Continuous>  doxazosin 1 milliGRAM(s) Oral at bedtime  enoxaparin Injectable 40 milliGRAM(s) SubCutaneous every 24 hours  pantoprazole  Injectable 40 milliGRAM(s) IV Push daily  piperacillin/tazobactam IVPB.. 3.375 Gram(s) IV Intermittent every 8 hours    MEDICATIONS  (PRN):  HYDROmorphone  Injectable 0.5 milliGRAM(s) IV Push every 4 hours PRN Severe Pain (7 - 10)  ondansetron Injectable 4 milliGRAM(s) IV Push every 6 hours PRN Nausea and/or Vomiting      Vital Signs Last 24 Hrs  T(C): 36.7 (12 Jul 2023 05:08), Max: 37.2 (11 Jul 2023 22:10)  T(F): 98.1 (12 Jul 2023 05:08), Max: 98.9 (11 Jul 2023 22:10)  HR: 108 (12 Jul 2023 04:32) (104 - 134)  BP: 115/75 (12 Jul 2023 04:32) (102/70 - 138/96)  BP(mean): 90 (12 Jul 2023 04:32) (81 - 110)  RR: 16 (12 Jul 2023 04:32) (16 - 19)  SpO2: 92% (12 Jul 2023 04:32) (92% - 94%)    Parameters below as of 12 Jul 2023 04:32  Patient On (Oxygen Delivery Method): room air        PHYSICAL EXAM:      Constitutional: A&Ox3    Respiratory: non labored breathing, no respiratory distress    Cardiovascular: NSR, RRR    Gastrointestinal: Soft, decrease in distension. non tender                 Incision: CDI    Genitourinary: Voiding     Extremities: (-) edema                  I&O's Detail    10 Jul 2023 07:01  -  11 Jul 2023 07:00  --------------------------------------------------------  IN:    dextrose 5% + sodium chloride 0.45%: 2400 mL    IV PiggyBack: 250 mL    IV PiggyBack: 200 mL    Lactated Ringers Bolus: 1000 mL  Total IN: 3850 mL    OUT:    Voided (mL): 500 mL  Total OUT: 500 mL    Total NET: 3350 mL      11 Jul 2023 07:01  -  12 Jul 2023 06:17  --------------------------------------------------------  IN:    dextrose 5% + sodium chloride 0.45%: 2300 mL    IV PiggyBack: 200 mL    Lactated Ringers Bolus: 3500 mL  Total IN: 6000 mL    OUT:    Nasogastric/Oral tube (mL): 3600 mL    Voided (mL): 1300 mL  Total OUT: 4900 mL    Total NET: 1100 mL          LABS:                        11.0   13.25 )-----------( 308      ( 11 Jul 2023 05:30 )             36.0     07-11    136  |  100  |  14  ----------------------------<  143<H>  3.9   |  21<L>  |  0.76    Ca    9.2      11 Jul 2023 05:30  Phos  3.7     07-11  Mg     2.1     07-11      PT/INR - ( 11 Jul 2023 05:30 )   PT: 22.8 sec;   INR: 1.90          PTT - ( 11 Jul 2023 05:30 )  PTT:35.0 sec  Urinalysis Basic - ( 11 Jul 2023 05:30 )    Color: x / Appearance: x / SG: x / pH: x  Gluc: 143 mg/dL / Ketone: x  / Bili: x / Urobili: x   Blood: x / Protein: x / Nitrite: x   Leuk Esterase: x / RBC: x / WBC x   Sq Epi: x / Non Sq Epi: x / Bacteria: x        RADIOLOGY & ADDITIONAL STUDIES:

## 2023-07-12 NOTE — DISCHARGE NOTE PROVIDER - NSDCFUADDINST_GEN_ALL_CORE_FT
General Discharge Instructions:  Please resume all regular home medications unless specifically advised not to take a particular medication. Also, please take any new medications as prescribed.  Please get plenty of rest, continue to ambulate several times per day, and drink adequate amounts of fluids. Avoid lifting weights greater than 5-10 lbs until you follow-up with your surgeon, who will instruct you further regarding activity restrictions.  Avoid driving or operating heavy machinery while taking pain medications.  Please follow-up with your surgeon and Primary Care Provider (PCP) as advised.    Incision Care:  *Please call your doctor if you have increased pain, swelling, redness, or drainage from the incision site.  *Avoid swimming and baths until your follow-up appointment.  *You may shower, and wash surgical incisions with a mild soap and warm water. Gently pat the area dry.   General Discharge Instructions:  Please resume all regular home medications unless specifically advised not to take a particular medication. Also, please take any new medications as prescribed.  Please get plenty of rest, continue to ambulate several times per day, and drink adequate amounts of fluids. Avoid lifting weights greater than 5-10 lbs until you follow-up with your surgeon, who will instruct you further regarding activity restrictions.  Avoid driving or operating heavy machinery while taking pain medications.  Please follow-up with your surgeon and Primary Care Provider (PCP) as advised.    Incision Care:  *Please call your doctor if you have increased pain, swelling, redness, or drainage from the incision site.  *Avoid swimming and baths until your follow-up appointment.  *You may shower, and wash surgical incisions with a mild soap and warm water. Gently pat the area dry.    WOUND CARE;  Recommend continue use of [facility] VersaCare YUN mattress for pressure redistribution and microclimate management  Continue frequent repositioning for offloading with air fluidized repositioner and/or pillows  Offload heels at all times with pillows or heel boots   General Discharge Instructions:  Please resume all regular home medications unless specifically advised not to take a particular medication. Also, please take any new medications as prescribed.  Please get plenty of rest, continue to ambulate several times per day, and drink adequate amounts of fluids. Avoid lifting weights greater than 5-10 lbs until you follow-up with your surgeon, who will instruct you further regarding activity restrictions.    Incision Care:  *Please call your doctor if you have increased pain, swelling, redness, or drainage from the incision site.  *Avoid swimming and baths until your follow-up appointment.  *You may shower, and wash surgical incisions with a mild soap and warm water. Gently pat the area dry.    WOUND CARE;  Recommend continue use of [facility] YUN mattress for pressure redistribution and microclimate management  Continue frequent repositioning for offloading with air fluidized repositioner and/or pillows  Offload heels at all times with pillows or heel boots

## 2023-07-12 NOTE — DISCHARGE NOTE PROVIDER - NSDCCPTREATMENT_GEN_ALL_CORE_FT
PRINCIPAL PROCEDURE  Procedure: Exploratory laparotomy with small bowel resection  Findings and Treatment:

## 2023-07-12 NOTE — DISCHARGE NOTE PROVIDER - CARE PROVIDER_API CALL
Saul Spann  Surgery  [facility]  [place] [address]  Phone: [phone]  Fax: [phone]  Follow Up Time: 1 week   Saul Spann  Surgery  [facility]  [place] [address]  Phone: [phone]  Fax: [phone]  Follow Up Time: 1 week    Margoth Francis  Pulmonary Disease  [address], [address] [place] [address]  Phone: [phone]  Fax: [phone]  Follow Up Time:

## 2023-07-12 NOTE — DISCHARGE NOTE PROVIDER - NSDCFUADDAPPT_GEN_ALL_CORE_FT
Please schedule a followup in office within 1 week with your primary care provider Please schedule a followup in office within 1 week with your primary care provider    Follow up with Dr. Francis in 1-2 weeks, Continue Eliquis 2.5mg every 12 hours as prescribed, unless otherwise specified by Dr. Francis.

## 2023-07-12 NOTE — DISCHARGE NOTE PROVIDER - HOSPITAL COURSE
Call placed to patients mother for urgent care f/u. The patient had a low grade temperature of 99.5 on Thursday. Has not had a fever since and has not required further tylenol or ibuprofen. Patient is otherwise asymptomatic. No further questions or concerns from the patient's mother. They will monitor for symptoms and f/u with PCP PRN   73 year old male with pmh of stroke (R hemiparesis), recurrent PE (last Jan 2022), diverticulosis and PSHx of colon tumor removal (1988), known jejunal mass presenting with abdominal pain. Pt underwent ex lap, SBR, primary anastomosis on 7/6. Patient was slow to progress in bowel function. He also experienced episodes of tacycardia. CT C/A/P was read with an SBO with possible transition in RLQ. NG tube was place which provided relief for his tachycardia with an immediate bilious output.             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 Pt underwent ex lap, SBR, primary anastomosis on 7/6. Patient was slow to progress in bowel function. He also experienced episodes of tacycardia. CT C/A/P was read with an SBO with possible transition in RLQ. NG tube was place which provided relief for his tachycardia with an immediate bilious output. On 7/13 a PICC line was placed for TPN.            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 Pt underwent ex lap, SBR, primary anastomosis on 7/6. Patient was slow to progress in bowel function. He also experienced episodes of tacycardia. CT C/A/P was read with an SBO with possible transition in RLQ. NG tube was place which provided relief for his tachycardia with an immediate bilious output. On 7/13 a PICC line was placed for TPN. PT evaluated the patient and recommended LALITHA, patient deferred and home w. home care set up.             LAST UPDATED 7/19****** 73 year old male with pmh of stroke (R hemiparesis), recurrent PE (last Jan 2022), diverticulosis and PSHx of colon tumor removal (1988), known jejunal mass presenting with abdominal pain. Pt underwent ex lap, SBR, primary anastomosis on 7/6. Patient was slow to progress in bowel function. He also experienced episodes of tacycardia. Patient had tachycardia, leukocytosis, and fever in the setting of UTI; treated and relieved by antiobiotics. CT C/A/P was read with an SBO with possible transition in RLQ. NG tube was place which provided relief for his tachycardia with an immediate bilious output. On 7/13 a PICC line was placed for TPN. PT evaluated the patient and recommended LALITHA, patient deferred and home w. home care set up.             LAST UPDATED 7/19****** 73 year old male with pmh of stroke (R hemiparesis), recurrent PE (last Jan 2022), diverticulosis and PSHx of colon tumor removal (1988), known jejunal mass presenting with abdominal pain. Pt underwent ex lap, SBR, primary anastomosis on 7/6. Patient was slow to progress in bowel function. He also experienced episodes of tacycardia. Patient had tachycardia, leukocytosis, and fever in the setting of UTI; treated and relieved by antiobiotics. CT C/A/P was read with an SBO with possible transition in RLQ. NG tube was place which provided relief for his tachycardia with an immediate bilious output, removed 2 days later on 7/14. On 7/13 a PICC line was placed for TPN. PT evaluated the patient and recommended LALITHA, patient deferred and home w. home care set up. Patient continued to be monitroed while awiaitng ROBF. Bowel movements were confirmed after suppository on 7/17 but ileus continued to persist until 7/25.             LAST UPDATED 7/25****** 73 year old male with pmh of stroke (R hemiparesis), recurrent PE (last Jan 2022), diverticulosis and PSHx of colon tumor removal (1988), known jejunal mass presenting with abdominal pain. Pt underwent ex lap, SBR, primary anastomosis on 7/6. Patient was slow to progress in bowel function. He also experienced episodes of tacycardia. Patient had tachycardia, leukocytosis, and fever in the setting of UTI; treated and relieved by antibiotics. CT C/A/P was read with an SBO with possible transition in RLQ. NG tube was place which provided relief for his tachycardia with an immediate bilious output, removed 2 days later on 7/14. On 7/13 a PICC line was placed for TPN. PT evaluated the patient and recommended LALITHA, patient deferred and home w. home care set up, TPN infusion services, and wound care. Patient continued to be monitroed while awiaitng ROBF. Bowel movements were confirmed after suppository on 7/17 but ileus continued to persist until 7/25.             LAST UPDATED 7/25****** 73 year old male with pmh of stroke (R hemiparesis), recurrent PE (last Jan 2022), diverticulosis and PSHx of colon tumor removal (1988), known jejunal mass presenting with abdominal pain. Pt underwent ex lap, SBR, primary anastomosis on 7/6. Patient was slow to progress in bowel function. He also experienced episodes of tacycardia. Patient had tachycardia, leukocytosis, and fever in the setting of UTI; treated and relieved by antibiotics. CT C/A/P was read with an SBO with possible transition in RLQ. NG tube was place which provided relief for his tachycardia with an immediate bilious output, removed 2 days later on 7/14. On 7/13 a PICC line was placed for TPN. PT evaluated the patient and recommended LALITHA, patient deferred and home w. home care set up, TPN infusion services, and wound care. Patient continued to be monitroed while awiaitng ROBF. Bowel movements were confirmed after suppository on 7/17 but ileus continued to persist until 7/25. advanced diet to clears, whom pt tolerated some without n/v. Decision made to d/c home with TPN given limited PO intake.

## 2023-07-12 NOTE — CONSULT NOTE ADULT - ASSESSMENT
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 Hernandez removed 7/8. POD4 7/9, patient spiked temp to 101F. Blood cx NGTD, Ucx growing Pseudomonas sens to Zosyn and Enterococcus faecalis.     Recommendations:  1. Increase Zosyn to 4.5g q8h for pseudomonal coverage, last date of treatment 7/16  2. If patient is discharged prior to 7/16, can convert to Ciprofloxacin PO 750mg q12h + Amoxicillin 875mg q12h through 7/16    ID Team 1 will sign off.

## 2023-07-12 NOTE — DISCHARGE NOTE PROVIDER - CARE PROVIDERS DIRECT ADDRESSES
,DirectAddress_Unknown ,[email or web address]

## 2023-07-12 NOTE — CONSULT NOTE ADULT - SUBJECTIVE AND OBJECTIVE BOX
Consultation Requested by:    Patient is a 73y old  Male who presents with a chief complaint of small bowel tumor (11 Jul 2023 07:43)    HPI:  73 year old male with pmh of stroke (R hemiparesis), recurrent PE (last Jan 2022), diverticulosis and PSHx of colon tumor removal (1988) presenting to the emergency department this morning with abdominal pain. Patient describes the pain as a mild discomfort that started last night, pt saw DR Spann today and was instructed to come to the ER. Patient was transferred from [facility] to [facility] on June 22 for GI bleed. Patient during hospital stay given 3U pRBC. Patient had colonoscopy 6/26 where multiple polypectomies were performed in ascending, transverse, and descending colon, 3 cecal polyps were found and unable to be removed. 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 Patient cleared by cardiology and internal medicine during his stay at [facility] on 6/30. Patient’s last bowel movement was June 28. Wife reports he is still passing gas. Patient takes eliquis for PE prophylaxis, reports his last dose was June 20th. Patient denies any current nausea, vomiting, bloody bowel movements, melena, hematemesis, dizziness, chest pain, shortness of breath.      Home medications-   Zoloft 50 mg PO once a day   Pantoprazole 40 mg once a day   Flomax 0.4 mg once a day   Eliquis 5 mg PO tablet, last dose taken June 20th    Physical exam:    General- NAD, resting comfortably in bed.  C/V- Normal rate and rhythm.  Pulm- Breath sounds are equal bilaterally. No adventitious sounds.  ABD- Bowel sounds normoactive. Abdomen is soft, nontender, and nondistended.  Extremities: no edema, pulses 2+ upper and lower extremities.      Vital Signs Last 24 Hrs  T(C): 36.9 (05 Jul 2023 11:27), Max: 36.9 (05 Jul 2023 11:27)  T(F): 98.5 (05 Jul 2023 11:27), Max: 98.5 (05 Jul 2023 11:27)  HR: 69 (05 Jul 2023 11:27) (69 - 74)  BP: 122/81 (05 Jul 2023 11:27) (122/81 - 127/80)  BP(mean): --  RR: 16 (05 Jul 2023 11:27) (16 - 18)  SpO2: 95% (05 Jul 2023 11:27) (95% - 95%)    Parameters below as of 05 Jul 2023 11:27  Patient On (Oxygen Delivery Method): room air                            10.7   6.10  )-----------( 264      ( 05 Jul 2023 10:48 )             37.6     07-05    139  |  104  |  11  ----------------------------<  105<H>  4.0   |  24  |  0.79    Ca    8.9      05 Jul 2023 10:48    TPro  7.2  /  Alb  3.5  /  TBili  0.3  /  DBili  x   /  AST  17  /  ALT  13  /  AlkPhos  60  07-05   (05 Jul 2023 11:32)      REVIEW OF SYSTEMS  All review of systems negative, except for those marked:  General:		[] Abnormal:  	[] Night Sweats		[] Fever		[] Weight Loss  Pulmonary/Cough:	[] Abnormal:  Cardiac/Chest Pain:	[] Abnormal:  Gastrointestinal:   	[] Abnormal:  Eyes:			        [] Abnormal:  ENT:		         	[] Abnormal:  Dysuria:		                [] Abnormal:  Musculoskeletal	:	[] Abnormal:  Endocrine:		        [] Abnormal:  Lymph Nodes:		[] Abnormal:  Headache:		        [] Abnormal:  Skin:			        [] Abnormal:  Allergy/Immune:       	[] Abnormal:  Psychiatric:		        [] Abnormal:  [] All other review of systems negative  [] Unable to obtain (explain):    Recent Ill Contacts:	[] No	[] Yes:  Recent Travel History:	[] No	[] Yes:  Recent Animal/Insect Exposure/Tick Bites:	[] No	[] Yes:    Allergies    No Known Allergies    Intolerances      Antimicrobials:  piperacillin/tazobactam IVPB.. 3.375 Gram(s) IV Intermittent every 8 hours      Other Medications:  dextrose 5% + sodium chloride 0.45% 1000 milliLiter(s) IV Continuous <Continuous>  doxazosin 1 milliGRAM(s) Oral at bedtime  enoxaparin Injectable 40 milliGRAM(s) SubCutaneous every 24 hours  HYDROmorphone  Injectable 0.5 milliGRAM(s) IV Push every 4 hours PRN  magnesium sulfate  IVPB 1 Gram(s) IV Intermittent once  ondansetron Injectable 4 milliGRAM(s) IV Push every 6 hours PRN  pantoprazole  Injectable 40 milliGRAM(s) IV Push daily  potassium chloride  10 mEq/100 mL IVPB 10 milliEquivalent(s) IV Intermittent every 1 hour      FAMILY HISTORY:    PAST MEDICAL & SURGICAL HISTORY:  Depression, major      Stroke      Pulmonary embolism      BPH (benign prostatic hyperplasia)      Diverticulosis        SOCIAL HISTORY:    IMMUNIZATIONS  [] Up to Date		[] Not Up to Date:  Recent Immunizations:	[] No	[] Yes:    Daily     Daily   Head Circumference:  Vital Signs Last 24 Hrs  T(C): 37.1 (12 Jul 2023 09:27), Max: 37.2 (11 Jul 2023 22:10)  T(F): 98.7 (12 Jul 2023 09:27), Max: 98.9 (11 Jul 2023 22:10)  HR: 102 (12 Jul 2023 08:46) (102 - 116)  BP: 114/62 (12 Jul 2023 08:46) (102/70 - 121/68)  BP(mean): 83 (12 Jul 2023 08:46) (81 - 91)  RR: 16 (12 Jul 2023 08:46) (16 - 19)  SpO2: 92% (12 Jul 2023 08:46) (92% - 94%)    Parameters below as of 12 Jul 2023 08:46  Patient On (Oxygen Delivery Method): room air        PHYSICAL EXAM    Respiratory Support:		[] No	[] Yes:  Vasoactive medication infusion:	[] No	[] Yes:  Venous catheters:		[] No	[] Yes:  Bladder catheter:		        [] No	[] Yes:  Other catheters or tubes:	[] No	[] Yes:    Lab Results:                        8.2    6.00  )-----------( 253      ( 12 Jul 2023 05:30 )             27.1     07-12    138  |  106  |  9   ----------------------------<  107<H>  3.5   |  25  |  0.84    Ca    8.0<L>      12 Jul 2023 05:30  Phos  3.0     07-12  Mg     1.8     07-12        PT/INR - ( 11 Jul 2023 05:30 )   PT: 22.8 sec;   INR: 1.90          PTT - ( 11 Jul 2023 05:30 )  PTT:35.0 sec  Urinalysis Basic - ( 12 Jul 2023 05:30 )    Color: x / Appearance: x / SG: x / pH: x  Gluc: 107 mg/dL / Ketone: x  / Bili: x / Urobili: x   Blood: x / Protein: x / Nitrite: x   Leuk Esterase: x / RBC: x / WBC x   Sq Epi: x / Non Sq Epi: x / Bacteria: x        MICROBIOLOGY    [] Pathology slides reviewed and/or discussed with pathologist  [] Microbiology findings discussed with microbiologist or slides reviewed  [] Images erviewed with radiologist  [] Case discussed with an attending physician in addition to the patient's primary physician  [] Records, reports from outside [facility] reviewed    [] Patient requires continued monitoring for:  [] Total time spent by attending physician: __ minutes, excluding procedure time. Patient is a 73y old  Male who presents with a chief complaint of small bowel tumor (11 Jul 2023 07:43)    HPI:  73 year old male with pmh of stroke (R hemiparesis), recurrent PE (last Jan 2022), diverticulosis and PSHx of colon tumor removal (1988) presenting to the emergency department this morning with abdominal pain. Patient describes the pain as a mild discomfort that started last night, pt saw DR Spann today and was instructed to come to the ER. Patient was transferred from [facility] to [facility] on June 22 for GI bleed. Patient during hospital stay given 3U pRBC. Patient had colonoscopy 6/26 where multiple polypectomies were performed in ascending, transverse, and descending colon, 3 cecal polyps were found and unable to be removed. 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 Patient cleared by cardiology and internal medicine during his stay at [facility] on 6/30. Patient’s last bowel movement was June 28. Wife reports he is still passing gas. Patient takes eliquis for PE prophylaxis, reports his last dose was June 20th. Patient denies any current nausea, vomiting, bloody bowel movements, melena, hematemesis, dizziness, chest pain, shortness of breath. (05 Jul 2023 11:32)    ID addendum  Patient seen and examined at bedside. Reports main concerns are abdominal distention, denies urinary symptoms at this time. Denies fevers/chills.       REVIEW OF SYSTEMS  All review of systems negative, except for those marked:  General:		[] Abnormal:  	[] Night Sweats		[] Fever		[] Weight Loss  Pulmonary/Cough:	[] Abnormal:  Cardiac/Chest Pain:	[] Abnormal:  Gastrointestinal:   	[x] Abnormal: abdominal bloating  Eyes:			[] Abnormal:  ENT:		         	[] Abnormal:  Dysuria:		            [] Abnormal:  Musculoskeletal	:	[] Abnormal:  Endocrine:		[] Abnormal:  Lymph Nodes:		[] Abnormal:  Headache:		[] Abnormal:  Skin:			[] Abnormal:  Allergy/Immune:       	[] Abnormal:  Psychiatric:		[] Abnormal:  [x] All other review of systems negative  [] Unable to obtain (explain):        Allergies    No Known Allergies    Intolerances      Antimicrobials:  piperacillin/tazobactam IVPB.. 3.375 Gram(s) IV Intermittent every 8 hours      Other Medications:  dextrose 5% + sodium chloride 0.45% 1000 milliLiter(s) IV Continuous <Continuous>  doxazosin 1 milliGRAM(s) Oral at bedtime  enoxaparin Injectable 40 milliGRAM(s) SubCutaneous every 24 hours  HYDROmorphone  Injectable 0.5 milliGRAM(s) IV Push every 4 hours PRN  magnesium sulfate  IVPB 1 Gram(s) IV Intermittent once  ondansetron Injectable 4 milliGRAM(s) IV Push every 6 hours PRN  pantoprazole  Injectable 40 milliGRAM(s) IV Push daily  potassium chloride  10 mEq/100 mL IVPB 10 milliEquivalent(s) IV Intermittent every 1 hour      FAMILY HISTORY:    PAST MEDICAL & SURGICAL HISTORY:  Depression, major      Stroke      Pulmonary embolism      BPH (benign prostatic hyperplasia)      Diverticulosis          Daily     Daily   Head Circumference:  Vital Signs Last 24 Hrs  T(C): 37.1 (12 Jul 2023 09:27), Max: 37.2 (11 Jul 2023 22:10)  T(F): 98.7 (12 Jul 2023 09:27), Max: 98.9 (11 Jul 2023 22:10)  HR: 102 (12 Jul 2023 08:46) (102 - 116)  BP: 114/62 (12 Jul 2023 08:46) (102/70 - 121/68)  BP(mean): 83 (12 Jul 2023 08:46) (81 - 91)  RR: 16 (12 Jul 2023 08:46) (16 - 19)  SpO2: 92% (12 Jul 2023 08:46) (92% - 94%)    Parameters below as of 12 Jul 2023 08:46  Patient On (Oxygen Delivery Method): room air          Lab Results:                        8.2    6.00  )-----------( 253      ( 12 Jul 2023 05:30 )             27.1     07-12    138  |  106  |  9   ----------------------------<  107<H>  3.5   |  25  |  0.84    Ca    8.0<L>      12 Jul 2023 05:30  Phos  3.0     07-12  Mg     1.8     07-12        PT/INR - ( 11 Jul 2023 05:30 )   PT: 22.8 sec;   INR: 1.90          PTT - ( 11 Jul 2023 05:30 )  PTT:35.0 sec  Urinalysis Basic - ( 12 Jul 2023 05:30 )    Color: x / Appearance: x / SG: x / pH: x  Gluc: 107 mg/dL / Ketone: x  / Bili: x / Urobili: x   Blood: x / Protein: x / Nitrite: x   Leuk Esterase: x / RBC: x / WBC x   Sq Epi: x / Non Sq Epi: x / Bacteria: x        MICROBIOLOGY Patient is a 73y old  Male who presents with a chief complaint of small bowel tumor (11 Jul 2023 07:43)    HPI:  73 year old male with pmh of stroke (R hemiparesis), recurrent PE (last Jan 2022), diverticulosis and PSHx of colon tumor removal (1988) presenting to the emergency department this morning with abdominal pain. Patient describes the pain as a mild discomfort that started last night, pt saw DR Spann today and was instructed to come to the ER. Patient was transferred from [facility] to [facility] on June 22 for GI bleed. Patient during hospital stay given 3U pRBC. Patient had colonoscopy 6/26 where multiple polypectomies were performed in ascending, transverse, and descending colon, 3 cecal polyps were found and unable to be removed. 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 Patient cleared by cardiology and internal medicine during his stay at [facility] on 6/30. Patient’s last bowel movement was June 28. Wife reports he is still passing gas. Patient takes eliquis for PE prophylaxis, reports his last dose was June 20th. Patient denies any current nausea, vomiting, bloody bowel movements, melena, hematemesis, dizziness, chest pain, shortness of breath. (05 Jul 2023 11:32)    ID addendum  Patient seen and examined at bedside. Reports main concerns are abdominal distention, denies urinary symptoms at this time. Denies fevers/chills.       REVIEW OF SYSTEMS  All review of systems negative, except for those marked:  General:		[] Abnormal:  	[] Night Sweats		[] Fever		[] Weight Loss  Pulmonary/Cough:	[] Abnormal:  Cardiac/Chest Pain:	[] Abnormal:  Gastrointestinal:   	[x] Abnormal: abdominal bloating  Eyes:			[] Abnormal:  ENT:		         	[] Abnormal:  Dysuria:		            [] Abnormal:  Musculoskeletal	:	[] Abnormal:  Endocrine:		[] Abnormal:  Lymph Nodes:		[] Abnormal:  Headache:		[] Abnormal:  Skin:			[] Abnormal:  Allergy/Immune:       	[] Abnormal:  Psychiatric:		[] Abnormal:  [x] All other review of systems negative  [] Unable to obtain (explain):        Allergies    No Known Allergies    Intolerances      Antimicrobials:  piperacillin/tazobactam IVPB.. 3.375 Gram(s) IV Intermittent every 8 hours      Other Medications:  dextrose 5% + sodium chloride 0.45% 1000 milliLiter(s) IV Continuous <Continuous>  doxazosin 1 milliGRAM(s) Oral at bedtime  enoxaparin Injectable 40 milliGRAM(s) SubCutaneous every 24 hours  HYDROmorphone  Injectable 0.5 milliGRAM(s) IV Push every 4 hours PRN  magnesium sulfate  IVPB 1 Gram(s) IV Intermittent once  ondansetron Injectable 4 milliGRAM(s) IV Push every 6 hours PRN  pantoprazole  Injectable 40 milliGRAM(s) IV Push daily  potassium chloride  10 mEq/100 mL IVPB 10 milliEquivalent(s) IV Intermittent every 1 hour      FAMILY HISTORY:    PAST MEDICAL & SURGICAL HISTORY:  Depression, major      Stroke      Pulmonary embolism      BPH (benign prostatic hyperplasia)      Diverticulosis          Daily     Daily   Head Circumference:  Vital Signs Last 24 Hrs  T(C): 37.1 (12 Jul 2023 09:27), Max: 37.2 (11 Jul 2023 22:10)  T(F): 98.7 (12 Jul 2023 09:27), Max: 98.9 (11 Jul 2023 22:10)  HR: 102 (12 Jul 2023 08:46) (102 - 116)  BP: 114/62 (12 Jul 2023 08:46) (102/70 - 121/68)  BP(mean): 83 (12 Jul 2023 08:46) (81 - 91)  RR: 16 (12 Jul 2023 08:46) (16 - 19)  SpO2: 92% (12 Jul 2023 08:46) (92% - 94%)    Parameters below as of 12 Jul 2023 08:46  Patient On (Oxygen Delivery Method): room air      Physical Exam:  General: NAD, resting comfortably in bed  Pulmonary: lungs CTAB, no respiratory distress  Cardiovascular: RRR, +S1S2  Abdominal: soft, NT, mildly distended. Mild bruising along surgical incision site, no erythema/fluctuance noted  Extremities: WWP, normal strength  Neuro: A/O x 3, no focal deficits    Lab Results:                        8.2    6.00  )-----------( 253      ( 12 Jul 2023 05:30 )             27.1     07-12    138  |  106  |  9   ----------------------------<  107<H>  3.5   |  25  |  0.84    Ca    8.0<L>      12 Jul 2023 05:30  Phos  3.0     07-12  Mg     1.8     07-12        PT/INR - ( 11 Jul 2023 05:30 )   PT: 22.8 sec;   INR: 1.90          PTT - ( 11 Jul 2023 05:30 )  PTT:35.0 sec  Urinalysis Basic - ( 12 Jul 2023 05:30 )    Color: x / Appearance: x / SG: x / pH: x  Gluc: 107 mg/dL / Ketone: x  / Bili: x / Urobili: x   Blood: x / Protein: x / Nitrite: x   Leuk Esterase: x / RBC: x / WBC x   Sq Epi: x / Non Sq Epi: x / Bacteria: x        MICROBIOLOGY

## 2023-07-12 NOTE — DISCHARGE NOTE PROVIDER - PROVIDER TOKENS
PROVIDER:[TOKEN:[[id number]],FOLLOWUP:[1 week]] PROVIDER:[TOKEN:[[id number]],FOLLOWUP:[1 week]],PROVIDER:[TOKEN:[[id number]]]

## 2023-07-12 NOTE — PROGRESS NOTE ADULT - SUBJECTIVE AND OBJECTIVE BOX
Interval Events: Reviewed  Patient seen and examined at bedside.    Patient is a 73y old  Male who presents with a chief complaint of jejunal mass resection (12 Jul 2023 12:25)    he is OK  PAST MEDICAL & SURGICAL HISTORY:  Depression, major      Stroke      Pulmonary embolism      BPH (benign prostatic hyperplasia)      Diverticulosis          MEDICATIONS:  Pulmonary:    Antimicrobials:  piperacillin/tazobactam IVPB.. 3.375 Gram(s) IV Intermittent every 8 hours    Anticoagulants:  enoxaparin Injectable 40 milliGRAM(s) SubCutaneous every 24 hours    Cardiac:  doxazosin 1 milliGRAM(s) Oral at bedtime      Allergies    No Known Allergies    Intolerances        Vital Signs Last 24 Hrs  T(C): 37.2 (12 Jul 2023 13:56), Max: 37.2 (11 Jul 2023 22:10)  T(F): 98.9 (12 Jul 2023 13:56), Max: 98.9 (11 Jul 2023 22:10)  HR: 108 (12 Jul 2023 11:15) (102 - 116)  BP: 146/76 (12 Jul 2023 11:15) (102/70 - 146/76)  BP(mean): 99 (12 Jul 2023 11:15) (81 - 99)  RR: 16 (12 Jul 2023 11:15) (16 - 19)  SpO2: 92% (12 Jul 2023 11:15) (92% - 94%)    Parameters below as of 12 Jul 2023 11:15  Patient On (Oxygen Delivery Method): room air        07-11 @ 07:01 - 07-12 @ 07:00  --------------------------------------------------------  IN: 6100 mL / OUT: 4900 mL / NET: 1200 mL    07-12 @ 07:01 - 07-12 @ 14:58  --------------------------------------------------------  IN: 650 mL / OUT: 550 mL / NET: 100 mL          Review of Systems:   •	General: negative  •	Skin/Breast: negative  •	Ophthalmologic: negative  •	ENMT: negative  •	Respiratory and Thorax: negative  •	Cardiovascular: negative  •	Gastrointestinal: negative  •	Genitourinary: negative  •	Musculoskeletal: negative  •	Neurological: negative  •	Psychiatric: negative  •	Hematology/Lymphatics: negative  •	Endocrine: negative  •	Allergic/Immunologic: negative    Physical Exam:   • Constitutional:	refer to the dietion /Nutritionist note  • Eyes:	EOMI; PERRL; no drainage or redness  • ENMT:	No oral lesions; no gross abnormalities  • Neck	No bruits; no thyromegaly or nodules  • Breasts:	not examined  • Back:	No deformity or limitation of movement  • Respiratory:	Breath Sounds equal & clear to percussion & auscultation, no accessory muscle use  • Cardiovascular:	Regular rate & rhythm, normal S1, S2; no murmurs, gallops or rubs; no S3, S4  • Gastrointestinal:	Soft, non-tender, no hepatosplenomegaly, normal bowel sounds  • Genitourinary:	not examined  • Rectal: not examined  • Extremities:	No cyanosis, clubbing or edema  • Vascular:	Equal and normal pulses (carotid, femoral, dorsalis pedis)  • Neurologica:l	not examined  • Skin:	No lesions; no rash  • Lymph Nodes:	No lymphadedenopathy  • Musculoskeletal:	No joint pain, swelling or deformity; no limitation of movement        LABS:      CBC Full  -  ( 12 Jul 2023 05:30 )  WBC Count : 6.00 K/uL  RBC Count : 3.44 M/uL  Hemoglobin : 8.2 g/dL  Hematocrit : 27.1 %  Platelet Count - Automated : 253 K/uL  Mean Cell Volume : 78.8 fl  Mean Cell Hemoglobin : 23.8 pg  Mean Cell Hemoglobin Concentration : 30.3 gm/dL  Auto Neutrophil # : x  Auto Lymphocyte # : x  Auto Monocyte # : x  Auto Eosinophil # : x  Auto Basophil # : x  Auto Neutrophil % : x  Auto Lymphocyte % : x  Auto Monocyte % : x  Auto Eosinophil % : x  Auto Basophil % : x    07-12    138  |  106  |  9   ----------------------------<  107<H>  3.5   |  25  |  0.84    Ca    8.0<L>      12 Jul 2023 05:30  Phos  3.0     07-12  Mg     1.8     07-12      PT/INR - ( 11 Jul 2023 05:30 )   PT: 22.8 sec;   INR: 1.90          PTT - ( 11 Jul 2023 05:30 )  PTT:35.0 sec      Urinalysis Basic - ( 12 Jul 2023 05:30 )    Color: x / Appearance: x / SG: x / pH: x  Gluc: 107 mg/dL / Ketone: x  / Bili: x / Urobili: x   Blood: x / Protein: x / Nitrite: x   Leuk Esterase: x / RBC: x / WBC x   Sq Epi: x / Non Sq Epi: x / Bacteria: x                  RADIOLOGY & ADDITIONAL STUDIES (The following images were personally reviewed):

## 2023-07-12 NOTE — DISCHARGE NOTE PROVIDER - NSDCMRMEDTOKEN_GEN_ALL_CORE_FT
acetaminophen 325 mg oral tablet: 2 tab(s) orally every 6 hours As needed Temp greater or equal to 38C (100.4F), Mild Pain (1 - 3), Moderate Pain (4 - 6), Severe Pain (7 - 10)  Flomax 0.4 mg oral capsule: 1 orally once a day (at bedtime)  Home Physical Therapy, OT, Speech Therapy: Continue home care  pantoprazole 40 mg oral delayed release tablet: 1 tab(s) orally once a day (at bedtime)  Zoloft 50 mg oral tablet: 1 orally once a day (at bedtime)   acetaminophen 325 mg oral tablet: 2 tab(s) orally every 6 hours As needed Temp greater or equal to 38C (100.4F), Mild Pain (1 - 3), Moderate Pain (4 - 6), Severe Pain (7 - 10)  clotrimazole 1% topical cream: Apply topically to affected area every 12 hours apply to buttocks every 12 hours  Eliquis 2.5 mg oral tablet: 1 tab(s) orally every 12 hours  Flomax 0.4 mg oral capsule: 1 orally once a day (at bedtime)  magnesium hydroxide 1200 mg/15 mL oral liquid: 15 milliliter(s) orally once a day (in the morning)  pantoprazole 40 mg oral delayed release tablet: 1 tab(s) orally once a day (at bedtime)  Senna 8.6 mg oral tablet: 2 tab(s) orally once a day (at bedtime)  triamcinolone 0.1% topical cream: Apply topically to affected area every 8 hours apply to right wrist rash  Zoloft 50 mg oral tablet: 1 orally once a day (at bedtime)

## 2023-07-12 NOTE — DISCHARGE NOTE PROVIDER - NSDCHHNEEDSERVICE_GEN_ALL_CORE
Wound care and assessment/Other, specify... Rehabilitation services/Wound care and assessment/Other, specify...

## 2023-07-12 NOTE — DISCHARGE NOTE PROVIDER - NSDCCPCAREPLAN_GEN_ALL_CORE_FT
PRINCIPAL DISCHARGE DIAGNOSIS  Diagnosis: Jejunal adenocarcinoma  Assessment and Plan of Treatment:      PRINCIPAL DISCHARGE DIAGNOSIS  Diagnosis: Jejunal adenocarcinoma  Assessment and Plan of Treatment: Follow up with Dr. Spann in 1-2 weeks. Call the office at the number below to schedule your appointment. You may shower; soap and water over incision sites. Do not scrub. Pat dry when done. No tub bathing or swimming until cleared. Keep incision sites out of the sun as scars will darken. Ambulate as tolerated, but no heavy lifting (>10lbs) or strenuous exercise. You may resume regular diet. You should be urinating at least 3-4x per day. Call the office if you experience increasing abdominal pain, nausea, vomiting, or temperature >101 F.   TPN is set up, continue transfusion once a day.

## 2023-07-12 NOTE — CONSULT NOTE ADULT - TIME BILLING
Briefly, 72 yo male s/p jejunal mass resection, course c/b UTI 2/2 Pseudomonas aeruginosa and Enterococcus faecalis. Advise continue Zosyn however increase to anti-pseudomonal dosing (this should also cover E. faecalis). Last DOT 7/16--if planned for discharge home prior to this, then can transition to PO ciprofloxacin plus amoxicillin to complete course.    Please reconsult with ?
Patient seen and examined with house-staff during bedside rounds.  Resident note read, including vitals, physical findings, laboratory data, and radiological reports.   Revisions included below.  Direct personal management at bed side and extensive interpretation of the data.  Plan was outlined and discussed in details with the housestaff.  Decision making of high complexity  Action taken for acute disease activity to reflect the level of care provided:  - medication reconciliation  - review laboratory data

## 2023-07-12 NOTE — PROGRESS NOTE ADULT - ASSESSMENT
73 year old male with pmh of stroke (R hemiparesis), recurrent PE (last Jan 2022), diverticulosis and PSHx of colon tumor removal (1988), known jejunal mass presenting with abdominal pain. Pt is now s/p ex lap, SBR, primary anastomosis on 7/6.     possible OR today  NPO/IVF   NGT to LIWS  Pain/nausea control PRN  Zosyn  SQL/SCDs  OOBA/IS  Dispo: PT LALITHA

## 2023-07-13 LAB
-  CIPROFLOXACIN: SIGNIFICANT CHANGE UP
ANION GAP SERPL CALC-SCNC: 11 MMOL/L — SIGNIFICANT CHANGE UP (ref 5–17)
BUN SERPL-MCNC: 6 MG/DL — LOW (ref 7–23)
CALCIUM SERPL-MCNC: 8.3 MG/DL — LOW (ref 8.4–10.5)
CHLORIDE SERPL-SCNC: 109 MMOL/L — HIGH (ref 96–108)
CO2 SERPL-SCNC: 21 MMOL/L — LOW (ref 22–31)
CREAT SERPL-MCNC: 0.83 MG/DL — SIGNIFICANT CHANGE UP (ref 0.5–1.3)
CULTURE RESULTS: SIGNIFICANT CHANGE UP
EGFR: 94 ML/MIN/1.73M2 — SIGNIFICANT CHANGE UP
GLUCOSE SERPL-MCNC: 116 MG/DL — HIGH (ref 70–99)
HCT VFR BLD CALC: 28 % — LOW (ref 39–50)
HGB BLD-MCNC: 8.4 G/DL — LOW (ref 13–17)
MAGNESIUM SERPL-MCNC: 2.2 MG/DL — SIGNIFICANT CHANGE UP (ref 1.6–2.6)
MCHC RBC-ENTMCNC: 24 PG — LOW (ref 27–34)
MCHC RBC-ENTMCNC: 30 GM/DL — LOW (ref 32–36)
MCV RBC AUTO: 80 FL — SIGNIFICANT CHANGE UP (ref 80–100)
METHOD TYPE: SIGNIFICANT CHANGE UP
NRBC # BLD: 0 /100 WBCS — SIGNIFICANT CHANGE UP (ref 0–0)
ORGANISM # SPEC MICROSCOPIC CNT: SIGNIFICANT CHANGE UP
PHOSPHATE SERPL-MCNC: 3.2 MG/DL — SIGNIFICANT CHANGE UP (ref 2.5–4.5)
PLATELET # BLD AUTO: 196 K/UL — SIGNIFICANT CHANGE UP (ref 150–400)
POTASSIUM SERPL-MCNC: 4.3 MMOL/L — SIGNIFICANT CHANGE UP (ref 3.5–5.3)
POTASSIUM SERPL-SCNC: 4.3 MMOL/L — SIGNIFICANT CHANGE UP (ref 3.5–5.3)
RBC # BLD: 3.5 M/UL — LOW (ref 4.2–5.8)
RBC # FLD: SIGNIFICANT CHANGE UP (ref 10.3–14.5)
SODIUM SERPL-SCNC: 141 MMOL/L — SIGNIFICANT CHANGE UP (ref 135–145)
SPECIMEN SOURCE: SIGNIFICANT CHANGE UP
WBC # BLD: 5.37 K/UL — SIGNIFICANT CHANGE UP (ref 3.8–10.5)
WBC # FLD AUTO: 5.37 K/UL — SIGNIFICANT CHANGE UP (ref 3.8–10.5)

## 2023-07-13 PROCEDURE — 36573 INSJ PICC RS&I 5 YR+: CPT

## 2023-07-13 PROCEDURE — 99232 SBSQ HOSP IP/OBS MODERATE 35: CPT

## 2023-07-13 PROCEDURE — 74019 RADEX ABDOMEN 2 VIEWS: CPT | Mod: 26

## 2023-07-13 RX ORDER — SODIUM CHLORIDE 9 MG/ML
10 INJECTION INTRAMUSCULAR; INTRAVENOUS; SUBCUTANEOUS
Refills: 0 | Status: DISCONTINUED | OUTPATIENT
Start: 2023-07-13 | End: 2023-07-31

## 2023-07-13 RX ORDER — CHLORHEXIDINE GLUCONATE 213 G/1000ML
1 SOLUTION TOPICAL
Refills: 0 | Status: DISCONTINUED | OUTPATIENT
Start: 2023-07-13 | End: 2023-07-31

## 2023-07-13 RX ADMIN — ENOXAPARIN SODIUM 40 MILLIGRAM(S): 100 INJECTION SUBCUTANEOUS at 17:24

## 2023-07-13 RX ADMIN — SODIUM CHLORIDE 120 MILLILITER(S): 9 INJECTION, SOLUTION INTRAVENOUS at 17:24

## 2023-07-13 RX ADMIN — PIPERACILLIN AND TAZOBACTAM 25 GRAM(S): 4; .5 INJECTION, POWDER, LYOPHILIZED, FOR SOLUTION INTRAVENOUS at 22:15

## 2023-07-13 RX ADMIN — PANTOPRAZOLE SODIUM 40 MILLIGRAM(S): 20 TABLET, DELAYED RELEASE ORAL at 11:26

## 2023-07-13 RX ADMIN — Medication 1 MILLIGRAM(S): at 22:15

## 2023-07-13 RX ADMIN — PIPERACILLIN AND TAZOBACTAM 25 GRAM(S): 4; .5 INJECTION, POWDER, LYOPHILIZED, FOR SOLUTION INTRAVENOUS at 12:55

## 2023-07-13 RX ADMIN — PIPERACILLIN AND TAZOBACTAM 25 GRAM(S): 4; .5 INJECTION, POWDER, LYOPHILIZED, FOR SOLUTION INTRAVENOUS at 05:27

## 2023-07-13 NOTE — DIETITIAN INITIAL EVALUATION ADULT - PERTINENT LABORATORY DATA
07-13    141  |  109<H>  |  6<L>  ----------------------------<  116<H>  4.3   |  21<L>  |  0.83    Ca    8.3<L>      13 Jul 2023 05:30  Phos  3.2     07-13  Mg     2.2     07-13

## 2023-07-13 NOTE — DIETITIAN INITIAL EVALUATION ADULT - NUTRITIONGOAL OUTCOME1
- start feeds within 24-48hrs   - Consistently meets > 75% EER via most appropriate route for nutrition

## 2023-07-13 NOTE — DIETITIAN INITIAL EVALUATION ADULT - ADD RECOMMEND
1. TPN via PICC: 330g Dex, 95g AA, 50g SMOF Lipids to provide in total 2000Kcal, 95g protein, GIR 2.6, 1.4g/kg IBW protein  Recommend checking TG before starting TPN and then check TG weekly. Check Mg, Phos, K daily and POC BG Q6hrs. Trend daily weights. Fluids and lytes per MD discretion. Start at 150g Dex on Day 1, 250g Dex on Day 2, and advance to goal of 330g Dex on Day 3.   2. Monitor GI fxn, skin integrity, labs, daily wts   3. RD to remain available for recs adjustment prn or will follow up pt per organizational policy

## 2023-07-13 NOTE — DIETITIAN INITIAL EVALUATION ADULT - PERTINENT MEDS FT
MEDICATIONS  (STANDING):  dextrose 5% + sodium chloride 0.45% 1000 milliLiter(s) (120 mL/Hr) IV Continuous <Continuous>  doxazosin 1 milliGRAM(s) Oral at bedtime  enoxaparin Injectable 40 milliGRAM(s) SubCutaneous every 24 hours  pantoprazole  Injectable 40 milliGRAM(s) IV Push daily  piperacillin/tazobactam IVPB.. 4.5 Gram(s) IV Intermittent every 8 hours    MEDICATIONS  (PRN):  ondansetron Injectable 4 milliGRAM(s) IV Push every 6 hours PRN Nausea and/or Vomiting

## 2023-07-13 NOTE — DIETITIAN INITIAL EVALUATION ADULT - NSFNSGIIOFT_GEN_A_CORE
07-12-23 @ 07:01 - 07-13-23 @ 07:00  --------------------------------------------------------  OUT:    Nasogastric/Oral tube (mL): 850 mL  Total OUT: 850 mL    Total NET: -850 mL      07-13-23 @ 07:01 - 07-13-23 @ 11:24  --------------------------------------------------------  OUT:    Nasogastric/Oral tube (mL): 150 mL  Total OUT: 150 mL    Total NET: -150 mL

## 2023-07-13 NOTE — PROGRESS NOTE ADULT - SUBJECTIVE AND OBJECTIVE BOX
SUBJECTIVE:  Patient was seen in chair this AM with chief resident. Patient is doing well this AM. Denies nausea and vomiting. No reported flatus or BM. Patient denies pain at this time.     Objective:  RE IV site appeared edematous yesterday afternoon and was not flushing. IV pulled, RUE duplex obtained. Heat pack applied to site. Patient denies any pain currently.    MEDICATIONS  (STANDING):  dextrose 5% + sodium chloride 0.45% 1000 milliLiter(s) (120 mL/Hr) IV Continuous <Continuous>  doxazosin 1 milliGRAM(s) Oral at bedtime  enoxaparin Injectable 40 milliGRAM(s) SubCutaneous every 24 hours  pantoprazole  Injectable 40 milliGRAM(s) IV Push daily  piperacillin/tazobactam IVPB.. 4.5 Gram(s) IV Intermittent every 8 hours    MEDICATIONS  (PRN):  ondansetron Injectable 4 milliGRAM(s) IV Push every 6 hours PRN Nausea and/or Vomiting      Vital Signs Last 24 Hrs  T(C): 36.8 (13 Jul 2023 04:42), Max: 37.2 (12 Jul 2023 13:56)  T(F): 98.3 (13 Jul 2023 04:42), Max: 98.9 (12 Jul 2023 13:56)  HR: 98 (13 Jul 2023 04:09) (98 - 112)  BP: 128/71 (13 Jul 2023 04:09) (114/62 - 146/76)  BP(mean): 95 (13 Jul 2023 04:09) (83 - 106)  RR: 17 (13 Jul 2023 04:09) (16 - 18)  SpO2: 91% (13 Jul 2023 04:09) (91% - 94%)    Parameters below as of 13 Jul 2023 04:09  Patient On (Oxygen Delivery Method): room air        Physical Exam:  General: NAD, resting comfortably in chair. NGT to suction  Pulmonary: Nonlabored breathing, no respiratory distress  Cardiovascular: NST  Abdominal: soft, NT/ND  Extremities: WWP, normal strength  Neuro: A/O x 3, CNs II-XII grossly intact, no focal deficits, normal motor/sensation  Pulses: palpable distal pulses    I&O's Summary    12 Jul 2023 07:01  -  13 Jul 2023 07:00  --------------------------------------------------------  IN: 2400 mL / OUT: 3025 mL / NET: -625 mL        LABS:                        8.4    5.37  )-----------( 196      ( 13 Jul 2023 05:30 )             28.0     07-13    141  |  109<H>  |  6<L>  ----------------------------<  116<H>  4.3   |  21<L>  |  0.83    Ca    8.3<L>      13 Jul 2023 05:30  Phos  3.2     07-13  Mg     2.2     07-13        Urinalysis Basic - ( 13 Jul 2023 05:30 )    Color: x / Appearance: x / SG: x / pH: x  Gluc: 116 mg/dL / Ketone: x  / Bili: x / Urobili: x   Blood: x / Protein: x / Nitrite: x   Leuk Esterase: x / RBC: x / WBC x   Sq Epi: x / Non Sq Epi: x / Bacteria: x      CAPILLARY BLOOD GLUCOSE            RADIOLOGY & ADDITIONAL STUDIES:

## 2023-07-13 NOTE — PROCEDURE NOTE - NSPOSTPRCRAD_GEN_A_CORE
RA-SVC visualized/chest radiograph/depth of insertion/line adjusted to depth of insertion/line in appropriate postion/postion of catheter/ultrasound

## 2023-07-13 NOTE — DIETITIAN INITIAL EVALUATION ADULT - OTHER INFO
73 year old male with pmh of stroke (R hemiparesis), recurrent PE (last Jan 2022), diverticulosis and PSHx of colon tumor removal (1988), known jejunal mass presenting with abdominal pain. Pt is now s/p ex lap, SBR, primary anastomosis on 7/6. C/b UTI and ileus, NGT placed 7/11-7/--.     Visited pt at bedside this AM on 8LA. Caregiver in the room. On assessment pt is awake and alert. Reports good appetite pta; consumes regular diet; NKFA. Currently NPO with IVF - on a CL diet prior. Plan for PICC placement and TPN. View recs below. Endorses constipation likely related to prolonged NPO/clear liquid. Denies n/v. Nutrition related labs are unremarkable at this time. Denies wt changes pta. No overt fat/muscle wasting noted. No edema noted. Skin with surgical incision to midline abdomen; no PUs noted; valentine scale 14. No pain noted during interview. Please view full recs below. Clinical nutrition services will continue to follow up pt per organizational policy. Please place new consult for any acute nutrition-related issues that may arise prior to follow up

## 2023-07-13 NOTE — DIETITIAN INITIAL EVALUATION ADULT - OTHER CALCULATIONS
Ideal body weight used to calculate energy needs due to pt's current body weight within % ideal body weight. Adjust for CA, age

## 2023-07-13 NOTE — PROGRESS NOTE ADULT - SUBJECTIVE AND OBJECTIVE BOX
Interval Events: Reviewed  Patient seen and examined at bedside.  he is ok  Patient is a 73y old  Male who presents with a chief complaint of JEJUNAL ADENOCARCINOMA     (13 Jul 2023 11:24)      PAST MEDICAL & SURGICAL HISTORY:  Depression, major      Stroke      Pulmonary embolism      BPH (benign prostatic hyperplasia)      Diverticulosis          MEDICATIONS:  Pulmonary:    Antimicrobials:  piperacillin/tazobactam IVPB.. 4.5 Gram(s) IV Intermittent every 8 hours    Anticoagulants:  enoxaparin Injectable 40 milliGRAM(s) SubCutaneous every 24 hours    Cardiac:  doxazosin 1 milliGRAM(s) Oral at bedtime      Allergies    No Known Allergies    Intolerances        Vital Signs Last 24 Hrs  T(C): 37.2 (13 Jul 2023 18:11), Max: 37.3 (13 Jul 2023 14:02)  T(F): 98.9 (13 Jul 2023 18:11), Max: 99.2 (13 Jul 2023 14:02)  HR: 96 (13 Jul 2023 20:35) (94 - 104)  BP: 113/75 (13 Jul 2023 20:35) (108/66 - 128/71)  BP(mean): 91 (13 Jul 2023 20:35) (82 - 95)  RR: 18 (13 Jul 2023 20:35) (16 - 18)  SpO2: 93% (13 Jul 2023 20:35) (91% - 94%)    Parameters below as of 13 Jul 2023 20:35  Patient On (Oxygen Delivery Method): room air        07-12 @ 07:01 - 07-13 @ 07:00  --------------------------------------------------------  IN: 2400 mL / OUT: 3025 mL / NET: -625 mL    07-13 @ 07:01 - 07-13 @ 22:01  --------------------------------------------------------  IN: 1800 mL / OUT: 1050 mL / NET: 750 mL          Review of Systems:   •	General: negative  •	Skin/Breast: negative  •	Ophthalmologic: negative  •	ENMT: negative  •	Respiratory and Thorax: negative  •	Cardiovascular: negative  •	Gastrointestinal: negative  •	Genitourinary: negative  •	Musculoskeletal: negative  •	Neurological: negative  •	Psychiatric: negative  •	Hematology/Lymphatics: negative  •	Endocrine: negative  •	Allergic/Immunologic: negative    Physical Exam:   • Constitutional:	refer to the dietion /Nutritionist note  • Eyes:	EOMI; PERRL; no drainage or redness  • ENMT:	No oral lesions; no gross abnormalities  • Neck	No bruits; no thyromegaly or nodules  • Breasts:	not examined  • Back:	No deformity or limitation of movement  • Respiratory:	Breath Sounds equal & clear to percussion & auscultation, no accessory muscle use  • Cardiovascular:	Regular rate & rhythm, normal S1, S2; no murmurs, gallops or rubs; no S3, S4  • Gastrointestinal:	Soft, non-tender, no hepatosplenomegaly, normal bowel sounds  • Genitourinary:	not examined  • Rectal: not examined  • Extremities:	No cyanosis, clubbing or edema  • Vascular:	Equal and normal pulses (carotid, femoral, dorsalis pedis)  • Neurologica:l	not examined  • Skin:	No lesions; no rash  • Lymph Nodes:	No lymphadedenopathy  • Musculoskeletal:	No joint pain, swelling or deformity; no limitation of movement        LABS:      CBC Full  -  ( 13 Jul 2023 05:30 )  WBC Count : 5.37 K/uL  RBC Count : 3.50 M/uL  Hemoglobin : 8.4 g/dL  Hematocrit : 28.0 %  Platelet Count - Automated : 196 K/uL  Mean Cell Volume : 80.0 fl  Mean Cell Hemoglobin : 24.0 pg  Mean Cell Hemoglobin Concentration : 30.0 gm/dL  Auto Neutrophil # : x  Auto Lymphocyte # : x  Auto Monocyte # : x  Auto Eosinophil # : x  Auto Basophil # : x  Auto Neutrophil % : x  Auto Lymphocyte % : x  Auto Monocyte % : x  Auto Eosinophil % : x  Auto Basophil % : x    07-13    141  |  109<H>  |  6<L>  ----------------------------<  116<H>  4.3   |  21<L>  |  0.83    Ca    8.3<L>      13 Jul 2023 05:30  Phos  3.2     07-13  Mg     2.2     07-13            Urinalysis Basic - ( 13 Jul 2023 05:30 )    Color: x / Appearance: x / SG: x / pH: x  Gluc: 116 mg/dL / Ketone: x  / Bili: x / Urobili: x   Blood: x / Protein: x / Nitrite: x   Leuk Esterase: x / RBC: x / WBC x   Sq Epi: x / Non Sq Epi: x / Bacteria: x                  RADIOLOGY & ADDITIONAL STUDIES (The following images were personally reviewed):   ESRD (end stage renal disease) on dialysis Dyspnea on exertion

## 2023-07-14 DIAGNOSIS — I34.0 NONRHEUMATIC MITRAL (VALVE) INSUFFICIENCY: ICD-10-CM

## 2023-07-14 DIAGNOSIS — D62 ACUTE POSTHEMORRHAGIC ANEMIA: ICD-10-CM

## 2023-07-14 DIAGNOSIS — R50.9 FEVER, UNSPECIFIED: ICD-10-CM

## 2023-07-14 DIAGNOSIS — K57.90 DIVERTICULOSIS OF INTESTINE, PART UNSPECIFIED, WITHOUT PERFORATION OR ABSCESS WITHOUT BLEEDING: ICD-10-CM

## 2023-07-14 DIAGNOSIS — Z86.718 PERSONAL HISTORY OF OTHER VENOUS THROMBOSIS AND EMBOLISM: ICD-10-CM

## 2023-07-14 DIAGNOSIS — K29.70 GASTRITIS, UNSPECIFIED, WITHOUT BLEEDING: ICD-10-CM

## 2023-07-14 DIAGNOSIS — D50.9 IRON DEFICIENCY ANEMIA, UNSPECIFIED: ICD-10-CM

## 2023-07-14 DIAGNOSIS — K28.4 CHRONIC OR UNSPECIFIED GASTROJEJUNAL ULCER WITH HEMORRHAGE: ICD-10-CM

## 2023-07-14 DIAGNOSIS — K63.9 DISEASE OF INTESTINE, UNSPECIFIED: ICD-10-CM

## 2023-07-14 DIAGNOSIS — Z86.711 PERSONAL HISTORY OF PULMONARY EMBOLISM: ICD-10-CM

## 2023-07-14 DIAGNOSIS — C17.1 MALIGNANT NEOPLASM OF JEJUNUM: ICD-10-CM

## 2023-07-14 DIAGNOSIS — K63.5 POLYP OF COLON: ICD-10-CM

## 2023-07-14 DIAGNOSIS — I69.320 APHASIA FOLLOWING CEREBRAL INFARCTION: ICD-10-CM

## 2023-07-14 DIAGNOSIS — Z85.038 PERSONAL HISTORY OF OTHER MALIGNANT NEOPLASM OF LARGE INTESTINE: ICD-10-CM

## 2023-07-14 DIAGNOSIS — D12.0 BENIGN NEOPLASM OF CECUM: ICD-10-CM

## 2023-07-14 DIAGNOSIS — Z79.01 LONG TERM (CURRENT) USE OF ANTICOAGULANTS: ICD-10-CM

## 2023-07-14 DIAGNOSIS — I69.351 HEMIPLEGIA AND HEMIPARESIS FOLLOWING CEREBRAL INFARCTION AFFECTING RIGHT DOMINANT SIDE: ICD-10-CM

## 2023-07-14 DIAGNOSIS — N40.0 BENIGN PROSTATIC HYPERPLASIA WITHOUT LOWER URINARY TRACT SYMPTOMS: ICD-10-CM

## 2023-07-14 DIAGNOSIS — F32.9 MAJOR DEPRESSIVE DISORDER, SINGLE EPISODE, UNSPECIFIED: ICD-10-CM

## 2023-07-14 LAB
ACANTHOCYTES BLD QL SMEAR: SLIGHT — SIGNIFICANT CHANGE UP
ANISOCYTOSIS BLD QL: SIGNIFICANT CHANGE UP
BURR CELLS BLD QL SMEAR: PRESENT — SIGNIFICANT CHANGE UP
DACRYOCYTES BLD QL SMEAR: SLIGHT — SIGNIFICANT CHANGE UP
ELLIPTOCYTES BLD QL SMEAR: SLIGHT — SIGNIFICANT CHANGE UP
GIANT PLATELETS BLD QL SMEAR: PRESENT — SIGNIFICANT CHANGE UP
HCT VFR BLD CALC: 30.4 % — LOW (ref 39–50)
HGB BLD-MCNC: 8.8 G/DL — LOW (ref 13–17)
HYPOCHROMIA BLD QL: SIGNIFICANT CHANGE UP
MACROCYTES BLD QL: SLIGHT — SIGNIFICANT CHANGE UP
MANUAL SMEAR VERIFICATION: SIGNIFICANT CHANGE UP
MCHC RBC-ENTMCNC: 23.5 PG — LOW (ref 27–34)
MCHC RBC-ENTMCNC: 28.9 GM/DL — LOW (ref 32–36)
MCV RBC AUTO: 81.3 FL — SIGNIFICANT CHANGE UP (ref 80–100)
MICROCYTES BLD QL: SLIGHT — SIGNIFICANT CHANGE UP
NRBC # BLD: 0 /100 WBCS — SIGNIFICANT CHANGE UP (ref 0–0)
NRBC # BLD: 1 /100 — HIGH (ref 0–0)
OVALOCYTES BLD QL SMEAR: SLIGHT — SIGNIFICANT CHANGE UP
PLAT MORPH BLD: ABNORMAL
PLATELET # BLD AUTO: 279 K/UL — SIGNIFICANT CHANGE UP (ref 150–400)
POIKILOCYTOSIS BLD QL AUTO: SLIGHT — SIGNIFICANT CHANGE UP
RBC # BLD: 3.74 M/UL — LOW (ref 4.2–5.8)
RBC # FLD: SIGNIFICANT CHANGE UP (ref 10.3–14.5)
RBC BLD AUTO: ABNORMAL
SCHISTOCYTES BLD QL AUTO: SLIGHT — SIGNIFICANT CHANGE UP
TARGETS BLD QL SMEAR: SLIGHT — SIGNIFICANT CHANGE UP
WBC # BLD: 5.09 K/UL — SIGNIFICANT CHANGE UP (ref 3.8–10.5)
WBC # FLD AUTO: 5.09 K/UL — SIGNIFICANT CHANGE UP (ref 3.8–10.5)

## 2023-07-14 PROCEDURE — 99232 SBSQ HOSP IP/OBS MODERATE 35: CPT

## 2023-07-14 RX ORDER — I.V. FAT EMULSION 20 G/100ML
0.56 EMULSION INTRAVENOUS
Qty: 50 | Refills: 0 | Status: DISCONTINUED | OUTPATIENT
Start: 2023-07-14 | End: 2023-07-14

## 2023-07-14 RX ORDER — ELECTROLYTE SOLUTION,INJ
1 VIAL (ML) INTRAVENOUS
Refills: 0 | Status: DISCONTINUED | OUTPATIENT
Start: 2023-07-14 | End: 2023-07-14

## 2023-07-14 RX ADMIN — Medication 1 MILLIGRAM(S): at 21:34

## 2023-07-14 RX ADMIN — CHLORHEXIDINE GLUCONATE 1 APPLICATION(S): 213 SOLUTION TOPICAL at 05:41

## 2023-07-14 RX ADMIN — PIPERACILLIN AND TAZOBACTAM 25 GRAM(S): 4; .5 INJECTION, POWDER, LYOPHILIZED, FOR SOLUTION INTRAVENOUS at 05:41

## 2023-07-14 RX ADMIN — PIPERACILLIN AND TAZOBACTAM 25 GRAM(S): 4; .5 INJECTION, POWDER, LYOPHILIZED, FOR SOLUTION INTRAVENOUS at 13:54

## 2023-07-14 RX ADMIN — I.V. FAT EMULSION 20.8 GM/KG/DAY: 20 EMULSION INTRAVENOUS at 19:07

## 2023-07-14 RX ADMIN — PANTOPRAZOLE SODIUM 40 MILLIGRAM(S): 20 TABLET, DELAYED RELEASE ORAL at 11:02

## 2023-07-14 RX ADMIN — ENOXAPARIN SODIUM 40 MILLIGRAM(S): 100 INJECTION SUBCUTANEOUS at 18:53

## 2023-07-14 RX ADMIN — PIPERACILLIN AND TAZOBACTAM 25 GRAM(S): 4; .5 INJECTION, POWDER, LYOPHILIZED, FOR SOLUTION INTRAVENOUS at 21:33

## 2023-07-14 RX ADMIN — Medication 1 EACH: at 19:07

## 2023-07-14 NOTE — PROGRESS NOTE ADULT - SUBJECTIVE AND OBJECTIVE BOX
Interval Events: Reviewed  Patient seen and examined at bedside.    Patient is a 73y old  Male who presents with a chief complaint of JEJUNAL ADENOCARCINOMA     (13 Jul 2023 11:24)  he is better    PAST MEDICAL & SURGICAL HISTORY:  Depression, major      Stroke      Pulmonary embolism      BPH (benign prostatic hyperplasia)      Diverticulosis          MEDICATIONS:  Pulmonary:    Antimicrobials:  piperacillin/tazobactam IVPB.. 4.5 Gram(s) IV Intermittent every 8 hours    Anticoagulants:  enoxaparin Injectable 40 milliGRAM(s) SubCutaneous every 24 hours    Cardiac:  doxazosin 1 milliGRAM(s) Oral at bedtime      Allergies    No Known Allergies    Intolerances        Vital Signs Last 24 Hrs  T(C): 36.7 (14 Jul 2023 17:43), Max: 37.1 (13 Jul 2023 22:12)  T(F): 98 (14 Jul 2023 17:43), Max: 98.7 (13 Jul 2023 22:12)  HR: 96 (14 Jul 2023 16:12) (88 - 96)  BP: 117/66 (14 Jul 2023 16:12) (113/75 - 156/93)  BP(mean): 86 (14 Jul 2023 16:12) (86 - 119)  RR: 17 (14 Jul 2023 16:12) (17 - 18)  SpO2: 97% (14 Jul 2023 16:12) (93% - 97%)    Parameters below as of 14 Jul 2023 16:12  Patient On (Oxygen Delivery Method): room air        07-13 @ 07:01 - 07-14 @ 07:00  --------------------------------------------------------  IN: 2880 mL / OUT: 1675 mL / NET: 1205 mL    07-14 @ 07:01 - 07-14 @ 20:24  --------------------------------------------------------  IN: 1200 mL / OUT: 600 mL / NET: 600 mL          Review of Systems:   •	General: negative  •	Skin/Breast: negative  •	Ophthalmologic: negative  •	ENMT: negative  •	Respiratory and Thorax: negative  •	Cardiovascular: negative  •	Gastrointestinal: negative  •	Genitourinary: negative  •	Musculoskeletal: negative  •	Neurological: negative  •	Psychiatric: negative  •	Hematology/Lymphatics: negative  •	Endocrine: negative  •	Allergic/Immunologic: negative    Physical Exam:   • Constitutional:	refer to the dietion /Nutritionist note  • Eyes:	EOMI; PERRL; no drainage or redness  • ENMT:	No oral lesions; no gross abnormalities  • Neck	No bruits; no thyromegaly or nodules  • Breasts:	not examined  • Back:	No deformity or limitation of movement  • Respiratory:	Breath Sounds equal & clear to percussion & auscultation, no accessory muscle use  • Cardiovascular:	Regular rate & rhythm, normal S1, S2; no murmurs, gallops or rubs; no S3, S4  • Gastrointestinal:	Soft, non-tender, no hepatosplenomegaly, normal bowel sounds  • Genitourinary:	not examined  • Rectal: not examined  • Extremities:	No cyanosis, clubbing or edema  • Vascular:	Equal and normal pulses (carotid, femoral, dorsalis pedis)  • Neurologica:l	not examined  • Skin:	No lesions; no rash  • Lymph Nodes:	No lymphadedenopathy  • Musculoskeletal:	No joint pain, swelling or deformity; no limitation of movement        LABS:      CBC Full  -  ( 14 Jul 2023 15:09 )  WBC Count : 5.09 K/uL  RBC Count : 3.74 M/uL  Hemoglobin : 8.8 g/dL  Hematocrit : 30.4 %  Platelet Count - Automated : 279 K/uL  Mean Cell Volume : 81.3 fl  Mean Cell Hemoglobin : 23.5 pg  Mean Cell Hemoglobin Concentration : 28.9 gm/dL  Auto Neutrophil # : x  Auto Lymphocyte # : x  Auto Monocyte # : x  Auto Eosinophil # : x  Auto Basophil # : x  Auto Neutrophil % : x  Auto Lymphocyte % : x  Auto Monocyte % : x  Auto Eosinophil % : x  Auto Basophil % : x    07-14    139  |  107  |  3<L>  ----------------------------<  122<H>  4.1   |  23  |  0.81    Ca    8.2<L>      14 Jul 2023 10:00  Phos  3.3     07-14  Mg     2.2     07-14            Urinalysis Basic - ( 14 Jul 2023 10:00 )    Color: x / Appearance: x / SG: x / pH: x  Gluc: 122 mg/dL / Ketone: x  / Bili: x / Urobili: x   Blood: x / Protein: x / Nitrite: x   Leuk Esterase: x / RBC: x / WBC x   Sq Epi: x / Non Sq Epi: x / Bacteria: x                  RADIOLOGY & ADDITIONAL STUDIES (The following images were personally reviewed):

## 2023-07-14 NOTE — PROGRESS NOTE ADULT - SUBJECTIVE AND OBJECTIVE BOX
INCOMPLETE    STATUS POST:  7/6: Ex lap with small bowel resection and anastomosis, EBL 10cc, IVF 1L,     ON:  +f/-bm. ngt w/ bilious output.     SUBJECTIVE: Patient seen and examined bedside by chief resident, patient has No acute complaints. NPO, w/o nausea or vomiting. NGT in place to suction. OOBA. Denies sob/bear/dizziness    doxazosin 1 milliGRAM(s) Oral at bedtime  enoxaparin Injectable 40 milliGRAM(s) SubCutaneous every 24 hours  piperacillin/tazobactam IVPB.. 4.5 Gram(s) IV Intermittent every 8 hours      Vital Signs Last 24 Hrs  T(C): 36.9 (14 Jul 2023 04:49), Max: 37.3 (13 Jul 2023 14:02)  T(F): 98.5 (14 Jul 2023 04:49), Max: 99.2 (13 Jul 2023 14:02)  HR: 88 (14 Jul 2023 04:04) (88 - 96)  BP: 121/77 (14 Jul 2023 04:04) (108/66 - 156/93)  BP(mean): 95 (14 Jul 2023 04:04) (82 - 119)  RR: 18 (14 Jul 2023 04:04) (16 - 18)  SpO2: 93% (14 Jul 2023 04:04) (92% - 94%)    Parameters below as of 14 Jul 2023 04:04  Patient On (Oxygen Delivery Method): room air      I&O's Detail    12 Jul 2023 07:01  -  13 Jul 2023 07:00  --------------------------------------------------------  IN:    dextrose 5% + sodium chloride 0.45% w/ Additives: 2000 mL    IV PiggyBack: 100 mL    IV PiggyBack: 300 mL  Total IN: 2400 mL    OUT:    Nasogastric/Oral tube (mL): 850 mL    Voided (mL): 2175 mL  Total OUT: 3025 mL    Total NET: -625 mL      13 Jul 2023 07:01  -  14 Jul 2023 06:11  --------------------------------------------------------  IN:    dextrose 5% + sodium chloride 0.45% w/ Additives: 1800 mL  Total IN: 1800 mL    OUT:    Nasogastric/Oral tube (mL): 350 mL    Oral Fluid: 0 mL    Voided (mL): 1325 mL  Total OUT: 1675 mL    Total NET: 125 mL          General: NAD, resting comfortably in bed  C/V: NSR  Pulm: Nonlabored breathing, no respiratory distress  Abd: soft, NT/ND. No rebound or guarding  NGT; bilious, to LIWS  Incisions; C/d/i  Extrem: WWP, no edema, SCDs in place        LABS:                        8.4    5.37  )-----------( 196      ( 13 Jul 2023 05:30 )             28.0     07-13    141  |  109<H>  |  6<L>  ----------------------------<  116<H>  4.3   |  21<L>  |  0.83    Ca    8.3<L>      13 Jul 2023 05:30  Phos  3.2     07-13  Mg     2.2     07-13        Urinalysis Basic - ( 13 Jul 2023 05:30 )    Color: x / Appearance: x / SG: x / pH: x  Gluc: 116 mg/dL / Ketone: x  / Bili: x / Urobili: x   Blood: x / Protein: x / Nitrite: x   Leuk Esterase: x / RBC: x / WBC x   Sq Epi: x / Non Sq Epi: x / Bacteria: x        RADIOLOGY & ADDITIONAL STUDIES:     STATUS POST:  7/6: Ex lap with small bowel resection and anastomosis, EBL 10cc, IVF 1L,     ON:  +f/-bm. ngt w/ bilious output.     SUBJECTIVE: Patient seen and examined bedside by chief resident, patient has No acute complaints. NPO, w/o nausea or vomiting, +f/-bm. NGT in place to suction. OOBA. Denies sob/bear/dizziness    doxazosin 1 milliGRAM(s) Oral at bedtime  enoxaparin Injectable 40 milliGRAM(s) SubCutaneous every 24 hours  piperacillin/tazobactam IVPB.. 4.5 Gram(s) IV Intermittent every 8 hours      Vital Signs Last 24 Hrs  T(C): 36.9 (14 Jul 2023 04:49), Max: 37.3 (13 Jul 2023 14:02)  T(F): 98.5 (14 Jul 2023 04:49), Max: 99.2 (13 Jul 2023 14:02)  HR: 88 (14 Jul 2023 04:04) (88 - 96)  BP: 121/77 (14 Jul 2023 04:04) (108/66 - 156/93)  BP(mean): 95 (14 Jul 2023 04:04) (82 - 119)  RR: 18 (14 Jul 2023 04:04) (16 - 18)  SpO2: 93% (14 Jul 2023 04:04) (92% - 94%)    Parameters below as of 14 Jul 2023 04:04  Patient On (Oxygen Delivery Method): room air      I&O's Detail    12 Jul 2023 07:01  -  13 Jul 2023 07:00  --------------------------------------------------------  IN:    dextrose 5% + sodium chloride 0.45% w/ Additives: 2000 mL    IV PiggyBack: 100 mL    IV PiggyBack: 300 mL  Total IN: 2400 mL    OUT:    Nasogastric/Oral tube (mL): 850 mL    Voided (mL): 2175 mL  Total OUT: 3025 mL    Total NET: -625 mL      13 Jul 2023 07:01  -  14 Jul 2023 06:11  --------------------------------------------------------  IN:    dextrose 5% + sodium chloride 0.45% w/ Additives: 1800 mL  Total IN: 1800 mL    OUT:    Nasogastric/Oral tube (mL): 350 mL    Oral Fluid: 0 mL    Voided (mL): 1325 mL  Total OUT: 1675 mL    Total NET: 125 mL          General: NAD, resting comfortably in bed  C/V: NSR  Pulm: Nonlabored breathing, no respiratory distress  Abd: soft, distension improving, NT. No rebound or guarding  NGT; bilious, to LIWS  Incisions; C/d/i  Extrem: WWP, no edema, SCDs in place        LABS:                        8.4    5.37  )-----------( 196      ( 13 Jul 2023 05:30 )             28.0     07-13    141  |  109<H>  |  6<L>  ----------------------------<  116<H>  4.3   |  21<L>  |  0.83    Ca    8.3<L>      13 Jul 2023 05:30  Phos  3.2     07-13  Mg     2.2     07-13        Urinalysis Basic - ( 13 Jul 2023 05:30 )    Color: x / Appearance: x / SG: x / pH: x  Gluc: 116 mg/dL / Ketone: x  / Bili: x / Urobili: x   Blood: x / Protein: x / Nitrite: x   Leuk Esterase: x / RBC: x / WBC x   Sq Epi: x / Non Sq Epi: x / Bacteria: x        RADIOLOGY & ADDITIONAL STUDIES:

## 2023-07-14 NOTE — PROGRESS NOTE ADULT - ASSESSMENT
73 year old male with pmh of stroke (R hemiparesis), recurrent PE (last Jan 2022), diverticulosis and PSHx of colon tumor removal (1988), known jejunal mass presenting with abdominal pain. Pt is now s/p ex lap, SBR, primary anastomosis on 7/6. C/b UTI and ileus, NGT placed 7/11-7/--. Continue awaiting return of bowel function    NPO/IVF   NGT to LIWS  PICC   Pain/nausea control PRN  Zosyn   Condom cath   SQL/SCDs  OOBA/IS  Dispo: LAL[facility] 73 year old male with pmh of stroke (R hemiparesis), recurrent PE (last Jan 2022), diverticulosis and PSHx of colon tumor removal (1988), known jejunal mass presenting with abdominal pain. Pt is now s/p ex lap, SBR, primary anastomosis on 7/6. C/b UTI and ileus, NGT placed 7/11-7/--. Continue awaiting return of bowel function, d/c NGT    NPO/IVF   D/c NGT  PICC for TPN  Pain/nausea control PRN  Zosyn   Condom cath   KUB 7/15 AM, potentially adv diet 7/15  SQL/SCDs  OOBA/IS  Dispo: refused [facility], opt for home services

## 2023-07-15 LAB
ANION GAP SERPL CALC-SCNC: 10 MMOL/L — SIGNIFICANT CHANGE UP (ref 5–17)
BUN SERPL-MCNC: 6 MG/DL — LOW (ref 7–23)
CALCIUM SERPL-MCNC: 8.6 MG/DL — SIGNIFICANT CHANGE UP (ref 8.4–10.5)
CHLORIDE SERPL-SCNC: 110 MMOL/L — HIGH (ref 96–108)
CO2 SERPL-SCNC: 22 MMOL/L — SIGNIFICANT CHANGE UP (ref 22–31)
CREAT SERPL-MCNC: 0.76 MG/DL — SIGNIFICANT CHANGE UP (ref 0.5–1.3)
CULTURE RESULTS: SIGNIFICANT CHANGE UP
CULTURE RESULTS: SIGNIFICANT CHANGE UP
EGFR: 96 ML/MIN/1.73M2 — SIGNIFICANT CHANGE UP
GLUCOSE SERPL-MCNC: 100 MG/DL — HIGH (ref 70–99)
HCT VFR BLD CALC: 30.8 % — LOW (ref 39–50)
HGB BLD-MCNC: 9.1 G/DL — LOW (ref 13–17)
MAGNESIUM SERPL-MCNC: 2 MG/DL — SIGNIFICANT CHANGE UP (ref 1.6–2.6)
MCHC RBC-ENTMCNC: 23.8 PG — LOW (ref 27–34)
MCHC RBC-ENTMCNC: 29.5 GM/DL — LOW (ref 32–36)
MCV RBC AUTO: 80.6 FL — SIGNIFICANT CHANGE UP (ref 80–100)
NRBC # BLD: 0 /100 WBCS — SIGNIFICANT CHANGE UP (ref 0–0)
PHOSPHATE SERPL-MCNC: 3 MG/DL — SIGNIFICANT CHANGE UP (ref 2.5–4.5)
PLATELET # BLD AUTO: 303 K/UL — SIGNIFICANT CHANGE UP (ref 150–400)
POTASSIUM SERPL-MCNC: 3.9 MMOL/L — SIGNIFICANT CHANGE UP (ref 3.5–5.3)
POTASSIUM SERPL-SCNC: 3.9 MMOL/L — SIGNIFICANT CHANGE UP (ref 3.5–5.3)
RBC # BLD: 3.82 M/UL — LOW (ref 4.2–5.8)
RBC # FLD: SIGNIFICANT CHANGE UP (ref 10.3–14.5)
SODIUM SERPL-SCNC: 142 MMOL/L — SIGNIFICANT CHANGE UP (ref 135–145)
SPECIMEN SOURCE: SIGNIFICANT CHANGE UP
SPECIMEN SOURCE: SIGNIFICANT CHANGE UP
TRIGL SERPL-MCNC: 147 MG/DL — SIGNIFICANT CHANGE UP
WBC # BLD: 5.09 K/UL — SIGNIFICANT CHANGE UP (ref 3.8–10.5)
WBC # FLD AUTO: 5.09 K/UL — SIGNIFICANT CHANGE UP (ref 3.8–10.5)

## 2023-07-15 PROCEDURE — 99232 SBSQ HOSP IP/OBS MODERATE 35: CPT

## 2023-07-15 PROCEDURE — 74019 RADEX ABDOMEN 2 VIEWS: CPT | Mod: 26

## 2023-07-15 RX ORDER — I.V. FAT EMULSION 20 G/100ML
0.56 EMULSION INTRAVENOUS
Qty: 50 | Refills: 0 | Status: DISCONTINUED | OUTPATIENT
Start: 2023-07-15 | End: 2023-07-15

## 2023-07-15 RX ORDER — ELECTROLYTE SOLUTION,INJ
1 VIAL (ML) INTRAVENOUS
Refills: 0 | Status: DISCONTINUED | OUTPATIENT
Start: 2023-07-15 | End: 2023-07-15

## 2023-07-15 RX ADMIN — PIPERACILLIN AND TAZOBACTAM 25 GRAM(S): 4; .5 INJECTION, POWDER, LYOPHILIZED, FOR SOLUTION INTRAVENOUS at 21:08

## 2023-07-15 RX ADMIN — PIPERACILLIN AND TAZOBACTAM 25 GRAM(S): 4; .5 INJECTION, POWDER, LYOPHILIZED, FOR SOLUTION INTRAVENOUS at 13:38

## 2023-07-15 RX ADMIN — PANTOPRAZOLE SODIUM 40 MILLIGRAM(S): 20 TABLET, DELAYED RELEASE ORAL at 12:59

## 2023-07-15 RX ADMIN — Medication 1 EACH: at 17:35

## 2023-07-15 RX ADMIN — ENOXAPARIN SODIUM 40 MILLIGRAM(S): 100 INJECTION SUBCUTANEOUS at 17:11

## 2023-07-15 RX ADMIN — Medication 1 MILLIGRAM(S): at 21:09

## 2023-07-15 RX ADMIN — SODIUM CHLORIDE 20 MILLILITER(S): 9 INJECTION, SOLUTION INTRAVENOUS at 06:11

## 2023-07-15 RX ADMIN — CHLORHEXIDINE GLUCONATE 1 APPLICATION(S): 213 SOLUTION TOPICAL at 05:14

## 2023-07-15 RX ADMIN — PIPERACILLIN AND TAZOBACTAM 25 GRAM(S): 4; .5 INJECTION, POWDER, LYOPHILIZED, FOR SOLUTION INTRAVENOUS at 05:14

## 2023-07-15 RX ADMIN — SODIUM CHLORIDE 20 MILLILITER(S): 9 INJECTION, SOLUTION INTRAVENOUS at 13:00

## 2023-07-15 NOTE — PROGRESS NOTE ADULT - SUBJECTIVE AND OBJECTIVE BOX
Overnight events:    INCOMPLETE    POD#    SUBJECTIVE:      MEDICATIONS  (STANDING):  chlorhexidine 2% Cloths 1 Application(s) Topical <User Schedule>  dextrose 5% + sodium chloride 0.45% 1000 milliLiter(s) (20 mL/Hr) IV Continuous <Continuous>  doxazosin 1 milliGRAM(s) Oral at bedtime  enoxaparin Injectable 40 milliGRAM(s) SubCutaneous every 24 hours  lipid, fat emulsion (Fish Oil and Plant Based) 20% Infusion 0.5649 Gm/kG/Day (20.8 mL/Hr) IV Continuous <Continuous>  pantoprazole  Injectable 40 milliGRAM(s) IV Push daily  Parenteral Nutrition - Adult 1 Each (63 mL/Hr) TPN Continuous <Continuous>  Parenteral Nutrition - Adult 1 Each (63 mL/Hr) TPN Continuous <Continuous>  piperacillin/tazobactam IVPB.. 4.5 Gram(s) IV Intermittent every 8 hours    MEDICATIONS  (PRN):  ondansetron Injectable 4 milliGRAM(s) IV Push every 6 hours PRN Nausea and/or Vomiting  sodium chloride 0.9% lock flush 10 milliLiter(s) IV Push every 1 hour PRN Pre/post blood products, medications, blood draw, and to maintain line patency      Vital Signs Last 24 Hrs  T(C): 37.4 (15 Jul 2023 09:22), Max: 37.4 (15 Jul 2023 09:22)  T(F): 99.3 (15 Jul 2023 09:22), Max: 99.3 (15 Jul 2023 09:22)  HR: 102 (15 Jul 2023 08:35) (96 - 110)  BP: 121/66 (15 Jul 2023 08:35) (116/77 - 130/83)  BP(mean): 88 (15 Jul 2023 08:35) (86 - 102)  RR: 17 (15 Jul 2023 08:35) (17 - 17)  SpO2: 93% (15 Jul 2023 08:35) (92% - 99%)    Parameters below as of 15 Jul 2023 08:35  Patient On (Oxygen Delivery Method): room air        Physical Exam:  General: NAD, resting comfortably in bed  Pulmonary: Nonlabored breathing, no respiratory distress  Cardiovascular: NSR  Abdominal: soft, NT/ND  Extremities: WWP, normal strength  Neuro: A/O x 3, CNs II-XII grossly intact, no focal deficits, normal motor/sensation  Pulses: palpable distal pulses    I&O's Summary    14 Jul 2023 07:01  -  15 Jul 2023 07:00  --------------------------------------------------------  IN: 2076.6 mL / OUT: 2400 mL / NET: -323.4 mL        LABS:                        9.1    5.09  )-----------( 303      ( 15 Jul 2023 06:32 )             30.8     07-15    142  |  110<H>  |  6<L>  ----------------------------<  100<H>  3.9   |  22  |  0.76    Ca    8.6      15 Jul 2023 06:32  Phos  3.0     07-15  Mg     2.0     07-15        Urinalysis Basic - ( 15 Jul 2023 06:32 )    Color: x / Appearance: x / SG: x / pH: x  Gluc: 100 mg/dL / Ketone: x  / Bili: x / Urobili: x   Blood: x / Protein: x / Nitrite: x   Leuk Esterase: x / RBC: x / WBC x   Sq Epi: x / Non Sq Epi: x / Bacteria: x      CAPILLARY BLOOD GLUCOSE            RADIOLOGY & ADDITIONAL STUDIES:   Overnight events:  +F/-BM. decreased fluids to 20cc/hr.       7/6: Ex lap with small bowel resection and anastomosis    SUBJECTIVE: Patient seen at bedside with chief resident. Patient denies any nausea or vomiting. He does state he wants to eat.       MEDICATIONS  (STANDING):  chlorhexidine 2% Cloths 1 Application(s) Topical <User Schedule>  dextrose 5% + sodium chloride 0.45% 1000 milliLiter(s) (20 mL/Hr) IV Continuous <Continuous>  doxazosin 1 milliGRAM(s) Oral at bedtime  enoxaparin Injectable 40 milliGRAM(s) SubCutaneous every 24 hours  lipid, fat emulsion (Fish Oil and Plant Based) 20% Infusion 0.5649 Gm/kG/Day (20.8 mL/Hr) IV Continuous <Continuous>  pantoprazole  Injectable 40 milliGRAM(s) IV Push daily  Parenteral Nutrition - Adult 1 Each (63 mL/Hr) TPN Continuous <Continuous>  Parenteral Nutrition - Adult 1 Each (63 mL/Hr) TPN Continuous <Continuous>  piperacillin/tazobactam IVPB.. 4.5 Gram(s) IV Intermittent every 8 hours    MEDICATIONS  (PRN):  ondansetron Injectable 4 milliGRAM(s) IV Push every 6 hours PRN Nausea and/or Vomiting  sodium chloride 0.9% lock flush 10 milliLiter(s) IV Push every 1 hour PRN Pre/post blood products, medications, blood draw, and to maintain line patency      Vital Signs Last 24 Hrs  T(C): 37.4 (15 Jul 2023 09:22), Max: 37.4 (15 Jul 2023 09:22)  T(F): 99.3 (15 Jul 2023 09:22), Max: 99.3 (15 Jul 2023 09:22)  HR: 102 (15 Jul 2023 08:35) (96 - 110)  BP: 121/66 (15 Jul 2023 08:35) (116/77 - 130/83)  BP(mean): 88 (15 Jul 2023 08:35) (86 - 102)  RR: 17 (15 Jul 2023 08:35) (17 - 17)  SpO2: 93% (15 Jul 2023 08:35) (92% - 99%)    Parameters below as of 15 Jul 2023 08:35  Patient On (Oxygen Delivery Method): room air        Physical Exam:  General: NAD, resting comfortably in bed  Pulmonary: Nonlabored breathing, no respiratory distress  Cardiovascular: NSR  Abdominal: soft,   Extremities: WWP, right arm swelling decreasing   Neuro: A/O x 3, CNs II-XII grossly intact, no focal deficits, normal motor/sensation  Pulses: palpable distal pulses    I&O's Summary    14 Jul 2023 07:01  -  15 Jul 2023 07:00  --------------------------------------------------------  IN: 2076.6 mL / OUT: 2400 mL / NET: -323.4 mL        LABS:                        9.1    5.09  )-----------( 303      ( 15 Jul 2023 06:32 )             30.8     07-15    142  |  110<H>  |  6<L>  ----------------------------<  100<H>  3.9   |  22  |  0.76    Ca    8.6      15 Jul 2023 06:32  Phos  3.0     07-15  Mg     2.0     07-15        Urinalysis Basic - ( 15 Jul 2023 06:32 )    Color: x / Appearance: x / SG: x / pH: x  Gluc: 100 mg/dL / Ketone: x  / Bili: x / Urobili: x   Blood: x / Protein: x / Nitrite: x   Leuk Esterase: x / RBC: x / WBC x   Sq Epi: x / Non Sq Epi: x / Bacteria: x      CAPILLARY BLOOD GLUCOSE            RADIOLOGY & ADDITIONAL STUDIES:   Overnight events:  +F/-BM. decreased fluids to 20cc/hr.       7/6: Ex lap with small bowel resection and anastomosis    SUBJECTIVE: Patient seen at bedside with chief resident. Patient denies any nausea or vomiting. He does state he wants to eat.       MEDICATIONS  (STANDING):  chlorhexidine 2% Cloths 1 Application(s) Topical <User Schedule>  dextrose 5% + sodium chloride 0.45% 1000 milliLiter(s) (20 mL/Hr) IV Continuous <Continuous>  doxazosin 1 milliGRAM(s) Oral at bedtime  enoxaparin Injectable 40 milliGRAM(s) SubCutaneous every 24 hours  lipid, fat emulsion (Fish Oil and Plant Based) 20% Infusion 0.5649 Gm/kG/Day (20.8 mL/Hr) IV Continuous <Continuous>  pantoprazole  Injectable 40 milliGRAM(s) IV Push daily  Parenteral Nutrition - Adult 1 Each (63 mL/Hr) TPN Continuous <Continuous>  Parenteral Nutrition - Adult 1 Each (63 mL/Hr) TPN Continuous <Continuous>  piperacillin/tazobactam IVPB.. 4.5 Gram(s) IV Intermittent every 8 hours    MEDICATIONS  (PRN):  ondansetron Injectable 4 milliGRAM(s) IV Push every 6 hours PRN Nausea and/or Vomiting  sodium chloride 0.9% lock flush 10 milliLiter(s) IV Push every 1 hour PRN Pre/post blood products, medications, blood draw, and to maintain line patency      Vital Signs Last 24 Hrs  T(C): 37.4 (15 Jul 2023 09:22), Max: 37.4 (15 Jul 2023 09:22)  T(F): 99.3 (15 Jul 2023 09:22), Max: 99.3 (15 Jul 2023 09:22)  HR: 102 (15 Jul 2023 08:35) (96 - 110)  BP: 121/66 (15 Jul 2023 08:35) (116/77 - 130/83)  BP(mean): 88 (15 Jul 2023 08:35) (86 - 102)  RR: 17 (15 Jul 2023 08:35) (17 - 17)  SpO2: 93% (15 Jul 2023 08:35) (92% - 99%)    Parameters below as of 15 Jul 2023 08:35  Patient On (Oxygen Delivery Method): room air        Physical Exam:  General: NAD, resting comfortably in bed  Pulmonary: Nonlabored breathing, no respiratory distress  Cardiovascular: NSR  Abdominal: soft, distension improving, NT  Extremities: WWP, right arm swelling decreasing   Neuro: A/O x 3, CNs II-XII grossly intact, no focal deficits, normal motor/sensation  Pulses: palpable distal pulses    I&O's Summary    14 Jul 2023 07:01  -  15 Jul 2023 07:00  --------------------------------------------------------  IN: 2076.6 mL / OUT: 2400 mL / NET: -323.4 mL        LABS:                        9.1    5.09  )-----------( 303      ( 15 Jul 2023 06:32 )             30.8     07-15    142  |  110<H>  |  6<L>  ----------------------------<  100<H>  3.9   |  22  |  0.76    Ca    8.6      15 Jul 2023 06:32  Phos  3.0     07-15  Mg     2.0     07-15        Urinalysis Basic - ( 15 Jul 2023 06:32 )    Color: x / Appearance: x / SG: x / pH: x  Gluc: 100 mg/dL / Ketone: x  / Bili: x / Urobili: x   Blood: x / Protein: x / Nitrite: x   Leuk Esterase: x / RBC: x / WBC x   Sq Epi: x / Non Sq Epi: x / Bacteria: x      CAPILLARY BLOOD GLUCOSE            RADIOLOGY & ADDITIONAL STUDIES:   Overnight events:  +F/-BM. decreased fluids to 20cc/hr.       7/6: Ex lap with small bowel resection and anastomosis    SUBJECTIVE: Patient seen at bedside with chief resident. Patient denies any nausea or vomiting. He does state he wants to eat. Patient reports passing flatus      MEDICATIONS  (STANDING):  chlorhexidine 2% Cloths 1 Application(s) Topical <User Schedule>  dextrose 5% + sodium chloride 0.45% 1000 milliLiter(s) (20 mL/Hr) IV Continuous <Continuous>  doxazosin 1 milliGRAM(s) Oral at bedtime  enoxaparin Injectable 40 milliGRAM(s) SubCutaneous every 24 hours  lipid, fat emulsion (Fish Oil and Plant Based) 20% Infusion 0.5649 Gm/kG/Day (20.8 mL/Hr) IV Continuous <Continuous>  pantoprazole  Injectable 40 milliGRAM(s) IV Push daily  Parenteral Nutrition - Adult 1 Each (63 mL/Hr) TPN Continuous <Continuous>  Parenteral Nutrition - Adult 1 Each (63 mL/Hr) TPN Continuous <Continuous>  piperacillin/tazobactam IVPB.. 4.5 Gram(s) IV Intermittent every 8 hours    MEDICATIONS  (PRN):  ondansetron Injectable 4 milliGRAM(s) IV Push every 6 hours PRN Nausea and/or Vomiting  sodium chloride 0.9% lock flush 10 milliLiter(s) IV Push every 1 hour PRN Pre/post blood products, medications, blood draw, and to maintain line patency      Vital Signs Last 24 Hrs  T(C): 37.4 (15 Jul 2023 09:22), Max: 37.4 (15 Jul 2023 09:22)  T(F): 99.3 (15 Jul 2023 09:22), Max: 99.3 (15 Jul 2023 09:22)  HR: 102 (15 Jul 2023 08:35) (96 - 110)  BP: 121/66 (15 Jul 2023 08:35) (116/77 - 130/83)  BP(mean): 88 (15 Jul 2023 08:35) (86 - 102)  RR: 17 (15 Jul 2023 08:35) (17 - 17)  SpO2: 93% (15 Jul 2023 08:35) (92% - 99%)    Parameters below as of 15 Jul 2023 08:35  Patient On (Oxygen Delivery Method): room air        Physical Exam:  General: NAD, resting comfortably in bed  Pulmonary: Nonlabored breathing, no respiratory distress  Cardiovascular: NSR  Abdominal: soft, distension improving, NT  Extremities: WWP, right arm swelling decreasing   Neuro: A/O x 3, CNs II-XII grossly intact, no focal deficits, normal motor/sensation  Pulses: palpable distal pulses    I&O's Summary    14 Jul 2023 07:01  -  15 Jul 2023 07:00  --------------------------------------------------------  IN: 2076.6 mL / OUT: 2400 mL / NET: -323.4 mL        LABS:                        9.1    5.09  )-----------( 303      ( 15 Jul 2023 06:32 )             30.8     07-15    142  |  110<H>  |  6<L>  ----------------------------<  100<H>  3.9   |  22  |  0.76    Ca    8.6      15 Jul 2023 06:32  Phos  3.0     07-15  Mg     2.0     07-15        Urinalysis Basic - ( 15 Jul 2023 06:32 )    Color: x / Appearance: x / SG: x / pH: x  Gluc: 100 mg/dL / Ketone: x  / Bili: x / Urobili: x   Blood: x / Protein: x / Nitrite: x   Leuk Esterase: x / RBC: x / WBC x   Sq Epi: x / Non Sq Epi: x / Bacteria: x      CAPILLARY BLOOD GLUCOSE            RADIOLOGY & ADDITIONAL STUDIES:

## 2023-07-15 NOTE — PROGRESS NOTE ADULT - SUBJECTIVE AND OBJECTIVE BOX
Interval Events: Reviewed  Patient seen and examined at bedside.    Patient is a 73y old  Male who presents with a chief complaint of JEJUNAL ADENOCARCINOMA  no acute event overnight, aide at bedside, RA93%      PAST MEDICAL & SURGICAL HISTORY:  Depression, major      Stroke      Pulmonary embolism      BPH (benign prostatic hyperplasia)      Diverticulosis          MEDICATIONS:  Pulmonary:    Antimicrobials:  piperacillin/tazobactam IVPB.. 4.5 Gram(s) IV Intermittent every 8 hours    Anticoagulants:  enoxaparin Injectable 40 milliGRAM(s) SubCutaneous every 24 hours    Cardiac:  doxazosin 1 milliGRAM(s) Oral at bedtime      Allergies    No Known Allergies    Intolerances        Vital Signs Last 24 Hrs  T(C): 36.8 (15 Jul 2023 04:37), Max: 37.3 (14 Jul 2023 22:03)  T(F): 98.2 (15 Jul 2023 04:37), Max: 99.2 (14 Jul 2023 22:03)  HR: 102 (15 Jul 2023 08:35) (90 - 110)  BP: 121/66 (15 Jul 2023 08:35) (116/77 - 132/87)  BP(mean): 88 (15 Jul 2023 08:35) (86 - 105)  RR: 17 (15 Jul 2023 08:35) (17 - 18)  SpO2: 93% (15 Jul 2023 08:35) (92% - 99%)    Parameters below as of 15 Jul 2023 08:35  Patient On (Oxygen Delivery Method): room air        07-14 @ 07:01  -  07-15 @ 07:00  --------------------------------------------------------  IN: 2076.6 mL / OUT: 2400 mL / NET: -323.4 mL          Review of Systems:   •	General: negative  •	Skin/Breast: negative  •	Ophthalmologic: negative  •	ENMT: negative  •	Respiratory and Thorax: negative  •	Cardiovascular: negative  •	Gastrointestinal: negative  •	Genitourinary: negative  •	Musculoskeletal: negative  •	Neurological: negative  •	Psychiatric: negative  •	Hematology/Lymphatics: negative  •	Endocrine: negative  •	Allergic/Immunologic: negative    Physical Exam:   • Constitutional:	elderly male, NAD  • Eyes:	EOMI; PERRL; no drainage or redness  • ENMT:	No oral lesions; no gross abnormalities  • Neck	no thyromegaly or nodules  • Breasts:	not examined  • Back:	No deformity or limitation of movement  • Respiratory:	Breath Sounds equal & clear to auscultation, no accessory muscle use  • Cardiovascular:	Regular rate & rhythm, normal S1, S2; no murmurs, gallops or rubs; no S3, S4  • Gastrointestinal:	Soft, non-tender, no hepatosplenomegaly, normal bowel sounds  • Genitourinary:	not examined  • Rectal: not examined  • Extremities:	No cyanosis, clubbing or edema  • Vascular:	Equal and normal pulses (dorsalis pedis)  • Neurologica:l	not examined  • Skin:	No lesions; no rash  • Lymph Nodes:	No lymphadedenopathy  • Musculoskeletal:	No joint pain, swelling or deformity; no limitation of movement        LABS:      CBC Full  -  ( 15 Jul 2023 06:32 )  WBC Count : 5.09 K/uL  RBC Count : 3.82 M/uL  Hemoglobin : 9.1 g/dL  Hematocrit : 30.8 %  Platelet Count - Automated : 303 K/uL  Mean Cell Volume : 80.6 fl  Mean Cell Hemoglobin : 23.8 pg  Mean Cell Hemoglobin Concentration : 29.5 gm/dL  Auto Neutrophil # : x  Auto Lymphocyte # : x  Auto Monocyte # : x  Auto Eosinophil # : x  Auto Basophil # : x  Auto Neutrophil % : x  Auto Lymphocyte % : x  Auto Monocyte % : x  Auto Eosinophil % : x  Auto Basophil % : x    07-15    142  |  110<H>  |  6<L>  ----------------------------<  100<H>  3.9   |  22  |  0.76    Ca    8.6      15 Jul 2023 06:32  Phos  3.0     07-15  Mg     2.0     07-15            Urinalysis Basic - ( 15 Jul 2023 06:32 )    Color: x / Appearance: x / SG: x / pH: x  Gluc: 100 mg/dL / Ketone: x  / Bili: x / Urobili: x   Blood: x / Protein: x / Nitrite: x   Leuk Esterase: x / RBC: x / WBC x   Sq Epi: x / Non Sq Epi: x / Bacteria: x                  RADIOLOGY & ADDITIONAL STUDIES (The following images were personally reviewed):  Hernandez:                                     No  Urine output:                       adequate  DVT prophylaxis:                 Yes  Flattus:                                  Yes  Bowel movement:              No

## 2023-07-15 NOTE — PROGRESS NOTE ADULT - ASSESSMENT
73 year old male with pmh of stroke (R hemiparesis), recurrent PE (last Jan 2022), diverticulosis and PSHx of colon tumor removal (1988), known jejunal mass presenting with abdominal pain. Pt is now s/p ex lap, SBR, primary anastomosis on 7/6. C/b UTI and ileus, NGT placed 7/11-7/--. PICC line placed 7/13 for TPN    NPO w sips/IVF   PICC w. TPN  Pain/nausea control PRN  Zosyn   Condom cath   SQL/SCDs  OOBA/IS  Dispo: refused [facility], home w. homecare

## 2023-07-15 NOTE — PROGRESS NOTE ADULT - SUBJECTIVE AND OBJECTIVE BOX
Covering for Dr. Spann.    Feeling better. Passing flatus, no BM  AF   Abd soft, mod distended, NT. Incision clean    A/P: POD 9 jejunal resection.  Ileus starting to resolve. UTI.  1. Sips/chips  2. TPN plus supplemental fluid at 20/h  3. OOB  4. Zosyn for UTI

## 2023-07-15 NOTE — PROGRESS NOTE ADULT - ASSESSMENT
73 year old male with pmh of stroke (R hemiparesis), recurrent PE (last Jan 2022), diverticulosis and PSHx of colon tumor removal (1988), known jejunal mass presenting with abdominal pain. Pt is now s/p ex lap, SBR, primary anastomosis on 7/6. C/b UTI and ileus, NGT placed 7/11-7/--. Continue awaiting return of bowel function, d/c NGT

## 2023-07-16 LAB
ANION GAP SERPL CALC-SCNC: 9 MMOL/L — SIGNIFICANT CHANGE UP (ref 5–17)
BUN SERPL-MCNC: 10 MG/DL — SIGNIFICANT CHANGE UP (ref 7–23)
CALCIUM SERPL-MCNC: 8.8 MG/DL — SIGNIFICANT CHANGE UP (ref 8.4–10.5)
CHLORIDE SERPL-SCNC: 107 MMOL/L — SIGNIFICANT CHANGE UP (ref 96–108)
CO2 SERPL-SCNC: 22 MMOL/L — SIGNIFICANT CHANGE UP (ref 22–31)
CREAT SERPL-MCNC: 0.76 MG/DL — SIGNIFICANT CHANGE UP (ref 0.5–1.3)
EGFR: 96 ML/MIN/1.73M2 — SIGNIFICANT CHANGE UP
GLUCOSE SERPL-MCNC: 139 MG/DL — HIGH (ref 70–99)
HCT VFR BLD CALC: 30.2 % — LOW (ref 39–50)
HGB BLD-MCNC: 9 G/DL — LOW (ref 13–17)
MAGNESIUM SERPL-MCNC: 2 MG/DL — SIGNIFICANT CHANGE UP (ref 1.6–2.6)
MCHC RBC-ENTMCNC: 23.8 PG — LOW (ref 27–34)
MCHC RBC-ENTMCNC: 29.8 GM/DL — LOW (ref 32–36)
MCV RBC AUTO: 79.9 FL — LOW (ref 80–100)
NRBC # BLD: 0 /100 WBCS — SIGNIFICANT CHANGE UP (ref 0–0)
PHOSPHATE SERPL-MCNC: 2.8 MG/DL — SIGNIFICANT CHANGE UP (ref 2.5–4.5)
PLATELET # BLD AUTO: 295 K/UL — SIGNIFICANT CHANGE UP (ref 150–400)
POTASSIUM SERPL-MCNC: 3.8 MMOL/L — SIGNIFICANT CHANGE UP (ref 3.5–5.3)
POTASSIUM SERPL-SCNC: 3.8 MMOL/L — SIGNIFICANT CHANGE UP (ref 3.5–5.3)
RBC # BLD: 3.78 M/UL — LOW (ref 4.2–5.8)
RBC # FLD: 32.9 % — HIGH (ref 10.3–14.5)
SODIUM SERPL-SCNC: 138 MMOL/L — SIGNIFICANT CHANGE UP (ref 135–145)
WBC # BLD: 5.37 K/UL — SIGNIFICANT CHANGE UP (ref 3.8–10.5)
WBC # FLD AUTO: 5.37 K/UL — SIGNIFICANT CHANGE UP (ref 3.8–10.5)

## 2023-07-16 PROCEDURE — 99232 SBSQ HOSP IP/OBS MODERATE 35: CPT

## 2023-07-16 RX ORDER — ELECTROLYTE SOLUTION,INJ
1 VIAL (ML) INTRAVENOUS
Refills: 0 | Status: DISCONTINUED | OUTPATIENT
Start: 2023-07-16 | End: 2023-07-16

## 2023-07-16 RX ORDER — I.V. FAT EMULSION 20 G/100ML
0.56 EMULSION INTRAVENOUS
Qty: 49.99 | Refills: 0 | Status: DISCONTINUED | OUTPATIENT
Start: 2023-07-16 | End: 2023-07-16

## 2023-07-16 RX ORDER — I.V. FAT EMULSION 20 G/100ML
0.56 EMULSION INTRAVENOUS
Qty: 50 | Refills: 0 | Status: DISCONTINUED | OUTPATIENT
Start: 2023-07-16 | End: 2023-07-16

## 2023-07-16 RX ADMIN — PIPERACILLIN AND TAZOBACTAM 25 GRAM(S): 4; .5 INJECTION, POWDER, LYOPHILIZED, FOR SOLUTION INTRAVENOUS at 06:02

## 2023-07-16 RX ADMIN — Medication 1 EACH: at 17:45

## 2023-07-16 RX ADMIN — PANTOPRAZOLE SODIUM 40 MILLIGRAM(S): 20 TABLET, DELAYED RELEASE ORAL at 15:13

## 2023-07-16 RX ADMIN — ENOXAPARIN SODIUM 40 MILLIGRAM(S): 100 INJECTION SUBCUTANEOUS at 17:46

## 2023-07-16 RX ADMIN — Medication 1 MILLIGRAM(S): at 21:13

## 2023-07-16 RX ADMIN — I.V. FAT EMULSION 20.8 GM/KG/DAY: 20 EMULSION INTRAVENOUS at 17:45

## 2023-07-16 RX ADMIN — PIPERACILLIN AND TAZOBACTAM 25 GRAM(S): 4; .5 INJECTION, POWDER, LYOPHILIZED, FOR SOLUTION INTRAVENOUS at 15:13

## 2023-07-16 RX ADMIN — CHLORHEXIDINE GLUCONATE 1 APPLICATION(S): 213 SOLUTION TOPICAL at 06:03

## 2023-07-16 NOTE — PROGRESS NOTE ADULT - SUBJECTIVE AND OBJECTIVE BOX
Doing well, passing flatus.  No BM  AFVSS  Abd soft, mild distention, NT. Incision clean    A/P: POD 10 jejunal resection, resolving ileus  1. Clear liquids, instructed to take it slowly.  2. OOB, IS  3. Zosyn for UTI to stop today.  4. TPN

## 2023-07-16 NOTE — PROGRESS NOTE ADULT - SUBJECTIVE AND OBJECTIVE BOX
SUBJECTIVE:  Patient was seen this AM at bedside with chief resident. Patient was sleepy this AM however denies nausea, vomiting, or pain. Confirms flatus but denies BM.    MEDICATIONS  (STANDING):  chlorhexidine 2% Cloths 1 Application(s) Topical <User Schedule>  dextrose 5% + sodium chloride 0.45% 1000 milliLiter(s) (20 mL/Hr) IV Continuous <Continuous>  doxazosin 1 milliGRAM(s) Oral at bedtime  enoxaparin Injectable 40 milliGRAM(s) SubCutaneous every 24 hours  lipid, fat emulsion (Fish Oil and Plant Based) 20% Infusion 0.565 Gm/kG/Day (20.8 mL/Hr) IV Continuous <Continuous>  pantoprazole  Injectable 40 milliGRAM(s) IV Push daily  Parenteral Nutrition - Adult 1 Each (63 mL/Hr) TPN Continuous <Continuous>  Parenteral Nutrition - Adult 1 Each (63 mL/Hr) TPN Continuous <Continuous>  piperacillin/tazobactam IVPB.. 4.5 Gram(s) IV Intermittent every 8 hours    MEDICATIONS  (PRN):  ondansetron Injectable 4 milliGRAM(s) IV Push every 6 hours PRN Nausea and/or Vomiting  sodium chloride 0.9% lock flush 10 milliLiter(s) IV Push every 1 hour PRN Pre/post blood products, medications, blood draw, and to maintain line patency      Vital Signs Last 24 Hrs  T(C): 37.2 (16 Jul 2023 10:00), Max: 37.2 (16 Jul 2023 10:00)  T(F): 99 (16 Jul 2023 10:00), Max: 99 (16 Jul 2023 10:00)  HR: 90 (16 Jul 2023 11:48) (90 - 98)  BP: 115/74 (16 Jul 2023 11:48) (112/69 - 124/74)  BP(mean): 90 (16 Jul 2023 11:48) (87 - 90)  RR: 18 (16 Jul 2023 11:48) (17 - 18)  SpO2: 95% (16 Jul 2023 11:48) (93% - 95%)    Parameters below as of 16 Jul 2023 11:48  Patient On (Oxygen Delivery Method): room air        Physical Exam:  General: NAD, resting comfortably in bed  Pulmonary: Nonlabored breathing, no respiratory distress  Cardiovascular: NSR  Abdominal: soft, NT/ND  Extremities: WWP, weak strength  Neuro: A/O x 3, CNs II-XII grossly intact, no focal deficits, normal motor/sensation  Pulses: palpable distal pulses    I&O's Summary    15 Jul 2023 07:01  -  16 Jul 2023 07:00  --------------------------------------------------------  IN: 1985.8 mL / OUT: 1200 mL / NET: 785.8 mL    16 Jul 2023 07:01  -  16 Jul 2023 16:33  --------------------------------------------------------  IN: 746 mL / OUT: 1300 mL / NET: -554 mL        LABS:                        9.0    5.37  )-----------( 295      ( 16 Jul 2023 05:30 )             30.2     07-16    138  |  107  |  10  ----------------------------<  139<H>  3.8   |  22  |  0.76    Ca    8.8      16 Jul 2023 05:30  Phos  2.8     07-16  Mg     2.0     07-16        Urinalysis Basic - ( 16 Jul 2023 05:30 )    Color: x / Appearance: x / SG: x / pH: x  Gluc: 139 mg/dL / Ketone: x  / Bili: x / Urobili: x   Blood: x / Protein: x / Nitrite: x   Leuk Esterase: x / RBC: x / WBC x   Sq Epi: x / Non Sq Epi: x / Bacteria: x      CAPILLARY BLOOD GLUCOSE            RADIOLOGY & ADDITIONAL STUDIES:

## 2023-07-16 NOTE — PROGRESS NOTE ADULT - ASSESSMENT
73 year old male with pmh of stroke (R hemiparesis), recurrent PE (last Jan 2022), diverticulosis and PSHx of colon tumor removal (1988), known jejunal mass presenting with abdominal pain. Pt is now s/p ex lap, SBR, primary anastomosis on 7/6. C/b UTI and ileus, NGT placed 7/11-7/--. PICC line placed 7/13 for TPN    CLD/IVF  PICC w. TPN  Pain/nausea control PRN  Condom cath   SQL/SCDs  OOBA/IS  Dispo: refused [facility], home w. homecare

## 2023-07-16 NOTE — PROGRESS NOTE ADULT - SUBJECTIVE AND OBJECTIVE BOX
Interval Events: Reviewed  Patient seen and examined at bedside.    Patient is a 73y old  Male who presents with a chief complaint of JEJUNAL ADENOCARCINOMA  no acute event overnight, RA 93%, aide at bedside       PAST MEDICAL & SURGICAL HISTORY:  Depression, major      Stroke      Pulmonary embolism      BPH (benign prostatic hyperplasia)      Diverticulosis          MEDICATIONS:  Pulmonary:    Antimicrobials:  piperacillin/tazobactam IVPB.. 4.5 Gram(s) IV Intermittent every 8 hours    Anticoagulants:  enoxaparin Injectable 40 milliGRAM(s) SubCutaneous every 24 hours    Cardiac:  doxazosin 1 milliGRAM(s) Oral at bedtime      Allergies    No Known Allergies    Intolerances        Vital Signs Last 24 Hrs  T(C): 36.8 (16 Jul 2023 04:41), Max: 37.2 (15 Jul 2023 14:22)  T(F): 98.3 (16 Jul 2023 04:41), Max: 98.9 (15 Jul 2023 14:22)  HR: 92 (16 Jul 2023 08:50) (92 - 98)  BP: 116/72 (16 Jul 2023 08:50) (112/69 - 124/74)  BP(mean): 89 (16 Jul 2023 08:50) (87 - 90)  RR: 18 (16 Jul 2023 08:50) (17 - 18)  SpO2: 93% (16 Jul 2023 08:50) (93% - 95%)    Parameters below as of 16 Jul 2023 08:50  Patient On (Oxygen Delivery Method): room air        07-15 @ 07:01 - 07-16 @ 07:00  --------------------------------------------------------  IN: 1985.8 mL / OUT: 1200 mL / NET: 785.8 mL    07-16 @ 07:01  -  07-16 @ 09:44  --------------------------------------------------------  IN: 239 mL / OUT: 1000 mL / NET: -761 mL          Review of Systems:   •	General: negative  •	Skin/Breast: negative  •	Ophthalmologic: negative  •	ENMT: negative  •	Respiratory and Thorax: negative  •	Cardiovascular: negative  •	Gastrointestinal: negative  •	Genitourinary: negative  •	Musculoskeletal: negative  •	Neurological: negative  •	Psychiatric: negative  •	Hematology/Lymphatics: negative  •	Endocrine: negative  •	Allergic/Immunologic: negative    Physical Exam:   • Constitutional:	elderly male, NAD  • Eyes:	EOMI; PERRL; no drainage or redness  • ENMT:	No oral lesions; no gross abnormalities  • Neck	no thyromegaly or nodules  • Breasts:	not examined  • Back:	No deformity or limitation of movement  • Respiratory:	Breath Sounds equal & clear to auscultation, no accessory muscle use  • Cardiovascular:	Regular rate & rhythm, normal S1, S2; no murmurs, gallops or rubs; no S3, S4  • Gastrointestinal:	Soft, non-tender, no hepatosplenomegaly, normal bowel sounds  • Genitourinary:	not examined  • Rectal: not examined  • Extremities:	No cyanosis, clubbing or edema  • Vascular:	Equal and normal pulses (dorsalis pedis)  • Neurologica:l	not examined  • Skin:	No lesions; no rash  • Lymph Nodes:	No lymphadedenopathy  • Musculoskeletal:	No joint pain, swelling or deformity; no limitation of movement        LABS:      CBC Full  -  ( 16 Jul 2023 05:30 )  WBC Count : 5.37 K/uL  RBC Count : 3.78 M/uL  Hemoglobin : 9.0 g/dL  Hematocrit : 30.2 %  Platelet Count - Automated : 295 K/uL  Mean Cell Volume : 79.9 fl  Mean Cell Hemoglobin : 23.8 pg  Mean Cell Hemoglobin Concentration : 29.8 gm/dL  Auto Neutrophil # : x  Auto Lymphocyte # : x  Auto Monocyte # : x  Auto Eosinophil # : x  Auto Basophil # : x  Auto Neutrophil % : x  Auto Lymphocyte % : x  Auto Monocyte % : x  Auto Eosinophil % : x  Auto Basophil % : x    07-16    138  |  107  |  10  ----------------------------<  139<H>  3.8   |  22  |  0.76    Ca    8.8      16 Jul 2023 05:30  Phos  2.8     07-16  Mg     2.0     07-16            Urinalysis Basic - ( 16 Jul 2023 05:30 )    Color: x / Appearance: x / SG: x / pH: x  Gluc: 139 mg/dL / Ketone: x  / Bili: x / Urobili: x   Blood: x / Protein: x / Nitrite: x   Leuk Esterase: x / RBC: x / WBC x   Sq Epi: x / Non Sq Epi: x / Bacteria: x                  RADIOLOGY & ADDITIONAL STUDIES (The following images were personally reviewed):  Hernandez:                                     No  Urine output:                       adequate  DVT prophylaxis:                 Yes  Flattus:                                  Yes  Bowel movement:              No

## 2023-07-17 LAB
ANION GAP SERPL CALC-SCNC: 9 MMOL/L — SIGNIFICANT CHANGE UP (ref 5–17)
BUN SERPL-MCNC: 11 MG/DL — SIGNIFICANT CHANGE UP (ref 7–23)
CALCIUM SERPL-MCNC: 8.5 MG/DL — SIGNIFICANT CHANGE UP (ref 8.4–10.5)
CHLORIDE SERPL-SCNC: 106 MMOL/L — SIGNIFICANT CHANGE UP (ref 96–108)
CO2 SERPL-SCNC: 22 MMOL/L — SIGNIFICANT CHANGE UP (ref 22–31)
CREAT SERPL-MCNC: 0.67 MG/DL — SIGNIFICANT CHANGE UP (ref 0.5–1.3)
EGFR: 100 ML/MIN/1.73M2 — SIGNIFICANT CHANGE UP
GLUCOSE SERPL-MCNC: 141 MG/DL — HIGH (ref 70–99)
POTASSIUM SERPL-MCNC: 3.8 MMOL/L — SIGNIFICANT CHANGE UP (ref 3.5–5.3)
POTASSIUM SERPL-SCNC: 3.8 MMOL/L — SIGNIFICANT CHANGE UP (ref 3.5–5.3)
SODIUM SERPL-SCNC: 137 MMOL/L — SIGNIFICANT CHANGE UP (ref 135–145)

## 2023-07-17 PROCEDURE — 74019 RADEX ABDOMEN 2 VIEWS: CPT | Mod: 26

## 2023-07-17 RX ORDER — I.V. FAT EMULSION 20 G/100ML
0.56 EMULSION INTRAVENOUS
Qty: 50 | Refills: 0 | Status: DISCONTINUED | OUTPATIENT
Start: 2023-07-17 | End: 2023-07-17

## 2023-07-17 RX ORDER — ELECTROLYTE SOLUTION,INJ
1 VIAL (ML) INTRAVENOUS
Refills: 0 | Status: DISCONTINUED | OUTPATIENT
Start: 2023-07-17 | End: 2023-07-17

## 2023-07-17 RX ADMIN — ENOXAPARIN SODIUM 40 MILLIGRAM(S): 100 INJECTION SUBCUTANEOUS at 17:51

## 2023-07-17 RX ADMIN — I.V. FAT EMULSION 20.8 GM/KG/DAY: 20 EMULSION INTRAVENOUS at 19:44

## 2023-07-17 RX ADMIN — Medication 1 MILLIGRAM(S): at 23:20

## 2023-07-17 RX ADMIN — CHLORHEXIDINE GLUCONATE 1 APPLICATION(S): 213 SOLUTION TOPICAL at 06:50

## 2023-07-17 RX ADMIN — PANTOPRAZOLE SODIUM 40 MILLIGRAM(S): 20 TABLET, DELAYED RELEASE ORAL at 13:18

## 2023-07-17 RX ADMIN — Medication 10 MILLIGRAM(S): at 08:16

## 2023-07-17 RX ADMIN — Medication 1 EACH: at 19:44

## 2023-07-17 NOTE — PROGRESS NOTE ADULT - ASSESSMENT
73 year old male with pmh of stroke (R hemiparesis), recurrent PE (last Jan 2022), diverticulosis and PSHx of colon tumor removal (1988), known jejunal mass presenting with abdominal pain. Pt is now s/p ex lap, SBR, primary anastomosis on 7/6. C/b UTI and ileus. PICC line placed 7/13 for TPN    CLD  PICC w. TPN  Abdominal X-ray to monitor ileus  Pain/nausea control PRN  Condom cath   SQL/SCDs  OOBA/IS  Dispo: refused [facility], home w. homecare

## 2023-07-17 NOTE — PROGRESS NOTE ADULT - SUBJECTIVE AND OBJECTIVE BOX
SUBJECTIVE:  Patient was seen at bedside this AM with chief resident. Patient alana nausea and vomiting. Pating continues to confirm flatus but denies BM. Reports mild abdominal pain    MEDICATIONS  (STANDING):  chlorhexidine 2% Cloths 1 Application(s) Topical <User Schedule>  doxazosin 1 milliGRAM(s) Oral at bedtime  enoxaparin Injectable 40 milliGRAM(s) SubCutaneous every 24 hours  lipid, fat emulsion (Fish Oil and Plant Based) 20% Infusion 0.565 Gm/kG/Day (20.8 mL/Hr) IV Continuous <Continuous>  pantoprazole  Injectable 40 milliGRAM(s) IV Push daily  Parenteral Nutrition - Adult 1 Each (63 mL/Hr) TPN Continuous <Continuous>    MEDICATIONS  (PRN):  ondansetron Injectable 4 milliGRAM(s) IV Push every 6 hours PRN Nausea and/or Vomiting  sodium chloride 0.9% lock flush 10 milliLiter(s) IV Push every 1 hour PRN Pre/post blood products, medications, blood draw, and to maintain line patency      Vital Signs Last 24 Hrs  T(C): 36.8 (17 Jul 2023 09:16), Max: 37.7 (16 Jul 2023 21:03)  T(F): 98.2 (17 Jul 2023 09:16), Max: 99.9 (16 Jul 2023 21:03)  HR: 102 (17 Jul 2023 04:55) (90 - 108)  BP: 115/77 (17 Jul 2023 04:55) (115/74 - 120/82)  BP(mean): 92 (17 Jul 2023 04:55) (90 - 97)  RR: 17 (17 Jul 2023 04:55) (16 - 18)  SpO2: 95% (17 Jul 2023 04:55) (94% - 95%)    Parameters below as of 17 Jul 2023 04:55  Patient On (Oxygen Delivery Method): room air        Physical Exam:  General: NAD, resting comfortably in bed  Pulmonary: Nonlabored breathing, no respiratory distress  Cardiovascular: NSR  Abdominal: soft, ND, mildly tender to palpation diffusely  Extremities: WWP, normal strength  Neuro: A/O x 3, CNs II-XII grossly intact, no focal deficits, normal motor/sensation  Pulses: palpable distal pulses    I&O's Summary    16 Jul 2023 07:01  -  17 Jul 2023 07:00  --------------------------------------------------------  IN: 1887.8 mL / OUT: 3000 mL / NET: -1112.2 mL        LABS:                        9.0    5.37  )-----------( 295      ( 16 Jul 2023 05:30 )             30.2     07-17    137  |  106  |  11  ----------------------------<  141<H>  3.8   |  22  |  0.67    Ca    8.5      17 Jul 2023 05:30  Phos  2.8     07-16  Mg     2.0     07-16        Urinalysis Basic - ( 17 Jul 2023 05:30 )    Color: x / Appearance: x / SG: x / pH: x  Gluc: 141 mg/dL / Ketone: x  / Bili: x / Urobili: x   Blood: x / Protein: x / Nitrite: x   Leuk Esterase: x / RBC: x / WBC x   Sq Epi: x / Non Sq Epi: x / Bacteria: x      CAPILLARY BLOOD GLUCOSE            RADIOLOGY & ADDITIONAL STUDIES:

## 2023-07-17 NOTE — CHART NOTE - NSCHARTNOTEFT_GEN_A_CORE
Addendum to 7/11 General surgery Progress Note:  Likely underlying cause for elevated HR and WBC from 7/9-7/11 likely sepsis due to UTI  Sepsis criteria met: 7/9 Temp 101, Pulse up to 134, and WBC on 7/11 13.25  Sepsis now resolved

## 2023-07-18 LAB
ANION GAP SERPL CALC-SCNC: 10 MMOL/L — SIGNIFICANT CHANGE UP (ref 5–17)
BUN SERPL-MCNC: 10 MG/DL — SIGNIFICANT CHANGE UP (ref 7–23)
CALCIUM SERPL-MCNC: 8.5 MG/DL — SIGNIFICANT CHANGE UP (ref 8.4–10.5)
CHLORIDE SERPL-SCNC: 107 MMOL/L — SIGNIFICANT CHANGE UP (ref 96–108)
CO2 SERPL-SCNC: 24 MMOL/L — SIGNIFICANT CHANGE UP (ref 22–31)
CREAT SERPL-MCNC: 0.64 MG/DL — SIGNIFICANT CHANGE UP (ref 0.5–1.3)
EGFR: 101 ML/MIN/1.73M2 — SIGNIFICANT CHANGE UP
GLUCOSE SERPL-MCNC: 143 MG/DL — HIGH (ref 70–99)
MAGNESIUM SERPL-MCNC: 1.9 MG/DL — SIGNIFICANT CHANGE UP (ref 1.6–2.6)
PHOSPHATE SERPL-MCNC: 2.6 MG/DL — SIGNIFICANT CHANGE UP (ref 2.5–4.5)
POTASSIUM SERPL-MCNC: 3.8 MMOL/L — SIGNIFICANT CHANGE UP (ref 3.5–5.3)
POTASSIUM SERPL-SCNC: 3.8 MMOL/L — SIGNIFICANT CHANGE UP (ref 3.5–5.3)
SODIUM SERPL-SCNC: 141 MMOL/L — SIGNIFICANT CHANGE UP (ref 135–145)

## 2023-07-18 RX ORDER — ELECTROLYTE SOLUTION,INJ
1 VIAL (ML) INTRAVENOUS
Refills: 0 | Status: DISCONTINUED | OUTPATIENT
Start: 2023-07-18 | End: 2023-07-18

## 2023-07-18 RX ORDER — MAGNESIUM SULFATE 500 MG/ML
1 VIAL (ML) INJECTION ONCE
Refills: 0 | Status: COMPLETED | OUTPATIENT
Start: 2023-07-18 | End: 2023-07-18

## 2023-07-18 RX ORDER — I.V. FAT EMULSION 20 G/100ML
0.56 EMULSION INTRAVENOUS
Qty: 50 | Refills: 0 | Status: DISCONTINUED | OUTPATIENT
Start: 2023-07-18 | End: 2023-07-18

## 2023-07-18 RX ORDER — SODIUM,POTASSIUM PHOSPHATES 278-250MG
2 POWDER IN PACKET (EA) ORAL ONCE
Refills: 0 | Status: COMPLETED | OUTPATIENT
Start: 2023-07-18 | End: 2023-07-18

## 2023-07-18 RX ADMIN — I.V. FAT EMULSION 20.83 GM/KG/DAY: 20 EMULSION INTRAVENOUS at 19:26

## 2023-07-18 RX ADMIN — Medication 1 MILLIGRAM(S): at 22:11

## 2023-07-18 RX ADMIN — CHLORHEXIDINE GLUCONATE 1 APPLICATION(S): 213 SOLUTION TOPICAL at 06:16

## 2023-07-18 RX ADMIN — Medication 100 GRAM(S): at 12:59

## 2023-07-18 RX ADMIN — ENOXAPARIN SODIUM 40 MILLIGRAM(S): 100 INJECTION SUBCUTANEOUS at 19:29

## 2023-07-18 RX ADMIN — Medication 1 EACH: at 19:26

## 2023-07-18 RX ADMIN — Medication 2 PACKET(S): at 09:25

## 2023-07-18 RX ADMIN — PANTOPRAZOLE SODIUM 40 MILLIGRAM(S): 20 TABLET, DELAYED RELEASE ORAL at 12:59

## 2023-07-18 NOTE — CHART NOTE - NSCHARTNOTEFT_GEN_A_CORE
Admitting Diagnosis:   Patient is a 73y old  Male who presents with a chief complaint of Jejunal mass (2023 09:19)      PAST MEDICAL & SURGICAL HISTORY:  Depression, major      Stroke      Pulmonary embolism      BPH (benign prostatic hyperplasia)      Diverticulosis    Current Nutrition Order:  Clear liquid Kosher diet + TPN: 95 g AA, 330 g Dex, 50 g Lipid. This provides: 2002 kcal, 95 g AA. Meetin kcal/kg, 1.1 g/kg based on IBW 88.5 kg. GIR: 2.6 g/kg/min.    PO Intake: Good (%) [   ]  Fair (50-75%) [   ] Poor (<25%) [ x  ]    GI Issues: No nausea/vomiting at this time. Last documented bowel movement .    Pain: No pain noted at this time.     Skin Integrity: B/L wrist edema 1+. Surgical incisions per chart. Dante score: 14.    Labs:       141  |  107  |  10  ----------------------------<  143<H>  3.8   |  24  |  0.64    Ca    8.5      2023 05:30  Phos  2.6     07-18  Mg     1.9     07-18      CAPILLARY BLOOD GLUCOSE          Medications:  MEDICATIONS  (STANDING):  chlorhexidine 2% Cloths 1 Application(s) Topical <User Schedule>  doxazosin 1 milliGRAM(s) Oral at bedtime  enoxaparin Injectable 40 milliGRAM(s) SubCutaneous every 24 hours  lipid, fat emulsion (Fish Oil and Plant Based) 20% Infusion 0.5649 Gm/kG/Day (20.83 mL/Hr) IV Continuous <Continuous>  pantoprazole  Injectable 40 milliGRAM(s) IV Push daily  Parenteral Nutrition - Adult 1 Each (63 mL/Hr) TPN Continuous <Continuous>  Parenteral Nutrition - Adult 1 Each (63 mL/Hr) TPN Continuous <Continuous>    MEDICATIONS  (PRN):  ondansetron Injectable 4 milliGRAM(s) IV Push every 6 hours PRN Nausea and/or Vomiting  sodium chloride 0.9% lock flush 10 milliLiter(s) IV Push every 1 hour PRN Pre/post blood products, medications, blood draw, and to maintain line patency      Dosing Anthropometrics:  Height: 5'7  Weight: 195 lbs  BMI: 30.6  IBW: 148 pounds/67.2 kg  %IBW: 132%    Weight Change: No new documented weights at this time.    Estimated energy needs: Ideal body weight used to calculate energy needs due to pt's current body weight within % ideal body weight. Adjust for CA, age    Estimated Energy Needs From (jose l/kg) 28  Estimated Energy Needs To (jose l/kg)	32  Estimated Energy Needs Calculated From (jose l/kg) 1878  Estimated Energy Needs Calculated To (jose l/kg)	2147    Estimated Protein Needs From (g/kg)	1.3  Estimated Protein Needs To (g/kg)	1.5  Estimated Protein Needs Calculated From (g/kg) 87.23  Estimated Protein Needs Calculated To (g/kg)	100.65    Estimated Fluid Needs From (ml/kg)	28  Estimated Fluid Needs To (ml/kg)	32  Estimated Fluid Needs Calculated From (ml/kg)	1878  Estimated Fluid Needs Calculated To (ml/kg)	2147    Subjective: 73 year old male with pmh of stroke (R hemiparesis), recurrent PE (last 2022), diverticulosis and PSHx of colon tumor removal (), known jejunal mass presenting with abdominal pain. Pt is now s/p ex lap, SBR, primary anastomosis on . C/b UTI and ileus. PICC line placed  for TPN.    Patient seen at bedside for follow up assessment-on room air. Labs reviewed ; electrolytes within normal limits at this time. Observed TPN infusing at bedside. Patient awaiting breakfast tray at time of assessment; reports only having coffee thus far today. Discussed importance of meeting nutritional needs via PO diet. Made aware RD remains available. RD to follow up. See nutrition recommendations below.    Previous Nutrition Diagnosis: Inadequate Oral Intake related to clinical course As evidenced by NPO status    Active [ x  ]  Resolved [   ]    If resolved, new PES:     Goal: - Consistently meets > 75% EER via most appropriate route for nutrition    Recommendations:  1. TPN via PICC: 330g Dex, 95g AA, 50g SMOF Lipids to provide in total 2000Kcal, 95g protein, GIR 2.6, 1.4g/kg IBW protein  Recommend checking TG before starting TPN and then check TG weekly. Check Mg, Phos, K daily and POC BG Q6hrs. Trend daily weights. Fluids and lytes per MD discretion. Start at 150g Dex on Day 1, 250g Dex on Day 2, and advance to goal of 330g Dex on Day 3.   >>advance diet to regular diet as able  2. Monitor GI fxn, skin integrity, labs, daily wts   3. RD to remain available for recs adjustment prn or will follow up pt per organizational policy    Education: Importance of PO intake to meet nutritional needs    Risk Level: High [ x  ] Moderate [   ] Low [   ]

## 2023-07-18 NOTE — PROGRESS NOTE ADULT - ASSESSMENT
73 year old male with pmh of stroke (R hemiparesis), recurrent PE (last Jan 2022), diverticulosis and PSHx of colon tumor removal (1988), known jejunal mass presenting with abdominal pain. Pt is now s/p ex lap, SBR, primary anastomosis on 7/6. C/b UTI and ileus. PICC line placed 7/13 for TPN.    CLD w/ coffee  PICC w/ TPN  Pain/nausea control PRN  Condom cath   SQL/SCDs  OOBA/IS  Dispo: refused [facility], PT pending

## 2023-07-18 NOTE — PROGRESS NOTE ADULT - SUBJECTIVE AND OBJECTIVE BOX
INTERVAL HPI/OVERNIGHT EVENTS: +F/+BM x1    STATUS POST:  7/6: Ex lap with small bowel resection and anastomosis, EBL 10cc, IVF 1L,     POST OPERATIVE DAY #: 12    SUBJECTIVE: Pt seen and examined at bedside this am by surgery team. No acute complaints. +F/+BM. Tolerating diet, pain well controlled. Denies f/n/v/cp/sob.    MEDICATIONS  (STANDING):  chlorhexidine 2% Cloths 1 Application(s) Topical <User Schedule>  doxazosin 1 milliGRAM(s) Oral at bedtime  enoxaparin Injectable 40 milliGRAM(s) SubCutaneous every 24 hours  lipid, fat emulsion (Fish Oil and Plant Based) 20% Infusion 0.565 Gm/kG/Day (20.8 mL/Hr) IV Continuous <Continuous>  magnesium sulfate  IVPB 1 Gram(s) IV Intermittent once  pantoprazole  Injectable 40 milliGRAM(s) IV Push daily  Parenteral Nutrition - Adult 1 Each (63 mL/Hr) TPN Continuous <Continuous>  potassium phosphate / sodium phosphate Powder (PHOS-NaK) 2 Packet(s) Oral once    MEDICATIONS  (PRN):  ondansetron Injectable 4 milliGRAM(s) IV Push every 6 hours PRN Nausea and/or Vomiting  sodium chloride 0.9% lock flush 10 milliLiter(s) IV Push every 1 hour PRN Pre/post blood products, medications, blood draw, and to maintain line patency    Vital Signs Last 24 Hrs  T(C): 36.8 (18 Jul 2023 05:16), Max: 37.3 (17 Jul 2023 21:57)  T(F): 98.2 (18 Jul 2023 05:16), Max: 99.1 (17 Jul 2023 21:57)  HR: 94 (18 Jul 2023 03:30) (86 - 102)  BP: 111/64 (18 Jul 2023 03:30) (111/64 - 129/78)  BP(mean): 82 (18 Jul 2023 03:30) (82 - 98)  RR: 17 (18 Jul 2023 03:30) (17 - 18)  SpO2: 93% (18 Jul 2023 03:30) (93% - 97%)    Parameters below as of 18 Jul 2023 03:30  Patient On (Oxygen Delivery Method): room air    PHYSICAL EXAM:    Constitutional: A&Ox3, NAD    Respiratory: non labored breathing, no respiratory distress    Cardiovascular: NSR, RRR    Gastrointestinal: abdomen more soft today, mildly distended, nt. No rebound or guarding. Incisions c/d/i.     Extremities: wwp, no calf tenderness or edema. SCDs in place     I&O's Detail    17 Jul 2023 07:01  -  18 Jul 2023 07:00  --------------------------------------------------------  IN:    Fat Emulsion (Fish Oil &amp; Plant Based) 20% Infusion: 270.4 mL    IV PiggyBack: 15 mL    Oral Fluid: 480 mL    TPN (Total Parenteral Nutrition): 1512 mL  Total IN: 2277.4 mL    OUT:    Voided (mL): 3150 mL  Total OUT: 3150 mL    Total NET: -872.6 mL          LABS:    07-18    141  |  107  |  10  ----------------------------<  143<H>  3.8   |  24  |  0.64    Ca    8.5      18 Jul 2023 05:30  Phos  2.6     07-18  Mg     1.9     07-18        Urinalysis Basic - ( 18 Jul 2023 05:30 )    Color: x / Appearance: x / SG: x / pH: x  Gluc: 143 mg/dL / Ketone: x  / Bili: x / Urobili: x   Blood: x / Protein: x / Nitrite: x   Leuk Esterase: x / RBC: x / WBC x   Sq Epi: x / Non Sq Epi: x / Bacteria: x        RADIOLOGY & ADDITIONAL STUDIES:/

## 2023-07-19 LAB
ANION GAP SERPL CALC-SCNC: 12 MMOL/L — SIGNIFICANT CHANGE UP (ref 5–17)
BUN SERPL-MCNC: 12 MG/DL — SIGNIFICANT CHANGE UP (ref 7–23)
CALCIUM SERPL-MCNC: 8.5 MG/DL — SIGNIFICANT CHANGE UP (ref 8.4–10.5)
CHLORIDE SERPL-SCNC: 103 MMOL/L — SIGNIFICANT CHANGE UP (ref 96–108)
CO2 SERPL-SCNC: 21 MMOL/L — LOW (ref 22–31)
CREAT SERPL-MCNC: 0.69 MG/DL — SIGNIFICANT CHANGE UP (ref 0.5–1.3)
EGFR: 99 ML/MIN/1.73M2 — SIGNIFICANT CHANGE UP
GLUCOSE SERPL-MCNC: 148 MG/DL — HIGH (ref 70–99)
MAGNESIUM SERPL-MCNC: 2 MG/DL — SIGNIFICANT CHANGE UP (ref 1.6–2.6)
PHOSPHATE SERPL-MCNC: 3.1 MG/DL — SIGNIFICANT CHANGE UP (ref 2.5–4.5)
POTASSIUM SERPL-MCNC: 4.3 MMOL/L — SIGNIFICANT CHANGE UP (ref 3.5–5.3)
POTASSIUM SERPL-SCNC: 4.3 MMOL/L — SIGNIFICANT CHANGE UP (ref 3.5–5.3)
SODIUM SERPL-SCNC: 136 MMOL/L — SIGNIFICANT CHANGE UP (ref 135–145)

## 2023-07-19 PROCEDURE — 71045 X-RAY EXAM CHEST 1 VIEW: CPT | Mod: 26

## 2023-07-19 RX ORDER — ELECTROLYTE SOLUTION,INJ
1 VIAL (ML) INTRAVENOUS
Refills: 0 | Status: DISCONTINUED | OUTPATIENT
Start: 2023-07-19 | End: 2023-07-19

## 2023-07-19 RX ORDER — I.V. FAT EMULSION 20 G/100ML
0.56 EMULSION INTRAVENOUS
Qty: 50 | Refills: 0 | Status: DISCONTINUED | OUTPATIENT
Start: 2023-07-19 | End: 2023-07-19

## 2023-07-19 RX ORDER — SODIUM CHLORIDE 9 MG/ML
500 INJECTION, SOLUTION INTRAVENOUS ONCE
Refills: 0 | Status: COMPLETED | OUTPATIENT
Start: 2023-07-19 | End: 2023-07-19

## 2023-07-19 RX ORDER — PANTOPRAZOLE SODIUM 20 MG/1
40 TABLET, DELAYED RELEASE ORAL
Refills: 0 | Status: DISCONTINUED | OUTPATIENT
Start: 2023-07-19 | End: 2023-07-21

## 2023-07-19 RX ADMIN — PANTOPRAZOLE SODIUM 40 MILLIGRAM(S): 20 TABLET, DELAYED RELEASE ORAL at 12:05

## 2023-07-19 RX ADMIN — ENOXAPARIN SODIUM 40 MILLIGRAM(S): 100 INJECTION SUBCUTANEOUS at 18:00

## 2023-07-19 RX ADMIN — I.V. FAT EMULSION 20.83 GM/KG/DAY: 20 EMULSION INTRAVENOUS at 18:09

## 2023-07-19 RX ADMIN — SODIUM CHLORIDE 500 MILLILITER(S): 9 INJECTION, SOLUTION INTRAVENOUS at 22:44

## 2023-07-19 RX ADMIN — Medication 1 EACH: at 18:09

## 2023-07-19 RX ADMIN — Medication 1 MILLIGRAM(S): at 21:54

## 2023-07-19 RX ADMIN — CHLORHEXIDINE GLUCONATE 1 APPLICATION(S): 213 SOLUTION TOPICAL at 05:28

## 2023-07-19 NOTE — OCCUPATIONAL THERAPY INITIAL EVALUATION ADULT - PERTINENT HX OF CURRENT PROBLEM, REHAB EVAL
73 year old male with PMH of stroke (R hemiparesis), recurrent PE (last Jan 2022), diverticulosis and PSHx of colon tumor removal (1988), known jejunal mass presenting with abdominal pain. Pt is now s/p ex lap, SBR, primary anastomosis on 7/6. C/b UTI and ileus. PICC line placed 7/13 for TPN

## 2023-07-19 NOTE — OCCUPATIONAL THERAPY INITIAL EVALUATION ADULT - GENERAL OBSERVATIONS, REHAB EVAL
Pt received in supine - +heplock, IVs, TPN, rogers, abdominal dressing intact. PT Hitesh present. Pt's private at bedside. Pt w/ fair affect, agreeable to OT.

## 2023-07-19 NOTE — PROGRESS NOTE ADULT - ASSESSMENT
73 year old male with pmh of stroke (R hemiparesis), recurrent PE (last Jan 2022), diverticulosis and PSHx of colon tumor removal (1988), known jejunal mass presenting with abdominal pain. Pt is now s/p ex lap, SBR, primary anastomosis on 7/6. C/b UTI and ileus. PICC line placed 7/13 for TPN    CLD w/ coffee  PICC w/ TPN  Pain/nausea control PRN  Condom cath   SQL/SCDs  OOBA/IS  Dispo: refused [facility], home w/ homecare 73 year old male with pmh of stroke (R hemiparesis), recurrent PE (last Jan 2022), diverticulosis and PSHx of colon tumor removal (1988), known jejunal mass presenting with abdominal pain. Pt is now s/p ex lap, SBR, primary anastomosis on 7/6. C/b UTI and ileus. PICC line placed 7/13 for TPN    CLD   PICC w/ TPN  Pain/nausea control PRN  Condom cath   SQL/SCDs  OOBA/IS  Dispo: refused [facility], home w/ homecare

## 2023-07-19 NOTE — OCCUPATIONAL THERAPY INITIAL EVALUATION ADULT - ADDITIONAL COMMENTS
Pt lives w/ his wife in private house. Pt has [facility] and wife + [facility] for assistance 4Hrs/dy for ADLs and required assist for ambulation. Pt reports having grab bars, RW and wheel chair.

## 2023-07-19 NOTE — PROGRESS NOTE ADULT - SUBJECTIVE AND OBJECTIVE BOX
INTERVAL HPI/OVERNIGHT EVENTS: +F/-BM, tachy up to 108    STATUS POST:  7/6: Ex lap with small bowel resection and anastomosis, EBL 10cc, IVF 1L,     POST OPERATIVE DAY #: 13    SUBJECTIVE: Pt seen and examined at bedside this am by surgery team. No acute complaints. Tolerating diet, pain well controlled. +F/-BMs. Denies f/n/v/cp/sob.    MEDICATIONS  (STANDING):  chlorhexidine 2% Cloths 1 Application(s) Topical <User Schedule>  doxazosin 1 milliGRAM(s) Oral at bedtime  enoxaparin Injectable 40 milliGRAM(s) SubCutaneous every 24 hours  lipid, fat emulsion (Fish Oil and Plant Based) 20% Infusion 0.5649 Gm/kG/Day (20.83 mL/Hr) IV Continuous <Continuous>  pantoprazole  Injectable 40 milliGRAM(s) IV Push daily  Parenteral Nutrition - Adult 1 Each (63 mL/Hr) TPN Continuous <Continuous>    MEDICATIONS  (PRN):  ondansetron Injectable 4 milliGRAM(s) IV Push every 6 hours PRN Nausea and/or Vomiting  sodium chloride 0.9% lock flush 10 milliLiter(s) IV Push every 1 hour PRN Pre/post blood products, medications, blood draw, and to maintain line patency      Vital Signs Last 24 Hrs  T(C): 37 (19 Jul 2023 04:35), Max: 37.3 (18 Jul 2023 16:52)  T(F): 98.6 (19 Jul 2023 04:35), Max: 99.1 (18 Jul 2023 16:52)  HR: 103 (19 Jul 2023 04:35) (100 - 108)  BP: 115/78 (19 Jul 2023 04:35) (109/74 - 121/83)  BP(mean): 91 (18 Jul 2023 08:49) (91 - 91)  RR: 18 (19 Jul 2023 04:35) (18 - 19)  SpO2: 94% (19 Jul 2023 04:35) (94% - 96%)    Parameters below as of 19 Jul 2023 04:35  Patient On (Oxygen Delivery Method): room air    PHYSICAL EXAM:    Constitutional: A&Ox3, NAD    Respiratory: non labored breathing, no respiratory distress    Cardiovascular: NSR, RRR    Gastrointestinal: abdomen more soft today, mildly distended, nt. No rebound or guarding. Incisions c/d/i.     Extremities: wwp, no calf tenderness or edema. SCDs in place      I&O's Detail    18 Jul 2023 07:01  -  19 Jul 2023 07:00  --------------------------------------------------------  IN:    Fat Emulsion (Fish Oil &amp; Plant Based) 20% Infusion: 291.2 mL    IV PiggyBack: 100 mL    Oral Fluid: 840 mL    TPN (Total Parenteral Nutrition): 1449 mL  Total IN: 2680.2 mL    OUT:    Incontinent per Condom Catheter (mL): 875 mL    Voided (mL): 650 mL  Total OUT: 1525 mL    Total NET: 1155.2 mL          LABS:    07-18    141  |  107  |  10  ----------------------------<  143<H>  3.8   |  24  |  0.64    Ca    8.5      18 Jul 2023 05:30  Phos  2.6     07-18  Mg     1.9     07-18        Urinalysis Basic - ( 18 Jul 2023 05:30 )    Color: x / Appearance: x / SG: x / pH: x  Gluc: 143 mg/dL / Ketone: x  / Bili: x / Urobili: x   Blood: x / Protein: x / Nitrite: x   Leuk Esterase: x / RBC: x / WBC x   Sq Epi: x / Non Sq Epi: x / Bacteria: x        RADIOLOGY & ADDITIONAL STUDIES:

## 2023-07-19 NOTE — CHART NOTE - NSCHARTNOTEFT_GEN_A_CORE
Paged by nurse around 8pm that patient is tachycardic (HR 120s). Went to evaluate patient. He was resting comfortably in bed. No complaints. Repeat vitals /85, , and satting 94% on RA. Abdomen was softish, mildly distended, and nontender. Went to evaluate patient again around 10:20ish. Patient states that he is not feeling so well. He states that he is shor Paged by nurse around 8pm that patient is tachycardic (HR 120s). Went to evaluate patient. He was resting comfortably in bed. No complaints. Repeat vitals /85, , and satting 94% on RA. Abdomen was softish, mildly distended, and nontender. Went to evaluate patient again around 10:20ish. Patient states that he is not feeling so well. He states that he is short of breath, but denies abdominal pain, nausea, and vomiting. Reports passing gas, but no BM yet. Repeat vitals BP 1203/70, -123, temp 99.4, and satting 93-94% on RA. Abdominal exam remains the same.     - STAT labs   - 500cc bolus   - STAT CXR     Plan discussed with Dr. Spann Paged by nurse around 8pm that patient is tachycardic (HR 120s). Went to evaluate patient. He was resting comfortably in bed. No complaints. Repeat vitals /85, , and satting 94% on RA. Abdomen was softish, mildly distended, and nontender. Went to evaluate patient again around 10:20ish. Patient states that he is not feeling so well. He states that he is short of breath, but denies abdominal pain, nausea, and vomiting. Reports passing gas, but no BM yet. Repeat vitals /70, -123, temp 99.4, and satting 93-94% on RA. Abdominal exam remains the same.     - STAT labs   - 500cc bolus   - STAT CXR     Plan discussed with Dr. Spann

## 2023-07-19 NOTE — OCCUPATIONAL THERAPY INITIAL EVALUATION ADULT - NSOTDISCHREC_GEN_A_CORE
[facility] however family refused. If Pt were to be d/c'd home, Pt would benefit from Home OT w/ and 24 Hr Assist./Sub-acute Rehab

## 2023-07-20 LAB
ANION GAP SERPL CALC-SCNC: 11 MMOL/L — SIGNIFICANT CHANGE UP (ref 5–17)
ANION GAP SERPL CALC-SCNC: 13 MMOL/L — SIGNIFICANT CHANGE UP (ref 5–17)
BUN SERPL-MCNC: 13 MG/DL — SIGNIFICANT CHANGE UP (ref 7–23)
BUN SERPL-MCNC: 14 MG/DL — SIGNIFICANT CHANGE UP (ref 7–23)
CALCIUM SERPL-MCNC: 8.7 MG/DL — SIGNIFICANT CHANGE UP (ref 8.4–10.5)
CALCIUM SERPL-MCNC: 8.7 MG/DL — SIGNIFICANT CHANGE UP (ref 8.4–10.5)
CHLORIDE SERPL-SCNC: 100 MMOL/L — SIGNIFICANT CHANGE UP (ref 96–108)
CHLORIDE SERPL-SCNC: 99 MMOL/L — SIGNIFICANT CHANGE UP (ref 96–108)
CO2 SERPL-SCNC: 21 MMOL/L — LOW (ref 22–31)
CO2 SERPL-SCNC: 21 MMOL/L — LOW (ref 22–31)
CREAT SERPL-MCNC: 0.67 MG/DL — SIGNIFICANT CHANGE UP (ref 0.5–1.3)
CREAT SERPL-MCNC: 0.7 MG/DL — SIGNIFICANT CHANGE UP (ref 0.5–1.3)
EGFR: 100 ML/MIN/1.73M2 — SIGNIFICANT CHANGE UP
EGFR: 99 ML/MIN/1.73M2 — SIGNIFICANT CHANGE UP
GLUCOSE SERPL-MCNC: 151 MG/DL — HIGH (ref 70–99)
GLUCOSE SERPL-MCNC: 160 MG/DL — HIGH (ref 70–99)
HCT VFR BLD CALC: 33.6 % — LOW (ref 39–50)
HCT VFR BLD CALC: 34.3 % — LOW (ref 39–50)
HGB BLD-MCNC: 10.2 G/DL — LOW (ref 13–17)
HGB BLD-MCNC: 10.5 G/DL — LOW (ref 13–17)
MAGNESIUM SERPL-MCNC: 1.8 MG/DL — SIGNIFICANT CHANGE UP (ref 1.6–2.6)
MAGNESIUM SERPL-MCNC: 1.9 MG/DL — SIGNIFICANT CHANGE UP (ref 1.6–2.6)
MCHC RBC-ENTMCNC: 24.9 PG — LOW (ref 27–34)
MCHC RBC-ENTMCNC: 25.1 PG — LOW (ref 27–34)
MCHC RBC-ENTMCNC: 30.4 GM/DL — LOW (ref 32–36)
MCHC RBC-ENTMCNC: 30.6 GM/DL — LOW (ref 32–36)
MCV RBC AUTO: 81.5 FL — SIGNIFICANT CHANGE UP (ref 80–100)
MCV RBC AUTO: 82.6 FL — SIGNIFICANT CHANGE UP (ref 80–100)
NRBC # BLD: 0 /100 WBCS — SIGNIFICANT CHANGE UP (ref 0–0)
NRBC # BLD: 0 /100 WBCS — SIGNIFICANT CHANGE UP (ref 0–0)
PHOSPHATE SERPL-MCNC: 2.9 MG/DL — SIGNIFICANT CHANGE UP (ref 2.5–4.5)
PHOSPHATE SERPL-MCNC: 3.3 MG/DL — SIGNIFICANT CHANGE UP (ref 2.5–4.5)
PLATELET # BLD AUTO: 307 K/UL — SIGNIFICANT CHANGE UP (ref 150–400)
PLATELET # BLD AUTO: 405 K/UL — HIGH (ref 150–400)
POTASSIUM SERPL-MCNC: 3.9 MMOL/L — SIGNIFICANT CHANGE UP (ref 3.5–5.3)
POTASSIUM SERPL-MCNC: 4.4 MMOL/L — SIGNIFICANT CHANGE UP (ref 3.5–5.3)
POTASSIUM SERPL-SCNC: 3.9 MMOL/L — SIGNIFICANT CHANGE UP (ref 3.5–5.3)
POTASSIUM SERPL-SCNC: 4.4 MMOL/L — SIGNIFICANT CHANGE UP (ref 3.5–5.3)
RBC # BLD: 4.07 M/UL — LOW (ref 4.2–5.8)
RBC # BLD: 4.21 M/UL — SIGNIFICANT CHANGE UP (ref 4.2–5.8)
RBC # FLD: 32.8 % — HIGH (ref 10.3–14.5)
RBC # FLD: SIGNIFICANT CHANGE UP (ref 10.3–14.5)
SODIUM SERPL-SCNC: 132 MMOL/L — LOW (ref 135–145)
SODIUM SERPL-SCNC: 133 MMOL/L — LOW (ref 135–145)
WBC # BLD: 7.93 K/UL — SIGNIFICANT CHANGE UP (ref 3.8–10.5)
WBC # BLD: 8.74 K/UL — SIGNIFICANT CHANGE UP (ref 3.8–10.5)
WBC # FLD AUTO: 7.93 K/UL — SIGNIFICANT CHANGE UP (ref 3.8–10.5)
WBC # FLD AUTO: 8.74 K/UL — SIGNIFICANT CHANGE UP (ref 3.8–10.5)

## 2023-07-20 PROCEDURE — 71275 CT ANGIOGRAPHY CHEST: CPT | Mod: 26

## 2023-07-20 PROCEDURE — 43752 NASAL/OROGASTRIC W/TUBE PLMT: CPT

## 2023-07-20 PROCEDURE — 74019 RADEX ABDOMEN 2 VIEWS: CPT | Mod: 26

## 2023-07-20 PROCEDURE — 93010 ELECTROCARDIOGRAM REPORT: CPT

## 2023-07-20 PROCEDURE — 74177 CT ABD & PELVIS W/CONTRAST: CPT | Mod: 26

## 2023-07-20 RX ORDER — I.V. FAT EMULSION 20 G/100ML
0.56 EMULSION INTRAVENOUS
Qty: 50 | Refills: 0 | Status: DISCONTINUED | OUTPATIENT
Start: 2023-07-20 | End: 2023-07-20

## 2023-07-20 RX ORDER — MAGNESIUM SULFATE 500 MG/ML
1 VIAL (ML) INJECTION ONCE
Refills: 0 | Status: COMPLETED | OUTPATIENT
Start: 2023-07-20 | End: 2023-07-20

## 2023-07-20 RX ORDER — SODIUM CHLORIDE 9 MG/ML
1000 INJECTION INTRAMUSCULAR; INTRAVENOUS; SUBCUTANEOUS
Refills: 0 | Status: DISCONTINUED | OUTPATIENT
Start: 2023-07-20 | End: 2023-07-24

## 2023-07-20 RX ORDER — IOHEXOL 300 MG/ML
30 INJECTION, SOLUTION INTRAVENOUS ONCE
Refills: 0 | Status: COMPLETED | OUTPATIENT
Start: 2023-07-20 | End: 2023-07-20

## 2023-07-20 RX ORDER — ELECTROLYTE SOLUTION,INJ
1 VIAL (ML) INTRAVENOUS
Refills: 0 | Status: DISCONTINUED | OUTPATIENT
Start: 2023-07-20 | End: 2023-07-20

## 2023-07-20 RX ADMIN — SODIUM CHLORIDE 40 MILLILITER(S): 9 INJECTION INTRAMUSCULAR; INTRAVENOUS; SUBCUTANEOUS at 18:06

## 2023-07-20 RX ADMIN — ENOXAPARIN SODIUM 40 MILLIGRAM(S): 100 INJECTION SUBCUTANEOUS at 18:01

## 2023-07-20 RX ADMIN — PANTOPRAZOLE SODIUM 40 MILLIGRAM(S): 20 TABLET, DELAYED RELEASE ORAL at 05:47

## 2023-07-20 RX ADMIN — CHLORHEXIDINE GLUCONATE 1 APPLICATION(S): 213 SOLUTION TOPICAL at 05:47

## 2023-07-20 RX ADMIN — Medication 100 GRAM(S): at 14:28

## 2023-07-20 RX ADMIN — Medication 1 EACH: at 18:02

## 2023-07-20 RX ADMIN — IOHEXOL 30 MILLILITER(S): 300 INJECTION, SOLUTION INTRAVENOUS at 08:49

## 2023-07-20 RX ADMIN — I.V. FAT EMULSION 20.8 GM/KG/DAY: 20 EMULSION INTRAVENOUS at 18:01

## 2023-07-20 RX ADMIN — Medication 1 MILLIGRAM(S): at 22:41

## 2023-07-20 NOTE — PROGRESS NOTE ADULT - SUBJECTIVE AND OBJECTIVE BOX
STATUS POST:  7/6: Ex lap with small bowel resection and anastomosis,      ON: persistently tachy to 120s. STAT labs unremarkable and CXR pre correa shows b/l hypoinflated lungs and R lobe atelectasis/opacity. 500cc bolus given. encouraged IS. EKG shows sinus tach. desat to 90% on RA, placed on 2L. +F/-BM.     SUBJECTIVE: Patient seen and examined bedside by chief resident, has no acute complaints at this time. Patient tolerating diet, -n/-v/+f/-bm. OOBA. Denies sob/bear/ha/dizziness    doxazosin 1 milliGRAM(s) Oral at bedtime  enoxaparin Injectable 40 milliGRAM(s) SubCutaneous every 24 hours      Vital Signs Last 24 Hrs  T(C): 37.2 (20 Jul 2023 05:07), Max: 37.4 (19 Jul 2023 13:48)  T(F): 98.9 (20 Jul 2023 05:07), Max: 99.4 (19 Jul 2023 13:48)  HR: 115 (20 Jul 2023 05:07) (97 - 120)  BP: 139/90 (20 Jul 2023 05:07) (108/71 - 145/97)  BP(mean): --  RR: 19 (20 Jul 2023 05:07) (17 - 19)  SpO2: 90% (20 Jul 2023 05:07) (90% - 94%)    Parameters below as of 20 Jul 2023 05:07  Patient On (Oxygen Delivery Method): room air      I&O's Detail    19 Jul 2023 07:01  -  20 Jul 2023 07:00  --------------------------------------------------------  IN:    Fat Emulsion (Fish Oil &amp; Plant Based) 20% Infusion: 249.6 mL    Lactated Ringers Bolus: 500 mL    Oral Fluid: 240 mL    TPN (Total Parenteral Nutrition): 1323 mL  Total IN: 2312.6 mL    OUT:    Voided (mL): 1600 mL  Total OUT: 1600 mL    Total NET: 712.6 mL          General: NAD, resting comfortably in bed  C/V: NSR  Pulm: Nonlabored breathing, no respiratory distress  Abd: soft, NT/ND. No rebound or guarding  Incisions; c/d/i  Extrem: WWP, no edema, SCDs in place        LABS:                        10.5   8.74  )-----------( 405      ( 19 Jul 2023 22:32 )             34.3     07-19    133<L>  |  99  |  14  ----------------------------<  151<H>  3.9   |  21<L>  |  0.70    Ca    8.7      19 Jul 2023 22:32  Phos  2.9     07-19  Mg     1.9     07-19        Urinalysis Basic - ( 19 Jul 2023 22:32 )    Color: x / Appearance: x / SG: x / pH: x  Gluc: 151 mg/dL / Ketone: x  / Bili: x / Urobili: x   Blood: x / Protein: x / Nitrite: x   Leuk Esterase: x / RBC: x / WBC x   Sq Epi: x / Non Sq Epi: x / Bacteria: x        RADIOLOGY & ADDITIONAL STUDIES:

## 2023-07-20 NOTE — PROGRESS NOTE ADULT - ASSESSMENT
73 year old male with pmh of stroke (R hemiparesis), recurrent PE (last Jan 2022), diverticulosis and PSHx of colon tumor removal (1988), known jejunal mass presenting with abdominal pain. Pt is now s/p ex lap, SBR, primary anastomosis on 7/6. C/b UTI and ileus. PICC line placed 7/13 for TPN.     CLD / PICC w/ TPN  CT PE w/ C/A/P IV w. PO contrast  Pain/nausea control PRN  SQL/SCDs  OOBA/IS  Dispo: refused [facility], home w/ homecare

## 2023-07-21 LAB
ANION GAP SERPL CALC-SCNC: 10 MMOL/L — SIGNIFICANT CHANGE UP (ref 5–17)
BUN SERPL-MCNC: 13 MG/DL — SIGNIFICANT CHANGE UP (ref 7–23)
CALCIUM SERPL-MCNC: 8.1 MG/DL — LOW (ref 8.4–10.5)
CHLORIDE SERPL-SCNC: 100 MMOL/L — SIGNIFICANT CHANGE UP (ref 96–108)
CO2 SERPL-SCNC: 24 MMOL/L — SIGNIFICANT CHANGE UP (ref 22–31)
CREAT SERPL-MCNC: 0.68 MG/DL — SIGNIFICANT CHANGE UP (ref 0.5–1.3)
EGFR: 99 ML/MIN/1.73M2 — SIGNIFICANT CHANGE UP
GLUCOSE SERPL-MCNC: 156 MG/DL — HIGH (ref 70–99)
HCT VFR BLD CALC: 30.1 % — LOW (ref 39–50)
HGB BLD-MCNC: 9.1 G/DL — LOW (ref 13–17)
MAGNESIUM SERPL-MCNC: 2 MG/DL — SIGNIFICANT CHANGE UP (ref 1.6–2.6)
MCHC RBC-ENTMCNC: 24.9 PG — LOW (ref 27–34)
MCHC RBC-ENTMCNC: 30.2 GM/DL — LOW (ref 32–36)
MCV RBC AUTO: 82.2 FL — SIGNIFICANT CHANGE UP (ref 80–100)
NRBC # BLD: 0 /100 WBCS — SIGNIFICANT CHANGE UP (ref 0–0)
PHOSPHATE SERPL-MCNC: 2.7 MG/DL — SIGNIFICANT CHANGE UP (ref 2.5–4.5)
PLATELET # BLD AUTO: 356 K/UL — SIGNIFICANT CHANGE UP (ref 150–400)
POTASSIUM SERPL-MCNC: 3.7 MMOL/L — SIGNIFICANT CHANGE UP (ref 3.5–5.3)
POTASSIUM SERPL-SCNC: 3.7 MMOL/L — SIGNIFICANT CHANGE UP (ref 3.5–5.3)
RBC # BLD: 3.66 M/UL — LOW (ref 4.2–5.8)
RBC # FLD: SIGNIFICANT CHANGE UP (ref 10.3–14.5)
SODIUM SERPL-SCNC: 134 MMOL/L — LOW (ref 135–145)
TRIGL SERPL-MCNC: 77 MG/DL — SIGNIFICANT CHANGE UP
WBC # BLD: 9.34 K/UL — SIGNIFICANT CHANGE UP (ref 3.8–10.5)
WBC # FLD AUTO: 9.34 K/UL — SIGNIFICANT CHANGE UP (ref 3.8–10.5)

## 2023-07-21 PROCEDURE — 74018 RADEX ABDOMEN 1 VIEW: CPT | Mod: 26

## 2023-07-21 RX ORDER — POTASSIUM PHOSPHATE, MONOBASIC POTASSIUM PHOSPHATE, DIBASIC 236; 224 MG/ML; MG/ML
30 INJECTION, SOLUTION INTRAVENOUS ONCE
Refills: 0 | Status: DISCONTINUED | OUTPATIENT
Start: 2023-07-21 | End: 2023-07-21

## 2023-07-21 RX ORDER — ACETAMINOPHEN 500 MG
1000 TABLET ORAL ONCE
Refills: 0 | Status: COMPLETED | OUTPATIENT
Start: 2023-07-21 | End: 2023-07-21

## 2023-07-21 RX ORDER — DOXAZOSIN MESYLATE 4 MG
1 TABLET ORAL AT BEDTIME
Refills: 0 | Status: DISCONTINUED | OUTPATIENT
Start: 2023-07-21 | End: 2023-07-31

## 2023-07-21 RX ORDER — POTASSIUM PHOSPHATE, MONOBASIC POTASSIUM PHOSPHATE, DIBASIC 236; 224 MG/ML; MG/ML
30 INJECTION, SOLUTION INTRAVENOUS ONCE
Refills: 0 | Status: COMPLETED | OUTPATIENT
Start: 2023-07-21 | End: 2023-07-21

## 2023-07-21 RX ORDER — ELECTROLYTE SOLUTION,INJ
1 VIAL (ML) INTRAVENOUS
Refills: 0 | Status: DISCONTINUED | OUTPATIENT
Start: 2023-07-21 | End: 2023-07-21

## 2023-07-21 RX ORDER — SENNA PLUS 8.6 MG/1
2 TABLET ORAL EVERY 24 HOURS
Refills: 0 | Status: DISCONTINUED | OUTPATIENT
Start: 2023-07-21 | End: 2023-07-25

## 2023-07-21 RX ORDER — I.V. FAT EMULSION 20 G/100ML
0.56 EMULSION INTRAVENOUS
Qty: 50 | Refills: 0 | Status: DISCONTINUED | OUTPATIENT
Start: 2023-07-21 | End: 2023-07-21

## 2023-07-21 RX ORDER — PANTOPRAZOLE SODIUM 20 MG/1
40 TABLET, DELAYED RELEASE ORAL DAILY
Refills: 0 | Status: DISCONTINUED | OUTPATIENT
Start: 2023-07-21 | End: 2023-07-30

## 2023-07-21 RX ADMIN — CHLORHEXIDINE GLUCONATE 1 APPLICATION(S): 213 SOLUTION TOPICAL at 06:15

## 2023-07-21 RX ADMIN — Medication 1 MILLIGRAM(S): at 22:21

## 2023-07-21 RX ADMIN — POTASSIUM PHOSPHATE, MONOBASIC POTASSIUM PHOSPHATE, DIBASIC 83.33 MILLIMOLE(S): 236; 224 INJECTION, SOLUTION INTRAVENOUS at 16:40

## 2023-07-21 RX ADMIN — Medication 400 MILLIGRAM(S): at 15:58

## 2023-07-21 RX ADMIN — ENOXAPARIN SODIUM 40 MILLIGRAM(S): 100 INJECTION SUBCUTANEOUS at 17:58

## 2023-07-21 RX ADMIN — PANTOPRAZOLE SODIUM 40 MILLIGRAM(S): 20 TABLET, DELAYED RELEASE ORAL at 06:15

## 2023-07-21 RX ADMIN — SENNA PLUS 2 TABLET(S): 8.6 TABLET ORAL at 13:21

## 2023-07-21 RX ADMIN — I.V. FAT EMULSION 20.8 GM/KG/DAY: 20 EMULSION INTRAVENOUS at 17:58

## 2023-07-21 RX ADMIN — Medication 1 EACH: at 17:58

## 2023-07-21 NOTE — PROGRESS NOTE ADULT - ASSESSMENT
73 year old male with pmh of stroke (R hemiparesis), recurrent PE (last Jan 2022), diverticulosis and PSHx of colon tumor removal (1988), known jejunal mass presenting with abdominal pain. Pt is now s/p ex lap, SBR, primary anastomosis on 7/6. C/b UTI and ileus. PICC line placed 7/13 for TPN. f/u abd xr    NPO / PICC w/ TPN  NGT LIWS, possible d/c NGT later   f/u Abd xr pending  IVF NS 40cc/hr  Pain/nausea control PRN  SQL/SCDs  OOBA/IS  Dispo: refused [facility], home w/ homecare

## 2023-07-21 NOTE — CHART NOTE - NSCHARTNOTEFT_GEN_A_CORE
Admitting Diagnosis:   Patient is a 73y old  Male who presents with a chief complaint of Jejunal mass (17 Jul 2023 09:19)  PAST MEDICAL & SURGICAL HISTORY:  Depression, major  Stroke  Pulmonary embolism  BPH (benign prostatic hyperplasia)  Diverticulosis    Current Nutrition Order:  Diet, NPO:   Except Medications (07-21-23 @ 12:42)  Diet, NPO (07-20-23 @ 17:27)    PO Intake: Good (%) [   ]  Fair (50-75%) [   ] Poor (<25%) [   ]  *NPO [  x  ]*    GI Issues: no c/o nausea or vomiting at time of assessment; no distention noted in electronic medical records; last documented bowel movement on 7/18/23 overnight, pt noted to be passing flatus currently;     Pain: No pain noted at this time.     Skin Integrity: no pressure ulcers, no edema. Surgical incisions per chart. Dante score: 14.    Labs:   07-21    134<L>  |  100  |  13  ----------------------------<  156<H>  3.7   |  24  |  0.68    Ca    8.1<L>      21 Jul 2023 06:05  Phos  2.7     07-21  Mg     2.0     07-21    CAPILLARY BLOOD GLUCOSE    Medications:  MEDICATIONS  (STANDING):  chlorhexidine 2% Cloths 1 Application(s) Topical <User Schedule>  doxazosin 1 milliGRAM(s) Oral at bedtime  enoxaparin Injectable 40 milliGRAM(s) SubCutaneous every 24 hours  lipid, fat emulsion (Fish Oil and Plant Based) 20% Infusion 0.565 Gm/kG/Day (20.8 mL/Hr) IV Continuous <Continuous>  pantoprazole  Injectable 40 milliGRAM(s) IV Push daily  Parenteral Nutrition - Adult 1 Each (63 mL/Hr) TPN Continuous <Continuous>  Parenteral Nutrition - Adult 1 Each (63 mL/Hr) TPN Continuous <Continuous>  potassium phosphate IVPB 30 milliMole(s) IV Intermittent once  senna 2 Tablet(s) Oral every 24 hours  sodium chloride 0.9%. 1000 milliLiter(s) (40 mL/Hr) IV Continuous <Continuous>    MEDICATIONS  (PRN):  ondansetron Injectable 4 milliGRAM(s) IV Push every 6 hours PRN Nausea and/or Vomiting  sodium chloride 0.9% lock flush 10 milliLiter(s) IV Push every 1 hour PRN Pre/post blood products, medications, blood draw, and to maintain line patency    Dosing Anthropometrics:  Height: 5'7  Weight: 195 lbs  BMI: 30.6  IBW: 148 pounds/67.2 kg  %IBW: 132%    Weight Change: No new documented weights at this time.    Estimated energy needs: Ideal body weight used to calculate energy needs due to pt's current body weight within % ideal body weight. Adjust for CA, age    Estimated Energy Needs From (jose l/kg) 28  Estimated Energy Needs To (jose l/kg)	32  Estimated Energy Needs Calculated From (jose l/kg) 1878  Estimated Energy Needs Calculated To (jose l/kg)	2147    Estimated Protein Needs From (g/kg)	1.3  Estimated Protein Needs To (g/kg)	1.5  Estimated Protein Needs Calculated From (g/kg) 87.23  Estimated Protein Needs Calculated To (g/kg)	100.65    Estimated Fluid Needs From (ml/kg)	28  Estimated Fluid Needs To (ml/kg)	32  Estimated Fluid Needs Calculated From (ml/kg)	1878  Estimated Fluid Needs Calculated To (ml/kg)	2147    Subjective: 73 year old male with pmh of stroke (R hemiparesis), recurrent PE (last Jan 2022), diverticulosis and PSHx of colon tumor removal (1988), known jejunal mass presenting with abdominal pain. Pt is now s/p ex lap, SBR, primary anastomosis on 7/6. C/b UTI and ileus. PICC line placed 7/13 for TPN. NGT dc'd on 7/21.     Patient seen at bedside for follow up assessmentDosing Anthropometrics:  Height: 5'7  Weight: 195 lbs  BMI: 30.6  IBW: 148 pounds/67.2 kg  %IBW: 132%    Weight Change: No new documented weights at this time.    Estimated energy needs: Ideal body weight used to calculate energy needs due to pt's current body weight within % ideal body weight. Adjust for CA, age    Estimated Energy Needs From (jose l/kg) 28  Estimated Energy Needs To (jose l/kg)	32  Estimated Energy Needs Calculated From (jose l/kg) 1878  Estimated Energy Needs Calculated To (jose l/kg)	2147    Estimated Protein Needs From (g/kg)	1.3  Estimated Protein Needs To (g/kg)	1.5  Estimated Protein Needs Calculated From (g/kg) 87.23  Estimated Protein Needs Calculated To (g/kg)	100.65    Estimated Fluid Needs From (ml/kg)	28  Estimated Fluid Needs To (ml/kg)	32  Estimated Fluid Needs Calculated From (ml/kg)	1878  Estimated Fluid Needs Calculated To (ml/kg)	2147    Subjective: 73 year old male with pmh of stroke (R hemiparesis), recurrent PE (last Jan 2022), diverticulosis and PSHx of colon tumor removal (1988), known jejunal mass presenting with abdominal pain. Pt is now s/p ex lap, SBR, primary anastomosis on 7/6. C/b UTI and ileus. PICC line placed 7/13 for TPN.    Patient seen at bedside for follow up assessment-on room air. Labs reviewed 7/21/23; low Na (134), elevated serum Glucose (156), RD to continue to monitor trends. Observed TPN infusing at bedside (63mL/h). Pt NPO. Potential diet progression discussed with pt and RD, pt was receptive, verbalized understanding. RD remains available per protocol. RD to follow up. See nutrition recommendations below.     Previous Nutrition Diagnosis: Inadequate Oral Intake related to clinical course As evidenced by NPO status    Active [ x  ]  Resolved [   ]    If resolved, new PES:     Goal: - Consistently meets > 75% EER via most appropriate route for nutrition    Recommendations:  1. TPN via PICC: 330g Dex, 95g AA, 50g SMOF Lipids to provide in total 2000Kcal, 95g protein, GIR 2.6, 1.4g/kg IBW protein  Recommend checking TG before starting TPN and then check TG weekly. Check Mg, Phos, K daily and POC BG Q6hrs. Trend daily weights. Fluids and lytes per MD discretion. Start at 150g Dex on Day 1, 250g Dex on Day 2, and advance to goal of 330g Dex on Day 3.   >>advance diet to regular diet as able  2. Monitor GI fxn, skin integrity, labs, daily wts   3. RD to remain available for recs adjustment prn or will follow up pt per organizational policy    Education: Importance of PO intake to meet nutritional needs    Risk Level: High [ x  ] Moderate [   ] Low [   ].    . Labs reviewed 7/18; electrolytes within normal limits at this time. Observed TPN infusing at bedside. Patient awaiting breakfast tray at time of assessment; reports only having coffee thus far today. Discussed importance of meeting nutritional needs via PO diet. Made aware RD remains available. RD to follow up. See nutrition recommendations below.    Previous Nutrition Diagnosis: Inadequate Oral Intake related to clinical course As evidenced by NPO status    Active [ x  ]  Resolved [   ]    If resolved, new PES:     Goal: - Consistently meets > 75% EER via most appropriate route for nutrition    Recommendations:  1. TPN via PICC: 330g Dex, 95g AA, 50g SMOF Lipids to provide in total 2000Kcal, 95g protein, GIR 2.6, 1.4g/kg IBW protein  Recommend checking TG before starting TPN and then check TG weekly. Check Mg, Phos, K daily and POC BG Q6hrs. Trend daily weights. Fluids and lytes per MD discretion. Start at 150g Dex on Day 1, 250g Dex on Day 2, and advance to goal of 330g Dex on Day 3.   >>advance diet to regular diet as able  2. Monitor GI fxn, skin integrity, labs, daily wts   3. RD to remain available for recs adjustment prn or will follow up pt per organizational policy    Education: Importance of PO intake to meet nutritional needs    Risk Level: High [ x ] Moderate [   ] Low [   ]. Admitting Diagnosis:   Patient is a 73y old  Male who presents with a chief complaint of Jejunal mass (17 Jul 2023 09:19)  PAST MEDICAL & SURGICAL HISTORY:  Depression, major  Stroke  Pulmonary embolism  BPH (benign prostatic hyperplasia)  Diverticulosis    Current Nutrition Order:  Diet, NPO:   Except Medications (07-21-23 @ 12:42)  Diet, NPO (07-20-23 @ 17:27)    PO Intake: Good (%) [   ]  Fair (50-75%) [   ] Poor (<25%) [   ]  *NPO [  x  ]*    GI Issues: no c/o nausea or vomiting at time of assessment; no distention noted in electronic medical records; last documented bowel movement on 7/18/23 overnight, pt noted to be passing flatus currently    Pain: No pain noted at this time.     Skin Integrity: no pressure ulcers, no edema. Surgical incisions per chart. Dante score: 14.    Labs:   07-21    134<L>  |  100  |  13  ----------------------------<  156<H>  3.7   |  24  |  0.68    Ca    8.1<L>      21 Jul 2023 06:05  Phos  2.7     07-21  Mg     2.0     07-21    CAPILLARY BLOOD GLUCOSE    Medications:  MEDICATIONS  (STANDING):  chlorhexidine 2% Cloths 1 Application(s) Topical <User Schedule>  doxazosin 1 milliGRAM(s) Oral at bedtime  enoxaparin Injectable 40 milliGRAM(s) SubCutaneous every 24 hours  lipid, fat emulsion (Fish Oil and Plant Based) 20% Infusion 0.565 Gm/kG/Day (20.8 mL/Hr) IV Continuous <Continuous>  pantoprazole  Injectable 40 milliGRAM(s) IV Push daily  Parenteral Nutrition - Adult 1 Each (63 mL/Hr) TPN Continuous <Continuous>  Parenteral Nutrition - Adult 1 Each (63 mL/Hr) TPN Continuous <Continuous>  potassium phosphate IVPB 30 milliMole(s) IV Intermittent once  senna 2 Tablet(s) Oral every 24 hours  sodium chloride 0.9%. 1000 milliLiter(s) (40 mL/Hr) IV Continuous <Continuous>    MEDICATIONS  (PRN):  ondansetron Injectable 4 milliGRAM(s) IV Push every 6 hours PRN Nausea and/or Vomiting  sodium chloride 0.9% lock flush 10 milliLiter(s) IV Push every 1 hour PRN Pre/post blood products, medications, blood draw, and to maintain line patency    Dosing Anthropometrics:  Height: 5'7  Weight: 195 lbs  BMI: 30.6  IBW: 148 pounds/67.2 kg  %IBW: 132%    Weight Change: No new documented weights at this time.    Estimated energy needs: Ideal body weight used to calculate energy needs due to pt's current body weight within % ideal body weight. Adjust for CA, age    Estimated Energy Needs From (jose l/kg) 28  Estimated Energy Needs To (jose l/kg)	32  Estimated Energy Needs Calculated From (ojse l/kg) 1878  Estimated Energy Needs Calculated To (jose l/kg)	2147    Estimated Protein Needs From (g/kg)	1.3  Estimated Protein Needs To (g/kg)	1.5  Estimated Protein Needs Calculated From (g/kg) 87.23  Estimated Protein Needs Calculated To (g/kg)	100.65    Estimated Fluid Needs From (ml/kg)	28  Estimated Fluid Needs To (ml/kg)	32  Estimated Fluid Needs Calculated From (ml/kg)	1878  Estimated Fluid Needs Calculated To (ml/kg)	2147    Subjective: 73 year old male with pmh of stroke (R hemiparesis), recurrent PE (last Jan 2022), diverticulosis and PSHx of colon tumor removal (1988), known jejunal mass presenting with abdominal pain. Pt is now s/p ex lap, SBR, primary anastomosis on 7/6. C/b UTI and ileus. PICC line placed 7/13 for TPN.    Patient seen at bedside for follow up assessment-on room air. Labs reviewed 7/21/23; low Na (134), elevated serum Glucose (156), RD to continue to monitor trends. Observed TPN infusing at bedside (63mL/h). Pt NPO. Potential diet progression discussed with pt and RD, pt was receptive, verbalized understanding. RD remains available per protocol. RD to follow up. RD paged medical team to discuss recommendations. See nutrition recommendations below.    Previous Nutrition Diagnosis: Inadequate Oral Intake related to clinical course As evidenced by NPO status    Active [ x  ]  Resolved [   ]    If resolved, new PES:     Goal: - Consistently meets > 75% EER via most appropriate route for nutrition    Recommendations:  1. Recommend to continue TPN via PICC: 330g Dex, 95g AA, 50g SMOF Lipids to provide in total 2000Kcal, 95g protein, GIR 2.6, 1.4g/kg ideal body weight protein  Recommend checking TG before starting TPN and then check TG weekly. Check Mg, Phos, K daily and POC BG Q6hrs. Trend daily weights. Fluids and lytes per MD discretion. Start at 150g Dex on Day 1, 250g Dex on Day 2, and advance to goal of 330g Dex on Day 3.   >>advance diet to regular diet as able  2. Monitor GI function, skin integrity, labs, daily wts   3. RD to remain available for recommendation adjustment prn or will follow up pt per organizational policy    Education: Importance of PO intakes to meet nutritional needs    Risk Level: High [ x ] Moderate [   ] Low [   ]. Admitting Diagnosis:   Patient is a 73y old  Male who presents with a chief complaint of Jejunal mass (17 Jul 2023 09:19)  PAST MEDICAL & SURGICAL HISTORY:  Depression, major  Stroke  Pulmonary embolism  BPH (benign prostatic hyperplasia)  Diverticulosis    Current Nutrition Order:  Diet, NPO:   Except Medications (07-21-23 @ 12:42)  Diet, NPO (07-20-23 @ 17:27)    PO Intake: Good (%) [   ]  Fair (50-75%) [   ] Poor (<25%) [   ]  *NPO [  x  ]*    GI Issues: no c/o nausea or vomiting at time of assessment; no distention noted in electronic medical records; last documented bowel movement on 7/18/23 overnight, pt noted to be passing flatus currently    Pain: No pain noted at this time.     Skin Integrity: no pressure ulcers, no edema. Surgical incisions per chart. Dante score: 14.    Labs:   07-21    134<L>  |  100  |  13  ----------------------------<  156<H>  3.7   |  24  |  0.68    Ca    8.1<L>      21 Jul 2023 06:05  Phos  2.7     07-21  Mg     2.0     07-21    CAPILLARY BLOOD GLUCOSE    Medications:  MEDICATIONS  (STANDING):  chlorhexidine 2% Cloths 1 Application(s) Topical <User Schedule>  doxazosin 1 milliGRAM(s) Oral at bedtime  enoxaparin Injectable 40 milliGRAM(s) SubCutaneous every 24 hours  lipid, fat emulsion (Fish Oil and Plant Based) 20% Infusion 0.565 Gm/kG/Day (20.8 mL/Hr) IV Continuous <Continuous>  pantoprazole  Injectable 40 milliGRAM(s) IV Push daily  Parenteral Nutrition - Adult 1 Each (63 mL/Hr) TPN Continuous <Continuous>  Parenteral Nutrition - Adult 1 Each (63 mL/Hr) TPN Continuous <Continuous>  potassium phosphate IVPB 30 milliMole(s) IV Intermittent once  senna 2 Tablet(s) Oral every 24 hours  sodium chloride 0.9%. 1000 milliLiter(s) (40 mL/Hr) IV Continuous <Continuous>    MEDICATIONS  (PRN):  ondansetron Injectable 4 milliGRAM(s) IV Push every 6 hours PRN Nausea and/or Vomiting  sodium chloride 0.9% lock flush 10 milliLiter(s) IV Push every 1 hour PRN Pre/post blood products, medications, blood draw, and to maintain line patency    Dosing Anthropometrics:  Height: 5'7  Weight: 195 lbs  BMI: 30.6  IBW: 148 pounds/67.2 kg  %IBW: 132%    Weight Change: No new documented weights at this time. Recommend that nursing obtain updated weights regularly (daily while on TPN). RD to continue to monitor.    Estimated energy needs: Ideal body weight used to calculate energy needs due to pt's current body weight within % ideal body weight. Adjust for CA, age    Estimated Energy Needs From (jose l/kg) 28  Estimated Energy Needs To (jose l/kg)	32  Estimated Energy Needs Calculated From (jose l/kg) 1878  Estimated Energy Needs Calculated To (jose l/kg)	2147    Estimated Protein Needs From (g/kg)	1.3  Estimated Protein Needs To (g/kg)	1.5  Estimated Protein Needs Calculated From (g/kg) 87.23  Estimated Protein Needs Calculated To (g/kg)	100.65    Estimated Fluid Needs From (ml/kg)	28  Estimated Fluid Needs To (ml/kg)	32  Estimated Fluid Needs Calculated From (ml/kg)	1878  Estimated Fluid Needs Calculated To (ml/kg)	2147    Subjective: 73 year old male with pmh of stroke (R hemiparesis), recurrent PE (last Jan 2022), diverticulosis and PSHx of colon tumor removal (1988), known jejunal mass presenting with abdominal pain. Pt is now s/p ex lap, SBR, primary anastomosis on 7/6. C/b UTI and ileus. PICC line placed 7/13 for TPN.    Patient seen at bedside for follow up assessment-on room air. Labs reviewed 7/21/23; low Na (134), elevated serum Glucose (156), triglycerides are WDL (77), RD to continue to monitor trends. Observed TPN infusing at bedside (63mL/h). Pt NPO. Potential diet progression discussed with pt and RD, pt was receptive, verbalized understanding. RD remains available per protocol. RD to follow up. RD paged medical team to discuss recommendations. See nutrition recommendations below.    Previous Nutrition Diagnosis: Inadequate Oral Intake related to clinical course As evidenced by NPO status    Active [ x  ]  Resolved [   ]    If resolved, new PES:     Goal: - Consistently meets > 75% EER via most appropriate route for nutrition    Recommendations:  1. Recommend to continue TPN via PICC: 330g Dex, 95g AA, 50g SMOF Lipids to provide in total 2000Kcal, 95g protein, GIR 2.6, 1.4g/kg ideal body weight protein  Recommend checking TG before starting TPN and then check TG weekly. Check Mg, Phos, K daily and POC BG Q6hrs. Trend daily weights. Fluids and lytes per MD discretion. Start at 150g Dex on Day 1, 250g Dex on Day 2, and advance to goal of 330g Dex on Day 3.   >>advance diet to regular diet as able  2. Monitor GI function, skin integrity, labs, daily wts   3. RD to remain available for recommendation adjustment prn or will follow up pt per organizational policy    Education: Importance of PO intakes to meet nutritional needs    Risk Level: High [ x ] Moderate [   ] Low [   ].

## 2023-07-21 NOTE — PROGRESS NOTE ADULT - SUBJECTIVE AND OBJECTIVE BOX
STATUS POST:  7/6: Ex lap with small bowel resection and anastomosis,     ON;ABD XR done - prelim read states contrast supspected to be in small bowel loops. x2 unsaved void.      SUBJECTIVE: Patient seen and examined bedside by chief resident, sleeping in bed with NGT connected. NPO -n/-v/+f/-bm. Denies sob/bear/cp/ha/dizziness    doxazosin 1 milliGRAM(s) Oral at bedtime  enoxaparin Injectable 40 milliGRAM(s) SubCutaneous every 24 hours      Vital Signs Last 24 Hrs  T(C): 38.1 (21 Jul 2023 14:44), Max: 38.2 (21 Jul 2023 05:37)  T(F): 100.5 (21 Jul 2023 14:44), Max: 100.7 (21 Jul 2023 05:37)  HR: 103 (21 Jul 2023 14:44) (99 - 110)  BP: 112/78 (21 Jul 2023 13:35) (108/74 - 120/85)  BP(mean): 91 (21 Jul 2023 09:38) (91 - 91)  RR: 19 (21 Jul 2023 14:44) (16 - 19)  SpO2: 95% (21 Jul 2023 14:44) (95% - 97%)    Parameters below as of 21 Jul 2023 14:44  Patient On (Oxygen Delivery Method): nasal cannula  O2 Flow (L/min): 2    I&O's Detail    20 Jul 2023 07:01  -  21 Jul 2023 07:00  --------------------------------------------------------  IN:    Fat Emulsion (Fish Oil &amp; Plant Based) 20% Infusion: 208 mL    sodium chloride 0.9%: 480 mL    TPN (Total Parenteral Nutrition): 1260 mL  Total IN: 1948 mL    OUT:    Incontinent per Condom Catheter (mL): 1 mL    Nasogastric/Oral tube (mL): 600 mL    Oral Fluid: 0 mL  Total OUT: 601 mL    Total NET: 1347 mL          General: NAD, resting comfortably in bed  C/V: NSR  Pulm: Nonlabored breathing, no respiratory distress, NC  Abd: soft, NT/ND. No rebound or guarding  NGT; LIWS. yellow tinged o/p  Extrem: WWP, no edema, SCDs in place        LABS:                        9.1    9.34  )-----------( 356      ( 21 Jul 2023 06:05 )             30.1     07-21    134<L>  |  100  |  13  ----------------------------<  156<H>  3.7   |  24  |  0.68    Ca    8.1<L>      21 Jul 2023 06:05  Phos  2.7     07-21  Mg     2.0     07-21        Urinalysis Basic - ( 21 Jul 2023 06:05 )    Color: x / Appearance: x / SG: x / pH: x  Gluc: 156 mg/dL / Ketone: x  / Bili: x / Urobili: x   Blood: x / Protein: x / Nitrite: x   Leuk Esterase: x / RBC: x / WBC x   Sq Epi: x / Non Sq Epi: x / Bacteria: x        RADIOLOGY & ADDITIONAL STUDIES:

## 2023-07-22 LAB
ALBUMIN SERPL ELPH-MCNC: 2.9 G/DL — LOW (ref 3.3–5)
ALP SERPL-CCNC: 54 U/L — SIGNIFICANT CHANGE UP (ref 40–120)
ALT FLD-CCNC: 14 U/L — SIGNIFICANT CHANGE UP (ref 10–45)
ANION GAP SERPL CALC-SCNC: 8 MMOL/L — SIGNIFICANT CHANGE UP (ref 5–17)
AST SERPL-CCNC: 10 U/L — SIGNIFICANT CHANGE UP (ref 10–40)
BILIRUB SERPL-MCNC: 0.3 MG/DL — SIGNIFICANT CHANGE UP (ref 0.2–1.2)
BUN SERPL-MCNC: 16 MG/DL — SIGNIFICANT CHANGE UP (ref 7–23)
CALCIUM SERPL-MCNC: 8.5 MG/DL — SIGNIFICANT CHANGE UP (ref 8.4–10.5)
CHLORIDE SERPL-SCNC: 103 MMOL/L — SIGNIFICANT CHANGE UP (ref 96–108)
CO2 SERPL-SCNC: 25 MMOL/L — SIGNIFICANT CHANGE UP (ref 22–31)
CREAT SERPL-MCNC: 0.56 MG/DL — SIGNIFICANT CHANGE UP (ref 0.5–1.3)
EGFR: 105 ML/MIN/1.73M2 — SIGNIFICANT CHANGE UP
GLUCOSE SERPL-MCNC: 145 MG/DL — HIGH (ref 70–99)
HCT VFR BLD CALC: 29.4 % — LOW (ref 39–50)
HGB BLD-MCNC: 8.8 G/DL — LOW (ref 13–17)
MAGNESIUM SERPL-MCNC: 2 MG/DL — SIGNIFICANT CHANGE UP (ref 1.6–2.6)
MCHC RBC-ENTMCNC: 25.2 PG — LOW (ref 27–34)
MCHC RBC-ENTMCNC: 29.9 GM/DL — LOW (ref 32–36)
MCV RBC AUTO: 84.2 FL — SIGNIFICANT CHANGE UP (ref 80–100)
NRBC # BLD: 0 /100 WBCS — SIGNIFICANT CHANGE UP (ref 0–0)
PHOSPHATE SERPL-MCNC: 3.1 MG/DL — SIGNIFICANT CHANGE UP (ref 2.5–4.5)
PLATELET # BLD AUTO: 323 K/UL — SIGNIFICANT CHANGE UP (ref 150–400)
POTASSIUM SERPL-MCNC: 4 MMOL/L — SIGNIFICANT CHANGE UP (ref 3.5–5.3)
POTASSIUM SERPL-SCNC: 4 MMOL/L — SIGNIFICANT CHANGE UP (ref 3.5–5.3)
PROT SERPL-MCNC: 6.4 G/DL — SIGNIFICANT CHANGE UP (ref 6–8.3)
RBC # BLD: 3.49 M/UL — LOW (ref 4.2–5.8)
RBC # FLD: SIGNIFICANT CHANGE UP (ref 10.3–14.5)
SODIUM SERPL-SCNC: 136 MMOL/L — SIGNIFICANT CHANGE UP (ref 135–145)
WBC # BLD: 7.17 K/UL — SIGNIFICANT CHANGE UP (ref 3.8–10.5)
WBC # FLD AUTO: 7.17 K/UL — SIGNIFICANT CHANGE UP (ref 3.8–10.5)

## 2023-07-22 PROCEDURE — 99232 SBSQ HOSP IP/OBS MODERATE 35: CPT

## 2023-07-22 RX ORDER — I.V. FAT EMULSION 20 G/100ML
0.56 EMULSION INTRAVENOUS
Qty: 50 | Refills: 0 | Status: DISCONTINUED | OUTPATIENT
Start: 2023-07-22 | End: 2023-07-22

## 2023-07-22 RX ORDER — ELECTROLYTE SOLUTION,INJ
1 VIAL (ML) INTRAVENOUS
Refills: 0 | Status: DISCONTINUED | OUTPATIENT
Start: 2023-07-22 | End: 2023-07-22

## 2023-07-22 RX ADMIN — SENNA PLUS 2 TABLET(S): 8.6 TABLET ORAL at 13:16

## 2023-07-22 RX ADMIN — I.V. FAT EMULSION 20.83 GM/KG/DAY: 20 EMULSION INTRAVENOUS at 19:08

## 2023-07-22 RX ADMIN — Medication 1 EACH: at 19:08

## 2023-07-22 RX ADMIN — SODIUM CHLORIDE 40 MILLILITER(S): 9 INJECTION INTRAMUSCULAR; INTRAVENOUS; SUBCUTANEOUS at 19:07

## 2023-07-22 RX ADMIN — PANTOPRAZOLE SODIUM 40 MILLIGRAM(S): 20 TABLET, DELAYED RELEASE ORAL at 13:16

## 2023-07-22 RX ADMIN — Medication 1 MILLIGRAM(S): at 22:21

## 2023-07-22 RX ADMIN — ENOXAPARIN SODIUM 40 MILLIGRAM(S): 100 INJECTION SUBCUTANEOUS at 17:24

## 2023-07-22 RX ADMIN — CHLORHEXIDINE GLUCONATE 1 APPLICATION(S): 213 SOLUTION TOPICAL at 06:20

## 2023-07-22 RX ADMIN — SODIUM CHLORIDE 40 MILLILITER(S): 9 INJECTION INTRAMUSCULAR; INTRAVENOUS; SUBCUTANEOUS at 02:38

## 2023-07-22 NOTE — PROGRESS NOTE ADULT - SUBJECTIVE AND OBJECTIVE BOX
Pt seen and examined at bedside on 9ur. Attending coverage for Dr. Spann.  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  Pt reports pain is managed, no nausea, but no return of appetite yet.  Some flatus but no BM  Tmax to 101.5 F yesterday with no fever since.  Abd is softly distended, nontender, incision is c/d/i.  Lab work appropriate.  Continue to monitor bowel function on while on TPN and consider CLD trial if improving bowel function.

## 2023-07-22 NOTE — PROGRESS NOTE ADULT - SUBJECTIVE AND OBJECTIVE BOX
ON:       SUBJECTIVE: Pt seen and examined at bedside this am by ICU team. Patient is lying comfortably in bed with no complaints. Tolerating diet, pain well controlled with current regimen. Patient denies fever, nausea, vomiting, chest pain, and shortness of breath. Patient is passing gas and having bowel movements.       MEDICATIONS  (STANDING):  chlorhexidine 2% Cloths 1 Application(s) Topical <User Schedule>  doxazosin 1 milliGRAM(s) Oral at bedtime  enoxaparin Injectable 40 milliGRAM(s) SubCutaneous every 24 hours  lipid, fat emulsion (Fish Oil and Plant Based) 20% Infusion 0.565 Gm/kG/Day (20.8 mL/Hr) IV Continuous <Continuous>  pantoprazole  Injectable 40 milliGRAM(s) IV Push daily  Parenteral Nutrition - Adult 1 Each (63 mL/Hr) TPN Continuous <Continuous>  senna 2 Tablet(s) Oral every 24 hours  sodium chloride 0.9%. 1000 milliLiter(s) (40 mL/Hr) IV Continuous <Continuous>    MEDICATIONS  (PRN):  ondansetron Injectable 4 milliGRAM(s) IV Push every 6 hours PRN Nausea and/or Vomiting  sodium chloride 0.9% lock flush 10 milliLiter(s) IV Push every 1 hour PRN Pre/post blood products, medications, blood draw, and to maintain line patency      Drips:     ICU Vital Signs Last 24 Hrs  T(C): 36.9 (22 Jul 2023 05:15), Max: 38.6 (21 Jul 2023 15:15)  T(F): 98.4 (22 Jul 2023 05:15), Max: 101.5 (21 Jul 2023 15:15)  HR: 100 (22 Jul 2023 05:15) (94 - 109)  BP: 105/73 (22 Jul 2023 05:15) (105/72 - 114/79)  BP(mean): 91 (21 Jul 2023 09:38) (91 - 91)  ABP: --  ABP(mean): --  RR: 18 (22 Jul 2023 05:15) (16 - 19)  SpO2: 95% (22 Jul 2023 05:15) (95% - 98%)    O2 Parameters below as of 22 Jul 2023 05:15  Patient On (Oxygen Delivery Method): nasal cannula            Physical Exam:  General: NAD  HEENT: NC/AT, EOMI, PERRLA, normal hearing, no oral lesions, neck supple w/o LAD  Pulmonary: Nonlabored breathing, no respiratory distress, CTA-B  Cardiovascular: NSR, no murmurs  Abdominal: soft, NT/ND, +BS, no organomegaly  Extremities: WWP, 5/5 strength x 4, no clubbing/cyanosis/edema  Neuro: A/O x3, CNs II-XII grossly intact, normal motor/sensation, no focal deficits  Pulses: palpable distal pulses    Lines/tubes/drains:  Hernandez:	      Vent settings:      I&O's Summary    20 Jul 2023 07:01  -  21 Jul 2023 07:00  --------------------------------------------------------  IN: 1948 mL / OUT: 601 mL / NET: 1347 mL    21 Jul 2023 07:01  -  22 Jul 2023 06:43  --------------------------------------------------------  IN: 2421.2 mL / OUT: 200 mL / NET: 2221.2 mL        LABS:                        9.1    9.34  )-----------( 356      ( 21 Jul 2023 06:05 )             30.1     07-21    134<L>  |  100  |  13  ----------------------------<  156<H>  3.7   |  24  |  0.68    Ca    8.1<L>      21 Jul 2023 06:05  Phos  2.7     07-21  Mg     2.0     07-21        Urinalysis Basic - ( 21 Jul 2023 06:05 )    Color: x / Appearance: x / SG: x / pH: x  Gluc: 156 mg/dL / Ketone: x  / Bili: x / Urobili: x   Blood: x / Protein: x / Nitrite: x   Leuk Esterase: x / RBC: x / WBC x   Sq Epi: x / Non Sq Epi: x / Bacteria: x      CAPILLARY BLOOD GLUCOSE            Cultures:Culture Results:   No growth at 12 hours (07-21 @ 16:37)  Culture Results:   No growth at 12 hours (07-21 @ 16:29)      RADIOLOGY & ADDITIONAL STUDIES:     ON: +F/-BM.      7/22: UCx/BCx NGTD. TPN + lipids, OOB all day in chair, +F/-BMs.        SUBJECTIVE: Pt seen and examined at bedside this am by team. Patient is lying comfortably in bed with no complaints. Tolerating diet, pain well controlled with current regimen. Patient denies fever, nausea, vomiting, chest pain, and shortness of breath. Patient currently passing gas but no bowel movements at this time.       MEDICATIONS  (STANDING):  chlorhexidine 2% Cloths 1 Application(s) Topical <User Schedule>  doxazosin 1 milliGRAM(s) Oral at bedtime  enoxaparin Injectable 40 milliGRAM(s) SubCutaneous every 24 hours  lipid, fat emulsion (Fish Oil and Plant Based) 20% Infusion 0.565 Gm/kG/Day (20.8 mL/Hr) IV Continuous <Continuous>  pantoprazole  Injectable 40 milliGRAM(s) IV Push daily  Parenteral Nutrition - Adult 1 Each (63 mL/Hr) TPN Continuous <Continuous>  senna 2 Tablet(s) Oral every 24 hours  sodium chloride 0.9%. 1000 milliLiter(s) (40 mL/Hr) IV Continuous <Continuous>    MEDICATIONS  (PRN):  ondansetron Injectable 4 milliGRAM(s) IV Push every 6 hours PRN Nausea and/or Vomiting  sodium chloride 0.9% lock flush 10 milliLiter(s) IV Push every 1 hour PRN Pre/post blood products, medications, blood draw, and to maintain line patency      Drips:     ICU Vital Signs Last 24 Hrs  T(C): 36.9 (22 Jul 2023 05:15), Max: 38.6 (21 Jul 2023 15:15)  T(F): 98.4 (22 Jul 2023 05:15), Max: 101.5 (21 Jul 2023 15:15)  HR: 100 (22 Jul 2023 05:15) (94 - 109)  BP: 105/73 (22 Jul 2023 05:15) (105/72 - 114/79)  BP(mean): 91 (21 Jul 2023 09:38) (91 - 91)  ABP: --  ABP(mean): --  RR: 18 (22 Jul 2023 05:15) (16 - 19)  SpO2: 95% (22 Jul 2023 05:15) (95% - 98%)    O2 Parameters below as of 22 Jul 2023 05:15  Patient On (Oxygen Delivery Method): nasal cannula            Physical Exam:    General: NAD, resting comfortably in bed  C/V: NSR  Pulm: Nonlabored breathing, no respiratory distress, NC  Abd: soft, NT/ND. No rebound or guarding  Extrem: WWP, no edema, SCDs in place      Lines/tubes/drains:  Hernandez:	      Vent settings:      I&O's Summary    20 Jul 2023 07:01  -  21 Jul 2023 07:00  --------------------------------------------------------  IN: 1948 mL / OUT: 601 mL / NET: 1347 mL    21 Jul 2023 07:01  -  22 Jul 2023 06:43  --------------------------------------------------------  IN: 2421.2 mL / OUT: 200 mL / NET: 2221.2 mL        LABS:                        9.1    9.34  )-----------( 356      ( 21 Jul 2023 06:05 )             30.1     07-21    134<L>  |  100  |  13  ----------------------------<  156<H>  3.7   |  24  |  0.68    Ca    8.1<L>      21 Jul 2023 06:05  Phos  2.7     07-21  Mg     2.0     07-21        Urinalysis Basic - ( 21 Jul 2023 06:05 )    Color: x / Appearance: x / SG: x / pH: x  Gluc: 156 mg/dL / Ketone: x  / Bili: x / Urobili: x   Blood: x / Protein: x / Nitrite: x   Leuk Esterase: x / RBC: x / WBC x   Sq Epi: x / Non Sq Epi: x / Bacteria: x      CAPILLARY BLOOD GLUCOSE            Cultures:Culture Results:   No growth at 12 hours (07-21 @ 16:37)  Culture Results:   No growth at 12 hours (07-21 @ 16:29)      RADIOLOGY & ADDITIONAL STUDIES:

## 2023-07-22 NOTE — PROGRESS NOTE ADULT - SUBJECTIVE AND OBJECTIVE BOX
Interval Events: Reviewed  Patient seen and examined at bedside.    Patient is a 73y old  Male who presents with a chief complaint of Jejunal mass (17 Jul 2023 09:19)  no acute event overnight, 2L 96%, aide at bedside      PAST MEDICAL & SURGICAL HISTORY:  Depression, major      Stroke      Pulmonary embolism      BPH (benign prostatic hyperplasia)      Diverticulosis          MEDICATIONS:  Pulmonary:    Antimicrobials:    Anticoagulants:  enoxaparin Injectable 40 milliGRAM(s) SubCutaneous every 24 hours    Cardiac:  doxazosin 1 milliGRAM(s) Oral at bedtime      Allergies    No Known Allergies    Intolerances        Vital Signs Last 24 Hrs  T(C): 37 (22 Jul 2023 08:50), Max: 38.6 (21 Jul 2023 15:15)  T(F): 98.6 (22 Jul 2023 08:50), Max: 101.5 (21 Jul 2023 15:15)  HR: 99 (22 Jul 2023 08:50) (94 - 106)  BP: 109/74 (22 Jul 2023 08:50) (105/72 - 112/78)  BP(mean): --  RR: 16 (22 Jul 2023 08:50) (16 - 19)  SpO2: 96% (22 Jul 2023 08:50) (95% - 98%)    Parameters below as of 22 Jul 2023 08:50  Patient On (Oxygen Delivery Method): nasal cannula  O2 Flow (L/min): 2      07-21 @ 07:01 - 07-22 @ 07:00  --------------------------------------------------------  IN: 2854 mL / OUT: 200 mL / NET: 2654 mL    07-22 @ 07:01  -  07-22 @ 12:48  --------------------------------------------------------  IN: 309 mL / OUT: 0 mL / NET: 309 mL          Review of Systems:   •	General: negative  •	Skin/Breast: negative  •	Ophthalmologic: negative  •	ENMT: negative  •	Respiratory and Thorax: negative  •	Cardiovascular: negative  •	Gastrointestinal: negative  •	Genitourinary: negative  •	Musculoskeletal: negative  •	Neurological: negative  •	Psychiatric: negative  •	Hematology/Lymphatics: negative  •	Endocrine: negative  •	Allergic/Immunologic: negative    Physical Exam:   • Constitutional:	elderly male, NAD  • Eyes:	EOMI; PERRL; no drainage or redness  • ENMT:	No oral lesions; no gross abnormalities  • Neck	no thyromegaly or nodules  • Breasts:	not examined  • Back:	No deformity or limitation of movement  • Respiratory:	Breath Sounds equal & clear to auscultation, no accessory muscle use  • Cardiovascular:	Regular rate & rhythm, normal S1, S2; no murmurs, gallops or rubs; no S3, S4  • Gastrointestinal:	Soft, non-tender, no hepatosplenomegaly, normal bowel sounds  • Genitourinary:	not examined  • Rectal: not examined  • Extremities:	No cyanosis, clubbing or edema  • Vascular:	Equal and normal pulses (dorsalis pedis)  • Neurologica:l	not examined  • Skin:	No lesions; no rash  • Lymph Nodes:	No lymphadedenopathy  • Musculoskeletal:	No joint pain, swelling or deformity; no limitation of movement        LABS:      CBC Full  -  ( 22 Jul 2023 05:30 )  WBC Count : 7.17 K/uL  RBC Count : 3.49 M/uL  Hemoglobin : 8.8 g/dL  Hematocrit : 29.4 %  Platelet Count - Automated : 323 K/uL  Mean Cell Volume : 84.2 fl  Mean Cell Hemoglobin : 25.2 pg  Mean Cell Hemoglobin Concentration : 29.9 gm/dL  Auto Neutrophil # : x  Auto Lymphocyte # : x  Auto Monocyte # : x  Auto Eosinophil # : x  Auto Basophil # : x  Auto Neutrophil % : x  Auto Lymphocyte % : x  Auto Monocyte % : x  Auto Eosinophil % : x  Auto Basophil % : x    07-22    136  |  103  |  16  ----------------------------<  145<H>  4.0   |  25  |  0.56    Ca    8.5      22 Jul 2023 05:30  Phos  3.1     07-22  Mg     2.0     07-22    TPro  6.4  /  Alb  2.9<L>  /  TBili  0.3  /  DBili  x   /  AST  10  /  ALT  14  /  AlkPhos  54  07-22          Urinalysis Basic - ( 22 Jul 2023 05:30 )    Color: x / Appearance: x / SG: x / pH: x  Gluc: 145 mg/dL / Ketone: x  / Bili: x / Urobili: x   Blood: x / Protein: x / Nitrite: x   Leuk Esterase: x / RBC: x / WBC x   Sq Epi: x / Non Sq Epi: x / Bacteria: x              Culture Results:   No growth to date (07-21 @ 17:02)  Culture Results:   No growth at 12 hours (07-21 @ 16:37)  Culture Results:   No growth at 12 hours (07-21 @ 16:29)      RADIOLOGY & ADDITIONAL STUDIES (The following images were personally reviewed):  Hernandez:                                     No  Urine output:                       adequate  DVT prophylaxis:                 Yes  Flattus:                                  Yes  Bowel movement:              No

## 2023-07-22 NOTE — PROGRESS NOTE ADULT - ASSESSMENT
73 year old male with pmh of stroke (R hemiparesis), recurrent PE (last Jan 2022), diverticulosis and PSHx of colon tumor removal (1988), known jejunal mass presenting with abdominal pain. Pt is now s/p ex lap, SBR, primary anastomosis on 7/6. C/b UTI and ileus. PICC line placed 7/13 for TPN.     NPO / PICC w/ TPN  IVF NS 40cc/hr  Pain/nausea control PRN  SQL/SCDs  Bcx results  OOBA/IS  Dispo: refused [facility], home w/ Homecare

## 2023-07-23 LAB
ANION GAP SERPL CALC-SCNC: 9 MMOL/L — SIGNIFICANT CHANGE UP (ref 5–17)
BUN SERPL-MCNC: 19 MG/DL — SIGNIFICANT CHANGE UP (ref 7–23)
CALCIUM SERPL-MCNC: 8.4 MG/DL — SIGNIFICANT CHANGE UP (ref 8.4–10.5)
CHLORIDE SERPL-SCNC: 105 MMOL/L — SIGNIFICANT CHANGE UP (ref 96–108)
CO2 SERPL-SCNC: 26 MMOL/L — SIGNIFICANT CHANGE UP (ref 22–31)
CREAT SERPL-MCNC: 0.62 MG/DL — SIGNIFICANT CHANGE UP (ref 0.5–1.3)
CULTURE RESULTS: NO GROWTH — SIGNIFICANT CHANGE UP
EGFR: 102 ML/MIN/1.73M2 — SIGNIFICANT CHANGE UP
GLUCOSE SERPL-MCNC: 139 MG/DL — HIGH (ref 70–99)
HCT VFR BLD CALC: 29.6 % — LOW (ref 39–50)
HGB BLD-MCNC: 8.9 G/DL — LOW (ref 13–17)
MAGNESIUM SERPL-MCNC: 2.1 MG/DL — SIGNIFICANT CHANGE UP (ref 1.6–2.6)
MCHC RBC-ENTMCNC: 25.4 PG — LOW (ref 27–34)
MCHC RBC-ENTMCNC: 30.1 GM/DL — LOW (ref 32–36)
MCV RBC AUTO: 84.6 FL — SIGNIFICANT CHANGE UP (ref 80–100)
NRBC # BLD: 0 /100 WBCS — SIGNIFICANT CHANGE UP (ref 0–0)
PHOSPHATE SERPL-MCNC: 3 MG/DL — SIGNIFICANT CHANGE UP (ref 2.5–4.5)
PLATELET # BLD AUTO: 316 K/UL — SIGNIFICANT CHANGE UP (ref 150–400)
POTASSIUM SERPL-MCNC: 3.9 MMOL/L — SIGNIFICANT CHANGE UP (ref 3.5–5.3)
POTASSIUM SERPL-SCNC: 3.9 MMOL/L — SIGNIFICANT CHANGE UP (ref 3.5–5.3)
RBC # BLD: 3.5 M/UL — LOW (ref 4.2–5.8)
RBC # FLD: 31.1 % — HIGH (ref 10.3–14.5)
SODIUM SERPL-SCNC: 140 MMOL/L — SIGNIFICANT CHANGE UP (ref 135–145)
SPECIMEN SOURCE: SIGNIFICANT CHANGE UP
WBC # BLD: 6.5 K/UL — SIGNIFICANT CHANGE UP (ref 3.8–10.5)
WBC # FLD AUTO: 6.5 K/UL — SIGNIFICANT CHANGE UP (ref 3.8–10.5)

## 2023-07-23 PROCEDURE — 99232 SBSQ HOSP IP/OBS MODERATE 35: CPT

## 2023-07-23 RX ORDER — I.V. FAT EMULSION 20 G/100ML
0.56 EMULSION INTRAVENOUS
Qty: 50 | Refills: 0 | Status: DISCONTINUED | OUTPATIENT
Start: 2023-07-23 | End: 2023-07-23

## 2023-07-23 RX ORDER — ELECTROLYTE SOLUTION,INJ
1 VIAL (ML) INTRAVENOUS
Refills: 0 | Status: DISCONTINUED | OUTPATIENT
Start: 2023-07-23 | End: 2023-07-23

## 2023-07-23 RX ADMIN — PANTOPRAZOLE SODIUM 40 MILLIGRAM(S): 20 TABLET, DELAYED RELEASE ORAL at 13:13

## 2023-07-23 RX ADMIN — CHLORHEXIDINE GLUCONATE 1 APPLICATION(S): 213 SOLUTION TOPICAL at 06:00

## 2023-07-23 RX ADMIN — Medication 1 MILLIGRAM(S): at 21:30

## 2023-07-23 RX ADMIN — I.V. FAT EMULSION 20.8 GM/KG/DAY: 20 EMULSION INTRAVENOUS at 17:54

## 2023-07-23 RX ADMIN — ENOXAPARIN SODIUM 40 MILLIGRAM(S): 100 INJECTION SUBCUTANEOUS at 17:54

## 2023-07-23 RX ADMIN — SODIUM CHLORIDE 40 MILLILITER(S): 9 INJECTION INTRAMUSCULAR; INTRAVENOUS; SUBCUTANEOUS at 17:54

## 2023-07-23 RX ADMIN — SENNA PLUS 2 TABLET(S): 8.6 TABLET ORAL at 13:13

## 2023-07-23 RX ADMIN — Medication 1 EACH: at 17:55

## 2023-07-23 NOTE — PROGRESS NOTE ADULT - SUBJECTIVE AND OBJECTIVE BOX
Interval Events: Reviewed  Patient seen and examined at bedside.    Patient is a 73y old  Male who presents with a chief complaint of abdominal pain (22 Jul 2023 12:47)  no acute event overnight, aide at bedside, 2L NC 95%      PAST MEDICAL & SURGICAL HISTORY:  Depression, major      Stroke      Pulmonary embolism      BPH (benign prostatic hyperplasia)      Diverticulosis          MEDICATIONS:  Pulmonary:    Antimicrobials:    Anticoagulants:  enoxaparin Injectable 40 milliGRAM(s) SubCutaneous every 24 hours    Cardiac:  doxazosin 1 milliGRAM(s) Oral at bedtime      Allergies    No Known Allergies    Intolerances        Vital Signs Last 24 Hrs  T(C): 37.3 (23 Jul 2023 04:56), Max: 37.4 (22 Jul 2023 12:57)  T(F): 99.1 (23 Jul 2023 04:56), Max: 99.3 (22 Jul 2023 12:57)  HR: 103 (23 Jul 2023 04:56) (100 - 104)  BP: 109/76 (23 Jul 2023 04:56) (109/76 - 126/76)  BP(mean): --  RR: 18 (23 Jul 2023 04:56) (17 - 19)  SpO2: 95% (23 Jul 2023 04:56) (95% - 98%)    Parameters below as of 23 Jul 2023 04:56  Patient On (Oxygen Delivery Method): nasal cannula  O2 Flow (L/min): 2      07-22 @ 07:01  -  07-23 @ 07:00  --------------------------------------------------------  IN: 2841.6 mL / OUT: 0 mL / NET: 2841.6 mL          Review of Systems:   •	General: negative  •	Skin/Breast: negative  •	Ophthalmologic: negative  •	ENMT: negative  •	Respiratory and Thorax: negative  •	Cardiovascular: negative  •	Gastrointestinal: negative  •	Genitourinary: negative  •	Musculoskeletal: negative  •	Neurological: negative  •	Psychiatric: negative  •	Hematology/Lymphatics: negative  •	Endocrine: negative  •	Allergic/Immunologic: negative    Physical Exam:   • Constitutional:	elderly male, NAD  • Eyes:	EOMI; PERRL; no drainage or redness  • ENMT:	No oral lesions; no gross abnormalities  • Neck	no thyromegaly or nodules  • Breasts:	not examined  • Back:	No deformity or limitation of movement  • Respiratory:	Breath Sounds equal & clear to auscultation, no accessory muscle use  • Cardiovascular:	Regular rate & rhythm, normal S1, S2; no murmurs, gallops or rubs; no S3, S4  • Gastrointestinal:	Soft, non-tender, no hepatosplenomegaly, normal bowel sounds  • Genitourinary:	not examined  • Rectal: not examined  • Extremities:	No cyanosis, clubbing or edema  • Vascular:	Equal and normal pulses (dorsalis pedis)  • Neurologica:l	not examined  • Skin:	No lesions; no rash  • Lymph Nodes:	No lymphadedenopathy  • Musculoskeletal:	No joint pain, swelling or deformity; no limitation of movement        LABS:      CBC Full  -  ( 23 Jul 2023 10:19 )  WBC Count : 6.50 K/uL  RBC Count : 3.50 M/uL  Hemoglobin : 8.9 g/dL  Hematocrit : 29.6 %  Platelet Count - Automated : 316 K/uL  Mean Cell Volume : 84.6 fl  Mean Cell Hemoglobin : 25.4 pg  Mean Cell Hemoglobin Concentration : 30.1 gm/dL  Auto Neutrophil # : x  Auto Lymphocyte # : x  Auto Monocyte # : x  Auto Eosinophil # : x  Auto Basophil # : x  Auto Neutrophil % : x  Auto Lymphocyte % : x  Auto Monocyte % : x  Auto Eosinophil % : x  Auto Basophil % : x    07-22    136  |  103  |  16  ----------------------------<  145<H>  4.0   |  25  |  0.56    Ca    8.5      22 Jul 2023 05:30  Phos  3.1     07-22  Mg     2.0     07-22    TPro  6.4  /  Alb  2.9<L>  /  TBili  0.3  /  DBili  x   /  AST  10  /  ALT  14  /  AlkPhos  54  07-22          Urinalysis Basic - ( 22 Jul 2023 05:30 )    Color: x / Appearance: x / SG: x / pH: x  Gluc: 145 mg/dL / Ketone: x  / Bili: x / Urobili: x   Blood: x / Protein: x / Nitrite: x   Leuk Esterase: x / RBC: x / WBC x   Sq Epi: x / Non Sq Epi: x / Bacteria: x              Culture Results:   No growth (07-21 @ 17:02)  Culture Results:   No growth at 1 day. (07-21 @ 16:37)  Culture Results:   No growth at 1 day. (07-21 @ 16:29)      RADIOLOGY & ADDITIONAL STUDIES (The following images were personally reviewed):  Hernandez:                                     No  Urine output:                       adequate  DVT prophylaxis:                 Yes  Flattus:                                  Yes  Bowel movement:              No

## 2023-07-23 NOTE — PROGRESS NOTE ADULT - SUBJECTIVE AND OBJECTIVE BOX
SUBJECTIVE:      MEDICATIONS  (STANDING):  chlorhexidine 2% Cloths 1 Application(s) Topical <User Schedule>  doxazosin 1 milliGRAM(s) Oral at bedtime  enoxaparin Injectable 40 milliGRAM(s) SubCutaneous every 24 hours  lipid, fat emulsion (Fish Oil and Plant Based) 20% Infusion 0.564 Gm/kG/Day (20.83 mL/Hr) IV Continuous <Continuous>  pantoprazole  Injectable 40 milliGRAM(s) IV Push daily  Parenteral Nutrition - Adult 1 Each (63 mL/Hr) TPN Continuous <Continuous>  senna 2 Tablet(s) Oral every 24 hours  sodium chloride 0.9%. 1000 milliLiter(s) (40 mL/Hr) IV Continuous <Continuous>    MEDICATIONS  (PRN):  ondansetron Injectable 4 milliGRAM(s) IV Push every 6 hours PRN Nausea and/or Vomiting  sodium chloride 0.9% lock flush 10 milliLiter(s) IV Push every 1 hour PRN Pre/post blood products, medications, blood draw, and to maintain line patency      Vital Signs Last 24 Hrs  T(C): 37.3 (23 Jul 2023 04:56), Max: 37.4 (22 Jul 2023 12:57)  T(F): 99.1 (23 Jul 2023 04:56), Max: 99.3 (22 Jul 2023 12:57)  HR: 103 (23 Jul 2023 04:56) (99 - 104)  BP: 109/76 (23 Jul 2023 04:56) (109/74 - 126/76)  BP(mean): --  RR: 18 (23 Jul 2023 04:56) (16 - 19)  SpO2: 95% (23 Jul 2023 04:56) (95% - 98%)    Parameters below as of 23 Jul 2023 04:56  Patient On (Oxygen Delivery Method): nasal cannula  O2 Flow (L/min): 2      Physical Exam:  General: NAD, resting comfortably in bed  Pulmonary: Nonlabored breathing, no respiratory distress  Cardiovascular: NSR  Abdominal: soft, NT/ND  Extremities: WWP, normal strength  Neuro: A/O x 3, CNs II-XII grossly intact, no focal deficits    I&O's Summary    22 Jul 2023 07:01  -  23 Jul 2023 07:00  --------------------------------------------------------  IN: 2841.6 mL / OUT: 0 mL / NET: 2841.6 mL        LABS:                        8.8    7.17  )-----------( 323      ( 22 Jul 2023 05:30 )             29.4     07-22    136  |  103  |  16  ----------------------------<  145<H>  4.0   |  25  |  0.56    Ca    8.5      22 Jul 2023 05:30  Phos  3.1     07-22  Mg     2.0     07-22    TPro  6.4  /  Alb  2.9<L>  /  TBili  0.3  /  DBili  x   /  AST  10  /  ALT  14  /  AlkPhos  54  07-22      Urinalysis Basic - ( 22 Jul 2023 05:30 )    Color: x / Appearance: x / SG: x / pH: x  Gluc: 145 mg/dL / Ketone: x  / Bili: x / Urobili: x   Blood: x / Protein: x / Nitrite: x   Leuk Esterase: x / RBC: x / WBC x   Sq Epi: x / Non Sq Epi: x / Bacteria: x      CAPILLARY BLOOD GLUCOSE        LIVER FUNCTIONS - ( 22 Jul 2023 05:30 )  Alb: 2.9 g/dL / Pro: 6.4 g/dL / ALK PHOS: 54 U/L / ALT: 14 U/L / AST: 10 U/L / GGT: x             RADIOLOGY & ADDITIONAL STUDIES:   SUBJECTIVE:  KAREN o/n. Pt seen on bedside rounds this AM. Pt is OOBTC. Pt denies fever/chills, CP, SOB.     MEDICATIONS  (STANDING):  chlorhexidine 2% Cloths 1 Application(s) Topical <User Schedule>  doxazosin 1 milliGRAM(s) Oral at bedtime  enoxaparin Injectable 40 milliGRAM(s) SubCutaneous every 24 hours  lipid, fat emulsion (Fish Oil and Plant Based) 20% Infusion 0.564 Gm/kG/Day (20.83 mL/Hr) IV Continuous <Continuous>  pantoprazole  Injectable 40 milliGRAM(s) IV Push daily  Parenteral Nutrition - Adult 1 Each (63 mL/Hr) TPN Continuous <Continuous>  senna 2 Tablet(s) Oral every 24 hours  sodium chloride 0.9%. 1000 milliLiter(s) (40 mL/Hr) IV Continuous <Continuous>    MEDICATIONS  (PRN):  ondansetron Injectable 4 milliGRAM(s) IV Push every 6 hours PRN Nausea and/or Vomiting  sodium chloride 0.9% lock flush 10 milliLiter(s) IV Push every 1 hour PRN Pre/post blood products, medications, blood draw, and to maintain line patency      Vital Signs Last 24 Hrs  T(C): 37.3 (23 Jul 2023 04:56), Max: 37.4 (22 Jul 2023 12:57)  T(F): 99.1 (23 Jul 2023 04:56), Max: 99.3 (22 Jul 2023 12:57)  HR: 103 (23 Jul 2023 04:56) (99 - 104)  BP: 109/76 (23 Jul 2023 04:56) (109/74 - 126/76)  BP(mean): --  RR: 18 (23 Jul 2023 04:56) (16 - 19)  SpO2: 95% (23 Jul 2023 04:56) (95% - 98%)    Parameters below as of 23 Jul 2023 04:56  Patient On (Oxygen Delivery Method): nasal cannula  O2 Flow (L/min): 2      Physical Exam:  General: NAD, resting comfortably in bed  Pulmonary: Nonlabored breathing, no respiratory distress  Cardiovascular: NSR  Abdominal: soft, NT/ND  Extremities: WWP, normal strength  Neuro: A/O x 3, CNs II-XII grossly intact, no focal deficits    I&O's Summary    22 Jul 2023 07:01  -  23 Jul 2023 07:00  --------------------------------------------------------  IN: 2841.6 mL / OUT: 0 mL / NET: 2841.6 mL        LABS:                        8.8    7.17  )-----------( 323      ( 22 Jul 2023 05:30 )             29.4     07-22    136  |  103  |  16  ----------------------------<  145<H>  4.0   |  25  |  0.56    Ca    8.5      22 Jul 2023 05:30  Phos  3.1     07-22  Mg     2.0     07-22    TPro  6.4  /  Alb  2.9<L>  /  TBili  0.3  /  DBili  x   /  AST  10  /  ALT  14  /  AlkPhos  54  07-22      Urinalysis Basic - ( 22 Jul 2023 05:30 )    Color: x / Appearance: x / SG: x / pH: x  Gluc: 145 mg/dL / Ketone: x  / Bili: x / Urobili: x   Blood: x / Protein: x / Nitrite: x   Leuk Esterase: x / RBC: x / WBC x   Sq Epi: x / Non Sq Epi: x / Bacteria: x      CAPILLARY BLOOD GLUCOSE        LIVER FUNCTIONS - ( 22 Jul 2023 05:30 )  Alb: 2.9 g/dL / Pro: 6.4 g/dL / ALK PHOS: 54 U/L / ALT: 14 U/L / AST: 10 U/L / GGT: x             RADIOLOGY & ADDITIONAL STUDIES:

## 2023-07-23 NOTE — PROGRESS NOTE ADULT - ASSESSMENT
73 year old male with pmh of stroke (R hemiparesis), recurrent PE (last Jan 2022), diverticulosis and PSHx of colon tumor removal (1988), known jejunal mass presenting with abdominal pain. Pt is now s/p ex lap, SBR, primary anastomosis on 7/6. C/b UTI and ileus. PICC line placed 7/13 for TPN.     NPO / PICC w/ TPN  IVF NS 40cc/hr  Bcx NGTD  Pain/nausea control PRN  SQL/SCDs  Bcx results  OOBA/IS

## 2023-07-24 PROCEDURE — 99232 SBSQ HOSP IP/OBS MODERATE 35: CPT

## 2023-07-24 RX ORDER — ELECTROLYTE SOLUTION,INJ
1 VIAL (ML) INTRAVENOUS
Refills: 0 | Status: DISCONTINUED | OUTPATIENT
Start: 2023-07-24 | End: 2023-07-24

## 2023-07-24 RX ORDER — I.V. FAT EMULSION 20 G/100ML
0.56 EMULSION INTRAVENOUS
Qty: 50 | Refills: 0 | Status: DISCONTINUED | OUTPATIENT
Start: 2023-07-24 | End: 2023-07-24

## 2023-07-24 RX ADMIN — Medication 1 EACH: at 17:39

## 2023-07-24 RX ADMIN — PANTOPRAZOLE SODIUM 40 MILLIGRAM(S): 20 TABLET, DELAYED RELEASE ORAL at 13:29

## 2023-07-24 RX ADMIN — I.V. FAT EMULSION 20.83 GM/KG/DAY: 20 EMULSION INTRAVENOUS at 17:39

## 2023-07-24 RX ADMIN — CHLORHEXIDINE GLUCONATE 1 APPLICATION(S): 213 SOLUTION TOPICAL at 05:54

## 2023-07-24 RX ADMIN — Medication 1 MILLIGRAM(S): at 21:30

## 2023-07-24 RX ADMIN — ENOXAPARIN SODIUM 40 MILLIGRAM(S): 100 INJECTION SUBCUTANEOUS at 17:38

## 2023-07-24 RX ADMIN — SENNA PLUS 2 TABLET(S): 8.6 TABLET ORAL at 13:29

## 2023-07-24 NOTE — PROGRESS NOTE ADULT - SUBJECTIVE AND OBJECTIVE BOX
Overnight events: No acute overnight events.       POD#18 ex lap with SBR and anastomosis     SUBJECTIVE: Patient seen at bedside with chief resident. Patient denies any nausea, vomiting, or pain. Patient denies any BM but endorses flatus.       MEDICATIONS  (STANDING):  chlorhexidine 2% Cloths 1 Application(s) Topical <User Schedule>  doxazosin 1 milliGRAM(s) Oral at bedtime  enoxaparin Injectable 40 milliGRAM(s) SubCutaneous every 24 hours  lipid, fat emulsion (Fish Oil and Plant Based) 20% Infusion 0.564 Gm/kG/Day (20.8 mL/Hr) IV Continuous <Continuous>  lipid, fat emulsion (Fish Oil and Plant Based) 20% Infusion 0.564 Gm/kG/Day (20.8 mL/Hr) IV Continuous <Continuous>  pantoprazole  Injectable 40 milliGRAM(s) IV Push daily  Parenteral Nutrition - Adult 1 Each (63 mL/Hr) TPN Continuous <Continuous>  Parenteral Nutrition - Adult 1 Each (63 mL/Hr) TPN Continuous <Continuous>  senna 2 Tablet(s) Oral every 24 hours    MEDICATIONS  (PRN):  ondansetron Injectable 4 milliGRAM(s) IV Push every 6 hours PRN Nausea and/or Vomiting  sodium chloride 0.9% lock flush 10 milliLiter(s) IV Push every 1 hour PRN Pre/post blood products, medications, blood draw, and to maintain line patency      Vital Signs Last 24 Hrs  T(C): 37 (24 Jul 2023 05:19), Max: 37.6 (23 Jul 2023 17:00)  T(F): 98.6 (24 Jul 2023 05:19), Max: 99.7 (23 Jul 2023 17:00)  HR: 106 (24 Jul 2023 05:19) (96 - 115)  BP: 106/70 (24 Jul 2023 05:19) (106/70 - 126/86)  BP(mean): 88 (23 Jul 2023 17:00) (88 - 88)  RR: 17 (24 Jul 2023 05:19) (16 - 18)  SpO2: 93% (24 Jul 2023 05:19) (93% - 94%)    Parameters below as of 24 Jul 2023 05:19  Patient On (Oxygen Delivery Method): room air        Physical Exam:  General: NAD, resting comfortably in bed  Pulmonary: Nonlabored breathing, no respiratory distress  Cardiovascular: NSR  Abdominal: soft, NT, mildly distended   Extremities: WWP, normal strength  Neuro: A/O x 3, CNs II-XII grossly intact, no focal deficits, normal motor/sensation  Pulses: palpable distal pulses    I&O's Summary    23 Jul 2023 07:01  -  24 Jul 2023 07:00  --------------------------------------------------------  IN: 3039.4 mL / OUT: 375 mL / NET: 2664.4 mL        LABS:                        8.9    6.50  )-----------( 316      ( 23 Jul 2023 10:19 )             29.6     07-23    140  |  105  |  19  ----------------------------<  139<H>  3.9   |  26  |  0.62    Ca    8.4      23 Jul 2023 10:19  Phos  3.0     07-23  Mg     2.1     07-23        Urinalysis Basic - ( 23 Jul 2023 10:19 )    Color: x / Appearance: x / SG: x / pH: x  Gluc: 139 mg/dL / Ketone: x  / Bili: x / Urobili: x   Blood: x / Protein: x / Nitrite: x   Leuk Esterase: x / RBC: x / WBC x   Sq Epi: x / Non Sq Epi: x / Bacteria: x      CAPILLARY BLOOD GLUCOSE            RADIOLOGY & ADDITIONAL STUDIES:

## 2023-07-24 NOTE — PROGRESS NOTE ADULT - SUBJECTIVE AND OBJECTIVE BOX
Interval Events: Reviewed  Patient seen and examined at bedside.    Patient is a 73y old  Male who presents with a chief complaint of abdominal pain (23 Jul 2023 10:40)  he is tired    PAST MEDICAL & SURGICAL HISTORY:  Depression, major      Stroke      Pulmonary embolism      BPH (benign prostatic hyperplasia)      Diverticulosis          MEDICATIONS:  Pulmonary:    Antimicrobials:    Anticoagulants:  enoxaparin Injectable 40 milliGRAM(s) SubCutaneous every 24 hours    Cardiac:  doxazosin 1 milliGRAM(s) Oral at bedtime      Allergies    No Known Allergies    Intolerances        Vital Signs Last 24 Hrs  T(C): 37.2 (24 Jul 2023 12:54), Max: 37.6 (23 Jul 2023 17:00)  T(F): 98.9 (24 Jul 2023 12:54), Max: 99.7 (23 Jul 2023 17:00)  HR: 97 (24 Jul 2023 12:54) (97 - 115)  BP: 114/77 (24 Jul 2023 12:54) (106/70 - 126/86)  BP(mean): 88 (23 Jul 2023 17:00) (88 - 88)  RR: 18 (24 Jul 2023 12:54) (17 - 18)  SpO2: 94% (24 Jul 2023 12:54) (93% - 96%)    Parameters below as of 24 Jul 2023 12:54  Patient On (Oxygen Delivery Method): room air        07-23 @ 07:01 - 07-24 @ 07:00  --------------------------------------------------------  IN: 3039.4 mL / OUT: 375 mL / NET: 2664.4 mL    07-24 @ 07:01 - 07-24 @ 13:07  --------------------------------------------------------  IN: 429 mL / OUT: 0 mL / NET: 429 mL          Review of Systems:   •	General: negative  •	Skin/Breast: negative  •	Ophthalmologic: negative  •	ENMT: negative  •	Respiratory and Thorax: negative  •	Cardiovascular: negative  •	Gastrointestinal: negative  •	Genitourinary: negative  •	Musculoskeletal: negative  •	Neurological: negative  •	Psychiatric: negative  •	Hematology/Lymphatics: negative  •	Endocrine: negative  •	Allergic/Immunologic: negative    Physical Exam:   • Constitutional:	refer to the dietion /Nutritionist note  • Eyes:	EOMI; PERRL; no drainage or redness  • ENMT:	No oral lesions; no gross abnormalities  • Neck	No bruits; no thyromegaly or nodules  • Breasts:	not examined  • Back:	No deformity or limitation of movement  • Respiratory:	Breath Sounds equal & clear to percussion & auscultation, no accessory muscle use  • Cardiovascular:	Regular rate & rhythm, normal S1, S2; no murmurs, gallops or rubs; no S3, S4  • Gastrointestinal:	Soft, non-tender, no hepatosplenomegaly, normal bowel sounds  • Genitourinary:	not examined  • Rectal: not examined  • Extremities:	No cyanosis, clubbing or edema  • Vascular:	Equal and normal pulses (carotid, femoral, dorsalis pedis)  • Neurologica:l	not examined  • Skin:	No lesions; no rash  • Lymph Nodes:	No lymphadedenopathy  • Musculoskeletal:	No joint pain, swelling or deformity; no limitation of movement        LABS:      CBC Full  -  ( 23 Jul 2023 10:19 )  WBC Count : 6.50 K/uL  RBC Count : 3.50 M/uL  Hemoglobin : 8.9 g/dL  Hematocrit : 29.6 %  Platelet Count - Automated : 316 K/uL  Mean Cell Volume : 84.6 fl  Mean Cell Hemoglobin : 25.4 pg  Mean Cell Hemoglobin Concentration : 30.1 gm/dL  Auto Neutrophil # : x  Auto Lymphocyte # : x  Auto Monocyte # : x  Auto Eosinophil # : x  Auto Basophil # : x  Auto Neutrophil % : x  Auto Lymphocyte % : x  Auto Monocyte % : x  Auto Eosinophil % : x  Auto Basophil % : x    07-23    140  |  105  |  19  ----------------------------<  139<H>  3.9   |  26  |  0.62    Ca    8.4      23 Jul 2023 10:19  Phos  3.0     07-23  Mg     2.1     07-23            Urinalysis Basic - ( 23 Jul 2023 10:19 )    Color: x / Appearance: x / SG: x / pH: x  Gluc: 139 mg/dL / Ketone: x  / Bili: x / Urobili: x   Blood: x / Protein: x / Nitrite: x   Leuk Esterase: x / RBC: x / WBC x   Sq Epi: x / Non Sq Epi: x / Bacteria: x    < from: CT Angio Chest PE Protocol w/ IV Cont (07.20.23 @ 13:57) >    IMPRESSION:  1.  No pulmonary embolism.  2.  Small left pleural effusion, increased  3.  Multiple distended loops of small bowel throughout the abdomen and   pelvis. A focal transition point cannot be identified, however,the   distal ileum is decompressed and the terminal ileum is normal, suggesting   a partial small bowel obstruction.  --- End of Report ---    < end of copied text >                RADIOLOGY & ADDITIONAL STUDIES (The following images were personally reviewed):  < from: Xray Abdomen 1 View (07.21.23 @ 07:48) >    ACC: [id number] EXAM:  XR ABDOMEN 1 VIEW   ORDERED BY: AILEEN SANCHEZ     PROCEDURE DATE:  07/21/2023          INTERPRETATION:  Clinical history reason for exam: Ileus.    KUB.    COMPARISON: July 20, 2023.    Findings/  impression: Stable boweldilatation/ileus.. No pneumoperitoneum.. Midline   abdominal surgical staples. Bibasilar opacities. Elevation of the right   hemidiaphragm. Left PICC line.    < end of copied text >

## 2023-07-24 NOTE — PROGRESS NOTE ADULT - ASSESSMENT
73 year old male with pmh of stroke (R hemiparesis), recurrent PE (last Jan 2022), diverticulosis and PSHx of colon tumor removal (1988), known jejunal mass presenting with abdominal pain. Pt is now s/p ex lap, SBR, primary anastomosis on 7/6. C/b UTI and ileus. PICC line placed 7/13 for TPN.     Full liquid / PICC w/ TPN  Pain/nausea control PRN  Home doxazosin and protonix  Senna qd  SQL/SCDs  F/u Bcx results  OOBA/IS  Dispo: refused [facility], home w/ homecare

## 2023-07-25 LAB
ANION GAP SERPL CALC-SCNC: 10 MMOL/L — SIGNIFICANT CHANGE UP (ref 5–17)
BUN SERPL-MCNC: 14 MG/DL — SIGNIFICANT CHANGE UP (ref 7–23)
CALCIUM SERPL-MCNC: 8.6 MG/DL — SIGNIFICANT CHANGE UP (ref 8.4–10.5)
CHLORIDE SERPL-SCNC: 104 MMOL/L — SIGNIFICANT CHANGE UP (ref 96–108)
CO2 SERPL-SCNC: 28 MMOL/L — SIGNIFICANT CHANGE UP (ref 22–31)
CREAT SERPL-MCNC: 0.61 MG/DL — SIGNIFICANT CHANGE UP (ref 0.5–1.3)
EGFR: 103 ML/MIN/1.73M2 — SIGNIFICANT CHANGE UP
GLUCOSE SERPL-MCNC: 148 MG/DL — HIGH (ref 70–99)
HCT VFR BLD CALC: 28.2 % — LOW (ref 39–50)
HGB BLD-MCNC: 8.5 G/DL — LOW (ref 13–17)
MAGNESIUM SERPL-MCNC: 1.9 MG/DL — SIGNIFICANT CHANGE UP (ref 1.6–2.6)
MCHC RBC-ENTMCNC: 25.4 PG — LOW (ref 27–34)
MCHC RBC-ENTMCNC: 30.1 GM/DL — LOW (ref 32–36)
MCV RBC AUTO: 84.2 FL — SIGNIFICANT CHANGE UP (ref 80–100)
NRBC # BLD: 0 /100 WBCS — SIGNIFICANT CHANGE UP (ref 0–0)
PHOSPHATE SERPL-MCNC: 3.4 MG/DL — SIGNIFICANT CHANGE UP (ref 2.5–4.5)
PLATELET # BLD AUTO: 266 K/UL — SIGNIFICANT CHANGE UP (ref 150–400)
POTASSIUM SERPL-MCNC: 3.6 MMOL/L — SIGNIFICANT CHANGE UP (ref 3.5–5.3)
POTASSIUM SERPL-SCNC: 3.6 MMOL/L — SIGNIFICANT CHANGE UP (ref 3.5–5.3)
RBC # BLD: 3.35 M/UL — LOW (ref 4.2–5.8)
RBC # FLD: 29.8 % — HIGH (ref 10.3–14.5)
SODIUM SERPL-SCNC: 142 MMOL/L — SIGNIFICANT CHANGE UP (ref 135–145)
WBC # BLD: 5.4 K/UL — SIGNIFICANT CHANGE UP (ref 3.8–10.5)
WBC # FLD AUTO: 5.4 K/UL — SIGNIFICANT CHANGE UP (ref 3.8–10.5)

## 2023-07-25 PROCEDURE — 99232 SBSQ HOSP IP/OBS MODERATE 35: CPT | Mod: GC

## 2023-07-25 RX ORDER — MAGNESIUM HYDROXIDE 400 MG/1
30 TABLET, CHEWABLE ORAL DAILY
Refills: 0 | Status: DISCONTINUED | OUTPATIENT
Start: 2023-07-25 | End: 2023-07-31

## 2023-07-25 RX ORDER — ELECTROLYTE SOLUTION,INJ
1 VIAL (ML) INTRAVENOUS
Refills: 0 | Status: DISCONTINUED | OUTPATIENT
Start: 2023-07-25 | End: 2023-07-25

## 2023-07-25 RX ORDER — MAGNESIUM SULFATE 500 MG/ML
1 VIAL (ML) INJECTION ONCE
Refills: 0 | Status: COMPLETED | OUTPATIENT
Start: 2023-07-25 | End: 2023-07-25

## 2023-07-25 RX ORDER — POTASSIUM CHLORIDE 20 MEQ
20 PACKET (EA) ORAL ONCE
Refills: 0 | Status: COMPLETED | OUTPATIENT
Start: 2023-07-25 | End: 2023-07-25

## 2023-07-25 RX ORDER — SENNA PLUS 8.6 MG/1
2 TABLET ORAL AT BEDTIME
Refills: 0 | Status: DISCONTINUED | OUTPATIENT
Start: 2023-07-25 | End: 2023-07-31

## 2023-07-25 RX ORDER — I.V. FAT EMULSION 20 G/100ML
0.56 EMULSION INTRAVENOUS
Qty: 50 | Refills: 0 | Status: DISCONTINUED | OUTPATIENT
Start: 2023-07-25 | End: 2023-07-25

## 2023-07-25 RX ADMIN — Medication 100 GRAM(S): at 09:40

## 2023-07-25 RX ADMIN — Medication 1 TABLET(S): at 14:54

## 2023-07-25 RX ADMIN — Medication 1 MILLIGRAM(S): at 22:10

## 2023-07-25 RX ADMIN — SENNA PLUS 2 TABLET(S): 8.6 TABLET ORAL at 22:11

## 2023-07-25 RX ADMIN — Medication 1 EACH: at 18:12

## 2023-07-25 RX ADMIN — Medication 1 APPLICATION(S): at 14:54

## 2023-07-25 RX ADMIN — ENOXAPARIN SODIUM 40 MILLIGRAM(S): 100 INJECTION SUBCUTANEOUS at 18:12

## 2023-07-25 RX ADMIN — PANTOPRAZOLE SODIUM 40 MILLIGRAM(S): 20 TABLET, DELAYED RELEASE ORAL at 11:21

## 2023-07-25 RX ADMIN — Medication 1 APPLICATION(S): at 22:11

## 2023-07-25 RX ADMIN — I.V. FAT EMULSION 20.8 GM/KG/DAY: 20 EMULSION INTRAVENOUS at 18:12

## 2023-07-25 RX ADMIN — Medication 10 MILLIGRAM(S): at 09:39

## 2023-07-25 RX ADMIN — Medication 20 MILLIEQUIVALENT(S): at 09:40

## 2023-07-25 RX ADMIN — MAGNESIUM HYDROXIDE 30 MILLILITER(S): 400 TABLET, CHEWABLE ORAL at 09:40

## 2023-07-25 RX ADMIN — CHLORHEXIDINE GLUCONATE 1 APPLICATION(S): 213 SOLUTION TOPICAL at 06:06

## 2023-07-25 NOTE — PROGRESS NOTE ADULT - SUBJECTIVE AND OBJECTIVE BOX
INTERVAL HPI/OVERNIGHT EVENTS: BCx NGTD (72hrs), tachy, missed voids    STATUS POST:  7/6: Ex lap with small bowel resection and anastomosis, EBL 10cc, IVF 1L,     POST OPERATIVE DAY #: 19    SUBJECTIVE: Pt seen and examined at bedside this am by surgery team. No acute complaints. Passing flatus however no BM. Tolerating diet, pain well controlled. Denies f/n/v/cp/sob.    MEDICATIONS  (STANDING):  chlorhexidine 2% Cloths 1 Application(s) Topical <User Schedule>  doxazosin 1 milliGRAM(s) Oral at bedtime  enoxaparin Injectable 40 milliGRAM(s) SubCutaneous every 24 hours  lipid, fat emulsion (Fish Oil and Plant Based) 20% Infusion 0.564 Gm/kG/Day (20.83 mL/Hr) IV Continuous <Continuous>  pantoprazole  Injectable 40 milliGRAM(s) IV Push daily  Parenteral Nutrition - Adult 1 Each (63 mL/Hr) TPN Continuous <Continuous>  senna 2 Tablet(s) Oral every 24 hours    MEDICATIONS  (PRN):  ondansetron Injectable 4 milliGRAM(s) IV Push every 6 hours PRN Nausea and/or Vomiting  sodium chloride 0.9% lock flush 10 milliLiter(s) IV Push every 1 hour PRN Pre/post blood products, medications, blood draw, and to maintain line patency    Vital Signs Last 24 Hrs  T(C): 36.7 (25 Jul 2023 04:38), Max: 37.6 (24 Jul 2023 20:01)  T(F): 98.1 (25 Jul 2023 04:38), Max: 99.6 (24 Jul 2023 20:01)  HR: 102 (25 Jul 2023 04:38) (97 - 112)  BP: 117/80 (25 Jul 2023 04:38) (110/72 - 124/86)  BP(mean): --  RR: 17 (25 Jul 2023 04:38) (16 - 18)  SpO2: 94% (25 Jul 2023 04:38) (94% - 96%)    Parameters below as of 25 Jul 2023 04:38  Patient On (Oxygen Delivery Method): room air    PHYSICAL EXAM:    Constitutional: A&Ox3, NAD    Respiratory: non labored breathing, no respiratory distress    Cardiovascular: NSR, RRR    Gastrointestinal: abdomen soft, moderately distended, nt. No rebound or guarding.    Extremities: wwp, no calf tenderness or edema. SCDs in place     I&O's Detail    24 Jul 2023 07:01  -  25 Jul 2023 07:00  --------------------------------------------------------  IN:    Fat Emulsion (Fish Oil &amp; Plant Based) 20% Infusion: 291.2 mL    Oral Fluid: 1250 mL    TPN (Total Parenteral Nutrition): 1512 mL  Total IN: 3053.2 mL    OUT:    Voided (mL): 275 mL  Total OUT: 275 mL    Total NET: 2778.2 mL      25 Jul 2023 07:01  -  25 Jul 2023 08:06  --------------------------------------------------------  IN:    Fat Emulsion (Fish Oil &amp; Plant Based) 20% Infusion: 20.8 mL    TPN (Total Parenteral Nutrition): 63 mL  Total IN: 83.8 mL    OUT:  Total OUT: 0 mL    Total NET: 83.8 mL          LABS:                        8.5    5.40  )-----------( 266      ( 25 Jul 2023 05:30 )             28.2     07-25    142  |  104  |  14  ----------------------------<  148<H>  3.6   |  28  |  0.61    Ca    8.6      25 Jul 2023 05:30  Phos  3.4     07-25  Mg     1.9     07-25        Urinalysis Basic - ( 25 Jul 2023 05:30 )    Color: x / Appearance: x / SG: x / pH: x  Gluc: 148 mg/dL / Ketone: x  / Bili: x / Urobili: x   Blood: x / Protein: x / Nitrite: x   Leuk Esterase: x / RBC: x / WBC x   Sq Epi: x / Non Sq Epi: x / Bacteria: x        RADIOLOGY & ADDITIONAL STUDIES:

## 2023-07-25 NOTE — PROGRESS NOTE ADULT - SUBJECTIVE AND OBJECTIVE BOX
Interval Events: Reviewed  Patient seen and examined at bedside.    Patient is a 73y old  Male who presents with a chief complaint of abdominal pain (23 Jul 2023 10:40)  he is OK and had BM    PAST MEDICAL & SURGICAL HISTORY:  Depression, major      Stroke      Pulmonary embolism      BPH (benign prostatic hyperplasia)      Diverticulosis          MEDICATIONS:  Pulmonary:    Antimicrobials:  trimethoprim  160 mG/sulfamethoxazole 800 mG 1 Tablet(s) Oral daily    Anticoagulants:  enoxaparin Injectable 40 milliGRAM(s) SubCutaneous every 24 hours    Cardiac:  doxazosin 1 milliGRAM(s) Oral at bedtime      Allergies    No Known Allergies    Intolerances        Vital Signs Last 24 Hrs  T(C): 36.8 (25 Jul 2023 09:40), Max: 37.6 (24 Jul 2023 20:01)  T(F): 98.2 (25 Jul 2023 09:40), Max: 99.6 (24 Jul 2023 20:01)  HR: 96 (25 Jul 2023 09:40) (96 - 112)  BP: 115/76 (25 Jul 2023 09:40) (114/77 - 124/86)  BP(mean): --  RR: 18 (25 Jul 2023 09:40) (16 - 18)  SpO2: 93% (25 Jul 2023 09:40) (93% - 94%)    Parameters below as of 25 Jul 2023 09:40  Patient On (Oxygen Delivery Method): room air        07-24 @ 07:01  -  07-25 @ 07:00  --------------------------------------------------------  IN: 3053.2 mL / OUT: 275 mL / NET: 2778.2 mL    07-25 @ 07:01  -  07-25 @ 12:18  --------------------------------------------------------  IN: 585.8 mL / OUT: 0 mL / NET: 585.8 mL          Review of Systems:   •	General: negative  •	Skin/Breast: negative  •	Ophthalmologic: negative  •	ENMT: negative  •	Respiratory and Thorax: negative  •	Cardiovascular: negative  •	Gastrointestinal: negative  •	Genitourinary: negative  •	Musculoskeletal: negative  •	Neurological: negative  •	Psychiatric: negative  •	Hematology/Lymphatics: negative  •	Endocrine: negative  •	Allergic/Immunologic: negative    Physical Exam:   • Constitutional:	refer to the dietion /Nutritionist note  • Eyes:	EOMI; PERRL; no drainage or redness  • ENMT:	No oral lesions; no gross abnormalities  • Neck	No bruits; no thyromegaly or nodules  • Breasts:	not examined  • Back:	No deformity or limitation of movement  • Respiratory:	Breath Sounds equal & clear to percussion & auscultation, no accessory muscle use  • Cardiovascular:	Regular rate & rhythm, normal S1, S2; no murmurs, gallops or rubs; no S3, S4  • Gastrointestinal:	Soft, non-tender, no hepatosplenomegaly, normal bowel sounds  • Genitourinary:	not examined  • Rectal: not examined  • Extremities:	No cyanosis, clubbing or edema  • Vascular:	Equal and normal pulses (carotid, femoral, dorsalis pedis)  • Neurologica:l	not examined  • Skin:	No lesions; no rash  • Lymph Nodes:	No lymphadedenopathy  • Musculoskeletal:	No joint pain, swelling or deformity; no limitation of movement        LABS:      CBC Full  -  ( 25 Jul 2023 05:30 )  WBC Count : 5.40 K/uL  RBC Count : 3.35 M/uL  Hemoglobin : 8.5 g/dL  Hematocrit : 28.2 %  Platelet Count - Automated : 266 K/uL  Mean Cell Volume : 84.2 fl  Mean Cell Hemoglobin : 25.4 pg  Mean Cell Hemoglobin Concentration : 30.1 gm/dL  Auto Neutrophil # : x  Auto Lymphocyte # : x  Auto Monocyte # : x  Auto Eosinophil # : x  Auto Basophil # : x  Auto Neutrophil % : x  Auto Lymphocyte % : x  Auto Monocyte % : x  Auto Eosinophil % : x  Auto Basophil % : x    07-25    142  |  104  |  14  ----------------------------<  148<H>  3.6   |  28  |  0.61    Ca    8.6      25 Jul 2023 05:30  Phos  3.4     07-25  Mg     1.9     07-25            Urinalysis Basic - ( 25 Jul 2023 05:30 )    Color: x / Appearance: x / SG: x / pH: x  Gluc: 148 mg/dL / Ketone: x  / Bili: x / Urobili: x   Blood: x / Protein: x / Nitrite: x   Leuk Esterase: x / RBC: x / WBC x   Sq Epi: x / Non Sq Epi: x / Bacteria: x                  RADIOLOGY & ADDITIONAL STUDIES (The following images were personally reviewed):

## 2023-07-25 NOTE — PROGRESS NOTE ADULT - ASSESSMENT
73 year old male with pmh of stroke (R hemiparesis), recurrent PE (last Jan 2022), diverticulosis and PSHx of colon tumor removal (1988), known jejunal mass presenting with abdominal pain. Pt is now s/p ex lap, SBR, primary anastomosis on 7/6. C/b UTI and ileus. PICC line placed 7/13 for TPN.     LFD (kosher)  PICC w/ TPN  Pain/nausea control PRN  Home doxazosin and protonix  Senna qd, miralax QHS, suppository x1  SQL/SCDs  F/u Bcx   f/u derm consult for right wrist rash  OOBA/IS  Dispo: refused [facility], home w/ home care

## 2023-07-26 LAB
ANION GAP SERPL CALC-SCNC: 12 MMOL/L — SIGNIFICANT CHANGE UP (ref 5–17)
ANION GAP SERPL CALC-SCNC: 12 MMOL/L — SIGNIFICANT CHANGE UP (ref 5–17)
BASE EXCESS BLDA CALC-SCNC: 1.6 MMOL/L — SIGNIFICANT CHANGE UP (ref -2–3)
BUN SERPL-MCNC: 13 MG/DL — SIGNIFICANT CHANGE UP (ref 7–23)
BUN SERPL-MCNC: 18 MG/DL — SIGNIFICANT CHANGE UP (ref 7–23)
CALCIUM SERPL-MCNC: 8.2 MG/DL — LOW (ref 8.4–10.5)
CALCIUM SERPL-MCNC: 8.7 MG/DL — SIGNIFICANT CHANGE UP (ref 8.4–10.5)
CHLORIDE SERPL-SCNC: 101 MMOL/L — SIGNIFICANT CHANGE UP (ref 96–108)
CHLORIDE SERPL-SCNC: 103 MMOL/L — SIGNIFICANT CHANGE UP (ref 96–108)
CO2 BLDA-SCNC: 26 MMOL/L — HIGH (ref 19–24)
CO2 SERPL-SCNC: 22 MMOL/L — SIGNIFICANT CHANGE UP (ref 22–31)
CO2 SERPL-SCNC: 24 MMOL/L — SIGNIFICANT CHANGE UP (ref 22–31)
CREAT SERPL-MCNC: 0.69 MG/DL — SIGNIFICANT CHANGE UP (ref 0.5–1.3)
CREAT SERPL-MCNC: 0.72 MG/DL — SIGNIFICANT CHANGE UP (ref 0.5–1.3)
CULTURE RESULTS: SIGNIFICANT CHANGE UP
CULTURE RESULTS: SIGNIFICANT CHANGE UP
EGFR: 98 ML/MIN/1.73M2 — SIGNIFICANT CHANGE UP
EGFR: 99 ML/MIN/1.73M2 — SIGNIFICANT CHANGE UP
GAS PNL BLDA: SIGNIFICANT CHANGE UP
GLUCOSE SERPL-MCNC: 137 MG/DL — HIGH (ref 70–99)
GLUCOSE SERPL-MCNC: 140 MG/DL — HIGH (ref 70–99)
HCO3 BLDA-SCNC: 25 MMOL/L — SIGNIFICANT CHANGE UP (ref 21–28)
HCT VFR BLD CALC: 30.1 % — LOW (ref 39–50)
HCT VFR BLD CALC: 33.9 % — LOW (ref 39–50)
HGB BLD-MCNC: 10.4 G/DL — LOW (ref 13–17)
HGB BLD-MCNC: 8.9 G/DL — LOW (ref 13–17)
MAGNESIUM SERPL-MCNC: 2.1 MG/DL — SIGNIFICANT CHANGE UP (ref 1.6–2.6)
MCHC RBC-ENTMCNC: 25.2 PG — LOW (ref 27–34)
MCHC RBC-ENTMCNC: 25.6 PG — LOW (ref 27–34)
MCHC RBC-ENTMCNC: 29.6 GM/DL — LOW (ref 32–36)
MCHC RBC-ENTMCNC: 30.7 GM/DL — LOW (ref 32–36)
MCV RBC AUTO: 83.3 FL — SIGNIFICANT CHANGE UP (ref 80–100)
MCV RBC AUTO: 85.3 FL — SIGNIFICANT CHANGE UP (ref 80–100)
NRBC # BLD: 0 /100 WBCS — SIGNIFICANT CHANGE UP (ref 0–0)
NRBC # BLD: 0 /100 WBCS — SIGNIFICANT CHANGE UP (ref 0–0)
PCO2 BLDA: 33 MMHG — LOW (ref 35–48)
PH BLDA: 7.48 — HIGH (ref 7.35–7.45)
PHOSPHATE SERPL-MCNC: 3.6 MG/DL — SIGNIFICANT CHANGE UP (ref 2.5–4.5)
PLATELET # BLD AUTO: 260 K/UL — SIGNIFICANT CHANGE UP (ref 150–400)
PLATELET # BLD AUTO: 293 K/UL — SIGNIFICANT CHANGE UP (ref 150–400)
PO2 BLDA: 82 MMHG — LOW (ref 83–108)
POTASSIUM SERPL-MCNC: 4 MMOL/L — SIGNIFICANT CHANGE UP (ref 3.5–5.3)
POTASSIUM SERPL-MCNC: 4.1 MMOL/L — SIGNIFICANT CHANGE UP (ref 3.5–5.3)
POTASSIUM SERPL-SCNC: 4 MMOL/L — SIGNIFICANT CHANGE UP (ref 3.5–5.3)
POTASSIUM SERPL-SCNC: 4.1 MMOL/L — SIGNIFICANT CHANGE UP (ref 3.5–5.3)
RBC # BLD: 3.53 M/UL — LOW (ref 4.2–5.8)
RBC # BLD: 4.07 M/UL — LOW (ref 4.2–5.8)
RBC # FLD: 29.5 % — HIGH (ref 10.3–14.5)
RBC # FLD: 29.9 % — HIGH (ref 10.3–14.5)
SAO2 % BLDA: 97.4 % — SIGNIFICANT CHANGE UP (ref 94–98)
SODIUM SERPL-SCNC: 135 MMOL/L — SIGNIFICANT CHANGE UP (ref 135–145)
SODIUM SERPL-SCNC: 139 MMOL/L — SIGNIFICANT CHANGE UP (ref 135–145)
SPECIMEN SOURCE: SIGNIFICANT CHANGE UP
SPECIMEN SOURCE: SIGNIFICANT CHANGE UP
WBC # BLD: 5.36 K/UL — SIGNIFICANT CHANGE UP (ref 3.8–10.5)
WBC # BLD: 7.85 K/UL — SIGNIFICANT CHANGE UP (ref 3.8–10.5)
WBC # FLD AUTO: 5.36 K/UL — SIGNIFICANT CHANGE UP (ref 3.8–10.5)
WBC # FLD AUTO: 7.85 K/UL — SIGNIFICANT CHANGE UP (ref 3.8–10.5)

## 2023-07-26 PROCEDURE — 36600 WITHDRAWAL OF ARTERIAL BLOOD: CPT

## 2023-07-26 PROCEDURE — 99232 SBSQ HOSP IP/OBS MODERATE 35: CPT

## 2023-07-26 PROCEDURE — 74177 CT ABD & PELVIS W/CONTRAST: CPT | Mod: 26

## 2023-07-26 PROCEDURE — 71275 CT ANGIOGRAPHY CHEST: CPT | Mod: 26

## 2023-07-26 PROCEDURE — 93010 ELECTROCARDIOGRAM REPORT: CPT

## 2023-07-26 RX ORDER — ELECTROLYTE SOLUTION,INJ
1 VIAL (ML) INTRAVENOUS
Refills: 0 | Status: DISCONTINUED | OUTPATIENT
Start: 2023-07-26 | End: 2023-07-26

## 2023-07-26 RX ORDER — IPRATROPIUM/ALBUTEROL SULFATE 18-103MCG
3 AEROSOL WITH ADAPTER (GRAM) INHALATION ONCE
Refills: 0 | Status: COMPLETED | OUTPATIENT
Start: 2023-07-26 | End: 2023-07-26

## 2023-07-26 RX ORDER — I.V. FAT EMULSION 20 G/100ML
0.56 EMULSION INTRAVENOUS
Qty: 50 | Refills: 0 | Status: DISCONTINUED | OUTPATIENT
Start: 2023-07-26 | End: 2023-07-26

## 2023-07-26 RX ADMIN — PANTOPRAZOLE SODIUM 40 MILLIGRAM(S): 20 TABLET, DELAYED RELEASE ORAL at 13:08

## 2023-07-26 RX ADMIN — Medication 1 MILLIGRAM(S): at 22:23

## 2023-07-26 RX ADMIN — Medication 1 APPLICATION(S): at 13:09

## 2023-07-26 RX ADMIN — CHLORHEXIDINE GLUCONATE 1 APPLICATION(S): 213 SOLUTION TOPICAL at 06:12

## 2023-07-26 RX ADMIN — Medication 3 MILLILITER(S): at 19:17

## 2023-07-26 RX ADMIN — I.V. FAT EMULSION 20.8 GM/KG/DAY: 20 EMULSION INTRAVENOUS at 18:11

## 2023-07-26 RX ADMIN — ENOXAPARIN SODIUM 40 MILLIGRAM(S): 100 INJECTION SUBCUTANEOUS at 17:37

## 2023-07-26 RX ADMIN — MAGNESIUM HYDROXIDE 30 MILLILITER(S): 400 TABLET, CHEWABLE ORAL at 13:08

## 2023-07-26 RX ADMIN — Medication 1 EACH: at 18:11

## 2023-07-26 RX ADMIN — Medication 1 APPLICATION(S): at 06:12

## 2023-07-26 RX ADMIN — Medication 1 APPLICATION(S): at 22:23

## 2023-07-26 RX ADMIN — Medication 1 TABLET(S): at 13:08

## 2023-07-26 RX ADMIN — SENNA PLUS 2 TABLET(S): 8.6 TABLET ORAL at 22:22

## 2023-07-26 NOTE — CHART NOTE - NSCHARTNOTEFT_GEN_A_CORE
Admitting Diagnosis:   Patient is a 73y old  Male who presents with a chief complaint of abdominal pain (23 Jul 2023 10:40)  PAST MEDICAL & SURGICAL HISTORY:  Depression, major  Stroke  Pulmonary embolism  BPH (benign prostatic hyperplasia)  Diverticulosis    Current Nutrition Order: Low fiber + Kosher diet  *Alternate means of nutrition via TPN: TPN via PICC: 330g Dex, 95g AA, 50g SMOF Lipids to provide in total 2000Kcal, 95g protein, GIR 2.6, 1.4g/kg ideal body weight protein     PO Intake: Good (%) [   ]  Fair (50-75%) [ x ] Poor (<25%) [   ]    GI Issues: distention noted per electronic medical records data; no complaints of nausea or vomiting; pt passing flatus per chart; BM noted on 7/25/23;     Pain: no pain/discomfort noted per pt and per electronic medical records data    Skin Integrity: abdominal midline surgical incisions; R inner wrist rash; L and R buttocks skin tears, R forearm skin tear; no pressure ulcers noted; no edema noted; Dante: 14    Labs:   07-26    139  |  103  |  13  ----------------------------<  140<H>  4.1   |  24  |  0.69    Ca    8.2<L>      26 Jul 2023 07:35  Phos  3.6     07-26  Mg     2.1     07-26    CAPILLARY BLOOD GLUCOSE    Medications:  MEDICATIONS  (STANDING):  chlorhexidine 2% Cloths 1 Application(s) Topical <User Schedule>  doxazosin 1 milliGRAM(s) Oral at bedtime  enoxaparin Injectable 40 milliGRAM(s) SubCutaneous every 24 hours  lipid, fat emulsion (Fish Oil and Plant Based) 20% Infusion 0.565 Gm/kG/Day (20.8 mL/Hr) IV Continuous <Continuous>  magnesium hydroxide Suspension 30 milliLiter(s) Oral daily  pantoprazole  Injectable 40 milliGRAM(s) IV Push daily  Parenteral Nutrition - Adult 1 Each (31 mL/Hr) TPN Continuous <Continuous>  Parenteral Nutrition - Adult 1 Each (63 mL/Hr) TPN Continuous <Continuous>  senna 2 Tablet(s) Oral at bedtime  triamcinolone 0.1% Cream 1 Application(s) Topical three times a day  trimethoprim  160 mG/sulfamethoxazole 800 mG 1 Tablet(s) Oral daily    MEDICATIONS  (PRN):  ondansetron Injectable 4 milliGRAM(s) IV Push every 6 hours PRN Nausea and/or Vomiting  sodium chloride 0.9% lock flush 10 milliLiter(s) IV Push every 1 hour PRN Pre/post blood products, medications, blood draw, and to maintain line patency    Dosing Anthropometrics:  Height: 5'7  Weight: 195 lbs  BMI: 30.6  IBW: 148 pounds/67.2 kg  %IBW: 132%    Weight Change: No new documented weights at this time. Recommend that nursing obtain updated weights regularly (daily while on TPN). RD to continue to monitor.    Estimated energy needs: Ideal body weight used to calculate energy needs due to pt's current body weight within % ideal body weight. Adjust for CA, age    Estimated Energy Needs From (josel /kg) 28  Estimated Energy Needs To (jose l/kg)	32  Estimated Energy Needs Calculated From (jose l/kg) 1878  Estimated Energy Needs Calculated To (jose l/kg)	2147    Estimated Protein Needs From (g/kg)	1.3  Estimated Protein Needs To (g/kg)	1.5  Estimated Protein Needs Calculated From (g/kg) 87.23  Estimated Protein Needs Calculated To (g/kg)	100.65    Estimated Fluid Needs From (ml/kg)	28  Estimated Fluid Needs To (ml/kg)	32  Estimated Fluid Needs Calculated From (ml/kg)	1878  Estimated Fluid Needs Calculated To (ml/kg)	2147    Subjective: 73 year old male with pmh of stroke (R hemiparesis), recurrent PE (last Jan 2022), diverticulosis and PSHx of colon tumor removal (1988), known jejunal mass presenting with abdominal pain. Pt is now s/p ex lap, SBR, primary anastomosis on 7/6. C/b UTI and ileus. PICC line placed 7/13 for TPN.    Patient seen at bedside for follow up assessment-on room air. Labs reviewed 7/26/23; elevated serum Glucose (140), no updated triglycerides since 7/21/23, RD to continue to monitor trends. Observed TPN infusing at bedside (63mL/h). Pt's diet has been advanced to Low Fiber as of 7/25/23, Kosher diet, 3-day calorie count in place. Pt, pt's aide at bedside, and pt's RN stated pt has been eating fairly well overall, approximately ~60% PO intakes today and last night, calorie count to be continued through 7/27/23 and assessed by RD. No complaints of nausea, vomiting or pain per pt, most recent BM on 7/25/23. RD encouraged pt to increase PO intakes as able, pt was receptive, verbalized understanding. RD discussed pt's status with pt's medical team, medical team has plans to run TPN at 1/2 rate today, then discontinue it as long as pt's PO intakes and nutrition are adequate; RD remains available per protocol. RD to follow up. See nutrition recommendations below.    Previous Nutrition Diagnosis: Inadequate Oral Intake related to clinical course As evidenced by NPO status    Active [   ]  Resolved [ x ]    If resolved, new PES: Inadequate Oral Intake related to clinical course as evidenced by alternate means of nutrition via TPN with nutrition via PO in addition for pt (fair PO intakes overall thus far since diet advanced)    Goal: - Consistently meets > 75% EER via most appropriate route for nutrition    Recommendations:  1. If pt's PO intakes are >60-65%, recommend to continue with plan of TPN at 1/2 rate, then dc if pt is meeting >60-65% of estimated nutrient needs  >>1/2 rate TPN is: 165g dextrose, 47.5g protein, 50g of 20% lipids to provide 1251calories per day, 47.5g protein  Recommend checking TG before starting TPN and then check TG weekly. Check Mg, Phos, K daily and POC BG Q6hrs. Trend daily weights. Fluids and electrolytes per MD discretion.  *Continue Low Fiber diet  >>implement nourishments/supplements if pt's PO intakes decrease <50% consistently  2. Monitor GI function, skin integrity, labs, daily wts  3. Pain and bowel regimen per medical team  4. RD to remain available for recommendation adjustment prn or will follow up pt per organizational policy    Education: RD encouraged pt to increase PO intakes as able, pt was receptive, verbalized understanding.    Risk Level: High [ x ] Moderate [   ] Low [   ].

## 2023-07-26 NOTE — PROGRESS NOTE ADULT - SUBJECTIVE AND OBJECTIVE BOX
Interval Events: Reviewed  Patient seen and examined at bedside.    Patient is a 73y old  Male who presents with a chief complaint of abdominal pain (23 Jul 2023 10:40)  he is OK    PAST MEDICAL & SURGICAL HISTORY:  Depression, major      Stroke      Pulmonary embolism      BPH (benign prostatic hyperplasia)      Diverticulosis          MEDICATIONS:  Pulmonary:    Antimicrobials:  trimethoprim  160 mG/sulfamethoxazole 800 mG 1 Tablet(s) Oral daily    Anticoagulants:  enoxaparin Injectable 40 milliGRAM(s) SubCutaneous every 24 hours    Cardiac:  doxazosin 1 milliGRAM(s) Oral at bedtime      Allergies    No Known Allergies    Intolerances        Vital Signs Last 24 Hrs  T(C): 37.1 (26 Jul 2023 22:34), Max: 37.2 (26 Jul 2023 13:05)  T(F): 98.7 (26 Jul 2023 22:34), Max: 99 (26 Jul 2023 13:05)  HR: 116 (26 Jul 2023 22:34) (94 - 125)  BP: 118/81 (26 Jul 2023 22:34) (112/76 - 145/96)  BP(mean): --  RR: 18 (26 Jul 2023 22:34) (17 - 32)  SpO2: 92% (26 Jul 2023 22:34) (92% - 96%)    Parameters below as of 26 Jul 2023 22:34  Patient On (Oxygen Delivery Method): room air        07-25 @ 07:01 - 07-26 @ 07:00  --------------------------------------------------------  IN: 2085.6 mL / OUT: 150 mL / NET: 1935.6 mL    07-26 @ 07:01 - 07-26 @ 23:10  --------------------------------------------------------  IN: 403.6 mL / OUT: 0 mL / NET: 403.6 mL          Review of Systems:   •	General: negative  •	Skin/Breast: negative  •	Ophthalmologic: negative  •	ENMT: negative  •	Respiratory and Thorax: negative  •	Cardiovascular: negative  •	Gastrointestinal: negative  •	Genitourinary: negative  •	Musculoskeletal: negative  •	Neurological: negative  •	Psychiatric: negative  •	Hematology/Lymphatics: negative  •	Endocrine: negative  •	Allergic/Immunologic: negative    Physical Exam:   • Constitutional:	refer to the dietion /Nutritionist note  • Eyes:	EOMI; PERRL; no drainage or redness  • ENMT:	No oral lesions; no gross abnormalities  • Neck	No bruits; no thyromegaly or nodules  • Breasts:	not examined  • Back:	No deformity or limitation of movement  • Respiratory:	Breath Sounds equal & clear to percussion & auscultation, no accessory muscle use  • Cardiovascular:	Regular rate & rhythm, normal S1, S2; no murmurs, gallops or rubs; no S3, S4  • Gastrointestinal:	Soft, non-tender, no hepatosplenomegaly, normal bowel sounds  • Genitourinary:	not examined  • Rectal: not examined  • Extremities:	No cyanosis, clubbing or edema  • Vascular:	Equal and normal pulses (carotid, femoral, dorsalis pedis)  • Neurologica:l	not examined  • Skin:	No lesions; no rash  • Lymph Nodes:	No lymphadedenopathy  • Musculoskeletal:	No joint pain, swelling or deformity; no limitation of movement        LABS:  ABG - ( 26 Jul 2023 21:15 )  pH, Arterial: 7.48  pH, Blood: x     /  pCO2: 33    /  pO2: 82    / HCO3: 25    / Base Excess: 1.6   /  SaO2: 97.4                CBC Full  -  ( 26 Jul 2023 20:49 )  WBC Count : 7.85 K/uL  RBC Count : 4.07 M/uL  Hemoglobin : 10.4 g/dL  Hematocrit : 33.9 %  Platelet Count - Automated : 293 K/uL  Mean Cell Volume : 83.3 fl  Mean Cell Hemoglobin : 25.6 pg  Mean Cell Hemoglobin Concentration : 30.7 gm/dL  Auto Neutrophil # : x  Auto Lymphocyte # : x  Auto Monocyte # : x  Auto Eosinophil # : x  Auto Basophil # : x  Auto Neutrophil % : x  Auto Lymphocyte % : x  Auto Monocyte % : x  Auto Eosinophil % : x  Auto Basophil % : x    07-26    135  |  101  |  18  ----------------------------<  137<H>  4.0   |  22  |  0.72    Ca    8.7      26 Jul 2023 20:49  Phos  3.6     07-26  Mg     2.1     07-26            Urinalysis Basic - ( 26 Jul 2023 20:49 )    Color: x / Appearance: x / SG: x / pH: x  Gluc: 137 mg/dL / Ketone: x  / Bili: x / Urobili: x   Blood: x / Protein: x / Nitrite: x   Leuk Esterase: x / RBC: x / WBC x   Sq Epi: x / Non Sq Epi: x / Bacteria: x                  RADIOLOGY & ADDITIONAL STUDIES (The following images were personally reviewed):

## 2023-07-26 NOTE — PROCEDURE NOTE - NSPROCDETAILS_GEN_ALL_CORE
positive blood return obtained via catheter/hemostasis with direct pressure, dressing applied
tip visualized at RA-SVC junction/location identified, draped/prepped, sterile technique used/sterile dressing applied/sterile technique, catheter placed/supine position/ultrasound guidance

## 2023-07-26 NOTE — PROGRESS NOTE ADULT - SUBJECTIVE AND OBJECTIVE BOX
SUBJECTIVE: Pt seen and examined at bedside with chief. Pt denies any complaints. Pain well controlled. Tolerating diet without N/V. Admits to BF     MEDICATIONS  (STANDING):  chlorhexidine 2% Cloths 1 Application(s) Topical <User Schedule>  doxazosin 1 milliGRAM(s) Oral at bedtime  enoxaparin Injectable 40 milliGRAM(s) SubCutaneous every 24 hours  lipid, fat emulsion (Fish Oil and Plant Based) 20% Infusion 0.565 Gm/kG/Day (20.8 mL/Hr) IV Continuous <Continuous>  magnesium hydroxide Suspension 30 milliLiter(s) Oral daily  pantoprazole  Injectable 40 milliGRAM(s) IV Push daily  Parenteral Nutrition - Adult 1 Each (63 mL/Hr) TPN Continuous <Continuous>  senna 2 Tablet(s) Oral at bedtime  triamcinolone 0.1% Cream 1 Application(s) Topical three times a day  trimethoprim  160 mG/sulfamethoxazole 800 mG 1 Tablet(s) Oral daily    MEDICATIONS  (PRN):  ondansetron Injectable 4 milliGRAM(s) IV Push every 6 hours PRN Nausea and/or Vomiting  sodium chloride 0.9% lock flush 10 milliLiter(s) IV Push every 1 hour PRN Pre/post blood products, medications, blood draw, and to maintain line patency      Vital Signs Last 24 Hrs  T(C): 37 (26 Jul 2023 05:08), Max: 37.7 (25 Jul 2023 16:21)  T(F): 98.6 (26 Jul 2023 05:08), Max: 99.8 (25 Jul 2023 16:21)  HR: 94 (26 Jul 2023 05:08) (94 - 103)  BP: 112/76 (26 Jul 2023 05:08) (108/73 - 115/76)  BP(mean): --  RR: 17 (26 Jul 2023 05:08) (17 - 18)  SpO2: 93% (26 Jul 2023 05:08) (93% - 94%)    Parameters below as of 26 Jul 2023 05:08  Patient On (Oxygen Delivery Method): room air        PHYSICAL EXAM:      Constitutional: A&Ox3    Respiratory: non labored breathing, no respiratory distress    Cardiovascular: NSR, RRR    Gastrointestinal: Soft ND, NT                 Incision: Healing well, staples intact    Genitourinary: voiding     Extremities: (-) edema                  I&O's Detail    25 Jul 2023 07:01  -  26 Jul 2023 07:00  --------------------------------------------------------  IN:    Fat Emulsion (Fish Oil &amp; Plant Based) 20% Infusion: 20.8 mL    Fat Emulsion (Fish Oil &amp; Plant Based) 20% Infusion: 228.8 mL    IV PiggyBack: 100 mL    Oral Fluid: 350 mL    TPN (Total Parenteral Nutrition): 1386 mL  Total IN: 2085.6 mL    OUT:    Voided (mL): 150 mL  Total OUT: 150 mL    Total NET: 1935.6 mL          LABS:                        8.9    5.36  )-----------( 260      ( 26 Jul 2023 07:35 )             30.1     07-26    139  |  103  |  13  ----------------------------<  140<H>  4.1   |  24  |  0.69    Ca    8.2<L>      26 Jul 2023 07:35  Phos  3.6     07-26  Mg     2.1     07-26        Urinalysis Basic - ( 26 Jul 2023 07:35 )    Color: x / Appearance: x / SG: x / pH: x  Gluc: 140 mg/dL / Ketone: x  / Bili: x / Urobili: x   Blood: x / Protein: x / Nitrite: x   Leuk Esterase: x / RBC: x / WBC x   Sq Epi: x / Non Sq Epi: x / Bacteria: x        RADIOLOGY & ADDITIONAL STUDIES:

## 2023-07-26 NOTE — PROGRESS NOTE ADULT - ASSESSMENT
73 year old male with pmh of stroke (R hemiparesis), recurrent PE (last Jan 2022), diverticulosis and PSHx of colon tumor removal (1988), known jejunal mass presenting with abdominal pain. Pt is now s/p ex lap, SBR, primary anastomosis on 7/6. C/b UTI and ileus. PICC line placed 7/13 for TPN.     LRD  PICC , 1/2 TPN  Pain/nausea control PRN  Bactrim/triamcinolone cream for allergic dermatitis, derm following  Home doxazosin and protonix  Senna qd, miralax QHS  SQL/SCDs  OOBA/IS  Dispo: refused LALITHA, home w/ home care

## 2023-07-27 ENCOUNTER — TRANSCRIPTION ENCOUNTER (OUTPATIENT)
Age: 73
End: 2023-07-27

## 2023-07-27 LAB
ANION GAP SERPL CALC-SCNC: 12 MMOL/L — SIGNIFICANT CHANGE UP (ref 5–17)
BUN SERPL-MCNC: 18 MG/DL — SIGNIFICANT CHANGE UP (ref 7–23)
CALCIUM SERPL-MCNC: 8.5 MG/DL — SIGNIFICANT CHANGE UP (ref 8.4–10.5)
CHLORIDE SERPL-SCNC: 102 MMOL/L — SIGNIFICANT CHANGE UP (ref 96–108)
CO2 SERPL-SCNC: 23 MMOL/L — SIGNIFICANT CHANGE UP (ref 22–31)
CREAT SERPL-MCNC: 0.72 MG/DL — SIGNIFICANT CHANGE UP (ref 0.5–1.3)
EGFR: 98 ML/MIN/1.73M2 — SIGNIFICANT CHANGE UP
GLUCOSE SERPL-MCNC: 144 MG/DL — HIGH (ref 70–99)
HCT VFR BLD CALC: 32.8 % — LOW (ref 39–50)
HGB BLD-MCNC: 9.8 G/DL — LOW (ref 13–17)
MAGNESIUM SERPL-MCNC: 2.2 MG/DL — SIGNIFICANT CHANGE UP (ref 1.6–2.6)
MCHC RBC-ENTMCNC: 25.2 PG — LOW (ref 27–34)
MCHC RBC-ENTMCNC: 29.9 GM/DL — LOW (ref 32–36)
MCV RBC AUTO: 84.3 FL — SIGNIFICANT CHANGE UP (ref 80–100)
NRBC # BLD: 0 /100 WBCS — SIGNIFICANT CHANGE UP (ref 0–0)
PHOSPHATE SERPL-MCNC: 3.8 MG/DL — SIGNIFICANT CHANGE UP (ref 2.5–4.5)
PLATELET # BLD AUTO: 302 K/UL — SIGNIFICANT CHANGE UP (ref 150–400)
POTASSIUM SERPL-MCNC: 4.5 MMOL/L — SIGNIFICANT CHANGE UP (ref 3.5–5.3)
POTASSIUM SERPL-SCNC: 4.5 MMOL/L — SIGNIFICANT CHANGE UP (ref 3.5–5.3)
RBC # BLD: 3.89 M/UL — LOW (ref 4.2–5.8)
RBC # FLD: 29.2 % — HIGH (ref 10.3–14.5)
SODIUM SERPL-SCNC: 137 MMOL/L — SIGNIFICANT CHANGE UP (ref 135–145)
WBC # BLD: 6.72 K/UL — SIGNIFICANT CHANGE UP (ref 3.8–10.5)
WBC # FLD AUTO: 6.72 K/UL — SIGNIFICANT CHANGE UP (ref 3.8–10.5)

## 2023-07-27 PROCEDURE — 99232 SBSQ HOSP IP/OBS MODERATE 35: CPT

## 2023-07-27 RX ORDER — IPRATROPIUM/ALBUTEROL SULFATE 18-103MCG
3 AEROSOL WITH ADAPTER (GRAM) INHALATION ONCE
Refills: 0 | Status: COMPLETED | OUTPATIENT
Start: 2023-07-27 | End: 2023-07-27

## 2023-07-27 RX ORDER — ELECTROLYTE SOLUTION,INJ
1 VIAL (ML) INTRAVENOUS
Refills: 0 | Status: DISCONTINUED | OUTPATIENT
Start: 2023-07-27 | End: 2023-07-27

## 2023-07-27 RX ORDER — I.V. FAT EMULSION 20 G/100ML
0.56 EMULSION INTRAVENOUS
Qty: 50 | Refills: 0 | Status: DISCONTINUED | OUTPATIENT
Start: 2023-07-27 | End: 2023-07-27

## 2023-07-27 RX ADMIN — Medication 1 APPLICATION(S): at 21:48

## 2023-07-27 RX ADMIN — SENNA PLUS 2 TABLET(S): 8.6 TABLET ORAL at 21:48

## 2023-07-27 RX ADMIN — Medication 1 APPLICATION(S): at 15:51

## 2023-07-27 RX ADMIN — Medication 10 MILLIGRAM(S): at 16:54

## 2023-07-27 RX ADMIN — CHLORHEXIDINE GLUCONATE 1 APPLICATION(S): 213 SOLUTION TOPICAL at 05:55

## 2023-07-27 RX ADMIN — PANTOPRAZOLE SODIUM 40 MILLIGRAM(S): 20 TABLET, DELAYED RELEASE ORAL at 12:50

## 2023-07-27 RX ADMIN — MAGNESIUM HYDROXIDE 30 MILLILITER(S): 400 TABLET, CHEWABLE ORAL at 12:51

## 2023-07-27 RX ADMIN — Medication 1 APPLICATION(S): at 05:55

## 2023-07-27 RX ADMIN — Medication 1 MILLIGRAM(S): at 21:48

## 2023-07-27 RX ADMIN — Medication 3 MILLILITER(S): at 10:11

## 2023-07-27 RX ADMIN — Medication 1 EACH: at 18:25

## 2023-07-27 RX ADMIN — ENOXAPARIN SODIUM 40 MILLIGRAM(S): 100 INJECTION SUBCUTANEOUS at 16:54

## 2023-07-27 RX ADMIN — Medication 1 TABLET(S): at 12:50

## 2023-07-27 RX ADMIN — I.V. FAT EMULSION 20.8 GM/KG/DAY: 20 EMULSION INTRAVENOUS at 18:25

## 2023-07-27 NOTE — PROGRESS NOTE ADULT - ASSESSMENT
73 year old male with pmh of stroke (R hemiparesis), recurrent PE (last Jan 2022), diverticulosis and PSHx of colon tumor removal (1988), known jejunal mass presenting with abdominal pain. Pt is now s/p ex lap, SBR, primary anastomosis on 7/6. C/b UTI and ileus. PICC line placed 7/13 for TPN.     CLD  PICC , TPN full rate  Pain/nausea control PRN  Bactrim/triamcinolone cream for allergic dermatitis, derm following  Home doxazosin and protonix  Senna qd, miralax QHS  SQL/SCDs  OOBA/IS  Dispo: refused [facility]ITHA, home w/ home care

## 2023-07-27 NOTE — DISCHARGE NOTE NURSING/CASE MANAGEMENT/SOCIAL WORK - NSSCTYPOFSERV_GEN_ALL_CORE
[phone]; [phone]; [phone]  or  [phone]  #s ->>> to call once you arrived home regarding infusion deliveries and home visits

## 2023-07-27 NOTE — PROGRESS NOTE ADULT - SUBJECTIVE AND OBJECTIVE BOX
STATUS POST:  7/6: Ex lap with small bowel resection and anastomosis       ON: tachycardia (114-120s). desat to high 80s. STAT labs done (BMP, ABG, CBC) and CT PE protocol and A/P. negative for PE, but there is concern for SBO (transition point in R abdomen). +F/-BM.    SUBJECTIVE: Patient seen and examined bedside by chief resident, states breathing effort is improving. Hand rash improving. Patient tolerating diet, -n/-v/+f/-bm. Patient poor effort OOBA. Denies sob/bear/dizziness/cp/ha    doxazosin 1 milliGRAM(s) Oral at bedtime  enoxaparin Injectable 40 milliGRAM(s) SubCutaneous every 24 hours  trimethoprim  160 mG/sulfamethoxazole 800 mG 1 Tablet(s) Oral daily      Vital Signs Last 24 Hrs  T(C): 36.9 (27 Jul 2023 05:28), Max: 37.2 (26 Jul 2023 13:05)  T(F): 98.4 (27 Jul 2023 05:28), Max: 99 (26 Jul 2023 13:05)  HR: 115 (27 Jul 2023 05:28) (94 - 125)  BP: 127/90 (27 Jul 2023 05:28) (116/76 - 145/96)  BP(mean): 102 (27 Jul 2023 05:28) (102 - 102)  RR: 17 (27 Jul 2023 05:28) (17 - 32)  SpO2: 96% (27 Jul 2023 05:28) (92% - 96%)    Parameters below as of 27 Jul 2023 05:28  Patient On (Oxygen Delivery Method): room air      I&O's Detail    26 Jul 2023 07:01  -  27 Jul 2023 07:00  --------------------------------------------------------  IN:    Fat Emulsion (Fish Oil &amp; Plant Based) 20% Infusion: 260 mL    TPN (Total Parenteral Nutrition): 688 mL  Total IN: 948 mL    OUT:  Total OUT: 0 mL    Total NET: 948 mL      27 Jul 2023 07:01  -  27 Jul 2023 08:23  --------------------------------------------------------  IN:    Fat Emulsion (Fish Oil &amp; Plant Based) 20% Infusion: 20.8 mL    TPN (Total Parenteral Nutrition): 31 mL  Total IN: 51.8 mL    OUT:  Total OUT: 0 mL    Total NET: 51.8 mL          General: NAD, resting comfortably in bed  C/V: NSR  Pulm: Nonlabored breathing, no respiratory distress, NC 2L  Abd: soft, NT/ND. No rebound or guarding  Incisions; c/d/i  Extrem: WWP, no edema, SCDs in place        LABS:                        10.4   7.85  )-----------( 293      ( 26 Jul 2023 20:49 )             33.9     07-27    137  |  102  |  18  ----------------------------<  144<H>  4.5   |  23  |  0.72    Ca    8.5      27 Jul 2023 06:52  Phos  3.8     07-27  Mg     2.2     07-27        Urinalysis Basic - ( 27 Jul 2023 06:52 )    Color: x / Appearance: x / SG: x / pH: x  Gluc: 144 mg/dL / Ketone: x  / Bili: x / Urobili: x   Blood: x / Protein: x / Nitrite: x   Leuk Esterase: x / RBC: x / WBC x   Sq Epi: x / Non Sq Epi: x / Bacteria: x        RADIOLOGY & ADDITIONAL STUDIES:

## 2023-07-27 NOTE — PROGRESS NOTE ADULT - SUBJECTIVE AND OBJECTIVE BOX
Interval Events: Reviewed  Patient seen and examined at bedside.    Patient is a 73y old  Male who presents with a chief complaint of abdominal pain (23 Jul 2023 10:40)  he did pt    PAST MEDICAL & SURGICAL HISTORY:  Depression, major      Stroke      Pulmonary embolism      BPH (benign prostatic hyperplasia)      Diverticulosis          MEDICATIONS:  Pulmonary:    Antimicrobials:  trimethoprim  160 mG/sulfamethoxazole 800 mG 1 Tablet(s) Oral daily    Anticoagulants:  enoxaparin Injectable 40 milliGRAM(s) SubCutaneous every 24 hours    Cardiac:  doxazosin 1 milliGRAM(s) Oral at bedtime      Allergies    No Known Allergies    Intolerances        Vital Signs Last 24 Hrs  T(C): 37.1 (27 Jul 2023 17:01), Max: 37.2 (26 Jul 2023 20:50)  T(F): 98.7 (27 Jul 2023 17:01), Max: 99 (26 Jul 2023 20:50)  HR: 100 (27 Jul 2023 17:01) (100 - 123)  BP: 114/75 (27 Jul 2023 17:01) (114/75 - 145/96)  BP(mean): 102 (27 Jul 2023 05:28) (102 - 102)  RR: 18 (27 Jul 2023 17:01) (17 - 32)  SpO2: 94% (27 Jul 2023 17:01) (92% - 97%)    Parameters below as of 27 Jul 2023 17:01  Patient On (Oxygen Delivery Method): nasal cannula        07-26 @ 07:01  -  07-27 @ 07:00  --------------------------------------------------------  IN: 948 mL / OUT: 0 mL / NET: 948 mL    07-27 @ 07:01  -  07-27 @ 20:41  --------------------------------------------------------  IN: 114.8 mL / OUT: 0 mL / NET: 114.8 mL          Review of Systems:   •	General: negative  •	Skin/Breast: negative  •	Ophthalmologic: negative  •	ENMT: negative  •	Respiratory and Thorax: negative  •	Cardiovascular: negative  •	Gastrointestinal: negative  •	Genitourinary: negative  •	Musculoskeletal: negative  •	Neurological: negative  •	Psychiatric: negative  •	Hematology/Lymphatics: negative  •	Endocrine: negative  •	Allergic/Immunologic: negative    Physical Exam:   • Constitutional:	refer to the dietion /Nutritionist note  • Eyes:	EOMI; PERRL; no drainage or redness  • ENMT:	No oral lesions; no gross abnormalities  • Neck	No bruits; no thyromegaly or nodules  • Breasts:	not examined  • Back:	No deformity or limitation of movement  • Respiratory:	Breath Sounds equal & clear to percussion & auscultation, no accessory muscle use  • Cardiovascular:	Regular rate & rhythm, normal S1, S2; no murmurs, gallops or rubs; no S3, S4  • Gastrointestinal:	Soft, non-tender, no hepatosplenomegaly, normal bowel sounds  • Genitourinary:	not examined  • Rectal: not examined  • Extremities:	No cyanosis, clubbing or edema  • Vascular:	Equal and normal pulses (carotid, femoral, dorsalis pedis)  • Neurologica:l	not examined  • Skin:	No lesions; no rash  • Lymph Nodes:	No lymphadedenopathy  • Musculoskeletal:	No joint pain, swelling or deformity; no limitation of movement        LABS:  ABG - ( 26 Jul 2023 21:15 )  pH, Arterial: 7.48  pH, Blood: x     /  pCO2: 33    /  pO2: 82    / HCO3: 25    / Base Excess: 1.6   /  SaO2: 97.4                CBC Full  -  ( 27 Jul 2023 09:45 )  WBC Count : 6.72 K/uL  RBC Count : 3.89 M/uL  Hemoglobin : 9.8 g/dL  Hematocrit : 32.8 %  Platelet Count - Automated : 302 K/uL  Mean Cell Volume : 84.3 fl  Mean Cell Hemoglobin : 25.2 pg  Mean Cell Hemoglobin Concentration : 29.9 gm/dL  Auto Neutrophil # : x  Auto Lymphocyte # : x  Auto Monocyte # : x  Auto Eosinophil # : x  Auto Basophil # : x  Auto Neutrophil % : x  Auto Lymphocyte % : x  Auto Monocyte % : x  Auto Eosinophil % : x  Auto Basophil % : x    07-27    137  |  102  |  18  ----------------------------<  144<H>  4.5   |  23  |  0.72    Ca    8.5      27 Jul 2023 06:52  Phos  3.8     07-27  Mg     2.2     07-27            Urinalysis Basic - ( 27 Jul 2023 06:52 )    Color: x / Appearance: x / SG: x / pH: x  Gluc: 144 mg/dL / Ketone: x  / Bili: x / Urobili: x   Blood: x / Protein: x / Nitrite: x   Leuk Esterase: x / RBC: x / WBC x   Sq Epi: x / Non Sq Epi: x / Bacteria: x                  RADIOLOGY & ADDITIONAL STUDIES (The following images were personally reviewed):

## 2023-07-27 NOTE — DISCHARGE NOTE NURSING/CASE MANAGEMENT/SOCIAL WORK - PATIENT PORTAL LINK FT
You can access the FollowMyHealth Patient Portal offered by [facility] by registering at the following website: [email or web address]. By joining [facility]’s FollowMyHealth portal, you will also be able to view your health information using other applications (apps) compatible with our system.

## 2023-07-28 LAB
ANION GAP SERPL CALC-SCNC: 9 MMOL/L — SIGNIFICANT CHANGE UP (ref 5–17)
BUN SERPL-MCNC: 16 MG/DL — SIGNIFICANT CHANGE UP (ref 7–23)
CALCIUM SERPL-MCNC: 8.1 MG/DL — LOW (ref 8.4–10.5)
CHLORIDE SERPL-SCNC: 103 MMOL/L — SIGNIFICANT CHANGE UP (ref 96–108)
CO2 SERPL-SCNC: 26 MMOL/L — SIGNIFICANT CHANGE UP (ref 22–31)
CREAT SERPL-MCNC: 0.75 MG/DL — SIGNIFICANT CHANGE UP (ref 0.5–1.3)
EGFR: 96 ML/MIN/1.73M2 — SIGNIFICANT CHANGE UP
GLUCOSE SERPL-MCNC: 134 MG/DL — HIGH (ref 70–99)
HCT VFR BLD CALC: 30.4 % — LOW (ref 39–50)
HGB BLD-MCNC: 9.2 G/DL — LOW (ref 13–17)
MAGNESIUM SERPL-MCNC: 2.1 MG/DL — SIGNIFICANT CHANGE UP (ref 1.6–2.6)
MCHC RBC-ENTMCNC: 25.8 PG — LOW (ref 27–34)
MCHC RBC-ENTMCNC: 30.3 GM/DL — LOW (ref 32–36)
MCV RBC AUTO: 85.2 FL — SIGNIFICANT CHANGE UP (ref 80–100)
NRBC # BLD: 0 /100 WBCS — SIGNIFICANT CHANGE UP (ref 0–0)
PHOSPHATE SERPL-MCNC: 2.9 MG/DL — SIGNIFICANT CHANGE UP (ref 2.5–4.5)
PLATELET # BLD AUTO: 240 K/UL — SIGNIFICANT CHANGE UP (ref 150–400)
POTASSIUM SERPL-MCNC: 4.2 MMOL/L — SIGNIFICANT CHANGE UP (ref 3.5–5.3)
POTASSIUM SERPL-SCNC: 4.2 MMOL/L — SIGNIFICANT CHANGE UP (ref 3.5–5.3)
RBC # BLD: 3.57 M/UL — LOW (ref 4.2–5.8)
RBC # FLD: 29.3 % — HIGH (ref 10.3–14.5)
SODIUM SERPL-SCNC: 138 MMOL/L — SIGNIFICANT CHANGE UP (ref 135–145)
TRIGL SERPL-MCNC: 73 MG/DL — SIGNIFICANT CHANGE UP
WBC # BLD: 5.27 K/UL — SIGNIFICANT CHANGE UP (ref 3.8–10.5)
WBC # FLD AUTO: 5.27 K/UL — SIGNIFICANT CHANGE UP (ref 3.8–10.5)

## 2023-07-28 PROCEDURE — 99232 SBSQ HOSP IP/OBS MODERATE 35: CPT

## 2023-07-28 RX ORDER — POTASSIUM CHLORIDE 20 MEQ
20 PACKET (EA) ORAL ONCE
Refills: 0 | Status: COMPLETED | OUTPATIENT
Start: 2023-07-28 | End: 2023-07-28

## 2023-07-28 RX ORDER — ELECTROLYTE SOLUTION,INJ
1 VIAL (ML) INTRAVENOUS
Refills: 0 | Status: DISCONTINUED | OUTPATIENT
Start: 2023-07-28 | End: 2023-07-28

## 2023-07-28 RX ORDER — SERTRALINE 25 MG/1
50 TABLET, FILM COATED ORAL DAILY
Refills: 0 | Status: DISCONTINUED | OUTPATIENT
Start: 2023-07-28 | End: 2023-07-31

## 2023-07-28 RX ORDER — I.V. FAT EMULSION 20 G/100ML
0.56 EMULSION INTRAVENOUS
Qty: 50 | Refills: 0 | Status: DISCONTINUED | OUTPATIENT
Start: 2023-07-28 | End: 2023-07-28

## 2023-07-28 RX ORDER — APIXABAN 2.5 MG/1
2.5 TABLET, FILM COATED ORAL EVERY 12 HOURS
Refills: 0 | Status: DISCONTINUED | OUTPATIENT
Start: 2023-07-28 | End: 2023-07-31

## 2023-07-28 RX ADMIN — MAGNESIUM HYDROXIDE 30 MILLILITER(S): 400 TABLET, CHEWABLE ORAL at 13:15

## 2023-07-28 RX ADMIN — APIXABAN 2.5 MILLIGRAM(S): 2.5 TABLET, FILM COATED ORAL at 08:17

## 2023-07-28 RX ADMIN — Medication 1 MILLIGRAM(S): at 22:01

## 2023-07-28 RX ADMIN — Medication 20 MILLIEQUIVALENT(S): at 13:14

## 2023-07-28 RX ADMIN — Medication 1 APPLICATION(S): at 22:01

## 2023-07-28 RX ADMIN — Medication 1 APPLICATION(S): at 19:45

## 2023-07-28 RX ADMIN — Medication 1 EACH: at 18:01

## 2023-07-28 RX ADMIN — SERTRALINE 50 MILLIGRAM(S): 25 TABLET, FILM COATED ORAL at 13:14

## 2023-07-28 RX ADMIN — I.V. FAT EMULSION 20.8 GM/KG/DAY: 20 EMULSION INTRAVENOUS at 18:01

## 2023-07-28 RX ADMIN — Medication 1 APPLICATION(S): at 06:03

## 2023-07-28 RX ADMIN — SENNA PLUS 2 TABLET(S): 8.6 TABLET ORAL at 22:01

## 2023-07-28 RX ADMIN — CHLORHEXIDINE GLUCONATE 1 APPLICATION(S): 213 SOLUTION TOPICAL at 06:04

## 2023-07-28 RX ADMIN — Medication 1 TABLET(S): at 13:13

## 2023-07-28 RX ADMIN — Medication 1 APPLICATION(S): at 19:24

## 2023-07-28 RX ADMIN — PANTOPRAZOLE SODIUM 40 MILLIGRAM(S): 20 TABLET, DELAYED RELEASE ORAL at 13:16

## 2023-07-28 RX ADMIN — APIXABAN 2.5 MILLIGRAM(S): 2.5 TABLET, FILM COATED ORAL at 18:01

## 2023-07-28 NOTE — PROGRESS NOTE ADULT - TIME BILLING
Patient seen and examined with house-staff during bedside rounds.  Resident note read, including vitals, physical findings, laboratory data, and radiological reports.   Revisions included below.  Direct personal management at bed side and extensive interpretation of the data.  Plan was outlined and discussed in details with the housestaff.  Decision making of high complexity  Action taken for acute disease activity to reflect the level of care provided:  - medication reconciliation  - review laboratory data      The patient Lovenox.  He has a cephalic vein thrombosis which is superficial warm compress or.
Patient seen and examined with house-staff during bedside rounds.  Resident note read, including vitals, physical findings, laboratory data, and radiological reports.   Revisions included below.  Direct personal management at bed side and extensive interpretation of the data.  Plan was outlined and discussed in details with the housestaff.  Decision making of high complexity  Action taken for acute disease activity to reflect the level of care provided:  - medication reconciliation  - review laboratory data
Patient seen and examined with house-staff during bedside rounds.  Resident note read, including vitals, physical findings, laboratory data, and radiological reports.   Revisions included below.  Direct personal management at bed side and extensive interpretation of the data.  Plan was outlined and discussed in details with the housestaff.  Decision making of high complexity  Action taken for acute disease activity to reflect the level of care provided:  - medication reconciliation  - review laboratory data      The patient Lovenox.  He has a cephalic vein thrombosis which is superficial warm compress or.
Patient seen and examined with house-staff during bedside rounds.  Resident note read, including vitals, physical findings, laboratory data, and radiological reports.   Revisions included below.  Direct personal management at bed side and extensive interpretation of the data.  Plan was outlined and discussed in details with the housestaff.  Decision making of high complexity  Action taken for acute disease activity to reflect the level of care provided:  - medication reconciliation  - review laboratory data  Hb is stable and continue TPN
Patient seen and examined with house-staff during bedside rounds.  Resident note read, including vitals, physical findings, laboratory data, and radiological reports.   Revisions included below.  Direct personal management at bed side and extensive interpretation of the data.  Plan was outlined and discussed in details with the housestaff.  Decision making of high complexity  Action taken for acute disease activity to reflect the level of care provided:  - medication reconciliation  - review laboratory data  Hb is stable and continue TPN
Patient seen and examined with house-staff during bedside rounds.  Resident note read, including vitals, physical findings, laboratory data, and radiological reports.   Revisions included below.  Direct personal management at bed side and extensive interpretation of the data.  Plan was outlined and discussed in details with the housestaff.  Decision making of high complexity  Action taken for acute disease activity to reflect the level of care provided:  - medication reconciliation  - review laboratory data
Patient seen and examined with house-staff during bedside rounds.  Resident note read, including vitals, physical findings, laboratory data, and radiological reports.   Revisions included below.  Direct personal management at bed side and extensive interpretation of the data.  Plan was outlined and discussed in details with the housestaff.  Decision making of high complexity  Action taken for acute disease activity to reflect the level of care provided:  - medication reconciliation  - review laboratory data  Hb is stable and continue TPN
Patient seen and examined with house-staff during bedside rounds.  Resident note read, including vitals, physical findings, laboratory data, and radiological reports.   Revisions included below.  Direct personal management at bed side and extensive interpretation of the data.  Plan was outlined and discussed in details with the housestaff.  Decision making of high complexity  Action taken for acute disease activity to reflect the level of care provided:  - medication reconciliation  - review laboratory data
Patient seen and examined with house-staff during bedside rounds.  Resident note read, including vitals, physical findings, laboratory data, and radiological reports.   Revisions included below.  Direct personal management at bed side and extensive interpretation of the data.  Plan was outlined and discussed in details with the housestaff.  Decision making of high complexity  Action taken for acute disease activity to reflect the level of care provided:  - medication reconciliation  - review laboratory data  Hb is stable and continue TPN

## 2023-07-28 NOTE — PROGRESS NOTE ADULT - SUBJECTIVE AND OBJECTIVE BOX
Interval Events: Reviewed  Patient seen and examined at bedside.    Patient is a 73y old  Male who presents with a chief complaint of abdominal pain (23 Jul 2023 10:40)  he is trying to eat    PAST MEDICAL & SURGICAL HISTORY:  Depression, major      Stroke      Pulmonary embolism      BPH (benign prostatic hyperplasia)      Diverticulosis          MEDICATIONS:  Pulmonary:    Antimicrobials:    Anticoagulants:  apixaban 2.5 milliGRAM(s) Oral every 12 hours    Cardiac:  doxazosin 1 milliGRAM(s) Oral at bedtime      Allergies    No Known Allergies    Intolerances        Vital Signs Last 24 Hrs  T(C): 37 (28 Jul 2023 18:27), Max: 37 (28 Jul 2023 17:00)  T(F): 98.6 (28 Jul 2023 18:27), Max: 98.6 (28 Jul 2023 17:00)  HR: 117 (28 Jul 2023 18:27) (75 - 117)  BP: 124/86 (28 Jul 2023 18:27) (108/74 - 148/88)  BP(mean): --  RR: 18 (28 Jul 2023 18:27) (17 - 18)  SpO2: 91% (28 Jul 2023 18:27) (91% - 96%)    Parameters below as of 28 Jul 2023 18:27  Patient On (Oxygen Delivery Method): room air        07-27 @ 07:01 - 07-28 @ 07:00  --------------------------------------------------------  IN: 114.8 mL / OUT: 0 mL / NET: 114.8 mL    07-28 @ 07:01 - 07-28 @ 18:56  --------------------------------------------------------  IN: 580 mL / OUT: 100 mL / NET: 480 mL          Review of Systems:   •	General: negative  •	Skin/Breast: negative  •	Ophthalmologic: negative  •	ENMT: negative  •	Respiratory and Thorax: negative  •	Cardiovascular: negative  •	Gastrointestinal: negative  •	Genitourinary: negative  •	Musculoskeletal: negative  •	Neurological: negative  •	Psychiatric: negative  •	Hematology/Lymphatics: negative  •	Endocrine: negative  •	Allergic/Immunologic: negative    Physical Exam:   • Constitutional:	refer to the dietion /Nutritionist note  • Eyes:	EOMI; PERRL; no drainage or redness  • ENMT:	No oral lesions; no gross abnormalities  • Neck	No bruits; no thyromegaly or nodules  • Breasts:	not examined  • Back:	No deformity or limitation of movement  • Respiratory:	Breath Sounds equal & clear to percussion & auscultation, no accessory muscle use  • Cardiovascular:	Regular rate & rhythm, normal S1, S2; no murmurs, gallops or rubs; no S3, S4  • Gastrointestinal:	Soft, non-tender, no hepatosplenomegaly, normal bowel sounds  • Genitourinary:	not examined  • Rectal: not examined  • Extremities:	No cyanosis, clubbing or edema  • Vascular:	Equal and normal pulses (carotid, femoral, dorsalis pedis)  • Neurologica:l	not examined  • Skin:	No lesions; no rash  • Lymph Nodes:	No lymphadedenopathy  • Musculoskeletal:	No joint pain, swelling or deformity; no limitation of movement        LABS:  ABG - ( 26 Jul 2023 21:15 )  pH, Arterial: 7.48  pH, Blood: x     /  pCO2: 33    /  pO2: 82    / HCO3: 25    / Base Excess: 1.6   /  SaO2: 97.4                CBC Full  -  ( 28 Jul 2023 07:10 )  WBC Count : 5.27 K/uL  RBC Count : 3.57 M/uL  Hemoglobin : 9.2 g/dL  Hematocrit : 30.4 %  Platelet Count - Automated : 240 K/uL  Mean Cell Volume : 85.2 fl  Mean Cell Hemoglobin : 25.8 pg  Mean Cell Hemoglobin Concentration : 30.3 gm/dL  Auto Neutrophil # : x  Auto Lymphocyte # : x  Auto Monocyte # : x  Auto Eosinophil # : x  Auto Basophil # : x  Auto Neutrophil % : x  Auto Lymphocyte % : x  Auto Monocyte % : x  Auto Eosinophil % : x  Auto Basophil % : x    07-28    138  |  103  |  16  ----------------------------<  134<H>  4.2   |  26  |  0.75    Ca    8.1<L>      28 Jul 2023 07:10  Phos  2.9     07-28  Mg     2.1     07-28            Urinalysis Basic - ( 28 Jul 2023 07:10 )    Color: x / Appearance: x / SG: x / pH: x  Gluc: 134 mg/dL / Ketone: x  / Bili: x / Urobili: x   Blood: x / Protein: x / Nitrite: x   Leuk Esterase: x / RBC: x / WBC x   Sq Epi: x / Non Sq Epi: x / Bacteria: x                  RADIOLOGY & ADDITIONAL STUDIES (The following images were personally reviewed):

## 2023-07-28 NOTE — ADVANCED PRACTICE NURSE CONSULT - RECOMMEDATIONS
Gluteals, posterior thighs, sacrum: cleanse with bath wipes, apply [employer] Antifungal BID.     Recommend continue use of [facility] YUN mattress for pressure redistribution and microclimate management  Continue frequent repositioning for offloading with air fluidized repositioner and/or pillows  Offload heels at all times with pillows or heel boots  Support nutrition and hydration    Discussed with primary RN    Please reconsult if the wound deteriorates or new concerns develop.

## 2023-07-28 NOTE — ADVANCED PRACTICE NURSE CONSULT - ASSESSMENT
Caregiver at bedside. Pt awake and alert. 2 person assist with FWW to transfer to the chair.   Dante 14, BMI 30. Incontinent of urine; condom catheter unsuccessful per caregiver.     Gluteals, posterior thighs, sacrum: intact fungal rash with satellite lesions. No pressure injuries.

## 2023-07-28 NOTE — PROGRESS NOTE ADULT - ASSESSMENT
73 year old male with pmh of stroke (R hemiparesis), recurrent PE (last Jan 2022), diverticulosis and PSHx of colon tumor removal (1988), known jejunal mass presenting with abdominal pain. Pt is now s/p ex lap, SBR, primary anastomosis on 7/6. C/b UTI and ileus. PICC line placed 7/13 for TPN. C/b SOB, CT PE negative for PE, +L large Pleural effusion (7/26)    CLD w. ensures   PICC/TPN  Pain/nausea control PRN  Bactrim/triamcinolone cream for allergic dermatitis, derm following  Home doxazosin and protonix  Senna qd, miralax QHS  eliquis/SCDs  OOBA/IS  Dispo: refused LALITHA, home w/ home care. Infusion services w. tpn, bactrim X 1 week course

## 2023-07-28 NOTE — PROGRESS NOTE ADULT - SUBJECTIVE AND OBJECTIVE BOX
Overnight events: had one BM       POD#12 Ex lap w/ SBR and anastomosis    SUBJECTIVE: Patient seen at bedside with chief resident. Patient denies any nausea, shortness of breath, or pain.       MEDICATIONS  (STANDING):  apixaban 2.5 milliGRAM(s) Oral every 12 hours  chlorhexidine 2% Cloths 1 Application(s) Topical <User Schedule>  doxazosin 1 milliGRAM(s) Oral at bedtime  lipid, fat emulsion (Fish Oil and Plant Based) 20% Infusion 0.565 Gm/kG/Day (20.8 mL/Hr) IV Continuous <Continuous>  magnesium hydroxide Suspension 30 milliLiter(s) Oral daily  pantoprazole  Injectable 40 milliGRAM(s) IV Push daily  Parenteral Nutrition - Adult 1 Each (63 mL/Hr) TPN Continuous <Continuous>  Parenteral Nutrition - Adult 1 Each (63 mL/Hr) TPN Continuous <Continuous>  potassium chloride    Tablet ER 20 milliEquivalent(s) Oral once  senna 2 Tablet(s) Oral at bedtime  sertraline 50 milliGRAM(s) Oral daily  triamcinolone 0.1% Cream 1 Application(s) Topical three times a day  trimethoprim  160 mG/sulfamethoxazole 800 mG 1 Tablet(s) Oral daily    MEDICATIONS  (PRN):  ondansetron Injectable 4 milliGRAM(s) IV Push every 6 hours PRN Nausea and/or Vomiting  sodium chloride 0.9% lock flush 10 milliLiter(s) IV Push every 1 hour PRN Pre/post blood products, medications, blood draw, and to maintain line patency      Vital Signs Last 24 Hrs  T(C): 36.7 (28 Jul 2023 09:20), Max: 37.1 (27 Jul 2023 17:01)  T(F): 98 (28 Jul 2023 09:20), Max: 98.7 (27 Jul 2023 17:01)  HR: 75 (28 Jul 2023 09:20) (75 - 106)  BP: 148/88 (28 Jul 2023 09:20) (108/74 - 148/88)  BP(mean): --  RR: 17 (28 Jul 2023 09:20) (17 - 18)  SpO2: 96% (28 Jul 2023 09:20) (94% - 96%)    Parameters below as of 28 Jul 2023 09:20  Patient On (Oxygen Delivery Method): nasal cannula  O2 Flow (L/min): 2      Physical Exam:  General: NAD, resting comfortably in bed  Pulmonary: Nonlabored breathing, no respiratory distress  Cardiovascular: NSR  Abdominal: soft, NT/ND  Extremities: WWP, normal strength, rash on right wrist unchanged from previous exam, red mildly raised about 6 inches in length  Neuro: A/O x 3, CNs II-XII grossly intact, no focal deficits, normal motor/sensation  Pulses: palpable distal pulses    I&O's Summary    27 Jul 2023 07:01  -  28 Jul 2023 07:00  --------------------------------------------------------  IN: 114.8 mL / OUT: 0 mL / NET: 114.8 mL    28 Jul 2023 07:01  -  28 Jul 2023 11:11  --------------------------------------------------------  IN: 300 mL / OUT: 0 mL / NET: 300 mL        LABS:                        9.2    5.27  )-----------( 240      ( 28 Jul 2023 07:10 )             30.4     07-28    138  |  103  |  16  ----------------------------<  134<H>  4.2   |  26  |  0.75    Ca    8.1<L>      28 Jul 2023 07:10  Phos  2.9     07-28  Mg     2.1     07-28        Urinalysis Basic - ( 28 Jul 2023 07:10 )    Color: x / Appearance: x / SG: x / pH: x  Gluc: 134 mg/dL / Ketone: x  / Bili: x / Urobili: x   Blood: x / Protein: x / Nitrite: x   Leuk Esterase: x / RBC: x / WBC x   Sq Epi: x / Non Sq Epi: x / Bacteria: x      CAPILLARY BLOOD GLUCOSE            RADIOLOGY & ADDITIONAL STUDIES:

## 2023-07-28 NOTE — ADVANCED PRACTICE NURSE CONSULT - REASON FOR CONSULT
gluteal, sacral discoloration    Per chart review, 73 year old male with pmh of stroke (R hemiparesis), recurrent PE (last Jan 2022), diverticulosis and PSHx of colon tumor removal (1988), known jejunal mass presenting with abdominal pain. Pt is now s/p ex lap, SBR, primary anastomosis on 7/6. C/b UTI and ileus. PICC line placed 7/13 for TPN. C/b SOB, CT PE negative for PE, +L large Pleural effusion (7/26)

## 2023-07-29 LAB
ANION GAP SERPL CALC-SCNC: 10 MMOL/L — SIGNIFICANT CHANGE UP (ref 5–17)
BUN SERPL-MCNC: 14 MG/DL — SIGNIFICANT CHANGE UP (ref 7–23)
CALCIUM SERPL-MCNC: 8.5 MG/DL — SIGNIFICANT CHANGE UP (ref 8.4–10.5)
CHLORIDE SERPL-SCNC: 103 MMOL/L — SIGNIFICANT CHANGE UP (ref 96–108)
CO2 SERPL-SCNC: 24 MMOL/L — SIGNIFICANT CHANGE UP (ref 22–31)
CREAT SERPL-MCNC: 0.75 MG/DL — SIGNIFICANT CHANGE UP (ref 0.5–1.3)
EGFR: 96 ML/MIN/1.73M2 — SIGNIFICANT CHANGE UP
GLUCOSE SERPL-MCNC: 128 MG/DL — HIGH (ref 70–99)
HCT VFR BLD CALC: 31.9 % — LOW (ref 39–50)
HGB BLD-MCNC: 9.6 G/DL — LOW (ref 13–17)
MAGNESIUM SERPL-MCNC: 2 MG/DL — SIGNIFICANT CHANGE UP (ref 1.6–2.6)
MCHC RBC-ENTMCNC: 25.9 PG — LOW (ref 27–34)
MCHC RBC-ENTMCNC: 30.1 GM/DL — LOW (ref 32–36)
MCV RBC AUTO: 86 FL — SIGNIFICANT CHANGE UP (ref 80–100)
NRBC # BLD: 0 /100 WBCS — SIGNIFICANT CHANGE UP (ref 0–0)
PHOSPHATE SERPL-MCNC: 3 MG/DL — SIGNIFICANT CHANGE UP (ref 2.5–4.5)
PLATELET # BLD AUTO: 235 K/UL — SIGNIFICANT CHANGE UP (ref 150–400)
POTASSIUM SERPL-MCNC: 4.4 MMOL/L — SIGNIFICANT CHANGE UP (ref 3.5–5.3)
POTASSIUM SERPL-SCNC: 4.4 MMOL/L — SIGNIFICANT CHANGE UP (ref 3.5–5.3)
RBC # BLD: 3.71 M/UL — LOW (ref 4.2–5.8)
RBC # FLD: 29.2 % — HIGH (ref 10.3–14.5)
SODIUM SERPL-SCNC: 137 MMOL/L — SIGNIFICANT CHANGE UP (ref 135–145)
WBC # BLD: 5.47 K/UL — SIGNIFICANT CHANGE UP (ref 3.8–10.5)
WBC # FLD AUTO: 5.47 K/UL — SIGNIFICANT CHANGE UP (ref 3.8–10.5)

## 2023-07-29 PROCEDURE — 99231 SBSQ HOSP IP/OBS SF/LOW 25: CPT | Mod: 24

## 2023-07-29 PROCEDURE — 99232 SBSQ HOSP IP/OBS MODERATE 35: CPT

## 2023-07-29 RX ORDER — IPRATROPIUM/ALBUTEROL SULFATE 18-103MCG
3 AEROSOL WITH ADAPTER (GRAM) INHALATION ONCE
Refills: 0 | Status: COMPLETED | OUTPATIENT
Start: 2023-07-29 | End: 2023-07-29

## 2023-07-29 RX ORDER — ELECTROLYTE SOLUTION,INJ
1 VIAL (ML) INTRAVENOUS
Refills: 0 | Status: DISCONTINUED | OUTPATIENT
Start: 2023-07-29 | End: 2023-07-29

## 2023-07-29 RX ORDER — I.V. FAT EMULSION 20 G/100ML
0.56 EMULSION INTRAVENOUS
Qty: 50 | Refills: 0 | Status: DISCONTINUED | OUTPATIENT
Start: 2023-07-29 | End: 2023-07-29

## 2023-07-29 RX ADMIN — I.V. FAT EMULSION 20.8 GM/KG/DAY: 20 EMULSION INTRAVENOUS at 18:12

## 2023-07-29 RX ADMIN — Medication 1 APPLICATION(S): at 21:35

## 2023-07-29 RX ADMIN — Medication 1 MILLIGRAM(S): at 21:35

## 2023-07-29 RX ADMIN — Medication 1 APPLICATION(S): at 07:06

## 2023-07-29 RX ADMIN — Medication 1 EACH: at 18:12

## 2023-07-29 RX ADMIN — Medication 3 MILLILITER(S): at 07:43

## 2023-07-29 RX ADMIN — APIXABAN 2.5 MILLIGRAM(S): 2.5 TABLET, FILM COATED ORAL at 07:06

## 2023-07-29 RX ADMIN — PANTOPRAZOLE SODIUM 40 MILLIGRAM(S): 20 TABLET, DELAYED RELEASE ORAL at 12:04

## 2023-07-29 RX ADMIN — SERTRALINE 50 MILLIGRAM(S): 25 TABLET, FILM COATED ORAL at 12:04

## 2023-07-29 RX ADMIN — SENNA PLUS 2 TABLET(S): 8.6 TABLET ORAL at 21:34

## 2023-07-29 RX ADMIN — Medication 1 APPLICATION(S): at 15:25

## 2023-07-29 RX ADMIN — APIXABAN 2.5 MILLIGRAM(S): 2.5 TABLET, FILM COATED ORAL at 17:49

## 2023-07-29 RX ADMIN — CHLORHEXIDINE GLUCONATE 1 APPLICATION(S): 213 SOLUTION TOPICAL at 07:05

## 2023-07-29 RX ADMIN — Medication 1 APPLICATION(S): at 18:00

## 2023-07-29 NOTE — PROGRESS NOTE ADULT - ASSESSMENT
73 year old male with pmh of stroke (R hemiparesis), recurrent PE (last Jan 2022), diverticulosis and PSHx of colon tumor removal (1988), known jejunal mass presenting with abdominal pain. Pt is now s/p ex lap, SBR, primary anastomosis on 7/6. C/b UTI and ileus. PICC line placed 7/13 for TPN. C/b SOB, CT PE negative for PE, +L large Pleural effusion (7/26)    CLD w. ensures   PICC/TPN  Pain/nausea control PRN  clotrimazole for fungal rash  Home doxazosin and protonix  Senna qd, miralax QHS  eliquis/SCDs  OOBA/IS  Dispo: refused [facility], home w/ home care. Infusion services w. tpn 12 hours a day

## 2023-07-29 NOTE — PROGRESS NOTE ADULT - ASSESSMENT
A/P  Stable. Tachycardia noted  To go home on TPN and clears on Monday  Has GI function but limited po intake.  Limited ambulation  OOB to chair with aided today  As before.

## 2023-07-29 NOTE — PROGRESS NOTE ADULT - SUBJECTIVE AND OBJECTIVE BOX
SUBJECTIVE:  Patient was seen at bedside this AM with chief resident. Patient has no complaints. Confirms flatus and BM. Denies n/v. Is tolerating diet    MEDICATIONS  (STANDING):  apixaban 2.5 milliGRAM(s) Oral every 12 hours  chlorhexidine 2% Cloths 1 Application(s) Topical <User Schedule>  clotrimazole 1% Cream 1 Application(s) Topical every 12 hours  doxazosin 1 milliGRAM(s) Oral at bedtime  lipid, fat emulsion (Fish Oil and Plant Based) 20% Infusion 0.565 Gm/kG/Day (20.8 mL/Hr) IV Continuous <Continuous>  magnesium hydroxide Suspension 30 milliLiter(s) Oral daily  pantoprazole  Injectable 40 milliGRAM(s) IV Push daily  Parenteral Nutrition - Adult 1 Each (63 mL/Hr) TPN Continuous <Continuous>  Parenteral Nutrition - Adult 1 Each (63 mL/Hr) TPN Continuous <Continuous>  senna 2 Tablet(s) Oral at bedtime  sertraline 50 milliGRAM(s) Oral daily  triamcinolone 0.1% Cream 1 Application(s) Topical three times a day    MEDICATIONS  (PRN):  ondansetron Injectable 4 milliGRAM(s) IV Push every 6 hours PRN Nausea and/or Vomiting  sodium chloride 0.9% lock flush 10 milliLiter(s) IV Push every 1 hour PRN Pre/post blood products, medications, blood draw, and to maintain line patency      Vital Signs Last 24 Hrs  T(C): 36.9 (29 Jul 2023 14:31), Max: 37.1 (29 Jul 2023 04:45)  T(F): 98.4 (29 Jul 2023 14:31), Max: 98.7 (29 Jul 2023 04:45)  HR: 100 (29 Jul 2023 14:31) (88 - 117)  BP: 118/79 (29 Jul 2023 14:31) (107/66 - 127/80)  BP(mean): --  RR: 17 (29 Jul 2023 14:31) (17 - 18)  SpO2: 96% (29 Jul 2023 14:31) (89% - 96%)    Parameters below as of 29 Jul 2023 14:31  Patient On (Oxygen Delivery Method): room air        Physical Exam:  General: NAD, resting comfortably in bed  Pulmonary: Nonlabored breathing, no respiratory distress  Cardiovascular: NSR  Abdominal: soft, NT, moderately distended (improved from yesterday)  Extremities: WWP, normal strength  Neuro: A/O x 3, CNs II-XII grossly intact, no focal deficits, normal motor/sensation  Pulses: palpable distal pulses    I&O's Summary    28 Jul 2023 07:01  -  29 Jul 2023 07:00  --------------------------------------------------------  IN: 1838.8 mL / OUT: 950 mL / NET: 888.8 mL    29 Jul 2023 07:01  -  29 Jul 2023 15:38  --------------------------------------------------------  IN: 360 mL / OUT: 200 mL / NET: 160 mL        LABS:                        9.6    5.47  )-----------( 235      ( 29 Jul 2023 09:10 )             31.9     07-29    137  |  103  |  14  ----------------------------<  128<H>  4.4   |  24  |  0.75    Ca    8.5      29 Jul 2023 09:10  Phos  3.0     07-29  Mg     2.0     07-29        Urinalysis Basic - ( 29 Jul 2023 09:10 )    Color: x / Appearance: x / SG: x / pH: x  Gluc: 128 mg/dL / Ketone: x  / Bili: x / Urobili: x   Blood: x / Protein: x / Nitrite: x   Leuk Esterase: x / RBC: x / WBC x   Sq Epi: x / Non Sq Epi: x / Bacteria: x      CAPILLARY BLOOD GLUCOSE            RADIOLOGY & ADDITIONAL STUDIES:

## 2023-07-29 NOTE — PROGRESS NOTE ADULT - SUBJECTIVE AND OBJECTIVE BOX
(covering for Dr. 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    On TPN and also clear liquids but po intake is not good.  Having BM's and no N/V  Incontinent of urine (wearing depends)  Gets OOB to chair with aides help.  Not c/o pain this AM    Vital Signs Last 24 Hrs  T(C): 36.7 (29 Jul 2023 09:20), Max: 37.1 (29 Jul 2023 04:45)  T(F): 98.1 (29 Jul 2023 09:20), Max: 98.7 (29 Jul 2023 04:45)  HR: 88 (29 Jul 2023 09:20) (88 - 117)  BP: 107/66 (29 Jul 2023 09:20) (107/66 - 127/80)  BP(mean): --  RR: 17 (29 Jul 2023 09:20) (17 - 18)  SpO2: 96% (29 Jul 2023 09:20) (89% - 96%)    Parameters below as of 29 Jul 2023 09:20  Patient On (Oxygen Delivery Method): nasal cannula  O2 Flow (L/min): 2      abd: midline incision is intact, not tender, obese  depends in place    ext: without calf tenderness    UO : not tracking it                          9.6    5.47  )-----------( 235      ( 29 Jul 2023 09:10 )             31.9   07-29    137  |  103  |  14  ----------------------------<  128<H>  4.4   |  24  |  0.75    Ca    8.5      29 Jul 2023 09:10  Phos  3.0     07-29  Mg     2.0     07-29

## 2023-07-29 NOTE — PROGRESS NOTE ADULT - SUBJECTIVE AND OBJECTIVE BOX
Interval Events: Reviewed  Patient seen and examined at bedside.    Patient is a 73y old  Male who presents with a chief complaint of abdominal pain (23 Jul 2023 10:40)  no acute event overnight, RA 89%      PAST MEDICAL & SURGICAL HISTORY:  Depression, major      Stroke      Pulmonary embolism      BPH (benign prostatic hyperplasia)      Diverticulosis          MEDICATIONS:  Pulmonary:    Antimicrobials:    Anticoagulants:  apixaban 2.5 milliGRAM(s) Oral every 12 hours    Cardiac:  doxazosin 1 milliGRAM(s) Oral at bedtime      Allergies    No Known Allergies    Intolerances        Vital Signs Last 24 Hrs  T(C): 36.6 (29 Jul 2023 08:17), Max: 37.1 (29 Jul 2023 04:45)  T(F): 97.8 (29 Jul 2023 08:17), Max: 98.7 (29 Jul 2023 04:45)  HR: 97 (29 Jul 2023 08:17) (97 - 117)  BP: 127/80 (29 Jul 2023 08:17) (113/79 - 127/80)  BP(mean): --  RR: 18 (29 Jul 2023 08:17) (17 - 18)  SpO2: 89% (29 Jul 2023 08:17) (89% - 94%)    Parameters below as of 29 Jul 2023 08:17  Patient On (Oxygen Delivery Method): room air        07-28 @ 07:01  -  07-29 @ 07:00  --------------------------------------------------------  IN: 1838.8 mL / OUT: 950 mL / NET: 888.8 mL          Review of Systems:   •	General: negative  •	Skin/Breast: negative  •	Ophthalmologic: negative  •	ENMT: negative  •	Respiratory and Thorax: negative  •	Cardiovascular: negative  •	Gastrointestinal: negative  •	Genitourinary: negative  •	Musculoskeletal: negative  •	Neurological: negative  •	Psychiatric: negative  •	Hematology/Lymphatics: negative  •	Endocrine: negative  •	Allergic/Immunologic: negative    Physical Exam:   • Constitutional:	thin, elderly  male , NAD   • Eyes:	EOMI; PERRL; no drainage or redness  • ENMT:	No oral lesions; no gross abnormalities  • Neck	no thyromegaly or nodules  • Breasts:	not examined  • Back:	No deformity or limitation of movement  • Respiratory:	Breath Sounds equal & clear to auscultation, no accessory muscle use, decrease breath sounds on left   • Cardiovascular:	Regular rate & rhythm, normal S1, S2; no murmurs, gallops or rubs; no S3, S4  • Gastrointestinal:	Soft, non-tender, no hepatosplenomegaly, normal bowel sounds  • Genitourinary:	not examined  • Rectal: not examined  • Extremities:	No cyanosis, clubbing or edema  • Vascular:	Equal and normal pulses (dorsalis pedis)  • Neurologica:l	not examined  • Skin:	No lesions; no rash  • Lymph Nodes:	No lymphadedenopathy  • Musculoskeletal:	No joint pain, swelling or deformity; no limitation of movement        LABS:      CBC Full  -  ( 29 Jul 2023 09:10 )  WBC Count : 5.47 K/uL  RBC Count : 3.71 M/uL  Hemoglobin : 9.6 g/dL  Hematocrit : 31.9 %  Platelet Count - Automated : 235 K/uL  Mean Cell Volume : 86.0 fl  Mean Cell Hemoglobin : 25.9 pg  Mean Cell Hemoglobin Concentration : 30.1 gm/dL  Auto Neutrophil # : x  Auto Lymphocyte # : x  Auto Monocyte # : x  Auto Eosinophil # : x  Auto Basophil # : x  Auto Neutrophil % : x  Auto Lymphocyte % : x  Auto Monocyte % : x  Auto Eosinophil % : x  Auto Basophil % : x    07-29    137  |  103  |  14  ----------------------------<  128<H>  4.4   |  24  |  0.75    Ca    8.5      29 Jul 2023 09:10  Phos  3.0     07-29  Mg     2.0     07-29            Urinalysis Basic - ( 29 Jul 2023 09:10 )    Color: x / Appearance: x / SG: x / pH: x  Gluc: 128 mg/dL / Ketone: x  / Bili: x / Urobili: x   Blood: x / Protein: x / Nitrite: x   Leuk Esterase: x / RBC: x / WBC x   Sq Epi: x / Non Sq Epi: x / Bacteria: x                  RADIOLOGY & ADDITIONAL STUDIES (The following images were personally reviewed):  Hernandez:                                     No  Urine output:                       adequate  DVT prophylaxis:                 Yes  Flattus:                                  Yes  Bowel movement:              No

## 2023-07-30 LAB
ANION GAP SERPL CALC-SCNC: 8 MMOL/L — SIGNIFICANT CHANGE UP (ref 5–17)
BUN SERPL-MCNC: 13 MG/DL — SIGNIFICANT CHANGE UP (ref 7–23)
CALCIUM SERPL-MCNC: 8 MG/DL — LOW (ref 8.4–10.5)
CHLORIDE SERPL-SCNC: 102 MMOL/L — SIGNIFICANT CHANGE UP (ref 96–108)
CO2 SERPL-SCNC: 24 MMOL/L — SIGNIFICANT CHANGE UP (ref 22–31)
CREAT SERPL-MCNC: 0.64 MG/DL — SIGNIFICANT CHANGE UP (ref 0.5–1.3)
EGFR: 101 ML/MIN/1.73M2 — SIGNIFICANT CHANGE UP
GLUCOSE SERPL-MCNC: 124 MG/DL — HIGH (ref 70–99)
HCT VFR BLD CALC: 30.1 % — LOW (ref 39–50)
HGB BLD-MCNC: 9.1 G/DL — LOW (ref 13–17)
MAGNESIUM SERPL-MCNC: 1.9 MG/DL — SIGNIFICANT CHANGE UP (ref 1.6–2.6)
MCHC RBC-ENTMCNC: 26.1 PG — LOW (ref 27–34)
MCHC RBC-ENTMCNC: 30.2 GM/DL — LOW (ref 32–36)
MCV RBC AUTO: 86.2 FL — SIGNIFICANT CHANGE UP (ref 80–100)
NRBC # BLD: 0 /100 WBCS — SIGNIFICANT CHANGE UP (ref 0–0)
PHOSPHATE SERPL-MCNC: 3.6 MG/DL — SIGNIFICANT CHANGE UP (ref 2.5–4.5)
PLATELET # BLD AUTO: 233 K/UL — SIGNIFICANT CHANGE UP (ref 150–400)
POTASSIUM SERPL-MCNC: 4.2 MMOL/L — SIGNIFICANT CHANGE UP (ref 3.5–5.3)
POTASSIUM SERPL-SCNC: 4.2 MMOL/L — SIGNIFICANT CHANGE UP (ref 3.5–5.3)
RBC # BLD: 3.49 M/UL — LOW (ref 4.2–5.8)
RBC # FLD: 28.6 % — HIGH (ref 10.3–14.5)
SODIUM SERPL-SCNC: 134 MMOL/L — LOW (ref 135–145)
WBC # BLD: 5.64 K/UL — SIGNIFICANT CHANGE UP (ref 3.8–10.5)
WBC # FLD AUTO: 5.64 K/UL — SIGNIFICANT CHANGE UP (ref 3.8–10.5)

## 2023-07-30 PROCEDURE — 99231 SBSQ HOSP IP/OBS SF/LOW 25: CPT | Mod: 24

## 2023-07-30 PROCEDURE — 99232 SBSQ HOSP IP/OBS MODERATE 35: CPT

## 2023-07-30 RX ORDER — IPRATROPIUM/ALBUTEROL SULFATE 18-103MCG
3 AEROSOL WITH ADAPTER (GRAM) INHALATION EVERY 6 HOURS
Refills: 0 | Status: DISCONTINUED | OUTPATIENT
Start: 2023-07-30 | End: 2023-07-31

## 2023-07-30 RX ORDER — I.V. FAT EMULSION 20 G/100ML
0.56 EMULSION INTRAVENOUS
Qty: 50 | Refills: 0 | Status: DISCONTINUED | OUTPATIENT
Start: 2023-07-30 | End: 2023-07-30

## 2023-07-30 RX ORDER — ELECTROLYTE SOLUTION,INJ
1 VIAL (ML) INTRAVENOUS
Refills: 0 | Status: DISCONTINUED | OUTPATIENT
Start: 2023-07-30 | End: 2023-07-30

## 2023-07-30 RX ORDER — PANTOPRAZOLE SODIUM 20 MG/1
40 TABLET, DELAYED RELEASE ORAL DAILY
Refills: 0 | Status: DISCONTINUED | OUTPATIENT
Start: 2023-07-30 | End: 2023-07-31

## 2023-07-30 RX ORDER — IPRATROPIUM/ALBUTEROL SULFATE 18-103MCG
3 AEROSOL WITH ADAPTER (GRAM) INHALATION ONCE
Refills: 0 | Status: COMPLETED | OUTPATIENT
Start: 2023-07-30 | End: 2023-07-30

## 2023-07-30 RX ADMIN — Medication 1 APPLICATION(S): at 10:06

## 2023-07-30 RX ADMIN — Medication 1 MILLIGRAM(S): at 22:36

## 2023-07-30 RX ADMIN — Medication 3 MILLILITER(S): at 06:26

## 2023-07-30 RX ADMIN — SERTRALINE 50 MILLIGRAM(S): 25 TABLET, FILM COATED ORAL at 12:55

## 2023-07-30 RX ADMIN — Medication 1 APPLICATION(S): at 06:05

## 2023-07-30 RX ADMIN — APIXABAN 2.5 MILLIGRAM(S): 2.5 TABLET, FILM COATED ORAL at 18:27

## 2023-07-30 RX ADMIN — CHLORHEXIDINE GLUCONATE 1 APPLICATION(S): 213 SOLUTION TOPICAL at 06:05

## 2023-07-30 RX ADMIN — Medication 1 APPLICATION(S): at 22:36

## 2023-07-30 RX ADMIN — Medication 3 MILLILITER(S): at 12:54

## 2023-07-30 RX ADMIN — Medication 1 EACH: at 18:28

## 2023-07-30 RX ADMIN — I.V. FAT EMULSION 20.8 GM/KG/DAY: 20 EMULSION INTRAVENOUS at 18:28

## 2023-07-30 RX ADMIN — APIXABAN 2.5 MILLIGRAM(S): 2.5 TABLET, FILM COATED ORAL at 06:05

## 2023-07-30 RX ADMIN — Medication 3 MILLILITER(S): at 18:28

## 2023-07-30 RX ADMIN — Medication 1 APPLICATION(S): at 13:16

## 2023-07-30 RX ADMIN — PANTOPRAZOLE SODIUM 40 MILLIGRAM(S): 20 TABLET, DELAYED RELEASE ORAL at 12:55

## 2023-07-30 RX ADMIN — SENNA PLUS 2 TABLET(S): 8.6 TABLET ORAL at 22:36

## 2023-07-30 RX ADMIN — Medication 1 APPLICATION(S): at 20:10

## 2023-07-30 NOTE — PROGRESS NOTE ADULT - PROBLEM SELECTOR PLAN 1
Events were noticed.  The patient had an NG tube insertion and drained already 700.  I reviewed the CT scan of the chest and has dilated stomach.  The repeat CT scan revealed bilateral atelectasis, small pleural effusion and elevation of the right hemidiaphragm.  No evidence of pneumonia/PE.  Incentive spirometry out of bed in chair.    Patient had a history of PE and DVT in the past. He is on Lovenox.  .  The patient has an abdominal x-ray which revealed still the bowel obstruction.  The pulmonary status is stable at this point.  NGT was removed.  He was out of bed in chair for most of the day.  Follow with surgery.  He is on TPN and will be started on diet as he had BM and flatus.  I discussed with surgery.  Patient was OOB in chair y and he is oral diet with tpn advance as tolerated.The patient is on liquid.  I discussed the case with the patient and the wife.  The patient will be discharged on home TPN and liquid diet.  I discussed that the patient has a history of DVT and need to be discharged on Eliquis 2-1/2 mg twice a day for prophylaxis.  He is leaving Monday.  Continue increase oral intake and activity
The pulmonary status is stable at this point.  NGT was removed.  He was out of bed in chair for most of the day.  Follow with surgery.  He is on TPN and will be started on diet as he had BM and flatus.  I discussed with surgery.  Patient was OOB in chair yesterday and he is oral diet with tpn advance as tolerated. The patient is on liquid.  I discussed the case with the patient and the wife.  The patient will be discharged on home TPN and liquid diet.  I discussed that the patient has a history of DVT and need to be discharged on Eliquis 2-1/2 mg twice a day for prophylaxis.  He is leaving Monday.  Continue increase oral intake and activity.  Encourage IS.
Events were noticed.  The patient had an NG tube insertion and drained already 700.  I reviewed the CT scan of the chest and has dilated stomach.  The CT scan revealed bilateral atelectasis and elevation of the right hemidiaphragm.  No evidence of pneumonia.  The patient might have aspirated.  Incentive spirometry out of bed in chair.  Continue NG tube suctioning.  Patient had a history of PE and DVT in the past. He is on Lovenox.  .  The patient has an abdominal x-ray which revealed still the bowel obstruction.  The pulmonary status is stable at this point.  Continue NG tube drainage
Events were noticed.  The patient had an NG tube insertion and drained already 700.  I reviewed the CT scan of the chest and has dilated stomach.  The CT scan revealed bilateral atelectasis and elevation of the right hemidiaphragm.  No evidence of pneumonia.  The patient might have aspirated.  Incentive spirometry out of bed in chair.  Continue NG tube suctioning.  Patient had a history of PE and DVT in the past. He is on Lovenox.  Continue NGT
Events were noticed.  The patient had an NG tube insertion and drained already 700.  I reviewed the CT scan of the chest and has dilated stomach.  The repeat CT scan revealed bilateral atelectasis, small pleural effusion and elevation of the right hemidiaphragm.  No evidence of pneumonia/PE.  Incentive spirometry out of bed in chair.    Patient had a history of PE and DVT in the past. He is on Lovenox.  .  The patient has an abdominal x-ray which revealed still the bowel obstruction.  The pulmonary status is stable at this point.  NGT was removed.  He was out of bed in chair for most of the day.  Follow with surgery.  He is on TPN and and will be started on diuet as he ahd BM and flatus.  I discussed with surgery.  Patient was OOB in chair y and he is oral diet ith tpn advance as tolerated
Events were noticed.  The patient had an NG tube insertion and drained already 700.  I reviewed the CT scan of the chest and has dilated stomach.  The repeat CT scan revealed bilateral atelectasis, small pleural effusion and elevation of the right hemidiaphragm.  No evidence of pneumonia/PE.  Incentive spirometry out of bed in chair.    Patient had a history of PE and DVT in the past. He is on Lovenox.  .  The patient has an abdominal x-ray which revealed still the bowel obstruction.  The pulmonary status is stable at this point.  NGT was removed.  He was out of bed in chair for most of the day.  Follow with surgery.  He is on TPN and will be started on diet as he had BM and flatus.  I discussed with surgery.  Patient was OOB in chair y and he is oral diet with tpn advance as tolerated.The patient is on liquid.  I discussed the case with the patient and the wife.  The patient will be discharged on home TPN and liquid diet.  I discussed that the patient has a history of DVT and need to be discharged on Eliquis 2-1/2 mg twice a day for prophylaxis.
The pulmonary status is stable at this point.  NGT was removed.  He was out of bed in chair for most of the day.  Follow with surgery.  He is on TPN and will be started on diet as he had BM and flatus.  I discussed with surgery.  Patient was OOB in chair y and he is oral diet with tpn advance as tolerated. The patient is on liquid.  I discussed the case with the patient and the wife.  The patient will be discharged on home TPN and liquid diet.  I discussed that the patient has a history of DVT and need to be discharged on Eliquis 2-1/2 mg twice a day for prophylaxis.  He is leaving Monday.  Continue increase oral intake and activity.  Encourage IS.
Patient had a history of PE and DVT in the past. He is on Lovenox.  .    The pulmonary status is stable at this point.  NGT was removed.  He was out of bed in chair for most of the day yesterday. Encourage OOB to chair, IS.
Patient had a history of PE and DVT in the past. He is on Lovenox.  .    The pulmonary status is stable at this point.  NGT was removed.  He was out of bed in chair for most of the day yesterday. Encourage OOB to chair, IS.
Events were noticed.  The patient had an NG tube insertion and drained already 700.  I reviewed the CT scan of the chest and has dilated stomach.  The CT scan revealed bilateral atelectasis and elevation of the right hemidiaphragm.  No evidence of pneumonia.  The patient might have aspirated.  Incentive spirometry out of bed in chair.  Continue NG tube suctioning.  Patient had a history of PE and DVT in the past. He is on Lovenox.  .  The patient has an abdominal x-ray which revealed still the bowel obstruction.  The pulmonary status is stable at this point.  NGT was removed.  He was out of bed in chair for most of the day.
Events were noticed.  The patient had an NG tube insertion and drained already 700.  I reviewed the CT scan of the chest and has dilated stomach.  The repeat CT scan revealed bilateral atelectasis, small pleural effusion and elevation of the right hemidiaphragm.  No evidence of pneumonia/PE.  Incentive spirometry out of bed in chair.    Patient had a history of PE and DVT in the past. He is on Lovenox.  .  The patient has an abdominal x-ray which revealed still the bowel obstruction.  The pulmonary status is stable at this point.  NGT was removed.  He was out of bed in chair for most of the day.  Follow with surgery.  He is on TPN and NPO
Events were noticed.  The patient had an NG tube insertion and drained already 700.  I reviewed the CT scan of the chest and has dilated stomach.  The CT scan revealed bilateral atelectasis and elevation of the right hemidiaphragm.  No evidence of pneumonia.  The patient might have aspirated.  Incentive spirometry out of bed in chair.  Continue NG tube suctioning.  Patient had a history of PE and DVT in the past. He is on Lovenox.  .  The patient has an abdominal x-ray which revealed still the bowel obstruction.  The pulmonary status is stable at this point.  NGT was removed.  He was out of bed in chair for most of the day.
Events were noticed.  The patient had an NG tube insertion and drained already 700.  I reviewed the CT scan of the chest and has dilated stomach.  The repeat CT scan revealed bilateral atelectasis, small pleural effusion and elevation of the right hemidiaphragm.  No evidence of pneumonia/PE.  Incentive spirometry out of bed in chair.    Patient had a history of PE and DVT in the past. He is on Lovenox.  .  The patient has an abdominal x-ray which revealed still the bowel obstruction.  The pulmonary status is stable at this point.  NGT was removed.  He was out of bed in chair for most of the day.  Follow with surgery.  He is on TPN and and will be started on diuet as he ahd BM and flatus.  I discussed with surgery.  Patient was OOB in chair yesterday
Events were noticed.  The patient had an NG tube insertion and drained already 700.  I reviewed the CT scan of the chest and has dilated stomach.  The CT scan revealed bilateral atelectasis and elevation of the right hemidiaphragm.  No evidence of pneumonia.  The patient might have aspirated.  Incentive spirometry out of bed in chair.  Continue NG tube suctioning.  Patient had a history of PE and DVT in the past. He is on Lovenox.  .  The patient has an abdominal x-ray which revealed still the bowel obstruction.  The pulmonary status is stable at this point.  Was removed.  He was out of bed in chair for most of the day.
Events were noticed.  The patient had an NG tube insertion and drained already 700.  I reviewed the CT scan of the chest and has dilated stomach.  The CT scan revealed bilateral atelectasis and elevation of the right hemidiaphragm.  No evidence of pneumonia.  The patient might have aspirated.  Incentive spirometry out of bed in chair.  Continue NG tube suctioning.  Patient had a history of PE and DVT in the past.  Patient will require to be on Lovenox for prophylaxis as he is high risk for recurrence

## 2023-07-30 NOTE — PROGRESS NOTE ADULT - ASSESSMENT
73 year old male with pmh of stroke (R hemiparesis), recurrent PE (last Jan 2022), diverticulosis and PSHx of colon tumor removal (1988), known jejunal mass presenting with abdominal pain. Pt is now s/p ex lap, SBR, primary anastomosis on 7/6. C/b UTI and ileus. PICC line placed 7/13 for TPN. C/b SOB, CT PE negative for PE, +L large Pleural effusion (7/26)    CLD w. ensures   PICC/TPN  Pain/nausea control PRN  Started clotrimazole for fungal rash  Home doxazosin and protonix  Senna qd, miralax QHS  eliquis/SCDs  OOBA/IS  Dispo: refused [facility], home w/ home care. Infusion services w. tpn 12 hours a day 73 year old male with pmh of stroke (R hemiparesis), recurrent PE (last Jan 2022), diverticulosis and PSHx of colon tumor removal (1988), known jejunal mass presenting with abdominal pain. Pt is now s/p ex lap, SBR, primary anastomosis on 7/6. C/b UTI and ileus. PICC line placed 7/13 for TPN. C/b SOB, CT PE negative for PE, +L large Pleural effusion (7/26)    CLD w. ensures   PICC/TPN  Pain/nausea control PRN  Started clotrimazole for fungal rash  Home doxazosin and protonix  Senna qd, miralax QHS  -duonebs q6h for wheezing   eliquis/SCDs  OOBA/IS  Dispo: refused [facility], home w/ home care. Infusion services w. tpn 12 hours a day

## 2023-07-30 NOTE — PROGRESS NOTE ADULT - SUBJECTIVE AND OBJECTIVE BOX
POD 24  (Covering for Dr. Spann)  Had BM yesterday and no N/V  Remains on clears but with low intake.  On TPN  Comfortable    Vital Signs Last 24 Hrs  T(C): 36.5 (30 Jul 2023 09:25), Max: 37.1 (29 Jul 2023 23:55)  T(F): 97.7 (30 Jul 2023 09:25), Max: 98.8 (29 Jul 2023 23:55)  HR: 91 (30 Jul 2023 09:25) (91 - 110)  BP: 113/77 (30 Jul 2023 09:25) (106/74 - 127/87)  BP(mean): --  RR: 19 (30 Jul 2023 09:31) (17 - 24)  SpO2: 94% (30 Jul 2023 09:31) (92% - 97%)    Parameters below as of 30 Jul 2023 09:31  Patient On (Oxygen Delivery Method): nasal cannula  O2 Flow (L/min): 2    Lungs:  audible wheezing bilaterally  Abdomen: incision intact and well healed, soft distension, no tenderness  Ext: some edema, without calf tenderness      + unrecorded incontinent episodes (wearing diaper)    Labs                        9.1    5.64  )-----------( 233      ( 30 Jul 2023 05:30 )             30.1   07-30    134<L>  |  102  |  13  ----------------------------<  124<H>  4.2   |  24  |  0.64    Ca    8.0<L>      30 Jul 2023 05:30  Phos  3.6     07-30  Mg     1.9     07-30

## 2023-07-30 NOTE — PROGRESS NOTE ADULT - SUBJECTIVE AND OBJECTIVE BOX
Interval Events: Reviewed  Patient seen and examined at bedside.    Patient is a 73y old  Male who presents with a chief complaint of Abdominal pain (29 Jul 2023 15:36)  no acute event overnight, 2L 94%      PAST MEDICAL & SURGICAL HISTORY:  Depression, major      Stroke      Pulmonary embolism      BPH (benign prostatic hyperplasia)      Diverticulosis          MEDICATIONS:  Pulmonary:  albuterol/ipratropium for Nebulization 3 milliLiter(s) Nebulizer every 6 hours    Antimicrobials:    Anticoagulants:  apixaban 2.5 milliGRAM(s) Oral every 12 hours    Cardiac:  doxazosin 1 milliGRAM(s) Oral at bedtime      Allergies    No Known Allergies    Intolerances        Vital Signs Last 24 Hrs  T(C): 36.5 (30 Jul 2023 09:25), Max: 37.1 (29 Jul 2023 23:55)  T(F): 97.7 (30 Jul 2023 09:25), Max: 98.8 (29 Jul 2023 23:55)  HR: 91 (30 Jul 2023 09:25) (91 - 110)  BP: 113/77 (30 Jul 2023 09:25) (106/74 - 127/87)  BP(mean): --  RR: 19 (30 Jul 2023 09:31) (17 - 24)  SpO2: 94% (30 Jul 2023 09:31) (92% - 97%)    Parameters below as of 30 Jul 2023 09:31  Patient On (Oxygen Delivery Method): nasal cannula  O2 Flow (L/min): 2      07-29 @ 07:01 - 07-30 @ 07:00  --------------------------------------------------------  IN: 2054 mL / OUT: 400 mL / NET: 1654 mL    07-30 @ 07:01 - 07-30 @ 11:28  --------------------------------------------------------  IN: 252 mL / OUT: 0 mL / NET: 252 mL          Review of Systems:   •	General: negative  •	Skin/Breast: negative  •	Ophthalmologic: negative  •	ENMT: negative  •	Respiratory and Thorax: negative  •	Cardiovascular: negative  •	Gastrointestinal: negative  •	Genitourinary: negative  •	Musculoskeletal: negative  •	Neurological: negative  •	Psychiatric: negative  •	Hematology/Lymphatics: negative  •	Endocrine: negative  •	Allergic/Immunologic: negative    Physical Exam:   • Constitutional:	elderly, thin male NAD  • Eyes:	EOMI; PERRL; no drainage or redness  • ENMT:	No oral lesions; no gross abnormalities  • Neck	no thyromegaly or nodules  • Breasts:	not examined  • Back:	No deformity or limitation of movement  • Respiratory:	Breath Sounds equal & clear to auscultation, no accessory muscle use  • Cardiovascular:	Regular rate & rhythm, normal S1, S2; no murmurs, gallops or rubs; no S3, S4  • Gastrointestinal:	Soft, non-tender, no hepatosplenomegaly, normal bowel sounds  • Genitourinary:	not examined  • Rectal: not examined  • Extremities:	No cyanosis, clubbing or edema  • Vascular:	Equal and normal pulses (dorsalis pedis)  • Neurologica:l	not examined  • Skin:	No lesions; no rash  • Lymph Nodes:	No lymphadedenopathy  • Musculoskeletal:	No joint pain, swelling or deformity        LABS:      CBC Full  -  ( 30 Jul 2023 05:30 )  WBC Count : 5.64 K/uL  RBC Count : 3.49 M/uL  Hemoglobin : 9.1 g/dL  Hematocrit : 30.1 %  Platelet Count - Automated : 233 K/uL  Mean Cell Volume : 86.2 fl  Mean Cell Hemoglobin : 26.1 pg  Mean Cell Hemoglobin Concentration : 30.2 gm/dL  Auto Neutrophil # : x  Auto Lymphocyte # : x  Auto Monocyte # : x  Auto Eosinophil # : x  Auto Basophil # : x  Auto Neutrophil % : x  Auto Lymphocyte % : x  Auto Monocyte % : x  Auto Eosinophil % : x  Auto Basophil % : x    07-30    134<L>  |  102  |  13  ----------------------------<  124<H>  4.2   |  24  |  0.64    Ca    8.0<L>      30 Jul 2023 05:30  Phos  3.6     07-30  Mg     1.9     07-30            Urinalysis Basic - ( 30 Jul 2023 05:30 )    Color: x / Appearance: x / SG: x / pH: x  Gluc: 124 mg/dL / Ketone: x  / Bili: x / Urobili: x   Blood: x / Protein: x / Nitrite: x   Leuk Esterase: x / RBC: x / WBC x   Sq Epi: x / Non Sq Epi: x / Bacteria: x                  RADIOLOGY & ADDITIONAL STUDIES (The following images were personally reviewed):  Hernandez:                                     No  Urine output:                       adequate  DVT prophylaxis:                 Yes  Flattus:                                  Yes  Bowel movement:              No

## 2023-07-30 NOTE — PROGRESS NOTE ADULT - PROBLEM SELECTOR PLAN 2
Antibiotic.  Follow-up on cultures
resolved
Antibiotic.  Follow-up on cultures
resolved
Antibiotic.  Follow-up on cultures
resolved
Antibiotic.  Follow-up on cultures
Antibiotic.  Follow-up on cultures
resolved

## 2023-07-30 NOTE — PROGRESS NOTE ADULT - PROBLEM SELECTOR PROBLEM 1
Atelectasis
If you are a smoker, it is important for your health to stop smoking. Please be aware that second hand smoke is also harmful.

## 2023-07-30 NOTE — PROGRESS NOTE ADULT - PROBLEM SELECTOR PROBLEM 2
Acute UTI

## 2023-07-30 NOTE — PROGRESS NOTE ADULT - ASSESSMENT
A/P. Had tachycardia last night to 110. Back down this Am. BP stable throughout. May be related to pulmonary issues  Wheezing this AM.  Got nebulizer treatment at 6:30 AM . Requiring 2 L nasal O2 for Sat of 96.  Will follow closely.   If doesn’t resolve will get chest x ray.    Continue TPNG  Clears as tolerated  Possible discharge tomorrow.

## 2023-07-30 NOTE — PROGRESS NOTE ADULT - REASON FOR ADMISSION
ileus
Abdominal pain
abdominal pain
small bowel tumor
Abdominal pain in the setting of jejunal mass
abdominal pain
Jejunal mass
abdominal pain
abdominal pain
ileus
abdominal pain

## 2023-07-30 NOTE — PROGRESS NOTE ADULT - ASSESSMENT
73 year old male with pmh of stroke (R hemiparesis), recurrent PE (last Jan 2022), diverticulosis and PSHx of colon tumor removal (1988), known jejunal mass presenting with abdominal pain. Pt is now s/p ex lap, SBR, primary anastomosis on 7/6. C/b UTI and ileus, NGT placed 7/11-7/--. Continue awaiting return of bowel function, d/c NGT; positive flatus and BM.

## 2023-07-30 NOTE — PROGRESS NOTE ADULT - SUBJECTIVE AND OBJECTIVE BOX
Overnight events:     INCOMPLETE!!    POD#    SUBJECTIVE:      MEDICATIONS  (STANDING):  apixaban 2.5 milliGRAM(s) Oral every 12 hours  chlorhexidine 2% Cloths 1 Application(s) Topical <User Schedule>  clotrimazole 1% Cream 1 Application(s) Topical every 12 hours  doxazosin 1 milliGRAM(s) Oral at bedtime  lipid, fat emulsion (Fish Oil and Plant Based) 20% Infusion 0.565 Gm/kG/Day (20.8 mL/Hr) IV Continuous <Continuous>  magnesium hydroxide Suspension 30 milliLiter(s) Oral daily  pantoprazole  Injectable 40 milliGRAM(s) IV Push daily  Parenteral Nutrition - Adult 1 Each (63 mL/Hr) TPN Continuous <Continuous>  senna 2 Tablet(s) Oral at bedtime  sertraline 50 milliGRAM(s) Oral daily  triamcinolone 0.1% Cream 1 Application(s) Topical three times a day    MEDICATIONS  (PRN):  ondansetron Injectable 4 milliGRAM(s) IV Push every 6 hours PRN Nausea and/or Vomiting  sodium chloride 0.9% lock flush 10 milliLiter(s) IV Push every 1 hour PRN Pre/post blood products, medications, blood draw, and to maintain line patency      Vital Signs Last 24 Hrs  T(C): 36.9 (30 Jul 2023 04:43), Max: 37.1 (29 Jul 2023 23:55)  T(F): 98.5 (30 Jul 2023 04:43), Max: 98.8 (29 Jul 2023 23:55)  HR: 102 (30 Jul 2023 04:43) (88 - 110)  BP: 115/81 (30 Jul 2023 04:43) (106/74 - 127/87)  BP(mean): --  RR: 18 (30 Jul 2023 04:43) (17 - 24)  SpO2: 96% (30 Jul 2023 04:43) (89% - 97%)    Parameters below as of 30 Jul 2023 04:43  Patient On (Oxygen Delivery Method): nasal cannula  O2 Flow (L/min): 2      Physical Exam:  General: NAD, resting comfortably in bed  Pulmonary: Nonlabored breathing, no respiratory distress  Cardiovascular: NSR  Abdominal: soft, NT/ND  Extremities: WWP, normal strength  Neuro: A/O x 3, CNs II-XII grossly intact, no focal deficits, normal motor/sensation  Pulses: palpable distal pulses    I&O's Summary    28 Jul 2023 07:01  -  29 Jul 2023 07:00  --------------------------------------------------------  IN: 1838.8 mL / OUT: 950 mL / NET: 888.8 mL    29 Jul 2023 07:01  -  30 Jul 2023 06:02  --------------------------------------------------------  IN: 2054 mL / OUT: 400 mL / NET: 1654 mL        LABS:                        9.6    5.47  )-----------( 235      ( 29 Jul 2023 09:10 )             31.9     07-29    137  |  103  |  14  ----------------------------<  128<H>  4.4   |  24  |  0.75    Ca    8.5      29 Jul 2023 09:10  Phos  3.0     07-29  Mg     2.0     07-29        Urinalysis Basic - ( 29 Jul 2023 09:10 )    Color: x / Appearance: x / SG: x / pH: x  Gluc: 128 mg/dL / Ketone: x  / Bili: x / Urobili: x   Blood: x / Protein: x / Nitrite: x   Leuk Esterase: x / RBC: x / WBC x   Sq Epi: x / Non Sq Epi: x / Bacteria: x      CAPILLARY BLOOD GLUCOSE            RADIOLOGY & ADDITIONAL STUDIES:   Overnight events: HR 110s asymptomatic     INCOMPLETE!!    POD#24 ex lap with SBR and anastomosis     SUBJECTIVE: Patient seen at bedside with chief resident. Patient reports feeling so-so. He denies any current pain. He denies any shortness of breath.       MEDICATIONS  (STANDING):  apixaban 2.5 milliGRAM(s) Oral every 12 hours  chlorhexidine 2% Cloths 1 Application(s) Topical <User Schedule>  clotrimazole 1% Cream 1 Application(s) Topical every 12 hours  doxazosin 1 milliGRAM(s) Oral at bedtime  lipid, fat emulsion (Fish Oil and Plant Based) 20% Infusion 0.565 Gm/kG/Day (20.8 mL/Hr) IV Continuous <Continuous>  magnesium hydroxide Suspension 30 milliLiter(s) Oral daily  pantoprazole  Injectable 40 milliGRAM(s) IV Push daily  Parenteral Nutrition - Adult 1 Each (63 mL/Hr) TPN Continuous <Continuous>  senna 2 Tablet(s) Oral at bedtime  sertraline 50 milliGRAM(s) Oral daily  triamcinolone 0.1% Cream 1 Application(s) Topical three times a day    MEDICATIONS  (PRN):  ondansetron Injectable 4 milliGRAM(s) IV Push every 6 hours PRN Nausea and/or Vomiting  sodium chloride 0.9% lock flush 10 milliLiter(s) IV Push every 1 hour PRN Pre/post blood products, medications, blood draw, and to maintain line patency      Vital Signs Last 24 Hrs  T(C): 36.9 (30 Jul 2023 04:43), Max: 37.1 (29 Jul 2023 23:55)  T(F): 98.5 (30 Jul 2023 04:43), Max: 98.8 (29 Jul 2023 23:55)  HR: 102 (30 Jul 2023 04:43) (88 - 110)  BP: 115/81 (30 Jul 2023 04:43) (106/74 - 127/87)  BP(mean): --  RR: 18 (30 Jul 2023 04:43) (17 - 24)  SpO2: 96% (30 Jul 2023 04:43) (89% - 97%)    Parameters below as of 30 Jul 2023 04:43  Patient On (Oxygen Delivery Method): nasal cannula  O2 Flow (L/min): 2      Physical Exam:  General: NAD, resting comfortably in bed  Pulmonary: Nonlabored breathing, no respiratory distress with NC 2L  Cardiovascular: NSR  Abdominal: soft, mild distended, but improvement from yesterday , NT  Extremities: WWP, normal strength, right wrist rash improved and decreasing in size and redness   Neuro: A/O x 3, CNs II-XII grossly intact, no focal deficits, normal motor/sensation  Pulses: palpable distal pulses    I&O's Summary    28 Jul 2023 07:01  -  29 Jul 2023 07:00  --------------------------------------------------------  IN: 1838.8 mL / OUT: 950 mL / NET: 888.8 mL    29 Jul 2023 07:01  -  30 Jul 2023 06:02  --------------------------------------------------------  IN: 2054 mL / OUT: 400 mL / NET: 1654 mL        LABS:                        9.6    5.47  )-----------( 235      ( 29 Jul 2023 09:10 )             31.9     07-29    137  |  103  |  14  ----------------------------<  128<H>  4.4   |  24  |  0.75    Ca    8.5      29 Jul 2023 09:10  Phos  3.0     07-29  Mg     2.0     07-29        Urinalysis Basic - ( 29 Jul 2023 09:10 )    Color: x / Appearance: x / SG: x / pH: x  Gluc: 128 mg/dL / Ketone: x  / Bili: x / Urobili: x   Blood: x / Protein: x / Nitrite: x   Leuk Esterase: x / RBC: x / WBC x   Sq Epi: x / Non Sq Epi: x / Bacteria: x      CAPILLARY BLOOD GLUCOSE            RADIOLOGY & ADDITIONAL STUDIES:   Overnight events: HR 110s asymptomatic       POD#24 ex lap with SBR and anastomosis     SUBJECTIVE: Patient seen at bedside with chief resident. Patient reports feeling so-so. He denies any current pain. He denies any shortness of breath.       MEDICATIONS  (STANDING):  apixaban 2.5 milliGRAM(s) Oral every 12 hours  chlorhexidine 2% Cloths 1 Application(s) Topical <User Schedule>  clotrimazole 1% Cream 1 Application(s) Topical every 12 hours  doxazosin 1 milliGRAM(s) Oral at bedtime  lipid, fat emulsion (Fish Oil and Plant Based) 20% Infusion 0.565 Gm/kG/Day (20.8 mL/Hr) IV Continuous <Continuous>  magnesium hydroxide Suspension 30 milliLiter(s) Oral daily  pantoprazole  Injectable 40 milliGRAM(s) IV Push daily  Parenteral Nutrition - Adult 1 Each (63 mL/Hr) TPN Continuous <Continuous>  senna 2 Tablet(s) Oral at bedtime  sertraline 50 milliGRAM(s) Oral daily  triamcinolone 0.1% Cream 1 Application(s) Topical three times a day    MEDICATIONS  (PRN):  ondansetron Injectable 4 milliGRAM(s) IV Push every 6 hours PRN Nausea and/or Vomiting  sodium chloride 0.9% lock flush 10 milliLiter(s) IV Push every 1 hour PRN Pre/post blood products, medications, blood draw, and to maintain line patency      Vital Signs Last 24 Hrs  T(C): 36.9 (30 Jul 2023 04:43), Max: 37.1 (29 Jul 2023 23:55)  T(F): 98.5 (30 Jul 2023 04:43), Max: 98.8 (29 Jul 2023 23:55)  HR: 102 (30 Jul 2023 04:43) (88 - 110)  BP: 115/81 (30 Jul 2023 04:43) (106/74 - 127/87)  BP(mean): --  RR: 18 (30 Jul 2023 04:43) (17 - 24)  SpO2: 96% (30 Jul 2023 04:43) (89% - 97%)    Parameters below as of 30 Jul 2023 04:43  Patient On (Oxygen Delivery Method): nasal cannula  O2 Flow (L/min): 2      Physical Exam:  General: NAD, resting comfortably in bed  Pulmonary: Nonlabored breathing, no respiratory distress with NC 2L  Cardiovascular: NSR  Abdominal: soft, mild distended, but improvement from yesterday , NT  Extremities: WWP, normal strength, right wrist rash improved and decreasing in size and redness   Neuro: A/O x 3, CNs II-XII grossly intact, no focal deficits, normal motor/sensation  Pulses: palpable distal pulses    I&O's Summary    28 Jul 2023 07:01  -  29 Jul 2023 07:00  --------------------------------------------------------  IN: 1838.8 mL / OUT: 950 mL / NET: 888.8 mL    29 Jul 2023 07:01  -  30 Jul 2023 06:02  --------------------------------------------------------  IN: 2054 mL / OUT: 400 mL / NET: 1654 mL        LABS:                        9.6    5.47  )-----------( 235      ( 29 Jul 2023 09:10 )             31.9     07-29    137  |  103  |  14  ----------------------------<  128<H>  4.4   |  24  |  0.75    Ca    8.5      29 Jul 2023 09:10  Phos  3.0     07-29  Mg     2.0     07-29        Urinalysis Basic - ( 29 Jul 2023 09:10 )    Color: x / Appearance: x / SG: x / pH: x  Gluc: 128 mg/dL / Ketone: x  / Bili: x / Urobili: x   Blood: x / Protein: x / Nitrite: x   Leuk Esterase: x / RBC: x / WBC x   Sq Epi: x / Non Sq Epi: x / Bacteria: x      CAPILLARY BLOOD GLUCOSE            RADIOLOGY & ADDITIONAL STUDIES:

## 2023-07-31 VITALS
TEMPERATURE: 98 F | OXYGEN SATURATION: 93 % | RESPIRATION RATE: 17 BRPM | HEART RATE: 91 BPM | SYSTOLIC BLOOD PRESSURE: 106 MMHG | DIASTOLIC BLOOD PRESSURE: 79 MMHG

## 2023-07-31 LAB
ANION GAP SERPL CALC-SCNC: 8 MMOL/L — SIGNIFICANT CHANGE UP (ref 5–17)
BUN SERPL-MCNC: 13 MG/DL — SIGNIFICANT CHANGE UP (ref 7–23)
CALCIUM SERPL-MCNC: 8.3 MG/DL — LOW (ref 8.4–10.5)
CHLORIDE SERPL-SCNC: 101 MMOL/L — SIGNIFICANT CHANGE UP (ref 96–108)
CO2 SERPL-SCNC: 25 MMOL/L — SIGNIFICANT CHANGE UP (ref 22–31)
CREAT SERPL-MCNC: 0.6 MG/DL — SIGNIFICANT CHANGE UP (ref 0.5–1.3)
EGFR: 103 ML/MIN/1.73M2 — SIGNIFICANT CHANGE UP
GLUCOSE SERPL-MCNC: 130 MG/DL — HIGH (ref 70–99)
HCT VFR BLD CALC: 29.3 % — LOW (ref 39–50)
HGB BLD-MCNC: 8.7 G/DL — LOW (ref 13–17)
MAGNESIUM SERPL-MCNC: 1.9 MG/DL — SIGNIFICANT CHANGE UP (ref 1.6–2.6)
MCHC RBC-ENTMCNC: 25.4 PG — LOW (ref 27–34)
MCHC RBC-ENTMCNC: 29.7 GM/DL — LOW (ref 32–36)
MCV RBC AUTO: 85.7 FL — SIGNIFICANT CHANGE UP (ref 80–100)
NRBC # BLD: 0 /100 WBCS — SIGNIFICANT CHANGE UP (ref 0–0)
PHOSPHATE SERPL-MCNC: 3.6 MG/DL — SIGNIFICANT CHANGE UP (ref 2.5–4.5)
PLATELET # BLD AUTO: 233 K/UL — SIGNIFICANT CHANGE UP (ref 150–400)
POTASSIUM SERPL-MCNC: 3.5 MMOL/L — SIGNIFICANT CHANGE UP (ref 3.5–5.3)
POTASSIUM SERPL-SCNC: 3.5 MMOL/L — SIGNIFICANT CHANGE UP (ref 3.5–5.3)
RBC # BLD: 3.42 M/UL — LOW (ref 4.2–5.8)
RBC # FLD: 27.9 % — HIGH (ref 10.3–14.5)
SODIUM SERPL-SCNC: 134 MMOL/L — LOW (ref 135–145)
WBC # BLD: 5.44 K/UL — SIGNIFICANT CHANGE UP (ref 3.8–10.5)
WBC # FLD AUTO: 5.44 K/UL — SIGNIFICANT CHANGE UP (ref 3.8–10.5)

## 2023-07-31 PROCEDURE — 84100 ASSAY OF PHOSPHORUS: CPT

## 2023-07-31 PROCEDURE — 93971 EXTREMITY STUDY: CPT

## 2023-07-31 PROCEDURE — 87184 SC STD DISK METHOD PER PLATE: CPT

## 2023-07-31 PROCEDURE — 86803 HEPATITIS C AB TEST: CPT

## 2023-07-31 PROCEDURE — 86901 BLOOD TYPING SEROLOGIC RH(D): CPT

## 2023-07-31 PROCEDURE — 85027 COMPLETE CBC AUTOMATED: CPT

## 2023-07-31 PROCEDURE — 86850 RBC ANTIBODY SCREEN: CPT

## 2023-07-31 PROCEDURE — 80048 BASIC METABOLIC PNL TOTAL CA: CPT

## 2023-07-31 PROCEDURE — 87086 URINE CULTURE/COLONY COUNT: CPT

## 2023-07-31 PROCEDURE — 85610 PROTHROMBIN TIME: CPT

## 2023-07-31 PROCEDURE — 97116 GAIT TRAINING THERAPY: CPT

## 2023-07-31 PROCEDURE — 93005 ELECTROCARDIOGRAM TRACING: CPT

## 2023-07-31 PROCEDURE — 97110 THERAPEUTIC EXERCISES: CPT

## 2023-07-31 PROCEDURE — 88341 IMHCHEM/IMCYTCHM EA ADD ANTB: CPT

## 2023-07-31 PROCEDURE — 74019 RADEX ABDOMEN 2 VIEWS: CPT

## 2023-07-31 PROCEDURE — 97535 SELF CARE MNGMENT TRAINING: CPT

## 2023-07-31 PROCEDURE — 83690 ASSAY OF LIPASE: CPT

## 2023-07-31 PROCEDURE — 74018 RADEX ABDOMEN 1 VIEW: CPT

## 2023-07-31 PROCEDURE — 87040 BLOOD CULTURE FOR BACTERIA: CPT

## 2023-07-31 PROCEDURE — 97530 THERAPEUTIC ACTIVITIES: CPT

## 2023-07-31 PROCEDURE — 97161 PT EVAL LOW COMPLEX 20 MIN: CPT

## 2023-07-31 PROCEDURE — 97112 NEUROMUSCULAR REEDUCATION: CPT

## 2023-07-31 PROCEDURE — C9399: CPT

## 2023-07-31 PROCEDURE — 86900 BLOOD TYPING SEROLOGIC ABO: CPT

## 2023-07-31 PROCEDURE — 80053 COMPREHEN METABOLIC PANEL: CPT

## 2023-07-31 PROCEDURE — 36415 COLL VENOUS BLD VENIPUNCTURE: CPT

## 2023-07-31 PROCEDURE — 83735 ASSAY OF MAGNESIUM: CPT

## 2023-07-31 PROCEDURE — 71260 CT THORAX DX C+: CPT

## 2023-07-31 PROCEDURE — 81003 URINALYSIS AUTO W/O SCOPE: CPT

## 2023-07-31 PROCEDURE — 82962 GLUCOSE BLOOD TEST: CPT

## 2023-07-31 PROCEDURE — 85730 THROMBOPLASTIN TIME PARTIAL: CPT

## 2023-07-31 PROCEDURE — 81001 URINALYSIS AUTO W/SCOPE: CPT

## 2023-07-31 PROCEDURE — 71275 CT ANGIOGRAPHY CHEST: CPT

## 2023-07-31 PROCEDURE — 36573 INSJ PICC RS&I 5 YR+: CPT

## 2023-07-31 PROCEDURE — 71045 X-RAY EXAM CHEST 1 VIEW: CPT

## 2023-07-31 PROCEDURE — 88342 IMHCHEM/IMCYTCHM 1ST ANTB: CPT

## 2023-07-31 PROCEDURE — 82803 BLOOD GASES ANY COMBINATION: CPT

## 2023-07-31 PROCEDURE — 94640 AIRWAY INHALATION TREATMENT: CPT

## 2023-07-31 PROCEDURE — 97165 OT EVAL LOW COMPLEX 30 MIN: CPT

## 2023-07-31 PROCEDURE — 99285 EMERGENCY DEPT VISIT HI MDM: CPT | Mod: 25

## 2023-07-31 PROCEDURE — 85025 COMPLETE CBC W/AUTO DIFF WBC: CPT

## 2023-07-31 PROCEDURE — 84478 ASSAY OF TRIGLYCERIDES: CPT

## 2023-07-31 PROCEDURE — 74177 CT ABD & PELVIS W/CONTRAST: CPT

## 2023-07-31 PROCEDURE — 88309 TISSUE EXAM BY PATHOLOGIST: CPT

## 2023-07-31 PROCEDURE — 87186 SC STD MICRODIL/AGAR DIL: CPT

## 2023-07-31 PROCEDURE — 87181 SC STD AGAR DILUTION PER AGT: CPT

## 2023-07-31 RX ORDER — APIXABAN 2.5 MG/1
1 TABLET, FILM COATED ORAL
Qty: 60 | Refills: 0
Start: 2023-07-31 | End: 2023-08-29

## 2023-07-31 RX ORDER — POTASSIUM CHLORIDE 20 MEQ
40 PACKET (EA) ORAL ONCE
Refills: 0 | Status: COMPLETED | OUTPATIENT
Start: 2023-07-31 | End: 2023-07-31

## 2023-07-31 RX ORDER — MAGNESIUM HYDROXIDE 400 MG/1
15 TABLET, CHEWABLE ORAL
Qty: 210 | Refills: 0
Start: 2023-07-31 | End: 2023-08-13

## 2023-07-31 RX ORDER — SENNA PLUS 8.6 MG/1
2 TABLET ORAL
Qty: 28 | Refills: 0
Start: 2023-07-31 | End: 2023-08-13

## 2023-07-31 RX ADMIN — Medication 1 APPLICATION(S): at 07:41

## 2023-07-31 RX ADMIN — Medication 1 APPLICATION(S): at 14:18

## 2023-07-31 RX ADMIN — Medication 1 APPLICATION(S): at 06:26

## 2023-07-31 RX ADMIN — Medication 3 MILLILITER(S): at 06:26

## 2023-07-31 RX ADMIN — Medication 3 MILLILITER(S): at 00:34

## 2023-07-31 RX ADMIN — CHLORHEXIDINE GLUCONATE 1 APPLICATION(S): 213 SOLUTION TOPICAL at 06:26

## 2023-07-31 RX ADMIN — Medication 40 MILLIEQUIVALENT(S): at 10:33

## 2023-07-31 RX ADMIN — APIXABAN 2.5 MILLIGRAM(S): 2.5 TABLET, FILM COATED ORAL at 06:26

## 2023-07-31 NOTE — PROGRESS NOTE ADULT - PROVIDER SPECIALTY LIST ADULT
Colorectal Surgery
Internal Medicine
Pulmonology
Pulmonology
Surgery
Colorectal Surgery
Pulmonology
Surgery
Colorectal Surgery
Colorectal Surgery
Surgery
Pulmonology
Surgery
Surgery
Pulmonology
Internal Medicine
Pulmonology
Internal Medicine

## 2023-07-31 NOTE — PROGRESS NOTE ADULT - SUBJECTIVE AND OBJECTIVE BOX
SUBJECTIVE: Pt seen and examined at bedside with chief. Pt denies any complaints. Pain well controlled. Tolerating clears without N/V. Admits to BF       MEDICATIONS  (STANDING):  albuterol/ipratropium for Nebulization 3 milliLiter(s) Nebulizer every 6 hours  apixaban 2.5 milliGRAM(s) Oral every 12 hours  chlorhexidine 2% Cloths 1 Application(s) Topical <User Schedule>  clotrimazole 1% Cream 1 Application(s) Topical every 12 hours  doxazosin 1 milliGRAM(s) Oral at bedtime  lipid, fat emulsion (Fish Oil and Plant Based) 20% Infusion 0.565 Gm/kG/Day (20.8 mL/Hr) IV Continuous <Continuous>  magnesium hydroxide Suspension 30 milliLiter(s) Oral daily  pantoprazole   Suspension 40 milliGRAM(s) Oral daily  Parenteral Nutrition - Adult 1 Each (63 mL/Hr) TPN Continuous <Continuous>  senna 2 Tablet(s) Oral at bedtime  sertraline 50 milliGRAM(s) Oral daily  triamcinolone 0.1% Cream 1 Application(s) Topical three times a day    MEDICATIONS  (PRN):  ondansetron Injectable 4 milliGRAM(s) IV Push every 6 hours PRN Nausea and/or Vomiting  sodium chloride 0.9% lock flush 10 milliLiter(s) IV Push every 1 hour PRN Pre/post blood products, medications, blood draw, and to maintain line patency      Vital Signs Last 24 Hrs  T(C): 37.1 (31 Jul 2023 05:07), Max: 37.1 (30 Jul 2023 15:56)  T(F): 98.7 (31 Jul 2023 05:07), Max: 98.8 (30 Jul 2023 15:56)  HR: 99 (31 Jul 2023 05:07) (90 - 103)  BP: 108/73 (31 Jul 2023 05:07) (108/73 - 115/79)  BP(mean): --  RR: 17 (31 Jul 2023 05:07) (17 - 20)  SpO2: 92% (31 Jul 2023 05:07) (92% - 98%)    Parameters below as of 31 Jul 2023 05:07  Patient On (Oxygen Delivery Method): room air        PHYSICAL EXAM:      Constitutional: A&Ox3    Respiratory: non labored breathing, no respiratory distress    Cardiovascular: NSR, RRR    Gastrointestinal: Soft ND, NT                 Incision: Healing well    Genitourinary: Voiding     Extremities: (-) edema                  I&O's Detail    30 Jul 2023 07:01  -  31 Jul 2023 07:00  --------------------------------------------------------  IN:    Fat Emulsion (Fish Oil &amp; Plant Based) 20% Infusion: 249.6 mL    Oral Fluid: 480 mL    TPN (Total Parenteral Nutrition): 1386 mL  Total IN: 2115.6 mL    OUT:    Voided (mL): 150 mL  Total OUT: 150 mL    Total NET: 1965.6 mL          LABS:                        8.7    5.44  )-----------( 233      ( 31 Jul 2023 07:14 )             29.3     07-31    134<L>  |  101  |  13  ----------------------------<  130<H>  3.5   |  25  |  0.60    Ca    8.3<L>      31 Jul 2023 07:14  Phos  3.6     07-31  Mg     1.9     07-31        Urinalysis Basic - ( 31 Jul 2023 07:14 )    Color: x / Appearance: x / SG: x / pH: x  Gluc: 130 mg/dL / Ketone: x  / Bili: x / Urobili: x   Blood: x / Protein: x / Nitrite: x   Leuk Esterase: x / RBC: x / WBC x   Sq Epi: x / Non Sq Epi: x / Bacteria: x        RADIOLOGY & ADDITIONAL STUDIES:

## 2023-07-31 NOTE — PROGRESS NOTE ADULT - ASSESSMENT
73 year old male with pmh of stroke (R hemiparesis), recurrent PE (last Jan 2022), diverticulosis and PSHx of colon tumor removal (1988), known jejunal mass presenting with abdominal pain. Pt is now s/p ex lap, SBR, primary anastomosis on 7/6. C/b UTI and ileus. PICC line placed 7/13 for TPN. C/b SOB, CT PE negative for PE, +L large Pleural effusion (7/26)    d/c home w/ TPN   CLD w. ensures   PICC/TPN  Pain/nausea control PRN  Started clotrimazole for fungal rash  Home doxazosin and protonix  Senna qd, miralax QHS  Duoneb q6h for wheezing   eliquis/SCDs  OOBA/IS

## 2023-07-31 NOTE — CHART NOTE - NSCHARTNOTEFT_GEN_A_CORE
Admitting Diagnosis:   Patient is a 73y old  Male who presents with a chief complaint of ileus (30 Jul 2023 12:12)  PAST MEDICAL & SURGICAL HISTORY:  Depression, major  Stroke  Pulmonary embolism  BPH (benign prostatic hyperplasia)  Diverticulosis    Current Nutrition Order: Clear Liquids Diet  *Alternate means of nutrition via TPN: TPN via PICC: 330g Dex, 95g AA, 50g SMOF Lipids to provide in total 2000Kcal, 95g protein, GIR 2.6, 1.4g/kg ideal body weight protein     PO Intake: Good (%) [   ]  Fair (50-75%) [ x ] Poor (<25%) [   ]    GI Issues: distention noted per electronic medical records data; no complaints of nausea or vomiting; pt passing flatus per chart; BM noted on 7/30/23;     Pain: no pain/discomfort noted per pt and per electronic medical records data    Skin Integrity: abdominal midline surgical incisions; buttocks skin tears, R forearm skin tear; no pressure ulcers noted; R arm trace (1+) edema noted; Dante: 14    Labs:   07-31    134<L>  |  101  |  13  ----------------------------<  130<H>  3.5   |  25  |  0.60    Ca    8.3<L>      31 Jul 2023 07:14  Phos  3.6     07-31  Mg     1.9     07-31    CAPILLARY BLOOD GLUCOSE    Medications:  MEDICATIONS  (STANDING):  albuterol/ipratropium for Nebulization 3 milliLiter(s) Nebulizer every 6 hours  apixaban 2.5 milliGRAM(s) Oral every 12 hours  chlorhexidine 2% Cloths 1 Application(s) Topical <User Schedule>  clotrimazole 1% Cream 1 Application(s) Topical every 12 hours  doxazosin 1 milliGRAM(s) Oral at bedtime  magnesium hydroxide Suspension 30 milliLiter(s) Oral daily  pantoprazole   Suspension 40 milliGRAM(s) Oral daily  Parenteral Nutrition - Adult 1 Each (63 mL/Hr) TPN Continuous <Continuous>  senna 2 Tablet(s) Oral at bedtime  sertraline 50 milliGRAM(s) Oral daily  triamcinolone 0.1% Cream 1 Application(s) Topical three times a day    MEDICATIONS  (PRN):  ondansetron Injectable 4 milliGRAM(s) IV Push every 6 hours PRN Nausea and/or Vomiting  sodium chloride 0.9% lock flush 10 milliLiter(s) IV Push every 1 hour PRN Pre/post blood products, medications, blood draw, and to maintain line patency    Dosing Anthropometrics:   Height: 5'7  Weight: 195 lbs  BMI: 30.6  IBW: 148 pounds/67.2 kg  %IBW: 132%    Weight Change: No new documented weights at this time. Recommend that nursing obtain updated weights regularly (daily while on TPN). RD to continue to monitor.    Estimated energy needs: Ideal body weight used to calculate energy needs due to pt's current body weight within % ideal body weight. Adjust for CA, age    Estimated Energy Needs From (jose l/kg) 28  Estimated Energy Needs To (jose l/kg)	32  Estimated Energy Needs Calculated From (jose l/kg) 1878  Estimated Energy Needs Calculated To (jose l/kg)	2147    Estimated Protein Needs From (g/kg)	1.3  Estimated Protein Needs To (g/kg)	1.5  Estimated Protein Needs Calculated From (g/kg) 87.23  Estimated Protein Needs Calculated To (g/kg)	100.65    Estimated Fluid Needs From (ml/kg)	28  Estimated Fluid Needs To (ml/kg)	32  Estimated Fluid Needs Calculated From (ml/kg)	1878  Estimated Fluid Needs Calculated To (ml/kg)	2147    Subjective: 73 year old male with pmh of stroke (R hemiparesis), recurrent PE (last Jan 2022), diverticulosis and PSHx of colon tumor removal (1988), known jejunal mass presenting with abdominal pain. Pt is now s/p ex lap, SBR, primary anastomosis on 7/6. C/b UTI and ileus. PICC line placed 7/13 for TPN.    Patient seen at bedside for follow up assessment-on room air. Pt's wife, Magda, was present during follow up nutrition evaluation. Labs reviewed 7/31/23; elevated serum Glucose (130), low Na (134), Triglycerides 73 on 7/28/23 thus within normal limits, RD to continue to monitor trends. Observed TPN infusing at bedside (63mL/h). Pt's diet is now Clear Liquids diet as of 7/27/23. No complaints of nausea, vomiting or pain per pt, most recent BM on 7/30/23; pt is also passing flatus, some distention still noted. RD encouraged pt to increase PO intakes (clear liquids) as able, pt was receptive, verbalized understanding. RD discussed pt's status with pt's medical team, medical team has plans to continue TPN upon discharge to meet pt's nutritional needs while GI concerns/issues continue; RD remains available per protocol. RD to follow up. See nutrition recommendations below.    Previous nutrition diagnosis: Inadequate Oral Intake related to clinical course as evidenced by alternate means of nutrition via TPN with nutrition via PO in addition for pt (fair PO intakes overall thus far since diet advanced)    Active: [ x ]    Resolved: [   ]     Goal: - Consistently meets > 75% EER via most appropriate route for nutrition    Recommendations:  1. Continue alternate means of nutrition via TPN: TPN via PICC: 330g Dex, 95g AA, 50g SMOF Lipids to provide in total 2000Kcal, 95g protein, GIR 2.6, 1.4g/kg ideal body weight protein  Check TG weekly. Check Mg, Phos, K daily and POC BG Q6hrs. Trend daily weights. Fluids and electrolytes per MD discretion.  2. Monitor GI function, skin integrity, labs, daily wts  3. Pain and bowel regimen per medical team  4. RD to remain available for recommendation adjustment prn or will follow up pt per organizational policy    Education: RD encouraged pt to increase PO intakes as able, pt was receptive, verbalized understanding.    Risk Level: High [ x ] Moderate [   ] Low [   ]. Admitting Diagnosis:   Patient is a 73y old  Male who presents with a chief complaint of ileus (30 Jul 2023 12:12)  PAST MEDICAL & SURGICAL HISTORY:  Depression, major  Stroke  Pulmonary embolism  BPH (benign prostatic hyperplasia)  Diverticulosis    Current Nutrition Order: Clear Liquids Diet  *Alternate means of nutrition via TPN: TPN via PICC: 330g Dex, 95g AA, 50g SMOF Lipids to provide in total 2000Kcal, 95g protein, GIR 2.6, 1.4g/kg ideal body weight protein     PO Intake: Good (%) [   ]  Fair (50-75%) [ x ] Poor (<25%) [   ]    GI Issues: distention noted per electronic medical records data; no complaints of nausea or vomiting; pt passing flatus per chart; BM noted on 7/30/23;     Pain: no pain/discomfort noted per pt and per electronic medical records data    Skin Integrity: abdominal midline surgical incisions; buttocks skin tears, R forearm skin tear; no pressure ulcers noted; R arm trace (1+) edema noted; Dante: 14    Labs:   07-31    134<L>  |  101  |  13  ----------------------------<  130<H>  3.5   |  25  |  0.60    Ca    8.3<L>      31 Jul 2023 07:14  Phos  3.6     07-31  Mg     1.9     07-31    CAPILLARY BLOOD GLUCOSE    Medications:  MEDICATIONS  (STANDING):  albuterol/ipratropium for Nebulization 3 milliLiter(s) Nebulizer every 6 hours  apixaban 2.5 milliGRAM(s) Oral every 12 hours  chlorhexidine 2% Cloths 1 Application(s) Topical <User Schedule>  clotrimazole 1% Cream 1 Application(s) Topical every 12 hours  doxazosin 1 milliGRAM(s) Oral at bedtime  magnesium hydroxide Suspension 30 milliLiter(s) Oral daily  pantoprazole   Suspension 40 milliGRAM(s) Oral daily  Parenteral Nutrition - Adult 1 Each (63 mL/Hr) TPN Continuous <Continuous>  senna 2 Tablet(s) Oral at bedtime  sertraline 50 milliGRAM(s) Oral daily  triamcinolone 0.1% Cream 1 Application(s) Topical three times a day    MEDICATIONS  (PRN):  ondansetron Injectable 4 milliGRAM(s) IV Push every 6 hours PRN Nausea and/or Vomiting  sodium chloride 0.9% lock flush 10 milliLiter(s) IV Push every 1 hour PRN Pre/post blood products, medications, blood draw, and to maintain line patency    Dosing Anthropometrics:   Height: 5'7  Weight: 195 lbs  BMI: 30.6  IBW: 148 pounds/67.2 kg  %IBW: 132%    Weight Change: No new documented weights at this time. Recommend that nursing obtain updated weights regularly (daily while on TPN). RD to continue to monitor.    Estimated energy needs: Ideal body weight used to calculate energy needs due to pt's current body weight within % ideal body weight. Adjust for CA, age    Estimated Energy Needs From (jose l/kg) 28  Estimated Energy Needs To (jose l/kg)	32  Estimated Energy Needs Calculated From (jose l/kg) 1878  Estimated Energy Needs Calculated To (jose l/kg)	2147    Estimated Protein Needs From (g/kg)	1.3  Estimated Protein Needs To (g/kg)	1.5  Estimated Protein Needs Calculated From (g/kg) 87.23  Estimated Protein Needs Calculated To (g/kg)	100.65    Estimated Fluid Needs From (ml/kg)	28  Estimated Fluid Needs To (ml/kg)	32  Estimated Fluid Needs Calculated From (ml/kg)	1878  Estimated Fluid Needs Calculated To (ml/kg)	2147    Subjective: 73 year old male with pmh of stroke (R hemiparesis), recurrent PE (last Jan 2022), diverticulosis and PSHx of colon tumor removal (1988), known jejunal mass presenting with abdominal pain. Pt is now s/p ex lap, SBR, primary anastomosis on 7/6. C/b UTI and ileus. PICC line placed 7/13 for TPN.    Patient seen at bedside for follow up assessment-on room air. Pt's wife, Magda, was present during follow up nutrition evaluation. Labs reviewed 7/31/23; elevated serum Glucose (130), low Na (134), Triglycerides 73 on 7/28/23 thus within normal limits, RD to continue to monitor trends. Observed TPN infusing at bedside (63mL/h). Pt's diet is now Clear Liquids diet as of 7/27/23. No complaints of nausea, vomiting or pain per pt, most recent BM on 7/30/23; pt is also passing flatus, some distention still noted. RD encouraged pt to increase PO intakes (clear liquids) as able, pt was receptive, verbalized understanding. RD discussed pt's status with pt's medical team, medical team has plans to continue TPN upon discharge to meet pt's nutritional needs while GI concerns/issues continue; RD remains available per protocol. RD to follow up. See nutrition recommendations below.    Previous nutrition diagnosis: Inadequate Oral Intake related to clinical course as evidenced by alternate means of nutrition via TPN with nutrition via PO in addition for pt (fair PO intakes overall thus far since diet advanced)    Active: [ x ]    Resolved: [   ]     Goal: - Consistently meets > 75% EER via most appropriate route for nutrition    Recommendations:  1. Clear Liquids diet  >>as medically feasible, advance diet as tolerated to low fiber diet, consider oral nutrition supplements as tolerated    **Continue alternate means of nutrition via TPN: TPN via PICC: 330g Dex, 95g AA, 50g SMOF Lipids to provide in total 2000Kcal, 95g protein, GIR 2.6, 1.4g/kg ideal body weight protein  Check TG weekly. Check Mg, Phos, K daily and POC BG Q6hrs. Trend daily weights. Fluids and electrolytes per MD discretion.  2. Monitor GI function, skin integrity, labs, daily wts  3. Pain and bowel regimen per medical team  4. RD to remain available for recommendation adjustment prn or will follow up pt per organizational policy    Education: RD encouraged pt to increase PO intakes as able, pt was receptive, verbalized understanding.    Risk Level: High [ x ] Moderate [   ] Low [   ].

## 2023-07-31 NOTE — CHART NOTE - NSCHARTNOTESELECT_GEN_ALL_CORE
Event Note
Event Note
Nutrition Services
Nutrition Services
Event Note
Event Note
Nutrition Services
Nutrition Services

## 2023-08-02 DIAGNOSIS — A41.81 SEPSIS DUE TO ENTEROCOCCUS: ICD-10-CM

## 2023-08-02 DIAGNOSIS — K57.90 DIVERTICULOSIS OF INTESTINE, PART UNSPECIFIED, WITHOUT PERFORATION OR ABSCESS WITHOUT BLEEDING: ICD-10-CM

## 2023-08-02 DIAGNOSIS — C17.1 MALIGNANT NEOPLASM OF JEJUNUM: ICD-10-CM

## 2023-08-02 DIAGNOSIS — Z86.711 PERSONAL HISTORY OF PULMONARY EMBOLISM: ICD-10-CM

## 2023-08-02 DIAGNOSIS — N39.0 URINARY TRACT INFECTION, SITE NOT SPECIFIED: ICD-10-CM

## 2023-08-02 DIAGNOSIS — K56.7 ILEUS, UNSPECIFIED: ICD-10-CM

## 2023-08-02 DIAGNOSIS — K91.89 OTHER POSTPROCEDURAL COMPLICATIONS AND DISORDERS OF DIGESTIVE SYSTEM: ICD-10-CM

## 2023-08-02 DIAGNOSIS — I82.611 ACUTE EMBOLISM AND THROMBOSIS OF SUPERFICIAL VEINS OF RIGHT UPPER EXTREMITY: ICD-10-CM

## 2023-08-02 DIAGNOSIS — N40.0 BENIGN PROSTATIC HYPERPLASIA WITHOUT LOWER URINARY TRACT SYMPTOMS: ICD-10-CM

## 2023-08-02 DIAGNOSIS — F32.9 MAJOR DEPRESSIVE DISORDER, SINGLE EPISODE, UNSPECIFIED: ICD-10-CM

## 2023-08-02 DIAGNOSIS — B36.8 OTHER SPECIFIED SUPERFICIAL MYCOSES: ICD-10-CM

## 2023-08-02 DIAGNOSIS — I69.351 HEMIPLEGIA AND HEMIPARESIS FOLLOWING CEREBRAL INFARCTION AFFECTING RIGHT DOMINANT SIDE: ICD-10-CM

## 2023-08-02 DIAGNOSIS — Z90.49 ACQUIRED ABSENCE OF OTHER SPECIFIED PARTS OF DIGESTIVE TRACT: ICD-10-CM

## 2023-08-02 DIAGNOSIS — Z86.010 PERSONAL HISTORY OF COLONIC POLYPS: ICD-10-CM
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
## 2023-08-02 DIAGNOSIS — J98.11 ATELECTASIS: ICD-10-CM

## 2023-08-02 DIAGNOSIS — Z86.718 PERSONAL HISTORY OF OTHER VENOUS THROMBOSIS AND EMBOLISM: ICD-10-CM

## 2023-08-02 DIAGNOSIS — Z53.29 PROCEDURE AND TREATMENT NOT CARRIED OUT BECAUSE OF PATIENT'S DECISION FOR OTHER REASONS: ICD-10-CM

## 2023-08-02 DIAGNOSIS — A41.52 SEPSIS DUE TO PSEUDOMONAS: ICD-10-CM

## 2023-08-25 DIAGNOSIS — Z98.890 OTHER SPECIFIED POSTPROCEDURAL STATES: ICD-10-CM

## 2023-08-25 RX ORDER — PANTOPRAZOLE 40 MG/1
40 TABLET, DELAYED RELEASE ORAL DAILY
Qty: 30 | Refills: 2 | Status: ACTIVE | COMMUNITY
Start: 2023-08-25 | End: 1900-01-01

## 2023-09-25 ENCOUNTER — NON-APPOINTMENT (OUTPATIENT)
Age: 73
End: 2023-09-25

## 2023-10-02 ENCOUNTER — NON-APPOINTMENT (OUTPATIENT)
Age: 73
End: 2023-10-02

## 2023-10-04 ENCOUNTER — INPATIENT (INPATIENT)
Facility: HOSPITAL | Age: 73
LOS: 11 days | Discharge: HOME | DRG: 389 | End: 2023-10-16
Attending: SURGERY | Admitting: SURGERY
Payer: COMMERCIAL

## 2023-10-04 VITALS
OXYGEN SATURATION: 98 % | TEMPERATURE: 98 F | HEIGHT: 68 IN | HEART RATE: 117 BPM | WEIGHT: 188.94 LBS | SYSTOLIC BLOOD PRESSURE: 122 MMHG | RESPIRATION RATE: 22 BRPM | DIASTOLIC BLOOD PRESSURE: 82 MMHG

## 2023-10-04 LAB
ANION GAP SERPL CALC-SCNC: 14 MMOL/L — SIGNIFICANT CHANGE UP (ref 5–17)
ANISOCYTOSIS BLD QL: SLIGHT — SIGNIFICANT CHANGE UP
APTT BLD: 28 SEC — SIGNIFICANT CHANGE UP (ref 24.5–35.6)
BASOPHILS # BLD AUTO: 0.1 K/UL — SIGNIFICANT CHANGE UP (ref 0–0.2)
BASOPHILS NFR BLD AUTO: 1 % — SIGNIFICANT CHANGE UP (ref 0–2)
BLD GP AB SCN SERPL QL: NEGATIVE — SIGNIFICANT CHANGE UP
BUN SERPL-MCNC: 26 MG/DL — HIGH (ref 7–23)
CALCIUM SERPL-MCNC: 9.6 MG/DL — SIGNIFICANT CHANGE UP (ref 8.4–10.5)
CHLORIDE SERPL-SCNC: 103 MMOL/L — SIGNIFICANT CHANGE UP (ref 96–108)
CO2 SERPL-SCNC: 19 MMOL/L — LOW (ref 22–31)
CREAT SERPL-MCNC: 0.77 MG/DL — SIGNIFICANT CHANGE UP (ref 0.5–1.3)
EGFR: 96 ML/MIN/1.73M2 — SIGNIFICANT CHANGE UP
EOSINOPHIL # BLD AUTO: 0 K/UL — SIGNIFICANT CHANGE UP (ref 0–0.5)
EOSINOPHIL NFR BLD AUTO: 0 % — SIGNIFICANT CHANGE UP (ref 0–6)
GIANT PLATELETS BLD QL SMEAR: PRESENT — SIGNIFICANT CHANGE UP
GLUCOSE SERPL-MCNC: 118 MG/DL — HIGH (ref 70–99)
HCT VFR BLD CALC: 43 % — SIGNIFICANT CHANGE UP (ref 39–50)
HGB BLD-MCNC: 13.1 G/DL — SIGNIFICANT CHANGE UP (ref 13–17)
INR BLD: 1.12 — SIGNIFICANT CHANGE UP (ref 0.85–1.18)
LACTATE SERPL-SCNC: 1.9 MMOL/L — SIGNIFICANT CHANGE UP (ref 0.5–2)
LYMPHOCYTES # BLD AUTO: 0.49 K/UL — LOW (ref 1–3.3)
LYMPHOCYTES # BLD AUTO: 5.1 % — LOW (ref 13–44)
MACROCYTES BLD QL: SLIGHT — SIGNIFICANT CHANGE UP
MAGNESIUM SERPL-MCNC: 2.3 MG/DL — SIGNIFICANT CHANGE UP (ref 1.6–2.6)
MANUAL SMEAR VERIFICATION: SIGNIFICANT CHANGE UP
MCHC RBC-ENTMCNC: 26.1 PG — LOW (ref 27–34)
MCHC RBC-ENTMCNC: 30.5 GM/DL — LOW (ref 32–36)
MCV RBC AUTO: 85.8 FL — SIGNIFICANT CHANGE UP (ref 80–100)
MONOCYTES # BLD AUTO: 1.17 K/UL — HIGH (ref 0–0.9)
MONOCYTES NFR BLD AUTO: 12.1 % — SIGNIFICANT CHANGE UP (ref 2–14)
NEUTROPHILS # BLD AUTO: 7.89 K/UL — HIGH (ref 1.8–7.4)
NEUTROPHILS NFR BLD AUTO: 81.8 % — HIGH (ref 43–77)
NRBC # BLD: 1 /100 — HIGH (ref 0–0)
NRBC # BLD: SIGNIFICANT CHANGE UP /100 WBCS (ref 0–0)
OVALOCYTES BLD QL SMEAR: SLIGHT — SIGNIFICANT CHANGE UP
PLAT MORPH BLD: ABNORMAL
PLATELET # BLD AUTO: 383 K/UL — SIGNIFICANT CHANGE UP (ref 150–400)
POIKILOCYTOSIS BLD QL AUTO: SLIGHT — SIGNIFICANT CHANGE UP
POTASSIUM SERPL-MCNC: 4.9 MMOL/L — SIGNIFICANT CHANGE UP (ref 3.5–5.3)
POTASSIUM SERPL-SCNC: 4.9 MMOL/L — SIGNIFICANT CHANGE UP (ref 3.5–5.3)
PROTHROM AB SERPL-ACNC: 12.7 SEC — SIGNIFICANT CHANGE UP (ref 9.5–13)
RBC # BLD: 5.01 M/UL — SIGNIFICANT CHANGE UP (ref 4.2–5.8)
RBC # FLD: 16.3 % — HIGH (ref 10.3–14.5)
RBC BLD AUTO: ABNORMAL
RH IG SCN BLD-IMP: POSITIVE — SIGNIFICANT CHANGE UP
SMUDGE CELLS # BLD: PRESENT — SIGNIFICANT CHANGE UP
SODIUM SERPL-SCNC: 136 MMOL/L — SIGNIFICANT CHANGE UP (ref 135–145)
WBC # BLD: 9.65 K/UL — SIGNIFICANT CHANGE UP (ref 3.8–10.5)
WBC # FLD AUTO: 9.65 K/UL — SIGNIFICANT CHANGE UP (ref 3.8–10.5)

## 2023-10-04 PROCEDURE — 71045 X-RAY EXAM CHEST 1 VIEW: CPT | Mod: 26

## 2023-10-04 PROCEDURE — 99285 EMERGENCY DEPT VISIT HI MDM: CPT

## 2023-10-04 PROCEDURE — 93010 ELECTROCARDIOGRAM REPORT: CPT

## 2023-10-04 PROCEDURE — 74177 CT ABD & PELVIS W/CONTRAST: CPT | Mod: 26,MC

## 2023-10-04 RX ORDER — SERTRALINE 25 MG/1
50 TABLET, FILM COATED ORAL DAILY
Refills: 0 | Status: DISCONTINUED | OUTPATIENT
Start: 2023-10-04 | End: 2023-10-05

## 2023-10-04 RX ORDER — ONDANSETRON 8 MG/1
4 TABLET, FILM COATED ORAL ONCE
Refills: 0 | Status: COMPLETED | OUTPATIENT
Start: 2023-10-04 | End: 2023-10-04

## 2023-10-04 RX ORDER — HEPARIN SODIUM 5000 [USP'U]/ML
5000 INJECTION INTRAVENOUS; SUBCUTANEOUS EVERY 8 HOURS
Refills: 0 | Status: DISCONTINUED | OUTPATIENT
Start: 2023-10-04 | End: 2023-10-04

## 2023-10-04 RX ORDER — IOHEXOL 300 MG/ML
30 INJECTION, SOLUTION INTRAVENOUS ONCE
Refills: 0 | Status: COMPLETED | OUTPATIENT
Start: 2023-10-04 | End: 2023-10-04

## 2023-10-04 RX ORDER — SODIUM CHLORIDE 9 MG/ML
1000 INJECTION INTRAMUSCULAR; INTRAVENOUS; SUBCUTANEOUS ONCE
Refills: 0 | Status: COMPLETED | OUTPATIENT
Start: 2023-10-04 | End: 2023-10-04

## 2023-10-04 RX ORDER — PANTOPRAZOLE SODIUM 20 MG/1
40 TABLET, DELAYED RELEASE ORAL AT BEDTIME
Refills: 0 | Status: DISCONTINUED | OUTPATIENT
Start: 2023-10-04 | End: 2023-10-06

## 2023-10-04 RX ORDER — TAMSULOSIN HYDROCHLORIDE 0.4 MG/1
0.4 CAPSULE ORAL AT BEDTIME
Refills: 0 | Status: DISCONTINUED | OUTPATIENT
Start: 2023-10-04 | End: 2023-10-05

## 2023-10-04 RX ORDER — SODIUM CHLORIDE 9 MG/ML
1000 INJECTION, SOLUTION INTRAVENOUS
Refills: 0 | Status: DISCONTINUED | OUTPATIENT
Start: 2023-10-04 | End: 2023-10-16

## 2023-10-04 RX ORDER — HEPARIN SODIUM 5000 [USP'U]/ML
1400 INJECTION INTRAVENOUS; SUBCUTANEOUS
Qty: 25000 | Refills: 0 | Status: DISCONTINUED | OUTPATIENT
Start: 2023-10-04 | End: 2023-10-10

## 2023-10-04 RX ADMIN — ONDANSETRON 4 MILLIGRAM(S): 8 TABLET, FILM COATED ORAL at 18:32

## 2023-10-04 RX ADMIN — PANTOPRAZOLE SODIUM 40 MILLIGRAM(S): 20 TABLET, DELAYED RELEASE ORAL at 20:44

## 2023-10-04 RX ADMIN — IOHEXOL 30 MILLILITER(S): 300 INJECTION, SOLUTION INTRAVENOUS at 18:32

## 2023-10-04 RX ADMIN — TAMSULOSIN HYDROCHLORIDE 0.4 MILLIGRAM(S): 0.4 CAPSULE ORAL at 20:45

## 2023-10-04 RX ADMIN — SERTRALINE 50 MILLIGRAM(S): 25 TABLET, FILM COATED ORAL at 20:44

## 2023-10-04 RX ADMIN — SODIUM CHLORIDE 1000 MILLILITER(S): 9 INJECTION INTRAMUSCULAR; INTRAVENOUS; SUBCUTANEOUS at 18:31

## 2023-10-04 NOTE — ED ADULT NURSE NOTE - NSFALLRISKINTERV_ED_ALL_ED
Assistance OOB with selected safe patient handling equipment if applicable/Assistance with ambulation/Communicate fall risk and risk factors to all staff, patient, and family/Monitor gait and stability/Provide visual cue: yellow wristband, yellow gown, etc/Reinforce activity limits and safety measures with patient and family/Call bell, personal items and telephone in reach/Instruct patient to call for assistance before getting out of bed/chair/stretcher/Non-slip footwear applied when patient is off stretcher/[place] to call system/Physically safe environment - no spills, clutter or unnecessary equipment/Purposeful Proactive Rounding/Room/bathroom lighting operational, light cord in reach

## 2023-10-04 NOTE — ED ADULT NURSE REASSESSMENT NOTE - NS ED NURSE REASSESS COMMENT FT1
Trial room air- Pt denies SOB, spo2 desat to 89% noted, HHA reports normally spo2 is 93+, placed again on 1L NC with [id number] to 93%    PIV placed as documented- medicated as documented. Attempts to obtain blood made x 3- unsuccessful. Will hydrate and retry.     HHA at bedside. Pt in NAD, on CM with sinus tach 119.

## 2023-10-04 NOTE — ED PROVIDER NOTE - PHYSICAL EXAMINATION
Constitutional: Elderly frail, alert and awake and in no apparent distress.  ENMT: Airway patent. Normal MM  Eyes: Clear bilaterally  Cardiac: Tachycardic rate, regular rhythm.  Heart sounds S1, S2.  No murmurs, rubs or gallops.  Respiratory: Breaths sounds equal and clear b/l. No increased WOB, tachypnea, hypoxia, or accessory mm use. Pt speaks in full sentences.   Gastrointestinal: Abd soft, distended, mild diffuse ttp. No guarding, rebound, or rigidity. No pulsatile abdominal masses.   Musculoskeletal: Range of motion is not limited  Neuro: Alert and awake,, face symmetric and speech fluent. R sided andres, baseline  Skin: Skin normal color for race, warm, dry and intact. No evidence of rash.

## 2023-10-04 NOTE — ED ADULT NURSE NOTE - NS ED NURSE TRANSPORT WITH
Cardiac Monitor/Defib/ACLS/Rescue Kit/O2/BVM NC 3L/Cardiac Monitor/Defib/ACLS/Rescue Kit/O2/BVM/oxygen

## 2023-10-04 NOTE — H&P ADULT - HISTORY OF PRESENT ILLNESS
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 Per family, once patient's PO intake was increased about 2 weeks ago, he developed significant abdominal distention with SOB. He was taken to an OSH in [place] where he was managed conservatively with NGT decompression for a possible SBO. Operative management was discussed but family declined citing preference to return to [facility] where initial surgery was performed. Clinical status improved and he was discharged yesterday, but when he got home and took PO pills, he again developed abdominal distention with SOB, hence presentation to the ED. Last dose of Eliquis was this AM

## 2023-10-04 NOTE — H&P ADULT - ASSESSMENT
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 Patient tachycardic, but HD stable on presentation. CT revealed multiple dilated loops of small bowel with air fluid levels consistent with SBO. NGT placed in the ED and patient admitted for monitoring and potential OR planning.    Admit to telemetry, Dr. Spann  NPO/IVF  CXR to confirm NGT placement - NGT TO LIWS  No narcotics  HSQ/SCDs/IS/OOBA  MILEY  Possible OR planning next week

## 2023-10-04 NOTE — ED PROVIDER NOTE - CLINICAL SUMMARY MEDICAL DECISION MAKING FREE TEXT BOX
Pt p/w recurrent abd distention, tachycardia. Afebrile rectally. Concerning for recurrent SBO vs complication of recent surgery. Tachycardia on unclear etiology. Consider infection, pain, recurrent PE, although less likely on AC. Pt seen in the ED by surgery initially, CT / XR, labs ordered. Dispo pending w/u and clinical status

## 2023-10-04 NOTE — H&P ADULT - NSHPLABSRESULTS_GEN_ALL_CORE
Abdominal X-ray     CT Abdomen-Pelvis INTERPRETATION:  CLINICAL INFORMATION: Abdominal pain, history of small   bowel obstruction, abdominal distention.    COMPARISON: CT abdomen/pelvis 7/26/2023    CONTRAST/COMPLICATIONS:  IV Contrast: Isovue 370  90 cc administered   10 cc discarded  Oral Contrast: Omnipaque 350  Complications: None reported at time of study completion    PROCEDURE:  CT of the Abdomen and Pelvis was performed.  Sagittal and coronal reformats were performed.    FINDINGS:  LOWER CHEST: Small left pleural effusion with adjacent atelectasis.   Severe coronary artery calcification.    LIVER: Subcentimeter hypodensity in the right hepatic lobe is too small   to characterize. Otherwise within normal limits.  BILE DUCTS: Normal caliber.  GALLBLADDER: Within normal limits.  SPLEEN: Within normal limits.  PANCREAS: Within normal limits.  ADRENALS: Within normal limits.  KIDNEYS/URETERS: Within normal limits.    BLADDER: Within normal limits.  REPRODUCTIVE ORGANS: Prostate is enlarged.    BOWEL: Stomach is markedly distended and contains layering dense fluid   likely some retained oral contrast material. There are multiple distended   loops of small bowel throughout the abdomen and pelvis which measure up   to 5.6 cm and contain air fluid levels. There is an abrupt change in   caliber in the mid lower abdomen (best seen on axial series 3 image 120   and sagittal series series 4 image 104) with distal decompressed small   bowel loops. There is oral contrast material in the colon. No pneumatosis.  PERITONEUM: No ascites. No free air.  VESSELS: Atherosclerotic changes.  RETROPERITONEUM/LYMPH NODES: No lymphadenopathy.  ABDOMINAL WALL: Moderate right and small left fat-containing inguinal   hernias. Postsurgical changes abdominal wall.  BONES: Degenerative changes.    IMPRESSION:    1. Multiple dilated loops of small bowel with air-fluid levels and   transition point in the mid lower abdomen, consistent with small bowel   obstruction. Oral contrast material reaches the right colon, possibly   partial obstruction versus retained oral contrast material from a prior   CT. Etiology is most likely secondary to adhesions.    2. Small left pleural effusion.    Findings were discussed with Dr. Trent 10/4/2023 10:38 PM by Dr. Vigil with read back confirmation.

## 2023-10-04 NOTE — ED PROVIDER NOTE - OBJECTIVE STATEMENT
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 Per family, once patient's PO intake was increased about 2 weeks ago, he developed significant abdominal distention with SOB. He was taken to an OSH in [place] where he was managed conservatively with NGT decompression for a possible SBO. Operative management was discussed but family declined citing preference to return to [facility] where initial surgery was performed. Clinical status improved and he was discharged yesterday, but when he got home and took PO pills, he again developed abdominal distention and tachycardia. He denies abd pain, CP, SOB. Last dose of Eliquis was this AM. No n/v/d.

## 2023-10-04 NOTE — ED ADULT NURSE NOTE - OBJECTIVE STATEMENT
BIBEMS for c/o abd distention x weeks, worsening, family concerned for cause of distention given minimal PO intake (only meds, has picc for tpn), pmh large bowel obstruction with multiple abd surgeries. HHA at bedside.     On assessment- AOx4, breathing even and unlabored on RA, no apparent distress, VSS in triage x tachy, able to speak in coherent sentences, neuro intact with no apparent facial asymmetry, PERRLA. +PICC RUE, will avoid accessing. +Distention abd, no tenderness to palp. No apparent deformity otherwise.

## 2023-10-04 NOTE — H&P ADULT - NSHPPHYSICALEXAM_GEN_ALL_CORE
General: Resting comfortably in bed, NAD  Neuro: A&Ox3, normal speech  HEENT: ATNC  Pulmonary: Saturating well on NC  Cardiovascular: NSR  Abdomen: Soft, distended, tympanic in upper abdomen. Minimally tender to palpation. No rebound, no guarding. Prior midline incision well healed  Extremities: WWP,No significant edema noted  Skin: Dry, no rashes  Psychiatric: Goal-oriented thought, normal affect

## 2023-10-05 LAB
ANION GAP SERPL CALC-SCNC: 12 MMOL/L — SIGNIFICANT CHANGE UP (ref 5–17)
APTT BLD: 32.5 SEC — SIGNIFICANT CHANGE UP (ref 24.5–35.6)
APTT BLD: 47.5 SEC — HIGH (ref 24.5–35.6)
BUN SERPL-MCNC: 23 MG/DL — SIGNIFICANT CHANGE UP (ref 7–23)
CALCIUM SERPL-MCNC: 9.1 MG/DL — SIGNIFICANT CHANGE UP (ref 8.4–10.5)
CHLORIDE SERPL-SCNC: 105 MMOL/L — SIGNIFICANT CHANGE UP (ref 96–108)
CO2 SERPL-SCNC: 18 MMOL/L — LOW (ref 22–31)
CREAT SERPL-MCNC: 0.78 MG/DL — SIGNIFICANT CHANGE UP (ref 0.5–1.3)
EGFR: 95 ML/MIN/1.73M2 — SIGNIFICANT CHANGE UP
GLUCOSE SERPL-MCNC: 124 MG/DL — HIGH (ref 70–99)
HCT VFR BLD CALC: 35.3 % — LOW (ref 39–50)
HGB BLD-MCNC: 10.7 G/DL — LOW (ref 13–17)
MAGNESIUM SERPL-MCNC: 2.1 MG/DL — SIGNIFICANT CHANGE UP (ref 1.6–2.6)
MCHC RBC-ENTMCNC: 26 PG — LOW (ref 27–34)
MCHC RBC-ENTMCNC: 30.3 GM/DL — LOW (ref 32–36)
MCV RBC AUTO: 85.7 FL — SIGNIFICANT CHANGE UP (ref 80–100)
NRBC # BLD: 0 /100 WBCS — SIGNIFICANT CHANGE UP (ref 0–0)
PHOSPHATE SERPL-MCNC: 3.7 MG/DL — SIGNIFICANT CHANGE UP (ref 2.5–4.5)
PLATELET # BLD AUTO: 349 K/UL — SIGNIFICANT CHANGE UP (ref 150–400)
POTASSIUM SERPL-MCNC: 4.4 MMOL/L — SIGNIFICANT CHANGE UP (ref 3.5–5.3)
POTASSIUM SERPL-SCNC: 4.4 MMOL/L — SIGNIFICANT CHANGE UP (ref 3.5–5.3)
RBC # BLD: 4.12 M/UL — LOW (ref 4.2–5.8)
RBC # FLD: 16.5 % — HIGH (ref 10.3–14.5)
SODIUM SERPL-SCNC: 135 MMOL/L — SIGNIFICANT CHANGE UP (ref 135–145)
TRIGL SERPL-MCNC: 159 MG/DL — HIGH
WBC # BLD: 7.79 K/UL — SIGNIFICANT CHANGE UP (ref 3.8–10.5)
WBC # FLD AUTO: 7.79 K/UL — SIGNIFICANT CHANGE UP (ref 3.8–10.5)

## 2023-10-05 PROCEDURE — 71045 X-RAY EXAM CHEST 1 VIEW: CPT | Mod: 26

## 2023-10-05 PROCEDURE — 93970 EXTREMITY STUDY: CPT | Mod: 26

## 2023-10-05 PROCEDURE — 71045 X-RAY EXAM CHEST 1 VIEW: CPT | Mod: 26,77

## 2023-10-05 PROCEDURE — 36569 INSJ PICC 5 YR+ W/O IMAGING: CPT

## 2023-10-05 RX ORDER — CHLORHEXIDINE GLUCONATE 213 G/1000ML
1 SOLUTION TOPICAL
Refills: 0 | Status: DISCONTINUED | OUTPATIENT
Start: 2023-10-05 | End: 2023-10-16

## 2023-10-05 RX ORDER — SODIUM CHLORIDE 9 MG/ML
10 INJECTION INTRAMUSCULAR; INTRAVENOUS; SUBCUTANEOUS
Refills: 0 | Status: DISCONTINUED | OUTPATIENT
Start: 2023-10-05 | End: 2023-10-16

## 2023-10-05 RX ORDER — ELECTROLYTE SOLUTION,INJ
1 VIAL (ML) INTRAVENOUS
Refills: 0 | Status: DISCONTINUED | OUTPATIENT
Start: 2023-10-05 | End: 2023-10-05

## 2023-10-05 RX ADMIN — Medication 1 EACH: at 18:14

## 2023-10-05 RX ADMIN — HEPARIN SODIUM 16 UNIT(S)/HR: 5000 INJECTION INTRAVENOUS; SUBCUTANEOUS at 17:09

## 2023-10-05 RX ADMIN — PANTOPRAZOLE SODIUM 40 MILLIGRAM(S): 20 TABLET, DELAYED RELEASE ORAL at 22:08

## 2023-10-05 RX ADMIN — SODIUM CHLORIDE 120 MILLILITER(S): 9 INJECTION, SOLUTION INTRAVENOUS at 02:53

## 2023-10-05 RX ADMIN — HEPARIN SODIUM 14 UNIT(S)/HR: 5000 INJECTION INTRAVENOUS; SUBCUTANEOUS at 01:44

## 2023-10-05 RX ADMIN — SODIUM CHLORIDE 120 MILLILITER(S): 9 INJECTION, SOLUTION INTRAVENOUS at 16:11

## 2023-10-05 NOTE — DIETITIAN INITIAL EVALUATION ADULT - NSFNSGIIOFT_GEN_A_CORE
10-05-23 @ 07:01  -  10-05-23 @ 16:20  --------------------------------------------------------  OUT:    Nasogastric/Oral tube (mL): 600 mL  Total OUT: 600 mL    Total NET: -600 mL

## 2023-10-05 NOTE — DIETITIAN INITIAL EVALUATION ADULT - PERTINENT MEDS FT
MEDICATIONS  (STANDING):  chlorhexidine 2% Cloths 1 Application(s) Topical <User Schedule>  heparin  Infusion 1400 Unit(s)/Hr (16 mL/Hr) IV Continuous <Continuous>  lactated ringers. 1000 milliLiter(s) (120 mL/Hr) IV Continuous <Continuous>  pantoprazole  Injectable 40 milliGRAM(s) IV Push at bedtime  Parenteral Nutrition - Adult 1 Each (50 mL/Hr) TPN Continuous <Continuous>    MEDICATIONS  (PRN):  sodium chloride 0.9% lock flush 10 milliLiter(s) IV Push every 1 hour PRN Pre/post blood products, medications, blood draw, and to maintain line patency

## 2023-10-05 NOTE — CHART NOTE - NSCHARTNOTEFT_GEN_A_CORE
Patient examined at bedside, ngt in place with bilious o/p. Patient denies nausea or vomiting. Physical exam significant for moderate distension, nt, No rebound or guarding. WIll continue to follow

## 2023-10-05 NOTE — DIETITIAN INITIAL EVALUATION ADULT - OTHER INFO
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 Per family, once patient's PO intake was increased about 2 weeks ago, he developed significant abdominal distention with SOB. He was taken to an OSH in [place] where he was managed conservatively with NGT decompression for a possible SBO. Operative management was discussed but family declined citing preference to return to [facility] where initial surgery was performed. Clinical status improved and he was discharged yesterday, but when he got home and took PO pills, he again developed abdominal distention with SOB, hence presentation to the ED. Last dose of Eliquis was this AM.    Pt seen in room for nutrition assessment. Pt reports appetite PTA and during hospital stay. As per diet recall PTA: Currently NPO status with NGT to LIWS. No cultural, Scientologist, or ethnic food preferences noted. NKFA. Denies wt changes, reports wt stability at current wt. Dosing wt: 189 pounds, Ideal body weight: 154 pounds, pt is 123% of ideal body weight. Denies nausea, vomiting, noted with flatus, noted with NGT to LIWS, bilious output noted. No edema. Skin: noted with left and right stage I pressure ulcers to buttocks/coccyx. Dante: 13. No issues chewing or swallowing noted. Denies pain. Labs reviewed: ; RD to continue to monitor trends. Nutritionally pertinent medications: RD observed pt with no overt signs of muscle or fat wasting. Based on ASPEN guidelines, pt does not meet criteria for malnutrition at this time. Pt amenable to education; RD provided education in regards to the importance of adequate macro and micronutrients, as well as hydration to support ADLs, maintain energy levels and overall functional/nutritional status. General healthful education provided. Pt was receptive and verbalized understanding. No additional nutrition-related concerns. Will continue to follow per RD protocol. Additional nutrition recommendations below to follow. 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 Per family, once patient's PO intake was increased about 2 weeks ago, he developed significant abdominal distention with SOB. He was taken to an OSH in [place] where he was managed conservatively with NGT decompression for a possible SBO. Operative management was discussed but family declined citing preference to return to [facility] where initial surgery was performed. Clinical status improved and he was discharged yesterday, but when he got home and took PO pills, he again developed abdominal distention with SOB, hence presentation to the ED. Last dose of Eliquis was this AM.    Pt seen in room for nutrition assessment. Pt's wife, son and granddaughter were present at bedside when RD assessed pt. Pt reports appetite PTA and during hospital stay. As per diet recall PTA: pt and pt's wife stated pt was on TPN and started liquids at home, then progressed to solids, including chicken, sweet potatoes, a variety of foods, as able. Currently NPO status with NGT to LIWS. Noted with Kosher Rastafari preferences. No known food allergies. Noted with some fluctuations recently in weight, however pt's wife stated pt's weight ranges between 185 and 190 pounds, no significant changes noted. Dosing wt: 189 pounds, Ideal body weight: 154 pounds, pt is 123% of ideal body weight. Denies nausea, vomiting, noted with flatus, noted with NGT to LIWS, bilious output noted. No edema. Skin: noted with left and right stage I pressure ulcers to buttocks/coccyx. Dante: 13. No issues chewing or swallowing noted. Denies pain, noted with discomfort. Labs reviewed: elevated POCT BG (124), BUN (26), serum Glucose (118), ALT (67); RD to continue to monitor trends. Nutritionally pertinent medications/supplements: IV fluids, protonix. RD observed pt with no overt signs of muscle or fat wasting. Based on ASPEN guidelines, pt does not meet criteria for malnutrition at this time. Pt amenable to education; RD provided education in regards to the importance of adequate macro and micronutrients, as well as hydration to support ADLs, maintain energy levels and overall functional/nutritional status. General healthful education provided. Nutrient-dense foods promoted. Current nutrition plan discussed, alternate means of nutrition discussed. Pt and pt's wife were receptive and verbalized understanding. No additional nutrition-related concerns. Will continue to follow per RD protocol. Additional nutrition recommendations below to follow.

## 2023-10-05 NOTE — DIETITIAN INITIAL EVALUATION ADULT - PERSON TAUGHT/METHOD
Pt amenable to education; RD provided education in regards to the importance of adequate macro and micronutrients, as well as hydration to support ADLs, maintain energy levels and overall functional/nutritional status. General healthful education provided. Nutrient-dense foods promoted. Current nutrition plan discussed, alternate means of nutrition discussed. Pt and pt's wife were receptive and verbalized understanding./verbal instruction/patient instructed/spouse instructed

## 2023-10-05 NOTE — PATIENT PROFILE ADULT - STATED REASON FOR ADMISSION
Came to emergencyroom at [facility] on 10/4/23 from home c/o no BM for 7 days,abdominal neelima,elevated heart rate at home between 130's and 120's, and low with pulse oximeter oxygen level between 90 and 93% as per patient's wife Magda Hawthorne.

## 2023-10-05 NOTE — CHART NOTE - NSCHARTNOTEFT_GEN_A_CORE
Patient examined at bedside with wife. NGT pulled back 10cm. CXR prelim confirmed placement. Patient physical exam mildly distended, improving. No tenderness, rebound, or guarding. NPO w.o nausea or vomiting.    Denies sob/bear/cp/dizziness      will continue to follow

## 2023-10-05 NOTE — DIETITIAN INITIAL EVALUATION ADULT - ADD RECOMMEND
1. NPO   >> TPN via central line: 334g Dextrose, 91g Amino Acids, 50g 20% Lipids to provide in total 2000 calories, 91g protein, GIR of 2.7 based on actual body weight of 85.7kg, 1.3g protein/kg ideal body weight (ideal body weight is 70kg)  Recommend checking TG before starting TPN and then check TG weekly. Check Mg, Phos, K daily and POC BG Q6hrs. Trend daily weights. Fluids and electrolytes per MD discretion. Start at 150g Dextrose on Day 1, 250g Dextrose on Day 2, and advance to goal of 334g Dextrose on Day 3.  2. Encourage pt to meet nutritional needs as able  3. Monitor PO intakes, trend weights (daily while on TPN), monitor skin integrity, monitor labs (electrolytes, CMP), monitor GI function  4. Encourage adherence to diet education (reinforce as able)   5. Pain and bowel regimen per team   6. Will continue to assess/honor preferences as able   7. Align nutrition interventions with goals of care at all times

## 2023-10-05 NOTE — ED ADULT NURSE REASSESSMENT NOTE - NS ED NURSE REASSESS COMMENT FT1
pt awake resting on stretcher comfortably sinus tach 108 noted to CCM respirations unlabored sating 93% on 3L NC. NGT placed to Left nare by SURG connected to suction noted 600ml in suction cannister dark green in color. Report given to CONSTANTINO HARDING Dedote for continued care and final dispo

## 2023-10-05 NOTE — DIETITIAN INITIAL EVALUATION ADULT - OTHER CALCULATIONS
Based on Standards of Care pt >% ideal body weight, thus ideal body weight used for all calculations. Needs adjusted for advanced age, clinical status/condition.

## 2023-10-05 NOTE — CHART NOTE - NSCHARTNOTEFT_GEN_A_CORE
SERIAL ABDOMINAL EXAM    Pt seen and examined at bedside. No acute complaints. Denies abdominal pain. +F, patient does not recall when last passed flatus. Denies F, N, V, CP, SOB. HD stable.    ICU Vital Signs Last 24 Hrs  T(C): 37.2 (05 Oct 2023 13:58), Max: 37.6 (04 Oct 2023 19:23)  T(F): 98.9 (05 Oct 2023 13:58), Max: 99.7 (04 Oct 2023 21:27)  HR: 100 (05 Oct 2023 13:58) (98 - 122)  BP: 105/69 (05 Oct 2023 13:58) (104/76 - 145/96)  BP(mean): --  ABP: --  ABP(mean): --  RR: 20 (05 Oct 2023 13:58) (19 - 22)  SpO2: 96% (05 Oct 2023 13:58) (95% - 100%)    O2 Parameters below as of 05 Oct 2023 13:58  Patient On (Oxygen Delivery Method): nasal cannula  O2 Flow (L/min): 2      Physical Exam   Constitutional: A&Ox3, NAD. NGT to LIWS w/ bilious OP  Respiratory: non labored breathing, no respiratory distress  Cardiovascular: NSR, RRR  Gastrointestinal: abdomen soft, mildly distended, nt. No rebound or guarding.  Extremities: wwp, no calf tenderness or edema. 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 from home after being discharged from OSH yesterday for 1 day of abdominal distention and bloating. Patient tachycardic, but HD stable on presentation. CT revealed multiple dilated loops of small bowel with air fluid levels consistent with SBO. NGT placed in the ED and patient admitted for monitoring and potential OR planning.    NPO/IVF  NGT TO LIWS  PICC/TPN  No narcotics  Heparin gtt, f/u next PTT @ 4pm  B/L LE duplex negative  SCDs  OOBA  MILEY Q6H  Possible OR planning next week

## 2023-10-05 NOTE — DIETITIAN INITIAL EVALUATION ADULT - NSFNSPHYEXAMSKINFT_GEN_A_CORE
Pressure Injury 1: Left:, Right:, buttocks, coccyx, Stage I  Pressure Injury 2: none, none  Pressure Injury 3: none, none  Pressure Injury 4: none, none  Pressure Injury 5: none, none  Pressure Injury 6: none, none  Pressure Injury 7: none, none  Pressure Injury 8: none, none  Pressure Injury 9: none, none  Pressure Injury 10: none, none  Pressure Injury 11: none, none

## 2023-10-05 NOTE — PATIENT PROFILE ADULT - FOOD INSECURITY
Bed: ED16  Expected date: 8/17/17  Expected time: 5:18 PM  Means of arrival:   Comments:  Delroy 89, 12 yo, SI  
Patient arrives to [facility]. Psych Associate explains process, gives patient urine cup and questionnaire. Patient told about meeting with Mental Health  and Psychiatrist. Patient told about 2-5 hour time frame for complete evaluation.   
Presenting accompanied by mother for complaints of aggressive behavior. Patient was recently admitted (~ 2 weeks ago) for mental health and was discharged on new medications. Mother feels since recent discharge patient has had a difficult time staying calm. Today patient has been unco operative at home and became physically and verbally aggressive when mother refused to take her to the mall. Upon mother's refusal, patient started throwing things, biting and kicking mother, swearing and stating she will hurt someone. With continued aggressiveness, parents called 911 to transport patient to ER as patient would not accept to drive with them to ER.  
no

## 2023-10-05 NOTE — DIETITIAN INITIAL EVALUATION ADULT - PERTINENT LABORATORY DATA
10-05    135  |  105  |  23  ----------------------------<  124<H>  4.4   |  18<L>  |  0.78    Ca    9.1      05 Oct 2023 09:05  Phos  3.7     10-05  Mg     2.1     10-05    TPro  8.1  /  Alb  2.9<L>  /  TBili  0.3  /  DBili  <0.2  /  AST  34  /  ALT  67<H>  /  AlkPhos  84  10-04  POCT Blood Glucose.: 124 mg/dL (10-04-23 @ 21:23)

## 2023-10-05 NOTE — PROGRESS NOTE ADULT - SUBJECTIVE AND OBJECTIVE BOX
INTERVAL HPI/OVERNIGHT EVENTS: admit for SBO. MILEY Mod distended/ NT. NGT LIWS bilious o/p. Hep gtt started     SUBJECTIVE: Pt seen and examined at bedside this am by surgery team. No acute complaints. No abdominal pain, distension improved since NGT placement. Denies f/n/v/cp/sob. RUE PICC removed, PICC team to place new line today for TPN.    MEDICATIONS  (STANDING):  heparin  Infusion 1400 Unit(s)/Hr (16 mL/Hr) IV Continuous <Continuous>  lactated ringers. 1000 milliLiter(s) (120 mL/Hr) IV Continuous <Continuous>  pantoprazole  Injectable 40 milliGRAM(s) IV Push at bedtime  Parenteral Nutrition - Adult 1 Each (50 mL/Hr) TPN Continuous <Continuous>    MEDICATIONS  (PRN):    Vital Signs Last 24 Hrs  T(C): 37.2 (05 Oct 2023 08:43), Max: 37.6 (04 Oct 2023 19:23)  T(F): 98.9 (05 Oct 2023 08:43), Max: 99.7 (04 Oct 2023 21:27)  HR: 100 (05 Oct 2023 11:14) (98 - 122)  BP: 119/73 (05 Oct 2023 11:14) (104/76 - 145/96)  BP(mean): --  RR: 19 (05 Oct 2023 11:14) (19 - 22)  SpO2: 97% (05 Oct 2023 11:14) (95% - 100%)    Parameters below as of 05 Oct 2023 11:14  Patient On (Oxygen Delivery Method): nasal cannula  O2 Flow (L/min): 3    PHYSICAL EXAM:    Constitutional: A&Ox3, NAD. NGT to LIWS w/ bilious OP    Respiratory: non labored breathing, no respiratory distress    Cardiovascular: NSR, RRR    Gastrointestinal: abdomen soft, mildly distended, nt. No rebound or guarding.    Extremities: wwp, no calf tenderness or edema. SCDs in place       I&O's Detail    05 Oct 2023 07:01  -  05 Oct 2023 11:32  --------------------------------------------------------  IN:    Heparin: 62 mL    Lactated Ringers: 480 mL  Total IN: 542 mL    OUT:    Nasogastric/Oral tube (mL): 100 mL  Total OUT: 100 mL    Total NET: 442 mL          LABS:                        10.7   7.79  )-----------( 349      ( 05 Oct 2023 09:05 )             35.3     10-05    135  |  105  |  23  ----------------------------<  124<H>  4.4   |  18<L>  |  0.78    Ca    9.1      05 Oct 2023 09:05  Phos  3.7     10-05  Mg     2.1     10-05    TPro  8.1  /  Alb  2.9<L>  /  TBili  0.3  /  DBili  <0.2  /  AST  34  /  ALT  67<H>  /  AlkPhos  84  10-04    PT/INR - ( 04 Oct 2023 16:58 )   PT: 12.7 sec;   INR: 1.12          PTT - ( 05 Oct 2023 09:05 )  PTT:47.5 sec  Urinalysis Basic - ( 05 Oct 2023 09:05 )    Color: x / Appearance: x / SG: x / pH: x  Gluc: 124 mg/dL / Ketone: x  / Bili: x / Urobili: x   Blood: x / Protein: x / Nitrite: x   Leuk Esterase: x / RBC: x / WBC x   Sq Epi: x / Non Sq Epi: x / Bacteria: x        RADIOLOGY & ADDITIONAL STUDIES:

## 2023-10-06 LAB
ANION GAP SERPL CALC-SCNC: 9 MMOL/L — SIGNIFICANT CHANGE UP (ref 5–17)
APTT BLD: 48.4 SEC — HIGH (ref 24.5–35.6)
APTT BLD: 61.6 SEC — HIGH (ref 24.5–35.6)
APTT BLD: 74.1 SEC — HIGH (ref 24.5–35.6)
BLD GP AB SCN SERPL QL: NEGATIVE — SIGNIFICANT CHANGE UP
BUN SERPL-MCNC: 16 MG/DL — SIGNIFICANT CHANGE UP (ref 7–23)
CALCIUM SERPL-MCNC: 8.6 MG/DL — SIGNIFICANT CHANGE UP (ref 8.4–10.5)
CHLORIDE SERPL-SCNC: 103 MMOL/L — SIGNIFICANT CHANGE UP (ref 96–108)
CO2 SERPL-SCNC: 25 MMOL/L — SIGNIFICANT CHANGE UP (ref 22–31)
CREAT SERPL-MCNC: 0.63 MG/DL — SIGNIFICANT CHANGE UP (ref 0.5–1.3)
EGFR: 102 ML/MIN/1.73M2 — SIGNIFICANT CHANGE UP
GLUCOSE SERPL-MCNC: 125 MG/DL — HIGH (ref 70–99)
HCT VFR BLD CALC: 31.8 % — LOW (ref 39–50)
HGB BLD-MCNC: 9.4 G/DL — LOW (ref 13–17)
MAGNESIUM SERPL-MCNC: 1.9 MG/DL — SIGNIFICANT CHANGE UP (ref 1.6–2.6)
MCHC RBC-ENTMCNC: 26.1 PG — LOW (ref 27–34)
MCHC RBC-ENTMCNC: 29.6 GM/DL — LOW (ref 32–36)
MCV RBC AUTO: 88.3 FL — SIGNIFICANT CHANGE UP (ref 80–100)
NRBC # BLD: 0 /100 WBCS — SIGNIFICANT CHANGE UP (ref 0–0)
PHOSPHATE SERPL-MCNC: 2.5 MG/DL — SIGNIFICANT CHANGE UP (ref 2.5–4.5)
PLATELET # BLD AUTO: 304 K/UL — SIGNIFICANT CHANGE UP (ref 150–400)
POTASSIUM SERPL-MCNC: 3.7 MMOL/L — SIGNIFICANT CHANGE UP (ref 3.5–5.3)
POTASSIUM SERPL-SCNC: 3.7 MMOL/L — SIGNIFICANT CHANGE UP (ref 3.5–5.3)
RBC # BLD: 3.6 M/UL — LOW (ref 4.2–5.8)
RBC # FLD: 16.6 % — HIGH (ref 10.3–14.5)
RH IG SCN BLD-IMP: POSITIVE — SIGNIFICANT CHANGE UP
SODIUM SERPL-SCNC: 137 MMOL/L — SIGNIFICANT CHANGE UP (ref 135–145)
WBC # BLD: 5.73 K/UL — SIGNIFICANT CHANGE UP (ref 3.8–10.5)
WBC # FLD AUTO: 5.73 K/UL — SIGNIFICANT CHANGE UP (ref 3.8–10.5)

## 2023-10-06 PROCEDURE — 71045 X-RAY EXAM CHEST 1 VIEW: CPT | Mod: 26

## 2023-10-06 RX ORDER — PANTOPRAZOLE SODIUM 20 MG/1
40 TABLET, DELAYED RELEASE ORAL EVERY 12 HOURS
Refills: 0 | Status: DISCONTINUED | OUTPATIENT
Start: 2023-10-06 | End: 2023-10-16

## 2023-10-06 RX ORDER — I.V. FAT EMULSION 20 G/100ML
0.58 EMULSION INTRAVENOUS
Qty: 50 | Refills: 0 | Status: DISCONTINUED | OUTPATIENT
Start: 2023-10-06 | End: 2023-10-06

## 2023-10-06 RX ORDER — ELECTROLYTE SOLUTION,INJ
1 VIAL (ML) INTRAVENOUS
Refills: 0 | Status: DISCONTINUED | OUTPATIENT
Start: 2023-10-06 | End: 2023-10-06

## 2023-10-06 RX ORDER — ACETAMINOPHEN 500 MG
1000 TABLET ORAL ONCE
Refills: 0 | Status: COMPLETED | OUTPATIENT
Start: 2023-10-06 | End: 2023-10-06

## 2023-10-06 RX ADMIN — HEPARIN SODIUM 17 UNIT(S)/HR: 5000 INJECTION INTRAVENOUS; SUBCUTANEOUS at 12:37

## 2023-10-06 RX ADMIN — SODIUM CHLORIDE 70 MILLILITER(S): 9 INJECTION, SOLUTION INTRAVENOUS at 03:10

## 2023-10-06 RX ADMIN — HEPARIN SODIUM 17 UNIT(S)/HR: 5000 INJECTION INTRAVENOUS; SUBCUTANEOUS at 10:56

## 2023-10-06 RX ADMIN — Medication 400 MILLIGRAM(S): at 21:45

## 2023-10-06 RX ADMIN — PANTOPRAZOLE SODIUM 40 MILLIGRAM(S): 20 TABLET, DELAYED RELEASE ORAL at 21:46

## 2023-10-06 RX ADMIN — Medication 1 EACH: at 17:36

## 2023-10-06 RX ADMIN — I.V. FAT EMULSION 20.8 GM/KG/DAY: 20 EMULSION INTRAVENOUS at 17:36

## 2023-10-06 RX ADMIN — CHLORHEXIDINE GLUCONATE 1 APPLICATION(S): 213 SOLUTION TOPICAL at 05:56

## 2023-10-06 NOTE — PHYSICAL THERAPY INITIAL EVALUATION ADULT - PERTINENT HX OF CURRENT PROBLEM, REHAB EVAL
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 Patient tachycardic, but HD stable on presentation. CT revealed multiple dilated loops of small bowel with air fluid levels consistent with SBO. NGT placed in the ED and patient admitted for monitoring and potential OR planning.

## 2023-10-06 NOTE — CHART NOTE - NSCHARTNOTEFT_GEN_A_CORE
Patient examined at bedside, no acute complaints. NGT connected to LIWS with bilious o/p. Patient physical exam mildly distended, nt, no rebound or guarding. Denies nausea or vomiting. Continues to endorse flatus, no bowel movements.     Denies sob/bear/dizziness/ha

## 2023-10-06 NOTE — CHART NOTE - NSCHARTNOTEFT_GEN_A_CORE
SERIAL ABDOMINAL EXAM    SUBJECTIVE  Pt seen an examined at bedside. Patient is lying in bed comfortably, denies pain. Patient denies fever, nausea, vomiting, chest pain, shortness of breath, and calf tenderness. Patient reports hunger and passing gas but not having bowel movements.     T(C): 36.7 (10-06-23 @ 12:54), Max: 37.8 (10-06-23 @ 00:52)  HR: 90 (10-06-23 @ 12:54) (86 - 95)  BP: 109/86 (10-06-23 @ 12:54) (100/57 - 129/69)  RR: 17 (10-06-23 @ 12:54) (15 - 18)  SpO2: 97% (10-06-23 @ 12:54) (94% - 97%)    OBJECTIVE    PHYSICAL EXAM  General: Patient is doing well and lying in bed comfortably  Constitutional: Alert and Oriented  Pulm: Nonlabored breathing, no respiratory distress  CV: Regular rate and rhythm  Abd: soft, mod-distended. No guarding or rebound. TTP at RLQ; NGT suctioning bilious fluid  Extremities: warm, well perfused, no edema

## 2023-10-06 NOTE — PHYSICAL THERAPY INITIAL EVALUATION ADULT - MANUAL MUSCLE TESTING RESULTS, REHAB EVAL
grossly at least 3+/5 2/2 ability to perform funct mob against gravity, R side noted to be weaker 2/2 previous CVA

## 2023-10-06 NOTE — PHYSICAL THERAPY INITIAL EVALUATION ADULT - GENERAL OBSERVATIONS, REHAB EVAL
pt received/returned semi-supine in bed +IV, +tele, +NG tube, +B/L SCDs, aide present, +3LPM O2 via NC, c/o general malaise.

## 2023-10-06 NOTE — CHART NOTE - NSCHARTNOTEFT_GEN_A_CORE
MILEY  Patient examined at bedside, no acute complaints. Patient has rectal temp of 100.6F. Physical exam singificant for mod distension, nt. No rebound or guarding. NGT LIWS bilious with blood tinged    Denies sob/bear/dizziness/cp    Plan;  CXR   UA w. reflex  Bloodmcultures  Resp viral swab  increase ppi BID

## 2023-10-06 NOTE — PHYSICAL THERAPY INITIAL EVALUATION ADULT - ADDITIONAL COMMENTS
Pt reports living in house with 2 ED, with wife. Reports ambulating with use of RW x30ft directly prior to this admission. Reports having HHA over the past year for 4 hours per day to assist with ADLs since prior CVA. Pt reports having grab bars and wheel chair.

## 2023-10-06 NOTE — PROGRESS NOTE ADULT - SUBJECTIVE AND OBJECTIVE BOX
SUBJECTIVE:  Flatus: [ ] YES [ ] NO             Bowel Movement: [ ] YES [ ] NO  Pain (0-10):            Pain Control Adequate: [ ] YES [ ] NO  Nausea: [ ] YES [ ] NO            Vomiting: [ ] YES [ ] NO  Diarrhea: [ ] YES [ ] NO         Constipation: [ ] YES [ ] NO     Chest Pain: [ ] YES [ ] NO    SOB:  [ ] YES [ ] NO    MEDICATIONS  (STANDING):  chlorhexidine 2% Cloths 1 Application(s) Topical <User Schedule>  heparin  Infusion 1400 Unit(s)/Hr (16 mL/Hr) IV Continuous <Continuous>  lactated ringers. 1000 milliLiter(s) (70 mL/Hr) IV Continuous <Continuous>  pantoprazole  Injectable 40 milliGRAM(s) IV Push at bedtime  Parenteral Nutrition - Adult 1 Each (50 mL/Hr) TPN Continuous <Continuous>    MEDICATIONS  (PRN):  sodium chloride 0.9% lock flush 10 milliLiter(s) IV Push every 1 hour PRN Pre/post blood products, medications, blood draw, and to maintain line patency      Vital Signs Last 24 Hrs  T(C): 36.8 (06 Oct 2023 08:45), Max: 37.8 (06 Oct 2023 00:52)  T(F): 98.2 (06 Oct 2023 08:45), Max: 100 (06 Oct 2023 00:52)  HR: 88 (06 Oct 2023 08:45) (86 - 100)  BP: 111/64 (06 Oct 2023 08:45) (100/57 - 129/69)  BP(mean): --  RR: 16 (06 Oct 2023 08:45) (15 - 20)  SpO2: 96% (06 Oct 2023 08:45) (94% - 97%)    Parameters below as of 06 Oct 2023 08:45  Patient On (Oxygen Delivery Method): nasal cannula  O2 Flow (L/min): 2      Physical Exam:  General: NAD, resting comfortably in bed  Pulmonary: Nonlabored breathing, no respiratory distress  Cardiovascular: NSR  Abdominal: soft, NT/ND  Extremities: WWP, normal strength  Neuro: A/O x 3, CNs II-XII grossly intact, no focal deficits, normal motor/sensation  Pulses: palpable distal pulses    I&O's Summary    05 Oct 2023 07:01  -  06 Oct 2023 07:00  --------------------------------------------------------  IN: 3085 mL / OUT: 800 mL / NET: 2285 mL    06 Oct 2023 07:01  -  06 Oct 2023 10:41  --------------------------------------------------------  IN: 136 mL / OUT: 0 mL / NET: 136 mL        LABS:                        9.4    5.73  )-----------( 304      ( 06 Oct 2023 05:30 )             31.8     10-06    137  |  103  |  16  ----------------------------<  125<H>  3.7   |  25  |  0.63    Ca    8.6      06 Oct 2023 05:30  Phos  2.5     10-06  Mg     1.9     10-06    TPro  8.1  /  Alb  2.9<L>  /  TBili  0.3  /  DBili  <0.2  /  AST  34  /  ALT  67<H>  /  AlkPhos  84  10-04    PT/INR - ( 05 Oct 2023 16:02 )   PT: 13.8 sec;   INR: 1.22          PTT - ( 06 Oct 2023 05:30 )  PTT:48.4 sec  Urinalysis Basic - ( 06 Oct 2023 05:30 )    Color: x / Appearance: x / SG: x / pH: x  Gluc: 125 mg/dL / Ketone: x  / Bili: x / Urobili: x   Blood: x / Protein: x / Nitrite: x   Leuk Esterase: x / RBC: x / WBC x   Sq Epi: x / Non Sq Epi: x / Bacteria: x      CAPILLARY BLOOD GLUCOSE        LIVER FUNCTIONS - ( 04 Oct 2023 16:58 )  Alb: 2.9 g/dL / Pro: 8.1 g/dL / ALK PHOS: 84 U/L / ALT: 67 U/L / AST: 34 U/L / GGT: x             RADIOLOGY & ADDITIONAL STUDIES:       SUBJECTIVE:  Patient was evaluated at bedside this AM by general surgery team. Patient is doing well this morning however continues to deny flatus or BM. Patient denies nausea and vomiting.    MEDICATIONS  (STANDING):  chlorhexidine 2% Cloths 1 Application(s) Topical <User Schedule>  heparin  Infusion 1400 Unit(s)/Hr (16 mL/Hr) IV Continuous <Continuous>  lactated ringers. 1000 milliLiter(s) (70 mL/Hr) IV Continuous <Continuous>  pantoprazole  Injectable 40 milliGRAM(s) IV Push at bedtime  Parenteral Nutrition - Adult 1 Each (50 mL/Hr) TPN Continuous <Continuous>    MEDICATIONS  (PRN):  sodium chloride 0.9% lock flush 10 milliLiter(s) IV Push every 1 hour PRN Pre/post blood products, medications, blood draw, and to maintain line patency      Vital Signs Last 24 Hrs  T(C): 36.8 (06 Oct 2023 08:45), Max: 37.8 (06 Oct 2023 00:52)  T(F): 98.2 (06 Oct 2023 08:45), Max: 100 (06 Oct 2023 00:52)  HR: 88 (06 Oct 2023 08:45) (86 - 100)  BP: 111/64 (06 Oct 2023 08:45) (100/57 - 129/69)  BP(mean): --  RR: 16 (06 Oct 2023 08:45) (15 - 20)  SpO2: 96% (06 Oct 2023 08:45) (94% - 97%)    Parameters below as of 06 Oct 2023 08:45  Patient On (Oxygen Delivery Method): nasal cannula  O2 Flow (L/min): 2      Physical Exam:  Constitutional: A&Ox3, NAD. NGT to LIWS w/ bilious OP  Respiratory: non labored breathing, no respiratory distress  Cardiovascular: NSR, RRR  Gastrointestinal: abdomen soft, mildly distended, nt. No rebound or guarding.  Extremities: wwp, no calf tenderness or edema. SCDs in place       I&O's Summary    05 Oct 2023 07:01  -  06 Oct 2023 07:00  --------------------------------------------------------  IN: 3085 mL / OUT: 800 mL / NET: 2285 mL    06 Oct 2023 07:01  -  06 Oct 2023 10:41  --------------------------------------------------------  IN: 136 mL / OUT: 0 mL / NET: 136 mL        LABS:                        9.4    5.73  )-----------( 304      ( 06 Oct 2023 05:30 )             31.8     10-06    137  |  103  |  16  ----------------------------<  125<H>  3.7   |  25  |  0.63    Ca    8.6      06 Oct 2023 05:30  Phos  2.5     10-06  Mg     1.9     10-06    TPro  8.1  /  Alb  2.9<L>  /  TBili  0.3  /  DBili  <0.2  /  AST  34  /  ALT  67<H>  /  AlkPhos  84  10-04    PT/INR - ( 05 Oct 2023 16:02 )   PT: 13.8 sec;   INR: 1.22          PTT - ( 06 Oct 2023 05:30 )  PTT:48.4 sec  Urinalysis Basic - ( 06 Oct 2023 05:30 )    Color: x / Appearance: x / SG: x / pH: x  Gluc: 125 mg/dL / Ketone: x  / Bili: x / Urobili: x   Blood: x / Protein: x / Nitrite: x   Leuk Esterase: x / RBC: x / WBC x   Sq Epi: x / Non Sq Epi: x / Bacteria: x      CAPILLARY BLOOD GLUCOSE        LIVER FUNCTIONS - ( 04 Oct 2023 16:58 )  Alb: 2.9 g/dL / Pro: 8.1 g/dL / ALK PHOS: 84 U/L / ALT: 67 U/L / AST: 34 U/L / GGT: x             RADIOLOGY & ADDITIONAL STUDIES:

## 2023-10-07 LAB
ANION GAP SERPL CALC-SCNC: 7 MMOL/L — SIGNIFICANT CHANGE UP (ref 5–17)
APPEARANCE UR: CLEAR — SIGNIFICANT CHANGE UP
APTT BLD: 57.8 SEC — HIGH (ref 24.5–35.6)
APTT BLD: 84.3 SEC — HIGH (ref 24.5–35.6)
APTT BLD: 90.1 SEC — HIGH (ref 24.5–35.6)
BACTERIA # UR AUTO: PRESENT /HPF
BILIRUB UR-MCNC: NEGATIVE — SIGNIFICANT CHANGE UP
BUN SERPL-MCNC: 12 MG/DL — SIGNIFICANT CHANGE UP (ref 7–23)
CALCIUM SERPL-MCNC: 8.9 MG/DL — SIGNIFICANT CHANGE UP (ref 8.4–10.5)
CHLORIDE SERPL-SCNC: 102 MMOL/L — SIGNIFICANT CHANGE UP (ref 96–108)
CO2 SERPL-SCNC: 28 MMOL/L — SIGNIFICANT CHANGE UP (ref 22–31)
COLOR SPEC: YELLOW — SIGNIFICANT CHANGE UP
CREAT SERPL-MCNC: 0.55 MG/DL — SIGNIFICANT CHANGE UP (ref 0.5–1.3)
DIFF PNL FLD: NEGATIVE — SIGNIFICANT CHANGE UP
EGFR: 106 ML/MIN/1.73M2 — SIGNIFICANT CHANGE UP
EPI CELLS # UR: SIGNIFICANT CHANGE UP /HPF (ref 0–5)
GLUCOSE SERPL-MCNC: 150 MG/DL — HIGH (ref 70–99)
GLUCOSE UR QL: NEGATIVE — SIGNIFICANT CHANGE UP
HCT VFR BLD CALC: 30.9 % — LOW (ref 39–50)
HGB BLD-MCNC: 9.4 G/DL — LOW (ref 13–17)
KETONES UR-MCNC: NEGATIVE — SIGNIFICANT CHANGE UP
LEUKOCYTE ESTERASE UR-ACNC: ABNORMAL
MAGNESIUM SERPL-MCNC: 2 MG/DL — SIGNIFICANT CHANGE UP (ref 1.6–2.6)
MCHC RBC-ENTMCNC: 26.2 PG — LOW (ref 27–34)
MCHC RBC-ENTMCNC: 30.4 GM/DL — LOW (ref 32–36)
MCV RBC AUTO: 86.1 FL — SIGNIFICANT CHANGE UP (ref 80–100)
NITRITE UR-MCNC: NEGATIVE — SIGNIFICANT CHANGE UP
NRBC # BLD: 0 /100 WBCS — SIGNIFICANT CHANGE UP (ref 0–0)
PH UR: 6.5 — SIGNIFICANT CHANGE UP (ref 5–8)
PHOSPHATE SERPL-MCNC: 2.9 MG/DL — SIGNIFICANT CHANGE UP (ref 2.5–4.5)
PLATELET # BLD AUTO: 295 K/UL — SIGNIFICANT CHANGE UP (ref 150–400)
POTASSIUM SERPL-MCNC: 3.7 MMOL/L — SIGNIFICANT CHANGE UP (ref 3.5–5.3)
POTASSIUM SERPL-SCNC: 3.7 MMOL/L — SIGNIFICANT CHANGE UP (ref 3.5–5.3)
PROT UR-MCNC: NEGATIVE MG/DL — SIGNIFICANT CHANGE UP
RAPID RVP RESULT: SIGNIFICANT CHANGE UP
RBC # BLD: 3.59 M/UL — LOW (ref 4.2–5.8)
RBC # FLD: 16.2 % — HIGH (ref 10.3–14.5)
RBC CASTS # UR COMP ASSIST: < 5 /HPF — SIGNIFICANT CHANGE UP
SARS-COV-2 RNA SPEC QL NAA+PROBE: SIGNIFICANT CHANGE UP
SODIUM SERPL-SCNC: 137 MMOL/L — SIGNIFICANT CHANGE UP (ref 135–145)
SP GR SPEC: 1.01 — SIGNIFICANT CHANGE UP (ref 1–1.03)
UROBILINOGEN FLD QL: 0.2 E.U./DL — SIGNIFICANT CHANGE UP
WBC # BLD: 6.26 K/UL — SIGNIFICANT CHANGE UP (ref 3.8–10.5)
WBC # FLD AUTO: 6.26 K/UL — SIGNIFICANT CHANGE UP (ref 3.8–10.5)
WBC UR QL: > 10 /HPF

## 2023-10-07 PROCEDURE — 99232 SBSQ HOSP IP/OBS MODERATE 35: CPT | Mod: GC

## 2023-10-07 RX ORDER — ELECTROLYTE SOLUTION,INJ
1 VIAL (ML) INTRAVENOUS
Refills: 0 | Status: DISCONTINUED | OUTPATIENT
Start: 2023-10-07 | End: 2023-10-07

## 2023-10-07 RX ORDER — I.V. FAT EMULSION 20 G/100ML
0.58 EMULSION INTRAVENOUS
Qty: 50 | Refills: 0 | Status: DISCONTINUED | OUTPATIENT
Start: 2023-10-07 | End: 2023-10-07

## 2023-10-07 RX ADMIN — HEPARIN SODIUM 17 UNIT(S)/HR: 5000 INJECTION INTRAVENOUS; SUBCUTANEOUS at 17:21

## 2023-10-07 RX ADMIN — CHLORHEXIDINE GLUCONATE 1 APPLICATION(S): 213 SOLUTION TOPICAL at 05:48

## 2023-10-07 RX ADMIN — HEPARIN SODIUM 17 UNIT(S)/HR: 5000 INJECTION INTRAVENOUS; SUBCUTANEOUS at 20:11

## 2023-10-07 RX ADMIN — HEPARIN SODIUM 18 UNIT(S)/HR: 5000 INJECTION INTRAVENOUS; SUBCUTANEOUS at 12:09

## 2023-10-07 RX ADMIN — PANTOPRAZOLE SODIUM 40 MILLIGRAM(S): 20 TABLET, DELAYED RELEASE ORAL at 05:58

## 2023-10-07 RX ADMIN — I.V. FAT EMULSION 20.83 GM/KG/DAY: 20 EMULSION INTRAVENOUS at 17:22

## 2023-10-07 RX ADMIN — Medication 1 EACH: at 17:22

## 2023-10-07 RX ADMIN — PANTOPRAZOLE SODIUM 40 MILLIGRAM(S): 20 TABLET, DELAYED RELEASE ORAL at 17:23

## 2023-10-07 RX ADMIN — HEPARIN SODIUM 17 UNIT(S)/HR: 5000 INJECTION INTRAVENOUS; SUBCUTANEOUS at 07:24

## 2023-10-07 NOTE — CHART NOTE - NSCHARTNOTEFT_GEN_A_CORE
SERIAL ABDOMINAL EXAM    SUBJECTIVE  Pt seen an examined at bedside. Patient is lying in bed comfortably, pain well controlled. Patient denies fever, nausea, vomiting, chest pain, shortness of breath, and calf tenderness. Patient reports passing gas however bedside aid denies having heard flatus. Patient denies having bowel movements. Patient is frustrated that he hasn't eaten in multiple days.     T(C): 36.9 (10-07-23 @ 08:56), Max: 38.3 (10-06-23 @ 20:16)  HR: 85 (10-07-23 @ 08:56) (85 - 98)  BP: 128/82 (10-07-23 @ 08:56) (121/80 - 129/81)  RR: 18 (10-07-23 @ 08:56) (18 - 18)  SpO2: 96% (10-07-23 @ 08:56) (95% - 98%)    OBJECTIVE    PHYSICAL EXAM  General: Patient is doing well and lying in bed comfortably  Constitutional: Alert and Oriented  Pulm: Nonlabored breathing, no respiratory distress  CV: Regular rate and rhythm  Abd: soft, nontender, mildly distended. No guarding or rebound; NGT to LIWS suctioning bilious  Extremities: warm, well perfused, no edema

## 2023-10-07 NOTE — PROGRESS NOTE ADULT - ATTENDING COMMENTS
Covering for Dr. Spann.    Denies abdominal pain.  Says he passed some flatus  .9 VSS  Looks well, NAD  NGT output 900/24h  voids not saved  Abd soft, mild distention, NT.    A/P: Small bowel obstruction due to adhesions.  1. Continue NGT decompression  2. TPN  3. Possible OR 10/9 if does not open up  4. OOB encouraged.  5. IV heparin for CVA.

## 2023-10-07 NOTE — CHART NOTE - NSCHARTNOTEFT_GEN_A_CORE
Patient resting in bed, NGT LIWS with bilious o/p. Patient physical exam mildly distended, nt. No rebound or guarding    Will continue to monitor

## 2023-10-07 NOTE — CHART NOTE - NSCHARTNOTEFT_GEN_A_CORE
SERIAL ABDOMINAL EXAM    SUBJECTIVE  Pt seen an examined at bedside. Patient is lying in bed comfortably, pain well controlled. Patient denies fever, nausea, vomiting, chest pain, shortness of breath, and calf tenderness. Patient is not passing gas or having bowel movements.     T(C): 37.6 (10-07-23 @ 19:55), Max: 37.7 (10-07-23 @ 01:20)  HR: 93 (10-07-23 @ 19:55) (85 - 93)  BP: 119/80 (10-07-23 @ 19:55) (118/73 - 129/81)  RR: 17 (10-07-23 @ 19:55) (17 - 18)  SpO2: 97% (10-07-23 @ 19:55) (96% - 98%)    OBJECTIVE    PHYSICAL EXAM  General: Patient is doing well and lying in bed comfortably  Constitutional: Alert and Oriented  Pulm: Nonlabored breathing, no respiratory distress  CV: Regular rate and rhythm  Abd: soft, nontender, moderately distended. No guarding or rebound; NGT suctioning bilious outpt  Extremities: warm, well perfused, no edema

## 2023-10-07 NOTE — PROGRESS NOTE ADULT - SUBJECTIVE AND OBJECTIVE BOX
SUBJECTIVE:  Flatus: [ ] YES [ ] NO             Bowel Movement: [ ] YES [ ] NO  Pain (0-10):            Pain Control Adequate: [ ] YES [ ] NO  Nausea: [ ] YES [ ] NO            Vomiting: [ ] YES [ ] NO  Diarrhea: [ ] YES [ ] NO         Constipation: [ ] YES [ ] NO     Chest Pain: [ ] YES [ ] NO    SOB:  [ ] YES [ ] NO    MEDICATIONS  (STANDING):  chlorhexidine 2% Cloths 1 Application(s) Topical <User Schedule>  heparin  Infusion 1400 Unit(s)/Hr (17 mL/Hr) IV Continuous <Continuous>  lactated ringers. 1000 milliLiter(s) (70 mL/Hr) IV Continuous <Continuous>  lipid, fat emulsion (Fish Oil and Plant Based) 20% Infusion 0.5834 Gm/kG/Day (20.8 mL/Hr) IV Continuous <Continuous>  pantoprazole  Injectable 40 milliGRAM(s) IV Push every 12 hours  Parenteral Nutrition - Adult 1 Each (50 mL/Hr) TPN Continuous <Continuous>    MEDICATIONS  (PRN):  sodium chloride 0.9% lock flush 10 milliLiter(s) IV Push every 1 hour PRN Pre/post blood products, medications, blood draw, and to maintain line patency      Vital Signs Last 24 Hrs  T(C): 37.4 (07 Oct 2023 05:22), Max: 38.3 (06 Oct 2023 20:16)  T(F): 99.3 (07 Oct 2023 05:22), Max: 100.9 (06 Oct 2023 20:16)  HR: 90 (07 Oct 2023 05:22) (88 - 98)  BP: 121/80 (07 Oct 2023 05:22) (109/86 - 129/81)  BP(mean): --  RR: 18 (07 Oct 2023 05:22) (16 - 18)  SpO2: 97% (07 Oct 2023 05:22) (95% - 98%)    Parameters below as of 07 Oct 2023 05:22  Patient On (Oxygen Delivery Method): nasal cannula  O2 Flow (L/min): 2      Physical Exam:  General: NAD, resting comfortably in bed  Pulmonary: Nonlabored breathing, no respiratory distress  Cardiovascular: NSR  Abdominal: soft, NT/ND  Extremities: WWP, normal strength  Neuro: A/O x 3, CNs II-XII grossly intact, no focal deficits, normal motor/sensation  Pulses: palpable distal pulses    I&O's Summary    05 Oct 2023 07:01  -  06 Oct 2023 07:00  --------------------------------------------------------  IN: 3085 mL / OUT: 800 mL / NET: 2285 mL    06 Oct 2023 07:01  -  07 Oct 2023 06:13  --------------------------------------------------------  IN: 3528 mL / OUT: 1301 mL / NET: 2227 mL        LABS:                        9.4    5.73  )-----------( 304      ( 06 Oct 2023 05:30 )             31.8     10-06    137  |  103  |  16  ----------------------------<  125<H>  3.7   |  25  |  0.63    Ca    8.6      06 Oct 2023 05:30  Phos  2.5     10-06  Mg     1.9     10-06      PT/INR - ( 05 Oct 2023 16:02 )   PT: 13.8 sec;   INR: 1.22          PTT - ( 06 Oct 2023 17:21 )  PTT:74.1 sec  Urinalysis Basic - ( 06 Oct 2023 05:30 )    Color: x / Appearance: x / SG: x / pH: x  Gluc: 125 mg/dL / Ketone: x  / Bili: x / Urobili: x   Blood: x / Protein: x / Nitrite: x   Leuk Esterase: x / RBC: x / WBC x   Sq Epi: x / Non Sq Epi: x / Bacteria: x      CAPILLARY BLOOD GLUCOSE            RADIOLOGY & ADDITIONAL STUDIES:       SUBJECTIVE:  Patient was evaluated by general surgery team at bedside this AM. Patient is doing well however reports he is sleepy. Patient continues to deny flatus and BM. Patient denies nausea and vomiting. Overnight patient had a rectal temp of 100.6 prompting a fever workup. CXR unchanged, Covid neg, BCx and UCx pending    MEDICATIONS  (STANDING):  chlorhexidine 2% Cloths 1 Application(s) Topical <User Schedule>  heparin  Infusion 1400 Unit(s)/Hr (17 mL/Hr) IV Continuous <Continuous>  lactated ringers. 1000 milliLiter(s) (70 mL/Hr) IV Continuous <Continuous>  lipid, fat emulsion (Fish Oil and Plant Based) 20% Infusion 0.5834 Gm/kG/Day (20.8 mL/Hr) IV Continuous <Continuous>  pantoprazole  Injectable 40 milliGRAM(s) IV Push every 12 hours  Parenteral Nutrition - Adult 1 Each (50 mL/Hr) TPN Continuous <Continuous>    MEDICATIONS  (PRN):  sodium chloride 0.9% lock flush 10 milliLiter(s) IV Push every 1 hour PRN Pre/post blood products, medications, blood draw, and to maintain line patency      Vital Signs Last 24 Hrs  T(C): 37.4 (07 Oct 2023 05:22), Max: 38.3 (06 Oct 2023 20:16)  T(F): 99.3 (07 Oct 2023 05:22), Max: 100.9 (06 Oct 2023 20:16)  HR: 90 (07 Oct 2023 05:22) (88 - 98)  BP: 121/80 (07 Oct 2023 05:22) (109/86 - 129/81)  BP(mean): --  RR: 18 (07 Oct 2023 05:22) (16 - 18)  SpO2: 97% (07 Oct 2023 05:22) (95% - 98%)    Parameters below as of 07 Oct 2023 05:22  Patient On (Oxygen Delivery Method): nasal cannula  O2 Flow (L/min): 2      Physical Exam:  General: NAD, resting comfortably in bed  Pulmonary: Nonlabored breathing, no respiratory distress  Cardiovascular: NSR  Abdominal: soft, mildly distended, TTP in RLQ; NGT draining dark bloody bilious  Extremities: WWP, normal strength  Neuro: A/O x 3, CNs II-XII grossly intact, no focal deficits, normal motor/sensation  Pulses: palpable distal pulses    I&O's Summary    05 Oct 2023 07:01  -  06 Oct 2023 07:00  --------------------------------------------------------  IN: 3085 mL / OUT: 800 mL / NET: 2285 mL    06 Oct 2023 07:01  -  07 Oct 2023 06:13  --------------------------------------------------------  IN: 3528 mL / OUT: 1301 mL / NET: 2227 mL        LABS:                        9.4    5.73  )-----------( 304      ( 06 Oct 2023 05:30 )             31.8     10-06    137  |  103  |  16  ----------------------------<  125<H>  3.7   |  25  |  0.63    Ca    8.6      06 Oct 2023 05:30  Phos  2.5     10-06  Mg     1.9     10-06      PT/INR - ( 05 Oct 2023 16:02 )   PT: 13.8 sec;   INR: 1.22          PTT - ( 06 Oct 2023 17:21 )  PTT:74.1 sec  Urinalysis Basic - ( 06 Oct 2023 05:30 )    Color: x / Appearance: x / SG: x / pH: x  Gluc: 125 mg/dL / Ketone: x  / Bili: x / Urobili: x   Blood: x / Protein: x / Nitrite: x   Leuk Esterase: x / RBC: x / WBC x   Sq Epi: x / Non Sq Epi: x / Bacteria: x      CAPILLARY BLOOD GLUCOSE            RADIOLOGY & ADDITIONAL STUDIES:

## 2023-10-08 LAB
APTT BLD: 58.9 SEC — HIGH (ref 24.5–35.6)
APTT BLD: 70.4 SEC — HIGH (ref 24.5–35.6)
APTT BLD: 70.7 SEC — HIGH (ref 24.5–35.6)

## 2023-10-08 PROCEDURE — 74019 RADEX ABDOMEN 2 VIEWS: CPT | Mod: 26

## 2023-10-08 RX ORDER — POTASSIUM CHLORIDE 20 MEQ
20 PACKET (EA) ORAL ONCE
Refills: 0 | Status: COMPLETED | OUTPATIENT
Start: 2023-10-08 | End: 2023-10-08

## 2023-10-08 RX ORDER — BENZOCAINE 10 %
1 GEL (GRAM) MUCOUS MEMBRANE ONCE
Refills: 0 | Status: COMPLETED | OUTPATIENT
Start: 2023-10-08 | End: 2023-10-08

## 2023-10-08 RX ORDER — I.V. FAT EMULSION 20 G/100ML
0.58 EMULSION INTRAVENOUS
Qty: 50 | Refills: 0 | Status: DISCONTINUED | OUTPATIENT
Start: 2023-10-08 | End: 2023-10-08

## 2023-10-08 RX ORDER — ELECTROLYTE SOLUTION,INJ
1 VIAL (ML) INTRAVENOUS
Refills: 0 | Status: DISCONTINUED | OUTPATIENT
Start: 2023-10-08 | End: 2023-10-08

## 2023-10-08 RX ADMIN — SODIUM CHLORIDE 70 MILLILITER(S): 9 INJECTION, SOLUTION INTRAVENOUS at 17:28

## 2023-10-08 RX ADMIN — PANTOPRAZOLE SODIUM 40 MILLIGRAM(S): 20 TABLET, DELAYED RELEASE ORAL at 06:30

## 2023-10-08 RX ADMIN — Medication 1 SPRAY(S): at 17:59

## 2023-10-08 RX ADMIN — Medication 1 EACH: at 17:29

## 2023-10-08 RX ADMIN — I.V. FAT EMULSION 20.8 GM/KG/DAY: 20 EMULSION INTRAVENOUS at 17:29

## 2023-10-08 RX ADMIN — PANTOPRAZOLE SODIUM 40 MILLIGRAM(S): 20 TABLET, DELAYED RELEASE ORAL at 17:29

## 2023-10-08 RX ADMIN — Medication 1 SPRAY(S): at 08:40

## 2023-10-08 RX ADMIN — CHLORHEXIDINE GLUCONATE 1 APPLICATION(S): 213 SOLUTION TOPICAL at 06:29

## 2023-10-08 RX ADMIN — Medication 50 MILLIEQUIVALENT(S): at 16:21

## 2023-10-08 RX ADMIN — HEPARIN SODIUM 17 UNIT(S)/HR: 5000 INJECTION INTRAVENOUS; SUBCUTANEOUS at 07:38

## 2023-10-08 RX ADMIN — SODIUM CHLORIDE 70 MILLILITER(S): 9 INJECTION, SOLUTION INTRAVENOUS at 06:30

## 2023-10-08 NOTE — PROGRESS NOTE ADULT - SUBJECTIVE AND OBJECTIVE BOX
SUBJECTIVE: Pt was seen during AM rounds with chief present.  Patient denies fever, nausea, vomiting, chest pain, shortness of breath, and calf tenderness. Patient is not passing gas or having bowel movements.       MEDICATIONS  (STANDING):  chlorhexidine 2% Cloths 1 Application(s) Topical <User Schedule>  heparin  Infusion 1400 Unit(s)/Hr (17 mL/Hr) IV Continuous <Continuous>  lactated ringers. 1000 milliLiter(s) (70 mL/Hr) IV Continuous <Continuous>  lipid, fat emulsion (Fish Oil and Plant Based) 20% Infusion 0.5834 Gm/kG/Day (20.8 mL/Hr) IV Continuous <Continuous>  lipid, fat emulsion (Fish Oil and Plant Based) 20% Infusion 0.5834 Gm/kG/Day (20.83 mL/Hr) IV Continuous <Continuous>  pantoprazole  Injectable 40 milliGRAM(s) IV Push every 12 hours  Parenteral Nutrition - Adult 1 Each (50 mL/Hr) TPN Continuous <Continuous>  Parenteral Nutrition - Adult 1 Each (50 mL/Hr) TPN Continuous <Continuous>    MEDICATIONS  (PRN):  sodium chloride 0.9% lock flush 10 milliLiter(s) IV Push every 1 hour PRN Pre/post blood products, medications, blood draw, and to maintain line patency      Vital Signs Last 24 Hrs  T(C): 37 (08 Oct 2023 08:34), Max: 37.6 (07 Oct 2023 19:55)  T(F): 98.6 (08 Oct 2023 08:34), Max: 99.6 (07 Oct 2023 19:55)  HR: 96 (08 Oct 2023 08:34) (88 - 99)  BP: 121/73 (08 Oct 2023 08:34) (118/73 - 132/82)  BP(mean): --  RR: 17 (08 Oct 2023 08:34) (17 - 18)  SpO2: 96% (08 Oct 2023 08:34) (96% - 98%)    Parameters below as of 08 Oct 2023 08:34  Patient On (Oxygen Delivery Method): nasal cannula  O2 Flow (L/min): 2      PHYSICAL EXAM:  General: Patient is doing well and lying in bed comfortably  Constitutional: Alert and Oriented  Pulm: Nonlabored breathing, no respiratory distress  CV: Regular rate and rhythm  Abd: soft, nontender, moderately distended. No guarding or rebound; NGT suctioning bilious outpt  Extremities: warm, well perfused, no edema.        I&O's Detail    07 Oct 2023 07:01  -  08 Oct 2023 07:00  --------------------------------------------------------  IN:    Fat Emulsion (Fish Oil &amp; Plant Based) 20% Infusion: 20.8 mL    Fat Emulsion (Fish Oil &amp; Plant Based) 20% Infusion: 270.4 mL    Heparin: 413 mL    Lactated Ringers: 1680 mL    TPN (Total Parenteral Nutrition): 1200 mL  Total IN: 3584.2 mL    OUT:    Nasogastric/Oral tube (mL): 800 mL    Voided (mL): 200 mL  Total OUT: 1000 mL    Total NET: 2584.2 mL      08 Oct 2023 07:01  -  08 Oct 2023 10:43  --------------------------------------------------------  IN:    Heparin: 17 mL    Lactated Ringers: 70 mL    TPN (Total Parenteral Nutrition): 50 mL  Total IN: 137 mL    OUT:    Nasogastric/Oral tube (mL): 100 mL  Total OUT: 100 mL    Total NET: 37 mL          LABS:                        9.1    7.34  )-----------( 302      ( 08 Oct 2023 07:38 )             29.6     10-08    138  |  101  |  10  ----------------------------<  149<H>  3.8   |  28  |  0.54    Ca    9.1      08 Oct 2023 07:38  Phos  3.3     10-08  Mg     2.0     10-08      PT/INR - ( 08 Oct 2023 07:35 )   PT: 14.0 sec;   INR: 1.24          PTT - ( 08 Oct 2023 07:35 )  PTT:70.4 sec  Urinalysis Basic - ( 08 Oct 2023 07:38 )    Color: x / Appearance: x / SG: x / pH: x  Gluc: 149 mg/dL / Ketone: x  / Bili: x / Urobili: x   Blood: x / Protein: x / Nitrite: x   Leuk Esterase: x / RBC: x / WBC x   Sq Epi: x / Non Sq Epi: x / Bacteria: x        RADIOLOGY & ADDITIONAL STUDIES:

## 2023-10-08 NOTE — CHART NOTE - NSCHARTNOTEFT_GEN_A_CORE
Patient examined at bedside with family. NGT connected to LIWS, flushed with bilious o/p. Physical exam Mildly distended, NT w/o rebound or guarding. Denies sob/bear/dizziness/cp    Will continue to follow

## 2023-10-08 NOTE — CHART NOTE - NSCHARTNOTEFT_GEN_A_CORE
SERIAL ABDOMINAL EXAM    SUBJECTIVE  Pt seen an examined at bedside. Patient is sitting in chair next to his bed comfortably, pain is controlled. Patient denies fever, nausea, vomiting, chest pain, shortness of breath, and calf tenderness. Patient is not passing gas or having bowel movements.     T(C): 37.6 (10-07-23 @ 19:55), Max: 37.7 (10-07-23 @ 01:20)  HR: 93 (10-07-23 @ 19:55) (85 - 93)  BP: 119/80 (10-07-23 @ 19:55) (118/73 - 129/81)  RR: 17 (10-07-23 @ 19:55) (17 - 18)  SpO2: 97% (10-07-23 @ 19:55) (96% - 98%)    OBJECTIVE    PHYSICAL EXAM  General: Patient is doing well and lying in bed comfortably  Constitutional: Alert and Oriented  Pulm: Nonlabored breathing, no respiratory distress  CV: Regular rate and rhythm  Abd: soft, nontender, moderately distended. No guarding or rebound; NGT suctioning bilious outpt  Extremities: warm, well perfused, no edema. SERIAL ABDOMINAL EXAM    SUBJECTIVE  Pt seen an examined at bedside. Patient is sitting in chair next to his bed comfortably, pain is controlled. Patient denies fever, nausea, vomiting, chest pain, shortness of breath, and calf tenderness. Patient is not passing gas or having bowel movements.     T(C): 37 (08 Oct 2023 08:34), Max: 37.6 (07 Oct 2023 19:55)  T(F): 98.6 (08 Oct 2023 08:34), Max: 99.6 (07 Oct 2023 19:55)  HR: 96 (08 Oct 2023 08:34) (88 - 99)  BP: 121/73 (08 Oct 2023 08:34) (118/73 - 132/82)  BP(mean): --  RR: 17 (08 Oct 2023 08:34) (17 - 18)  SpO2: 96% (08 Oct 2023 08:34) (96% - 98%)      OBJECTIVE    PHYSICAL EXAM  General: Patient is doing well and lying in bed comfortably  Constitutional: Alert and Oriented  Pulm: Nonlabored breathing, no respiratory distress  CV: Regular rate and rhythm  Abd: soft, nontender, moderately distended. No guarding or rebound; NGT suctioning bilious outpt  Extremities: warm, well perfused, no edema.

## 2023-10-09 ENCOUNTER — NON-APPOINTMENT (OUTPATIENT)
Age: 73
End: 2023-10-09

## 2023-10-09 LAB
-  AMPICILLIN/SULBACTAM: SIGNIFICANT CHANGE UP
-  AMPICILLIN: SIGNIFICANT CHANGE UP
-  CEFAZOLIN: SIGNIFICANT CHANGE UP
-  CEFTRIAXONE: SIGNIFICANT CHANGE UP
-  CIPROFLOXACIN: SIGNIFICANT CHANGE UP
-  ERTAPENEM: SIGNIFICANT CHANGE UP
-  GENTAMICIN: SIGNIFICANT CHANGE UP
-  NITROFURANTOIN: SIGNIFICANT CHANGE UP
-  PIPERACILLIN/TAZOBACTAM: SIGNIFICANT CHANGE UP
-  TOBRAMYCIN: SIGNIFICANT CHANGE UP
-  TRIMETHOPRIM/SULFAMETHOXAZOLE: SIGNIFICANT CHANGE UP
ALBUMIN SERPL ELPH-MCNC: 2.1 G/DL — LOW (ref 3.3–5)
ALP SERPL-CCNC: 62 U/L — SIGNIFICANT CHANGE UP (ref 40–120)
ALT FLD-CCNC: 26 U/L — SIGNIFICANT CHANGE UP (ref 10–45)
ANION GAP SERPL CALC-SCNC: 12 MMOL/L — SIGNIFICANT CHANGE UP (ref 5–17)
APTT BLD: 136.9 SEC — CRITICAL HIGH (ref 24.5–35.6)
APTT BLD: 57.2 SEC — HIGH (ref 24.5–35.6)
APTT BLD: 59.7 SEC — HIGH (ref 24.5–35.6)
APTT BLD: >200 SEC — CRITICAL HIGH (ref 24.5–35.6)
AST SERPL-CCNC: 14 U/L — SIGNIFICANT CHANGE UP (ref 10–40)
BILIRUB SERPL-MCNC: 0.3 MG/DL — SIGNIFICANT CHANGE UP (ref 0.2–1.2)
BUN SERPL-MCNC: 12 MG/DL — SIGNIFICANT CHANGE UP (ref 7–23)
CALCIUM SERPL-MCNC: 8.8 MG/DL — SIGNIFICANT CHANGE UP (ref 8.4–10.5)
CHLORIDE SERPL-SCNC: 98 MMOL/L — SIGNIFICANT CHANGE UP (ref 96–108)
CO2 SERPL-SCNC: 24 MMOL/L — SIGNIFICANT CHANGE UP (ref 22–31)
CREAT SERPL-MCNC: 0.57 MG/DL — SIGNIFICANT CHANGE UP (ref 0.5–1.3)
CULTURE RESULTS: SIGNIFICANT CHANGE UP
EGFR: 105 ML/MIN/1.73M2 — SIGNIFICANT CHANGE UP
GLUCOSE SERPL-MCNC: 252 MG/DL — HIGH (ref 70–99)
HCT VFR BLD CALC: 29.4 % — LOW (ref 39–50)
HGB BLD-MCNC: 8.8 G/DL — LOW (ref 13–17)
INR BLD: 2.47 — HIGH (ref 0.85–1.18)
MCHC RBC-ENTMCNC: 26.3 PG — LOW (ref 27–34)
MCHC RBC-ENTMCNC: 29.9 GM/DL — LOW (ref 32–36)
MCV RBC AUTO: 87.8 FL — SIGNIFICANT CHANGE UP (ref 80–100)
METHOD TYPE: SIGNIFICANT CHANGE UP
NRBC # BLD: 0 /100 WBCS — SIGNIFICANT CHANGE UP (ref 0–0)
ORGANISM # SPEC MICROSCOPIC CNT: SIGNIFICANT CHANGE UP
ORGANISM # SPEC MICROSCOPIC CNT: SIGNIFICANT CHANGE UP
PLATELET # BLD AUTO: 166 K/UL — SIGNIFICANT CHANGE UP (ref 150–400)
POTASSIUM SERPL-MCNC: 3.6 MMOL/L — SIGNIFICANT CHANGE UP (ref 3.5–5.3)
POTASSIUM SERPL-SCNC: 3.6 MMOL/L — SIGNIFICANT CHANGE UP (ref 3.5–5.3)
PROT SERPL-MCNC: 6.5 G/DL — SIGNIFICANT CHANGE UP (ref 6–8.3)
PROTHROM AB SERPL-ACNC: 27.4 SEC — HIGH (ref 9.5–13)
RBC # BLD: 3.35 M/UL — LOW (ref 4.2–5.8)
RBC # FLD: 17.2 % — HIGH (ref 10.3–14.5)
SODIUM SERPL-SCNC: 134 MMOL/L — LOW (ref 135–145)
SPECIMEN SOURCE: SIGNIFICANT CHANGE UP
WBC # BLD: 6.78 K/UL — SIGNIFICANT CHANGE UP (ref 3.8–10.5)
WBC # FLD AUTO: 6.78 K/UL — SIGNIFICANT CHANGE UP (ref 3.8–10.5)

## 2023-10-09 PROCEDURE — 74177 CT ABD & PELVIS W/CONTRAST: CPT | Mod: 26

## 2023-10-09 RX ORDER — I.V. FAT EMULSION 20 G/100ML
0.58 EMULSION INTRAVENOUS
Qty: 50 | Refills: 0 | Status: DISCONTINUED | OUTPATIENT
Start: 2023-10-09 | End: 2023-10-09

## 2023-10-09 RX ORDER — BENZOCAINE 10 %
1 GEL (GRAM) MUCOUS MEMBRANE ONCE
Refills: 0 | Status: COMPLETED | OUTPATIENT
Start: 2023-10-09 | End: 2023-10-09

## 2023-10-09 RX ORDER — ACETAMINOPHEN 500 MG
1000 TABLET ORAL ONCE
Refills: 0 | Status: COMPLETED | OUTPATIENT
Start: 2023-10-09 | End: 2023-10-09

## 2023-10-09 RX ORDER — IOHEXOL 300 MG/ML
30 INJECTION, SOLUTION INTRAVENOUS ONCE
Refills: 0 | Status: COMPLETED | OUTPATIENT
Start: 2023-10-09 | End: 2023-10-09

## 2023-10-09 RX ORDER — POTASSIUM CHLORIDE 20 MEQ
10 PACKET (EA) ORAL EVERY 4 HOURS
Refills: 0 | Status: DISCONTINUED | OUTPATIENT
Start: 2023-10-09 | End: 2023-10-09

## 2023-10-09 RX ORDER — SODIUM CHLORIDE 9 MG/ML
500 INJECTION INTRAMUSCULAR; INTRAVENOUS; SUBCUTANEOUS ONCE
Refills: 0 | Status: DISCONTINUED | OUTPATIENT
Start: 2023-10-09 | End: 2023-10-09

## 2023-10-09 RX ORDER — ELECTROLYTE SOLUTION,INJ
1 VIAL (ML) INTRAVENOUS
Refills: 0 | Status: DISCONTINUED | OUTPATIENT
Start: 2023-10-09 | End: 2023-10-09

## 2023-10-09 RX ADMIN — Medication 1000 MILLIGRAM(S): at 21:20

## 2023-10-09 RX ADMIN — Medication 1000 MILLIGRAM(S): at 04:50

## 2023-10-09 RX ADMIN — HEPARIN SODIUM 17 UNIT(S)/HR: 5000 INJECTION INTRAVENOUS; SUBCUTANEOUS at 00:38

## 2023-10-09 RX ADMIN — Medication 400 MILLIGRAM(S): at 21:05

## 2023-10-09 RX ADMIN — HEPARIN SODIUM 14 UNIT(S)/HR: 5000 INJECTION INTRAVENOUS; SUBCUTANEOUS at 10:11

## 2023-10-09 RX ADMIN — HEPARIN SODIUM 11 UNIT(S)/HR: 5000 INJECTION INTRAVENOUS; SUBCUTANEOUS at 20:26

## 2023-10-09 RX ADMIN — CHLORHEXIDINE GLUCONATE 1 APPLICATION(S): 213 SOLUTION TOPICAL at 06:47

## 2023-10-09 RX ADMIN — HEPARIN SODIUM 11 UNIT(S)/HR: 5000 INJECTION INTRAVENOUS; SUBCUTANEOUS at 16:26

## 2023-10-09 RX ADMIN — PANTOPRAZOLE SODIUM 40 MILLIGRAM(S): 20 TABLET, DELAYED RELEASE ORAL at 05:59

## 2023-10-09 RX ADMIN — Medication 400 MILLIGRAM(S): at 04:35

## 2023-10-09 RX ADMIN — IOHEXOL 30 MILLILITER(S): 300 INJECTION, SOLUTION INTRAVENOUS at 07:41

## 2023-10-09 NOTE — CHART NOTE - NSCHARTNOTEFT_GEN_A_CORE
Patient examined at bedside, sleeping.  NGT connected to LIWS, w. bilious o/p. Physical exam Mildly distended, NT w/o rebound or guarding. Denies sob/bear/dizziness/cp    Will continue to follow

## 2023-10-09 NOTE — CHART NOTE - NSCHARTNOTEFT_GEN_A_CORE
Patient seen and examined at bedside, found resting comfortably. Patient denies nausea and vomiting. Denies abdominal pain. Endorses passing flatus, no bowel movements.    T(C): 36.7 (10-09-23 @ 12:27), Max: 38.1 (10-09-23 @ 04:07)  HR: 95 (10-09-23 @ 12:27) (88 - 104)  BP: 123/85 (10-09-23 @ 12:27) (112/73 - 134/86)  RR: 18 (10-09-23 @ 12:27) (18 - 19)  SpO2: 99% (10-09-23 @ 12:27) (94% - 99%)    CONSTITUTIONAL: Well groomed, no apparent distress, NGT in place with bilious output  RESP: No respiratory distress, no use of accessory muscles; CTA b/l, no WRR  CV: RRR, +S1S2, no MRG; no JVD; no peripheral edema  GI: Soft, mild distension, NT, no rebound, no guarding; no palpable masses; no hepatosplenomegaly; no hernia palpated  SKIN: No rashes or ulcers noted; no subcutaneous nodules or induration palpable  NEURO: CN II-XII intact; normal reflexes in upper and lower extremities, sensation intact in upper and lower extremities b/l to light touch     Team will continue to monitor serial abdominal exams.

## 2023-10-09 NOTE — CHART NOTE - NSCHARTNOTEFT_GEN_A_CORE
Subjective: Patient seen and examined bedside. He is resting in bed comfortably and has no acute complaints. He reports having consistent flatus, but denies any bowel movements. He denies cp, sob, nausea, emesis, fevers, or chills.    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

## 2023-10-09 NOTE — PROVIDER CONTACT NOTE (CRITICAL VALUE NOTIFICATION) - NS PROVIDER READ BACK
Problem: Elimination-Bowel, Altered:Diarrhea  Goal: Bowel elimination pattern returned to baseline  Outcome: Outcome Not Met, Continue to Monitor  Goal: Verbalizes understanding of diarrhea management  Description: Document on Patient Education Activity  Outcome: Outcome Not Met, Continue to Monitor     Problem: Pressure Injury, Risk for  Goal: # Skin remains intact  Outcome: Outcome Not Met, Continue to Monitor  Goal: No new pressure injury (PI) development  Outcome: Outcome Not Met, Continue to Monitor  Goal: # Verbalizes understanding of PI risk factors and prevention strategies  Description: Document education using the patient education activity.   Outcome: Outcome Not Met, Continue to Monitor  Goal: Comfort optimized with pressure injury prevention strategies guided by patient/family preference. (Hospice)  Outcome: Outcome Not Met, Continue to Monitor     Problem: Pressure Injury Actual  Goal: # No deterioration in pressure injury (PI)  Outcome: Outcome Not Met, Continue to Monitor  Goal: # Verbalizes pressure injury management  Description: Document education using the patient education activity.  Outcome: Outcome Not Met, Continue to Monitor  Goal: Wound care provided to promote comfort needs (Hospice)  Outcome: Outcome Not Met, Continue to Monitor     Problem: Non-Pressure Injury Wound  Goal: # No deterioration in wound  Outcome: Outcome Not Met, Continue to Monitor  Goal: # Verbalizes understanding of wound and wound care  Description: If abnormality is a skin tear, avoid using tape on skin including transparent dressings. Document education using the patient education activity.  Outcome: Outcome Not Met, Continue to Monitor  Goal: Participates in wound care activities  Outcome: Outcome Not Met, Continue to Monitor  Goal: Wound care provided to promote comfort needs (Hospice)  Outcome: Outcome Not Met, Continue to Monitor     Problem: Cellulitis  Goal: Cellulitis improved as evidenced by decreased  redness, swelling and pain  Description: Girth measurement may be used to evaluate edema.  Outcome: Outcome Not Met, Continue to Monitor  Goal: Care provided to promote comfort needs (Hospice)  Outcome: Outcome Not Met, Continue to Monitor     Problem: Diabetes  Goal: Glycemic balance achieved/maintained  Description: Goal is to maintain blood sugar within range with no episodes of hypoglycemia  Outcome: Outcome Not Met, Continue to Monitor  Goal: Verbalizes/demonstrates understanding of Diabetes  Description: Document on Patient Education Activity  Outcome: Outcome Not Met, Continue to Monitor  Goal: Demonstrates ability to self-administer insulin  Description: Document on Patient Education Activity  Outcome: Outcome Not Met, Continue to Monitor      yes

## 2023-10-09 NOTE — PROGRESS NOTE ADULT - SUBJECTIVE AND OBJECTIVE BOX
INTERVAL HPI/OVERNIGHT EVENTS: MILEY mildly distended, nt. [id number], asym. Temp 100.6F,    SUBJECTIVE: Patient seen and examined at bedside with chief resident. Patient denies nausea and vomiting. He states he is passing flatus but denies bowel movements. He denies chest pain and shortness of breath      heparin  Infusion 1400 Unit(s)/Hr IV Continuous <Continuous>      Vital Signs Last 24 Hrs  T(C): 36.7 (09 Oct 2023 07:00), Max: 38.1 (09 Oct 2023 04:07)  T(F): 98 (09 Oct 2023 07:00), Max: 100.6 (09 Oct 2023 04:07)  HR: 90 (09 Oct 2023 07:00) (90 - 108)  BP: 134/86 (09 Oct 2023 07:00) (112/73 - 134/86)  BP(mean): --  RR: 18 (09 Oct 2023 07:00) (18 - 18)  SpO2: 96% (09 Oct 2023 07:00) (94% - 98%)    Parameters below as of 09 Oct 2023 07:00  Patient On (Oxygen Delivery Method): nasal cannula  O2 Flow (L/min): 2    I&O's Detail    08 Oct 2023 07:01  -  09 Oct 2023 07:00  --------------------------------------------------------  IN:    Fat Emulsion (Fish Oil &amp; Plant Based) 20% Infusion: 249.6 mL    Heparin: 408 mL    Lactated Ringers: 1680 mL    TPN (Total Parenteral Nutrition): 1200 mL  Total IN: 3537.6 mL    OUT:    Nasogastric/Oral tube (mL): 500 mL  Total OUT: 500 mL    Total NET: 3037.6 mL      General: NAD, resting comfortably in bed, NGT in place connected to LIWS bilious output  C/V: NSR  Pulm: Nonlabored breathing, no respiratory distress  Abd: mildly distended, nontender, soft  Extrem: WWP, no edema, SCDs in place        LABS:                        8.8    6.78  )-----------( 166      ( 09 Oct 2023 05:30 )             29.4     10-09    x   |  x   |  12  ----------------------------<  252<H>  x    |  24  |  0.57    Ca    8.8      09 Oct 2023 05:30  Phos  3.3     10-08  Mg     2.0     10-08      PT/INR - ( 08 Oct 2023 07:35 )   PT: 14.0 sec;   INR: 1.24          PTT - ( 08 Oct 2023 18:23 )  PTT:70.7 sec  Urinalysis Basic - ( 09 Oct 2023 05:30 )    Color: x / Appearance: x / SG: x / pH: x  Gluc: 252 mg/dL / Ketone: x  / Bili: x / Urobili: x   Blood: x / Protein: x / Nitrite: x   Leuk Esterase: x / RBC: x / WBC x   Sq Epi: x / Non Sq Epi: x / Bacteria: x        RADIOLOGY & ADDITIONAL STUDIES:

## 2023-10-10 LAB
ALBUMIN SERPL ELPH-MCNC: 2.5 G/DL — LOW (ref 3.3–5)
ALP SERPL-CCNC: 78 U/L — SIGNIFICANT CHANGE UP (ref 40–120)
ALT FLD-CCNC: 28 U/L — SIGNIFICANT CHANGE UP (ref 10–45)
ANION GAP SERPL CALC-SCNC: 12 MMOL/L — SIGNIFICANT CHANGE UP (ref 5–17)
APTT BLD: 31.5 SEC — SIGNIFICANT CHANGE UP (ref 24.5–35.6)
APTT BLD: 33.6 SEC — SIGNIFICANT CHANGE UP (ref 24.5–35.6)
APTT BLD: 36 SEC — HIGH (ref 24.5–35.6)
APTT BLD: 41.3 SEC — HIGH (ref 24.5–35.6)
APTT BLD: 41.3 SEC — HIGH (ref 24.5–35.6)
AST SERPL-CCNC: 30 U/L — SIGNIFICANT CHANGE UP (ref 10–40)
BILIRUB SERPL-MCNC: 0.2 MG/DL — SIGNIFICANT CHANGE UP (ref 0.2–1.2)
BUN SERPL-MCNC: 12 MG/DL — SIGNIFICANT CHANGE UP (ref 7–23)
CALCIUM SERPL-MCNC: 9 MG/DL — SIGNIFICANT CHANGE UP (ref 8.4–10.5)
CHLORIDE SERPL-SCNC: 100 MMOL/L — SIGNIFICANT CHANGE UP (ref 96–108)
CO2 SERPL-SCNC: 23 MMOL/L — SIGNIFICANT CHANGE UP (ref 22–31)
CREAT SERPL-MCNC: 0.5 MG/DL — SIGNIFICANT CHANGE UP (ref 0.5–1.3)
EGFR: 109 ML/MIN/1.73M2 — SIGNIFICANT CHANGE UP
GLUCOSE SERPL-MCNC: 136 MG/DL — HIGH (ref 70–99)
HCT VFR BLD CALC: 29.1 % — LOW (ref 39–50)
HCT VFR BLD CALC: 30 % — LOW (ref 39–50)
HGB BLD-MCNC: 9.3 G/DL — LOW (ref 13–17)
HGB BLD-MCNC: 9.7 G/DL — LOW (ref 13–17)
MAGNESIUM SERPL-MCNC: 2 MG/DL — SIGNIFICANT CHANGE UP (ref 1.6–2.6)
MCHC RBC-ENTMCNC: 26.1 PG — LOW (ref 27–34)
MCHC RBC-ENTMCNC: 31 GM/DL — LOW (ref 32–36)
MCHC RBC-ENTMCNC: 31.2 PG — SIGNIFICANT CHANGE UP (ref 27–34)
MCHC RBC-ENTMCNC: 33.3 GM/DL — SIGNIFICANT CHANGE UP (ref 32–36)
MCV RBC AUTO: 84 FL — SIGNIFICANT CHANGE UP (ref 80–100)
MCV RBC AUTO: 93.6 FL — SIGNIFICANT CHANGE UP (ref 80–100)
NRBC # BLD: 0 /100 WBCS — SIGNIFICANT CHANGE UP (ref 0–0)
NRBC # BLD: 0 /100 WBCS — SIGNIFICANT CHANGE UP (ref 0–0)
PHOSPHATE SERPL-MCNC: 3.1 MG/DL — SIGNIFICANT CHANGE UP (ref 2.5–4.5)
PLATELET # BLD AUTO: 267 K/UL — SIGNIFICANT CHANGE UP (ref 150–400)
PLATELET # BLD AUTO: 381 K/UL — SIGNIFICANT CHANGE UP (ref 150–400)
POTASSIUM SERPL-MCNC: SIGNIFICANT CHANGE UP (ref 3.5–5.3)
POTASSIUM SERPL-SCNC: SIGNIFICANT CHANGE UP (ref 3.5–5.3)
PROT SERPL-MCNC: 7 G/DL — SIGNIFICANT CHANGE UP (ref 6–8.3)
RBC # BLD: 3.11 M/UL — LOW (ref 4.2–5.8)
RBC # BLD: 3.57 M/UL — LOW (ref 4.2–5.8)
RBC # FLD: 14.9 % — HIGH (ref 10.3–14.5)
RBC # FLD: 16.8 % — HIGH (ref 10.3–14.5)
SODIUM SERPL-SCNC: 135 MMOL/L — SIGNIFICANT CHANGE UP (ref 135–145)
WBC # BLD: 10.32 K/UL — SIGNIFICANT CHANGE UP (ref 3.8–10.5)
WBC # BLD: 5.51 K/UL — SIGNIFICANT CHANGE UP (ref 3.8–10.5)
WBC # FLD AUTO: 10.32 K/UL — SIGNIFICANT CHANGE UP (ref 3.8–10.5)
WBC # FLD AUTO: 5.51 K/UL — SIGNIFICANT CHANGE UP (ref 3.8–10.5)

## 2023-10-10 PROCEDURE — 87040 BLOOD CULTURE FOR BACTERIA: CPT

## 2023-10-10 PROCEDURE — 71045 X-RAY EXAM CHEST 1 VIEW: CPT | Mod: 26

## 2023-10-10 RX ORDER — HEPARIN SODIUM 5000 [USP'U]/ML
1700 INJECTION INTRAVENOUS; SUBCUTANEOUS
Qty: 25000 | Refills: 0 | Status: DISCONTINUED | OUTPATIENT
Start: 2023-10-10 | End: 2023-10-11

## 2023-10-10 RX ORDER — HEPARIN SODIUM 5000 [USP'U]/ML
1500 INJECTION INTRAVENOUS; SUBCUTANEOUS
Qty: 25000 | Refills: 0 | Status: DISCONTINUED | OUTPATIENT
Start: 2023-10-10 | End: 2023-10-10

## 2023-10-10 RX ORDER — I.V. FAT EMULSION 20 G/100ML
0.58 EMULSION INTRAVENOUS
Qty: 50 | Refills: 0 | Status: DISCONTINUED | OUTPATIENT
Start: 2023-10-10 | End: 2023-10-10

## 2023-10-10 RX ORDER — ACETAMINOPHEN 500 MG
1000 TABLET ORAL ONCE
Refills: 0 | Status: COMPLETED | OUTPATIENT
Start: 2023-10-10 | End: 2023-10-10

## 2023-10-10 RX ORDER — ELECTROLYTE SOLUTION,INJ
1 VIAL (ML) INTRAVENOUS
Refills: 0 | Status: DISCONTINUED | OUTPATIENT
Start: 2023-10-10 | End: 2023-10-10

## 2023-10-10 RX ORDER — CEFTRIAXONE 500 MG/1
1000 INJECTION, POWDER, FOR SOLUTION INTRAMUSCULAR; INTRAVENOUS EVERY 24 HOURS
Refills: 0 | Status: DISCONTINUED | OUTPATIENT
Start: 2023-10-10 | End: 2023-10-16

## 2023-10-10 RX ADMIN — PANTOPRAZOLE SODIUM 40 MILLIGRAM(S): 20 TABLET, DELAYED RELEASE ORAL at 06:23

## 2023-10-10 RX ADMIN — HEPARIN SODIUM 1700 UNIT(S)/HR: 5000 INJECTION INTRAVENOUS; SUBCUTANEOUS at 21:09

## 2023-10-10 RX ADMIN — HEPARIN SODIUM 12.5 UNIT(S)/HR: 5000 INJECTION INTRAVENOUS; SUBCUTANEOUS at 03:48

## 2023-10-10 RX ADMIN — PANTOPRAZOLE SODIUM 40 MILLIGRAM(S): 20 TABLET, DELAYED RELEASE ORAL at 17:02

## 2023-10-10 RX ADMIN — Medication 1 SPRAY(S): at 00:47

## 2023-10-10 RX ADMIN — Medication 400 MILLIGRAM(S): at 21:35

## 2023-10-10 RX ADMIN — CEFTRIAXONE 100 MILLIGRAM(S): 500 INJECTION, POWDER, FOR SOLUTION INTRAMUSCULAR; INTRAVENOUS at 19:08

## 2023-10-10 RX ADMIN — Medication 1 EACH: at 17:01

## 2023-10-10 RX ADMIN — I.V. FAT EMULSION 20.83 GM/KG/DAY: 20 EMULSION INTRAVENOUS at 17:01

## 2023-10-10 RX ADMIN — HEPARIN SODIUM 1500 UNIT(S)/HR: 5000 INJECTION INTRAVENOUS; SUBCUTANEOUS at 14:00

## 2023-10-10 RX ADMIN — CHLORHEXIDINE GLUCONATE 1 APPLICATION(S): 213 SOLUTION TOPICAL at 06:23

## 2023-10-10 NOTE — CHART NOTE - NSCHARTNOTEFT_GEN_A_CORE
Subjective: Patient seen and examined bedside. He has no acute complaints. He reports having flatus, but denies any bowel movements. He denies cp, sob, nausea, emesis, or fevers.     Objective:  Vital Signs Last 24 Hrs  T(C): 37.2 (10 Oct 2023 23:36), Max: 38.4 (10 Oct 2023 20:48)  T(F): 98.9 (10 Oct 2023 23:36), Max: 101.1 (10 Oct 2023 20:48)  HR: 97 (10 Oct 2023 23:36) (97 - 110)  BP: 111/74 (10 Oct 2023 23:36) (111/74 - 148/87)  BP(mean): --  RR: 16 (10 Oct 2023 23:36) (16 - 19)  SpO2: 93% (10 Oct 2023 23:36) (93% - 94%)    Parameters below as of 10 Oct 2023 23:36  Patient On (Oxygen Delivery Method): nasal cannula  O2 Flow (L/min): 2    Gen: NAD, resting comfortably in bed, +nasal cannula  Pulm: Nonlabored breathing, no respiratory distress   C/V: NSR   Abd: soft, mildly distended, non-tender. No rebound or guarding.  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

## 2023-10-10 NOTE — CHART NOTE - NSCHARTNOTEFT_GEN_A_CORE
Patient seen and examined at bedside, patient found resting comfortably. Denies nausea and vomiting. Endorses minimal flatus, denies bowel movements.    T(C): 36.7 (10-10-23 @ 16:10), Max: 38.1 (10-09-23 @ 20:20)  HR: 106 (10-10-23 @ 16:10) (70 - 110)  BP: 148/87 (10-10-23 @ 16:10) (112/76 - 148/87)  RR: 18 (10-10-23 @ 16:10) (17 - 19)  SpO2: 94% (10-10-23 @ 16:10) (93% - 94%)    CONSTITUTIONAL: Well groomed, no apparent distress, NGT in place with bilious output  RESP: No respiratory distress, no use of accessory muscles; CTA b/l, no WRR  CV: RRR, +S1S2, no MRG; no JVD; no peripheral edema  GI: Soft, NT, mildly distended, no rebound, no guarding; no palpable masses; no hepatosplenomegaly; no hernia palpated  SKIN: No rashes or ulcers noted; no subcutaneous nodules or induration palpable    Patient with stable exam findings. Team will continue serial abdominal assessments.

## 2023-10-10 NOTE — CHART NOTE - NSCHARTNOTEFT_GEN_A_CORE
Admitting Diagnosis:   Patient is a 73y old  Male who presents with a chief complaint of Abdominal distention; r/o SBO (10 Oct 2023 07:03)  PAST MEDICAL & SURGICAL HISTORY:  Depression, major  Stroke  Pulmonary embolism  BPH (benign prostatic hyperplasia)  Diverticulosis    Current Nutrition Order: NPO with alternate means of nutrition via TPN at 50mL/h: 250g dextrose, 91g amino acids, 50g lipids, providing 1714 calories, 91g protein, GIR of 2.49, 1.3g protein/kg of ideal body weight;     PO Intake: Good (%) [   ]  Fair (50-75%) [   ] Poor (<25%) [   ] *NPO [ x ]*    GI Issues: no noted nausea or vomiting; distention noted; passing flatus; NGT in place and set to LIWS    Pain: denies pain/discomfort     Skin Integrity: stage I L buttocks pressure ulcer; Dante: 12; no noted edema    Labs:   10-10    135  |  100  |  12  ----------------------------<  136<H>  See Note   |  23  |  0.50    Ca    9.0      10 Oct 2023 05:30  Phos  3.1     10-10  Mg     2.0     10-10    TPro  7.0  /  Alb  2.5<L>  /  TBili  0.2  /  DBili  x   /  AST  30  /  ALT  28  /  AlkPhos  78  10-10    CAPILLARY BLOOD GLUCOSE    Medications:  MEDICATIONS  (STANDING):  chlorhexidine 2% Cloths 1 Application(s) Topical <User Schedule>  heparin  Infusion. 1500 Unit(s)/Hr (15 mL/Hr) IV Continuous <Continuous>  lactated ringers. 1000 milliLiter(s) (70 mL/Hr) IV Continuous <Continuous>  lipid, fat emulsion (Fish Oil and Plant Based) 20% Infusion 0.5834 Gm/kG/Day (20.83 mL/Hr) IV Continuous <Continuous>  pantoprazole  Injectable 40 milliGRAM(s) IV Push every 12 hours  Parenteral Nutrition - Adult 1 Each (50 mL/Hr) TPN Continuous <Continuous>  Parenteral Nutrition - Adult 1 Each (50 mL/Hr) TPN Continuous <Continuous>    MEDICATIONS  (PRN):  sodium chloride 0.9% lock flush 10 milliLiter(s) IV Push every 1 hour PRN Pre/post blood products, medications, blood draw, and to maintain line patency    Dosing Anthropometrics:   Height for BMI (FEET)	5 Feet  Height for BMI (INCHES)	8 Inch(s)  Height for BMI (CM)	172.72 Centimeter(s)  Weight for BMI (lbs)	188.9 lb  Weight for BMI (kg)	85.7 kg  Body Mass Index	              28.7     Weight Change: no new weights noted; recommend that nursing obtain updated weights biweekly; RD to continue to trend.     Nutrition Focused Physical Exam: Completed [   ]  Not Pertinent [ x ]  Muscle Wasting- Temporal [   ]  Clavicle/Pectoral [   ]  Shoulder/Deltoid [   ]  Scapula [   ]  Interosseous [   ]  Quadriceps [   ]  Gastrocnemius [   ]  Fat Wasting- Orbital [   ]  Buccal [   ]  Triceps [   ]  Rib [   ]  Suspect [PCM] 2/2 to physical assessment, [poor intake], and [wt loss]; please see malnutrition chart note.    Estimated energy needs:   Weight used for calculations	IBW   Estimated Energy Needs Weight (lbs)	154 lb  Estimated Energy Needs Weight (kg)	69.8 kg  Estimated Energy Needs From (jose l/kg)	25   Estimated Energy Needs To (jose l/kg)	30   Estimated Energy Needs Calculated From (jose l/kg)	1745   Estimated Energy Needs Calculated To (jose l/kg)	2094   Weight used for calculations	IBW   Estimated Protein Needs Weight (lbs)	154 lb  Estimated Protein Needs Weight (kg)	69.8 kg  Estimated Protein Needs From (g/kg)	1.2   Estimated Protein Needs To (g/kg)	1.4   Estimated Protein Needs Calculated From (g/kg)	83.76   Estimated Protein Needs Calculated To (g/kg)	97.72   Estimated Fluid Needs Weight (lbs)	154 lb  Estimated Fluid Needs Weight (kg)	69.8 kg  Estimated Fluid Needs From (ml/kg)	30   Estimated Fluid Needs To (ml/kg)	35   Estimated Fluid Needs Calculated From (ml/kg)	2094   Estimated Fluid Needs Calculated To (ml/kg)	2443   Other Calculations	Based on Standards of Care pt >% ideal body weight, thus ideal body weight used for all calculations. Needs adjusted for advanced age, clinical status/condition.    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 Per family, once patient's PO intake was increased about 2 weeks ago, he developed significant abdominal distention with SOB. He was taken to an OSH in [place] where he was managed conservatively with NGT decompression for a possible SBO. Operative management was discussed but family declined citing preference to return to [facility] where initial surgery was performed. Clinical status improved and he was discharged yesterday, but when he got home and took PO pills, he again developed abdominal distention with SOB, hence presentation to the ED. Last dose of Eliquis was this AM. 10/7/23: mildly distended. 10/08/23: KUB ileus. 10/09/23: CTAP increased distention in loops of small bowel proximal to SBO. 10/10/23: no PEG d/t fever overnight. consistent wet cough.    RD visited pt at bedside for nutrition follow up evaluation. Pt's home assistant, Jacques, was present during nutrition evaluation. Pt endorsed no noted nausea or vomiting; distention noted; passing flatus; NGT in place and set to LIWS. Pt continues on TPN at 50mL/hour. Denies pain/discomfort. Stage I L buttocks pressure ulcer; Dante: 12; no noted edema. Labs reviewed: elevated serum Glucose (136), triglycerides slightly elevated (159). RD to continue to monitor trends. RD spoke to pt about current nutrition plan and potential future plan regarding nutrition. Pt was relatively receptive, verbalized understanding. RD to continue to monitor and remain available per protocol. Additional nutrition recommendations below to follow.     Previous Nutrition Diagnosis: Inadequate Oral Intake related to pt's condition/clinical course as evidenced by NPO status with NGT to LIWS and need for alternate means of nutrition via TPN    Active [ x ]  Resolved [   ]    If resolved, new PES:    Goal: Pt to consistently meet at least 75% of EEE via tolerated route that is consistent with goals of care during hospital stay    Recommendations:  1. NPO   >> TPN via central line: 334g Dextrose, 91g Amino Acids, 50g 20% Lipids to provide in total 2000 calories, 91g protein, GIR of 2.7 based on actual body weight of 85.7kg, 1.3g protein/kg ideal body weight (ideal body weight is 70kg)  Recommend checking TG before starting TPN and then check TG weekly. Check Mg, Phos, K daily and POC BG Q6hrs. Trend daily weights. Fluids and electrolytes per MD discretion. Start at 150g Dextrose on Day 1, 250g Dextrose on Day 2, and advance to goal of 334g Dextrose on Day 3.  2. Encourage pt to meet nutritional needs as able  3. Monitor PO intakes, trend weights (daily while on TPN), monitor skin integrity, monitor labs (electrolytes, CMP), monitor GI function  4. Encourage adherence to diet education (reinforce as able)   5. Pain and bowel regimen per team   6. Will continue to assess/honor preferences as able   7. Align nutrition interventions with goals of care at all times    Education: RD spoke to pt about current nutrition plan and potential future plan regarding nutrition. Pt was relatively receptive, verbalized understanding.    Risk Level: High [ x ] Moderate [   ] Low [   ]

## 2023-10-10 NOTE — PROGRESS NOTE ADULT - SUBJECTIVE AND OBJECTIVE BOX
INTERVAL HPI/OVERNIGHT EVENTS: oral temp 100.5, , Ofirmev x1, 8pm PTT 57.2, drip increased to 11.5, Bcx NG@3d, 2AM PTT 33.6, drip increased to 12.5,     SUBJECTIVE:  pt seen at bedside, aide at bedside. feeling alright. aide states he had been coughing overnight. +flatus, denies BM    MEDICATIONS  (STANDING):  chlorhexidine 2% Cloths 1 Application(s) Topical <User Schedule>  heparin  Infusion 1400 Unit(s)/Hr (12.5 mL/Hr) IV Continuous <Continuous>  lactated ringers. 1000 milliLiter(s) (70 mL/Hr) IV Continuous <Continuous>  lipid, fat emulsion (Fish Oil and Plant Based) 20% Infusion 0.5834 Gm/kG/Day (20.8 mL/Hr) IV Continuous <Continuous>  pantoprazole  Injectable 40 milliGRAM(s) IV Push every 12 hours  Parenteral Nutrition - Adult 1 Each (50 mL/Hr) TPN Continuous <Continuous>    MEDICATIONS  (PRN):  sodium chloride 0.9% lock flush 10 milliLiter(s) IV Push every 1 hour PRN Pre/post blood products, medications, blood draw, and to maintain line patency      Vital Signs Last 24 Hrs  T(C): 37.1 (10 Oct 2023 04:40), Max: 38.1 (09 Oct 2023 20:20)  T(F): 98.8 (10 Oct 2023 04:40), Max: 100.5 (09 Oct 2023 20:20)  HR: 108 (10 Oct 2023 04:40) (70 - 110)  BP: 139/86 (10 Oct 2023 04:40) (118/79 - 142/87)  BP(mean): --  RR: 18 (10 Oct 2023 04:40) (17 - 19)  SpO2: 94% (10 Oct 2023 04:40) (93% - 99%)    Parameters below as of 10 Oct 2023 04:40  Patient On (Oxygen Delivery Method): nasal cannula        PHYSICAL EXAM:      Constitutional: A&Ox3    Respiratory: non labored breathing, no respiratory distress    Cardiovascular: NSR, RRR    Gastrointestinal: soft, nontender, mild distension    Extremities: (-) edema                  I&O's Detail    09 Oct 2023 07:01  -  10 Oct 2023 07:00  --------------------------------------------------------  IN:    Fat Emulsion (Fish Oil &amp; Plant Based) 20% Infusion: 187.2 mL    Heparin: 263 mL    Lactated Ringers: 1400 mL    TPN (Total Parenteral Nutrition): 500 mL  Total IN: 2350.2 mL    OUT:    Nasogastric/Oral tube (mL): 925 mL  Total OUT: 925 mL    Total NET: 1425.2 mL          LABS:                        8.8    6.78  )-----------( 166      ( 09 Oct 2023 05:30 )             29.4     10-09    134<L>  |  98  |  12  ----------------------------<  252<H>  3.6   |  24  |  0.57    Ca    8.8      09 Oct 2023 05:30  Phos  3.3     10-08  Mg     2.0     10-08    TPro  6.5  /  Alb  2.1<L>  /  TBili  0.3  /  DBili  x   /  AST  14  /  ALT  26  /  AlkPhos  62  10-09    PT/INR - ( 09 Oct 2023 05:30 )   PT: 27.4 sec;   INR: 2.47          PTT - ( 10 Oct 2023 02:42 )  PTT:33.6 sec  Urinalysis Basic - ( 09 Oct 2023 05:30 )    Color: x / Appearance: x / SG: x / pH: x  Gluc: 252 mg/dL / Ketone: x  / Bili: x / Urobili: x   Blood: x / Protein: x / Nitrite: x   Leuk Esterase: x / RBC: x / WBC x   Sq Epi: x / Non Sq Epi: x / Bacteria: x        RADIOLOGY & ADDITIONAL STUDIES:

## 2023-10-11 ENCOUNTER — TRANSCRIPTION ENCOUNTER (OUTPATIENT)
Age: 73
End: 2023-10-11

## 2023-10-11 LAB
APTT BLD: 25.7 SEC — SIGNIFICANT CHANGE UP (ref 24.5–35.6)
APTT BLD: 37.9 SEC — HIGH (ref 24.5–35.6)
APTT BLD: 39.7 SEC — HIGH (ref 24.5–35.6)
APTT BLD: 57.7 SEC — HIGH (ref 24.5–35.6)
CULTURE RESULTS: SIGNIFICANT CHANGE UP
CULTURE RESULTS: SIGNIFICANT CHANGE UP
SPECIMEN SOURCE: SIGNIFICANT CHANGE UP
SPECIMEN SOURCE: SIGNIFICANT CHANGE UP

## 2023-10-11 PROCEDURE — 99232 SBSQ HOSP IP/OBS MODERATE 35: CPT

## 2023-10-11 PROCEDURE — 99222 1ST HOSP IP/OBS MODERATE 55: CPT

## 2023-10-11 PROCEDURE — 71045 X-RAY EXAM CHEST 1 VIEW: CPT | Mod: 26

## 2023-10-11 RX ORDER — HEPARIN SODIUM 5000 [USP'U]/ML
1900 INJECTION INTRAVENOUS; SUBCUTANEOUS
Qty: 25000 | Refills: 0 | Status: DISCONTINUED | OUTPATIENT
Start: 2023-10-11 | End: 2023-10-12

## 2023-10-11 RX ORDER — ELECTROLYTE SOLUTION,INJ
1 VIAL (ML) INTRAVENOUS
Refills: 0 | Status: DISCONTINUED | OUTPATIENT
Start: 2023-10-11 | End: 2023-10-11

## 2023-10-11 RX ORDER — HEPARIN SODIUM 5000 [USP'U]/ML
1800 INJECTION INTRAVENOUS; SUBCUTANEOUS
Qty: 25000 | Refills: 0 | Status: DISCONTINUED | OUTPATIENT
Start: 2023-10-11 | End: 2023-10-11

## 2023-10-11 RX ORDER — I.V. FAT EMULSION 20 G/100ML
0.58 EMULSION INTRAVENOUS
Qty: 50 | Refills: 0 | Status: DISCONTINUED | OUTPATIENT
Start: 2023-10-11 | End: 2023-10-11

## 2023-10-11 RX ADMIN — Medication 1 EACH: at 17:11

## 2023-10-11 RX ADMIN — PANTOPRAZOLE SODIUM 40 MILLIGRAM(S): 20 TABLET, DELAYED RELEASE ORAL at 06:27

## 2023-10-11 RX ADMIN — PANTOPRAZOLE SODIUM 40 MILLIGRAM(S): 20 TABLET, DELAYED RELEASE ORAL at 17:11

## 2023-10-11 RX ADMIN — I.V. FAT EMULSION 20.8 GM/KG/DAY: 20 EMULSION INTRAVENOUS at 17:11

## 2023-10-11 RX ADMIN — CEFTRIAXONE 100 MILLIGRAM(S): 500 INJECTION, POWDER, FOR SOLUTION INTRAMUSCULAR; INTRAVENOUS at 17:11

## 2023-10-11 RX ADMIN — HEPARIN SODIUM 1800 UNIT(S)/HR: 5000 INJECTION INTRAVENOUS; SUBCUTANEOUS at 03:15

## 2023-10-11 RX ADMIN — CHLORHEXIDINE GLUCONATE 1 APPLICATION(S): 213 SOLUTION TOPICAL at 06:27

## 2023-10-11 RX ADMIN — HEPARIN SODIUM 2200 UNIT(S)/HR: 5000 INJECTION INTRAVENOUS; SUBCUTANEOUS at 14:30

## 2023-10-11 NOTE — CHART NOTE - NSCHARTNOTEFT_GEN_A_CORE
Patient examined at bedside with wife. KAREN. Patient ngt connected to LIWS with min o/p, clear > bilious. Patient physical exam mildly distended, nt, no rebound or guarding  Denies sob/bear/cp/n/v    Will continue to monitor

## 2023-10-11 NOTE — PROGRESS NOTE ADULT - SUBJECTIVE AND OBJECTIVE BOX
SUBJECTIVE: Pt seen and examined at bedside. No complaints. Patient denies N/V, endorses flatus, denies BM.      MEDICATIONS  (STANDING):  cefTRIAXone   IVPB 1000 milliGRAM(s) IV Intermittent every 24 hours  chlorhexidine 2% Cloths 1 Application(s) Topical <User Schedule>  heparin  Infusion. 1800 Unit(s)/Hr (18 mL/Hr) IV Continuous <Continuous>  lactated ringers. 1000 milliLiter(s) (70 mL/Hr) IV Continuous <Continuous>  lipid, fat emulsion (Fish Oil and Plant Based) 20% Infusion 0.5834 Gm/kG/Day (20.83 mL/Hr) IV Continuous <Continuous>  pantoprazole  Injectable 40 milliGRAM(s) IV Push every 12 hours  Parenteral Nutrition - Adult 1 Each (50 mL/Hr) TPN Continuous <Continuous>  Parenteral Nutrition - Adult 1 Each (50 mL/Hr) TPN Continuous <Continuous>    MEDICATIONS  (PRN):  sodium chloride 0.9% lock flush 10 milliLiter(s) IV Push every 1 hour PRN Pre/post blood products, medications, blood draw, and to maintain line patency      Vital Signs Last 24 Hrs  T(C): 37.4 (11 Oct 2023 06:31), Max: 38.4 (10 Oct 2023 20:48)  T(F): 99.4 (11 Oct 2023 06:31), Max: 101.1 (10 Oct 2023 20:48)  HR: 106 (11 Oct 2023 06:31) (97 - 173)  BP: 125/81 (11 Oct 2023 05:40) (111/74 - 148/87)  BP(mean): --  RR: 16 (11 Oct 2023 06:31) (16 - 19)  SpO2: 94% (11 Oct 2023 06:31) (77% - 94%)    Parameters below as of 11 Oct 2023 06:31  Patient On (Oxygen Delivery Method): nasal cannula  O2 Flow (L/min): 2      PHYSICAL EXAM:  General: NAD, pt resting comfortably in bed  Pulm: No respiratory distress, nonlabored breathing, on room air  CVS: NSR, HDS  Abd: Soft, NT, mild distention. NGT in place  Extremities: WWP, no edema              I&O's Detail    10 Oct 2023 07:01  -  11 Oct 2023 07:00  --------------------------------------------------------  IN:    Fat Emulsion (Fish Oil &amp; Plant Based) 20% Infusion: 249.6 mL    Heparin Infusion: 120 mL    Heparin Infusion: 54 mL    Heparin Infusion: 119 mL    Lactated Ringers: 1610 mL    TPN (Total Parenteral Nutrition): 1150 mL  Total IN: 3302.6 mL    OUT:    Nasogastric/Oral tube (mL): 650 mL    Oral Fluid: 0 mL  Total OUT: 650 mL    Total NET: 2652.6 mL          LABS:                        9.7    5.51  )-----------( 381      ( 10 Oct 2023 19:46 )             29.1     10-10    135  |  100  |  12  ----------------------------<  136<H>  See Note   |  23  |  0.50    Ca    9.0      10 Oct 2023 05:30  Phos  3.1     10-10  Mg     2.0     10-10    TPro  7.0  /  Alb  2.5<L>  /  TBili  0.2  /  DBili  x   /  AST  30  /  ALT  28  /  AlkPhos  78  10-10    PTT - ( 11 Oct 2023 05:30 )  PTT:25.7 sec  Urinalysis Basic - ( 10 Oct 2023 05:30 )    Color: x / Appearance: x / SG: x / pH: x  Gluc: 136 mg/dL / Ketone: x  / Bili: x / Urobili: x   Blood: x / Protein: x / Nitrite: x   Leuk Esterase: x / RBC: x / WBC x   Sq Epi: x / Non Sq Epi: x / Bacteria: x        RADIOLOGY & ADDITIONAL STUDIES:

## 2023-10-11 NOTE — DISCHARGE NOTE PROVIDER - CARE PROVIDER_API CALL
Saul Spann  Surgery  [facility]  [place] [address]  Phone: [phone]  Fax: [phone]  Follow Up Time: 2 weeks

## 2023-10-11 NOTE — DISCHARGE NOTE PROVIDER - NSDCFUADDINST_GEN_ALL_CORE_FT
Follow up with Dr. Spann in 1-2 weeks. Call the office at the number below to schedule your appointment. You may shower; soap and water over incision sites. Do not scrub. Pat dry when done. No tub bathing or swimming until cleared. Keep incision sites out of the sun as scars will darken. Ambulate as tolerated, but no heavy lifting (>10lbs) or strenuous exercise. You may resume regular diet. You should be urinating at least 3-4x per day. Call the office if you experience increasing abdominal pain, nausea, vomiting, or temperature >101 F.    Warning Signs:  Please call your doctor or nurse practitioner if you experience the following:  *You experience new chest pain, pressure, squeezing or tightness.  *New or worsening cough, shortness of breath, or wheeze.  *If you are vomiting and cannot keep down fluids or your medications.  *You are getting dehydrated due to continued vomiting, diarrhea, or other reasons. Signs of dehydration include dry mouth, rapid heartbeat, or feeling dizzy or faint when standing.  *You see blood or dark/black material when you vomit or have a bowel movement.  *You experience burning when you urinate, have blood in your urine, or experience a discharge.  *Your pain is not improving within 8-12 hours or is not gone within 24 hours. Call or return immediately if your pain is getting worse, changes location, or moves to your chest or back.  *You have shaking chills, or fever greater than 101.5 degrees Fahrenheit or 38 degrees Celsius.  *Any change in your symptoms, or any new symptoms that concern you.   Follow up with Dr. Spann in 1-2 weeks. Call the office at the number below to schedule your appointment. Ambulate as tolerated, but no heavy lifting (>10lbs) or strenuous exercise. You may resume regular diet. You should be urinating at least 3-4x per day. Call the office if you experience increasing abdominal pain, nausea, vomiting, or temperature >101 F.    Warning Signs:  Please call your doctor or nurse practitioner if you experience the following:  *You experience new chest pain, pressure, squeezing or tightness.  *New or worsening cough, shortness of breath, or wheeze.  *If you are vomiting and cannot keep down fluids or your medications.  *You are getting dehydrated due to continued vomiting, diarrhea, or other reasons. Signs of dehydration include dry mouth, rapid heartbeat, or feeling dizzy or faint when standing.  *You see blood or dark/black material when you vomit or have a bowel movement.  *You experience burning when you urinate, have blood in your urine, or experience a discharge.  *Your pain is not improving within 8-12 hours or is not gone within 24 hours. Call or return immediately if your pain is getting worse, changes location, or moves to your chest or back.  *You have shaking chills, or fever greater than 101.5 degrees Fahrenheit or 38 degrees Celsius.  *Any change in your symptoms, or any new symptoms that concern you.   Follow up with Dr. Spann in 1-2 weeks, please call [phone] to schedule your appointment.  You should receive an upright chest x-ray in 2 weeks.  Please flush the PEG tube with 30mL of sterile water once daily. The PEG should remain draining to gravity. You may administer medications through the PEG, please clamp for 30 minutes after administration.  TPN will be administered daily through the PICC line.     Ambulate as tolerated, but no heavy lifting (>10lbs) or strenuous exercise. You may resume regular diet. You should be urinating at least 3-4x per day. Call the office if you experience increasing abdominal pain, nausea, vomiting, or temperature >101 F.    Warning Signs:  Please call your doctor or nurse practitioner if you experience the following:  *You experience new chest pain, pressure, squeezing or tightness.  *New or worsening cough, shortness of breath, or wheeze.  *If you are vomiting and cannot keep down fluids or your medications.  *You are getting dehydrated due to continued vomiting, diarrhea, or other reasons. Signs of dehydration include dry mouth, rapid heartbeat, or feeling dizzy or faint when standing.  *You see blood or dark/black material when you vomit or have a bowel movement.  *You experience burning when you urinate, have blood in your urine, or experience a discharge.  *Your pain is not improving within 8-12 hours or is not gone within 24 hours. Call or return immediately if your pain is getting worse, changes location, or moves to your chest or back.  *You have shaking chills, or fever greater than 101.5 degrees Fahrenheit or 38 degrees Celsius.  *Any change in your symptoms, or any new symptoms that concern you.   Follow up with Dr. Spann in 1-2 weeks, please call [phone] to schedule your appointment.  You should receive a KUB x-ray in 2 weeks.  Please flush the PEG tube with 30mL of sterile water once daily. The PEG should remain draining to gravity. You may administer medications through the PEG, please clamp for 30 minutes after administration.  TPN will be administered daily through the PICC line.     Ambulate as tolerated, but no heavy lifting (>10lbs) or strenuous exercise. You may resume regular diet. You should be urinating at least 3-4x per day. Call the office if you experience increasing abdominal pain, nausea, vomiting, or temperature >101 F.    Warning Signs:  Please call your doctor or nurse practitioner if you experience the following:  *You experience new chest pain, pressure, squeezing or tightness.  *New or worsening cough, shortness of breath, or wheeze.  *If you are vomiting and cannot keep down fluids or your medications.  *You are getting dehydrated due to continued vomiting, diarrhea, or other reasons. Signs of dehydration include dry mouth, rapid heartbeat, or feeling dizzy or faint when standing.  *You see blood or dark/black material when you vomit or have a bowel movement.  *You experience burning when you urinate, have blood in your urine, or experience a discharge.  *Your pain is not improving within 8-12 hours or is not gone within 24 hours. Call or return immediately if your pain is getting worse, changes location, or moves to your chest or back.  *You have shaking chills, or fever greater than 101.5 degrees Fahrenheit or 38 degrees Celsius.  *Any change in your symptoms, or any new symptoms that concern you.

## 2023-10-11 NOTE — DISCHARGE NOTE PROVIDER - HOSPITAL COURSE
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 Per family, once patient's PO intake was increased about 2 weeks ago, he developed significant abdominal distention with SOB. He was taken to an OSH in [place] where he was managed conservatively with NGT decompression for a possible SBO. Operative management was discussed but family declined citing preference to return to [facility] where initial surgery was performed. Clinical status improved and he was discharged yesterday, but when he got home and took PO pills, he again developed abdominal distention with SOB, hence presentation to the ED.    Hospital course: Patient was admitted for SBO, serial abdominal exams, and decompression with NGT. HD1 TPN was started and new picc line was placed. HD2 patient had rectal temp of 100.6F, Blood cultures, UA, and CXR were all performed as infectious workup. CXR 10/6 was negative for any acute pathology, Blood cultures 10/6 had no growth to date >72 hrs, UA was positive for UTI and urine culture grew klebsiella pneumoniae. Patient was started on ceftriaxone IV for urinary tract infection. GI was consulted for placement of PEG ____.      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 Per family, once patient's PO intake was increased about 2 weeks ago, he developed significant abdominal distention with SOB. He was taken to an OSH in [place] where he was managed conservatively with NGT decompression for a possible SBO. Operative management was discussed but family declined citing preference to return to [facility] where initial surgery was performed. Clinical status improved and he was discharged yesterday, but when he got home and took PO pills, he again developed abdominal distention with SOB, hence presentation to the ED.    Hospital course: Patient was admitted for SBO, serial abdominal exams, and decompression with NGT. HD1 TPN was started and new picc line was placed. HD2 patient had rectal temp of 100.6F, Blood cultures, UA, and CXR were all performed as infectious workup. CXR 10/6 was negative for any acute pathology, Blood cultures 10/6 had no growth to date >72 hrs, UA was positive for UTI and urine culture grew klebsiella pneumoniae. Patient was started on ceftriaxone IV for urinary tract infection. The family refused surgical intervention for the small bowel obstruction, less invasive options were then explored with the family. GI was consulted for placement of PEG which occurred on 10/12. The patient's oral medications were continued through the PEG tube and his nasogastric tube was removed. The PEG was flushed twice daily and output was monitored. On day of discharge the patient was stable and both the patient and his family felt comfortable returning home.    The patient will return home with a PICC line and will receive daily TPN for nutritional support.           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 Per family, once patient's PO intake was increased about 2 weeks ago, he developed significant abdominal distention with SOB. He was taken to an OSH in [place] where he was managed conservatively with NGT decompression for a possible SBO. Operative management was discussed but family declined citing preference to return to [facility] where initial surgery was performed. Clinical status improved and he was discharged yesterday, but when he got home and took PO pills, he again developed abdominal distention with SOB, hence presentation to the ED.    Hospital course: Patient was admitted for SBO, serial abdominal exams, and decompression with NGT. HD1 TPN was started and new picc line was placed. HD2 patient had rectal temp of 100.6F, Blood cultures, UA, and CXR were all performed as infectious workup. CXR 10/6 was negative for any acute pathology, Blood cultures 10/6 had no growth to date >72 hrs, UA was positive for UTI and urine culture grew klebsiella pneumoniae. Patient was started on ceftriaxone IV for urinary tract infection. The family refused surgical intervention for the small bowel obstruction, less invasive options were then explored with the family. GI was consulted for placement of a venting PEG which occurred on 10/12. The patient's oral medications were continued through the PEG tube and his nasogastric tube was removed. The PEG was flushed twice daily and output was monitored. On day of discharge the patient was stable and both the patient and his family felt comfortable returning home.    The patient will return home without oxygen, he will have a PICC line and will receive daily TPN for nutritional support.

## 2023-10-11 NOTE — CHART NOTE - NSCHARTNOTEFT_GEN_A_CORE
Patient seen and examined at bedside, patient found resting comfortably asleep. He denies abdominal pain, no nausea or vomiting. He endorses passing flatus but denies bowel movements.    T(C): 36.8 (10-11-23 @ 09:10), Max: 38.4 (10-10-23 @ 20:48)  HR: 104 (10-11-23 @ 09:10) (97 - 173)  BP: 124/82 (10-11-23 @ 09:10) (111/74 - 148/87)  RR: 18 (10-11-23 @ 09:10) (16 - 18)  SpO2: 93% (10-11-23 @ 09:10) (77% - 94%)    CONSTITUTIONAL: Well groomed, no apparent distress. NGT in place with bilious output.  RESP: No respiratory distress, no use of accessory muscles; CTA b/l, no WRR  CV: RRR, +S1S2, no MRG; no JVD; no peripheral edema  GI: mildly distended, nontender, soft, no rebound or guarding  SKIN: No rashes or ulcers noted; no subcutaneous nodules or induration palpable    Physical exam has remained stable from previous. The team will continue to monitor with serial abdominal examinations.

## 2023-10-11 NOTE — CONSULT NOTE ADULT - ATTENDING COMMENTS
Seen/discussed with fellow at bedside  Reviewed all results and pertinent imaging personally  Agree with above  We have discussed with family that a venting PEG will not resolve underlying issue. If it cna be placed safely it will allow him to recover without an NGT but because they do not want any surgery at the time, they wish to proceed understanding all of the risks and shortcomings of this approach

## 2023-10-11 NOTE — CONSULT NOTE ADULT - ASSESSMENT
73M with PMH of CVA (R hemiparesis, on Eliquis), recurrent PE (last Jan 2022), diverticulosis, MDD, and jejunal Ca (s/p ex-lap SBR 7/6, prolonged post-op course c/b ileus and UTI, discharged on 7/30). Admitted to surgery for treatment of SBO, and family declining further surgical intervention at this time. GI consulted for venting peg tube placement.     CT abdomen/pelvis 10/09/23:   - high-grade small bowel obstruction, at the same location noted previously with increased distention of the small bowel loops proximal to   the point of obstruction  - stable small left pleural effusion    Course complicated by fevers, likely 2/2 to aspiration PNA. UA positive, but patient denies any UTI symptoms and blood cultures NGTD.     Recommendations:   - agree with continuing ceftriaxone to treat suspected aspiration PNA   - consider starting Flagyl for anaerobic coverage   - possible venting peg tube placement tomorrow if fever curve down-trends  - NPO except medications after midnight (patient is currently NPO and on TPN given SBO)     Case discussed with Dr. Jha. GI Team will continue to follow.     Racheal Mendoza D.O.   Gastroenterology Fellow  Weekday 7am-5pm Pager: [phone]  Weeknights/Weekend/Holiday Coverage: Please call the  for contact info

## 2023-10-11 NOTE — CONSULT NOTE ADULT - SUBJECTIVE AND OBJECTIVE BOX
Initial GI Consult Note:     HPI:       OVERNIGHT EVENTS:    SUBJECTIVE / INTERVAL HPI: Patient seen and examined at bedside.     VITAL SIGNS:  Vital Signs Last 24 Hrs  T(C): 37.4 (11 Oct 2023 06:31), Max: 38.4 (10 Oct 2023 20:48)  T(F): 99.4 (11 Oct 2023 06:31), Max: 101.1 (10 Oct 2023 20:48)  HR: 106 (11 Oct 2023 06:31) (97 - 173)  BP: 125/81 (11 Oct 2023 05:40) (111/74 - 148/87)  BP(mean): --  RR: 16 (11 Oct 2023 06:31) (16 - 19)  SpO2: 94% (11 Oct 2023 06:31) (77% - 94%)    Parameters below as of 11 Oct 2023 06:31  Patient On (Oxygen Delivery Method): nasal cannula  O2 Flow (L/min): 2      10-10-23 @ 07:01  -  10-11-23 @ 07:00  --------------------------------------------------------  IN: 3302.6 mL / OUT: 650 mL / NET: 2652.6 mL        PHYSICAL EXAM:    General: WDWN  HEENT: NC/AT; PERRL, anicteric sclera; MMM  Neck: supple  Cardiovascular: +S1/S2; RRR  Respiratory: CTA B/L; no W/R/R  Gastrointestinal: soft, NT/ND; +BSx4  Extremities: WWP; no edema, clubbing or cyanosis  Vascular: 2+ radial, DP/PT pulses B/L  Neurological: AAOx3; no focal deficits    MEDICATIONS:  MEDICATIONS  (STANDING):  cefTRIAXone   IVPB 1000 milliGRAM(s) IV Intermittent every 24 hours  chlorhexidine 2% Cloths 1 Application(s) Topical <User Schedule>  heparin  Infusion. 1800 Unit(s)/Hr (18 mL/Hr) IV Continuous <Continuous>  lactated ringers. 1000 milliLiter(s) (70 mL/Hr) IV Continuous <Continuous>  lipid, fat emulsion (Fish Oil and Plant Based) 20% Infusion 0.5834 Gm/kG/Day (20.83 mL/Hr) IV Continuous <Continuous>  pantoprazole  Injectable 40 milliGRAM(s) IV Push every 12 hours  Parenteral Nutrition - Adult 1 Each (50 mL/Hr) TPN Continuous <Continuous>  Parenteral Nutrition - Adult 1 Each (50 mL/Hr) TPN Continuous <Continuous>    MEDICATIONS  (PRN):  sodium chloride 0.9% lock flush 10 milliLiter(s) IV Push every 1 hour PRN Pre/post blood products, medications, blood draw, and to maintain line patency      ALLERGIES:  Allergies    No Known Allergies    Intolerances        LABS:                        9.7    5.51  )-----------( 381      ( 10 Oct 2023 19:46 )             29.1     10-10    135  |  100  |  12  ----------------------------<  136<H>  See Note   |  23  |  0.50    Ca    9.0      10 Oct 2023 05:30  Phos  3.1     10-10  Mg     2.0     10-10    TPro  7.0  /  Alb  2.5<L>  /  TBili  0.2  /  DBili  x   /  AST  30  /  ALT  28  /  AlkPhos  78  10-10    PTT - ( 11 Oct 2023 05:30 )  PTT:25.7 sec  Urinalysis Basic - ( 10 Oct 2023 05:30 )    Color: x / Appearance: x / SG: x / pH: x  Gluc: 136 mg/dL / Ketone: x  / Bili: x / Urobili: x   Blood: x / Protein: x / Nitrite: x   Leuk Esterase: x / RBC: x / WBC x   Sq Epi: x / Non Sq Epi: x / Bacteria: x      CAPILLARY BLOOD GLUCOSE            RADIOLOGY & ADDITIONAL TESTS: Reviewed.    ASSESSMENT:    PLAN:      Initial GI Consult Note:     HPI:   73M with PMH of CVA (R hemiparesis, on Eliquis), recurrent PE (last Jan 2022), diverticulosis, MDD, and jejunal Ca (s/p ex-lap SBR 7/6, prolonged post-op course c/b ileus and UTI, discharged on 7/30). Patient was initially brought into the hospital as 2 weeks ago he developed significant abdominal distention with SOB, which was managed conservatively with NGT decompression for a possible SBO at an OSH. Operative management was discussed, but family declined citing preference to return to [facility] where initial surgery was performed. Per notes, patient clinically improved and was discharged, but then again developed abdominal distension and SOB, so came to [facility] for further management of SBO. GI consulted for possible venting peg tube placement. Patient seen and examined at bedside. Currently with NG tube in place. Patient appears comfortable and says that he is passing flatus but has not had any bowel movements in days. Denies any nausea/vomiting. Aware of plans for possible venting peg tube placement. Denies any abdominal pain, SOB, cough, pain with urination, rigors, or chills, though persistently febrile.       VITAL SIGNS:  Vital Signs Last 24 Hrs  T(C): 37.4 (11 Oct 2023 06:31), Max: 38.4 (10 Oct 2023 20:48)  T(F): 99.4 (11 Oct 2023 06:31), Max: 101.1 (10 Oct 2023 20:48)  HR: 106 (11 Oct 2023 06:31) (97 - 173)  BP: 125/81 (11 Oct 2023 05:40) (111/74 - 148/87)  BP(mean): --  RR: 16 (11 Oct 2023 06:31) (16 - 19)  SpO2: 94% (11 Oct 2023 06:31) (77% - 94%)    Parameters below as of 11 Oct 2023 06:31  Patient On (Oxygen Delivery Method): nasal cannula  O2 Flow (L/min): 2      10-10-23 @ 07:01  -  10-11-23 @ 07:00  --------------------------------------------------------  IN: 3302.6 mL / OUT: 650 mL / NET: 2652.6 mL      PHYSICAL EXAM:  General: No acute distress, thin frail  Lungs: Normal respiratory effort and no intercostal retractions  Cardiovascular: RRR  Abdomen: Soft, non-tender, mildly distended, no rebound or guarding, NG tube in place   Neurological: Conversant, moves all extremities spontaneously   Skin: Warm and dry. No obvious rash        MEDICATIONS:  MEDICATIONS  (STANDING):  cefTRIAXone   IVPB 1000 milliGRAM(s) IV Intermittent every 24 hours  chlorhexidine 2% Cloths 1 Application(s) Topical <User Schedule>  heparin  Infusion. 1800 Unit(s)/Hr (18 mL/Hr) IV Continuous <Continuous>  lactated ringers. 1000 milliLiter(s) (70 mL/Hr) IV Continuous <Continuous>  lipid, fat emulsion (Fish Oil and Plant Based) 20% Infusion 0.5834 Gm/kG/Day (20.83 mL/Hr) IV Continuous <Continuous>  pantoprazole  Injectable 40 milliGRAM(s) IV Push every 12 hours  Parenteral Nutrition - Adult 1 Each (50 mL/Hr) TPN Continuous <Continuous>  Parenteral Nutrition - Adult 1 Each (50 mL/Hr) TPN Continuous <Continuous>    MEDICATIONS  (PRN):  sodium chloride 0.9% lock flush 10 milliLiter(s) IV Push every 1 hour PRN Pre/post blood products, medications, blood draw, and to maintain line patency      ALLERGIES:  Allergies    No Known Allergies    Intolerances        LABS:                        9.7    5.51  )-----------( 381      ( 10 Oct 2023 19:46 )             29.1     10-10    135  |  100  |  12  ----------------------------<  136<H>  See Note   |  23  |  0.50    Ca    9.0      10 Oct 2023 05:30  Phos  3.1     10-10  Mg     2.0     10-10    TPro  7.0  /  Alb  2.5<L>  /  TBili  0.2  /  DBili  x   /  AST  30  /  ALT  28  /  AlkPhos  78  10-10    PTT - ( 11 Oct 2023 05:30 )  PTT:25.7 sec  Urinalysis Basic - ( 10 Oct 2023 05:30 )    Color: x / Appearance: x / SG: x / pH: x  Gluc: 136 mg/dL / Ketone: x  / Bili: x / Urobili: x   Blood: x / Protein: x / Nitrite: x   Leuk Esterase: x / RBC: x / WBC x   Sq Epi: x / Non Sq Epi: x / Bacteria: x      CAPILLARY BLOOD GLUCOSE  RADIOLOGY & ADDITIONAL TESTS: Reviewed.         Initial GI Consult Note:     HPI:   73M with PMH of CVA (R hemiparesis, on Eliquis), recurrent PE (last Jan 2022), diverticulosis, MDD, and jejunal Ca (s/p ex-lap SBR 7/6, prolonged post-op course c/b ileus and UTI, discharged on 7/30). Patient was initially brought into the hospital as 2 weeks ago he developed significant abdominal distention with SOB, which was managed conservatively with NGT decompression for a possible SBO at an OSH. Operative management was discussed, but family declined citing preference to return to [facility] where initial surgery was performed. Per notes, patient clinically improved and was discharged, but then again developed abdominal distension and SOB, so came to [facility] for further management of SBO. GI consulted for possible venting peg tube placement. Patient seen and examined at bedside. Currently with NG tube in place. Patient appears comfortable and says that he is passing flatus but has not had any bowel movements in days. Denies any nausea/vomiting. Aware of plans for possible venting peg tube placement. Denies any abdominal pain, SOB, cough, pain with urination, rigors, or chills, though persistently febrile.       VITAL SIGNS:  Vital Signs Last 24 Hrs  T(C): 37.4 (11 Oct 2023 06:31), Max: 38.4 (10 Oct 2023 20:48)  T(F): 99.4 (11 Oct 2023 06:31), Max: 101.1 (10 Oct 2023 20:48)  HR: 106 (11 Oct 2023 06:31) (97 - 173)  BP: 125/81 (11 Oct 2023 05:40) (111/74 - 148/87)  BP(mean): --  RR: 16 (11 Oct 2023 06:31) (16 - 19)  SpO2: 94% (11 Oct 2023 06:31) (77% - 94%)    Parameters below as of 11 Oct 2023 06:31  Patient On (Oxygen Delivery Method): nasal cannula  O2 Flow (L/min): 2      10-10-23 @ 07:01  -  10-11-23 @ 07:00  --------------------------------------------------------  IN: 3302.6 mL / OUT: 650 mL / NET: 2652.6 mL      PHYSICAL EXAM:  General: No acute distress, thin frail  Lungs: Normal respiratory effort and no intercostal retractions  Cardiovascular: RRR  Abdomen: Soft, non-tender, mildly distended, no rebound or guarding, NG tube in place   Neurological: Conversant, moves all extremities spontaneously   Skin: Warm and dry. No obvious rash    social  No drinking or smoking    MEDICATIONS:  MEDICATIONS  (STANDING):  cefTRIAXone   IVPB 1000 milliGRAM(s) IV Intermittent every 24 hours  chlorhexidine 2% Cloths 1 Application(s) Topical <User Schedule>  heparin  Infusion. 1800 Unit(s)/Hr (18 mL/Hr) IV Continuous <Continuous>  lactated ringers. 1000 milliLiter(s) (70 mL/Hr) IV Continuous <Continuous>  lipid, fat emulsion (Fish Oil and Plant Based) 20% Infusion 0.5834 Gm/kG/Day (20.83 mL/Hr) IV Continuous <Continuous>  pantoprazole  Injectable 40 milliGRAM(s) IV Push every 12 hours  Parenteral Nutrition - Adult 1 Each (50 mL/Hr) TPN Continuous <Continuous>  Parenteral Nutrition - Adult 1 Each (50 mL/Hr) TPN Continuous <Continuous>    MEDICATIONS  (PRN):  sodium chloride 0.9% lock flush 10 milliLiter(s) IV Push every 1 hour PRN Pre/post blood products, medications, blood draw, and to maintain line patency      ALLERGIES:  Allergies    No Known Allergies    Intolerances        LABS:                        9.7    5.51  )-----------( 381      ( 10 Oct 2023 19:46 )             29.1     10-10    135  |  100  |  12  ----------------------------<  136<H>  See Note   |  23  |  0.50    Ca    9.0      10 Oct 2023 05:30  Phos  3.1     10-10  Mg     2.0     10-10    TPro  7.0  /  Alb  2.5<L>  /  TBili  0.2  /  DBili  x   /  AST  30  /  ALT  28  /  AlkPhos  78  10-10    PTT - ( 11 Oct 2023 05:30 )  PTT:25.7 sec  Urinalysis Basic - ( 10 Oct 2023 05:30 )    Color: x / Appearance: x / SG: x / pH: x  Gluc: 136 mg/dL / Ketone: x  / Bili: x / Urobili: x   Blood: x / Protein: x / Nitrite: x   Leuk Esterase: x / RBC: x / WBC x   Sq Epi: x / Non Sq Epi: x / Bacteria: x      CAPILLARY BLOOD GLUCOSE  RADIOLOGY & ADDITIONAL TESTS: Reviewed.

## 2023-10-11 NOTE — DISCHARGE NOTE PROVIDER - NSDCCPCAREPLAN_GEN_ALL_CORE_FT
PRINCIPAL DISCHARGE DIAGNOSIS  Diagnosis: Small bowel obstruction  Assessment and Plan of Treatment:       SECONDARY DISCHARGE DIAGNOSES  Diagnosis: History of CVA (cerebrovascular accident)  Assessment and Plan of Treatment:     Diagnosis: Recurrent pulmonary embolism  Assessment and Plan of Treatment:     Diagnosis: Jejunal cancer  Assessment and Plan of Treatment:     Diagnosis: H/O resection of small bowel  Assessment and Plan of Treatment:     Diagnosis: Diverticulosis  Assessment and Plan of Treatment:     Diagnosis: Low serum albumin  Assessment and Plan of Treatment:     Diagnosis: MDD (major depressive disorder)  Assessment and Plan of Treatment:     Diagnosis: UTI due to Klebsiella species  Assessment and Plan of Treatment:

## 2023-10-11 NOTE — DISCHARGE NOTE PROVIDER - NSDCMRMEDTOKEN_GEN_ALL_CORE_FT
acetaminophen 325 mg oral tablet: 2 tab(s) orally every 6 hours As needed Temp greater or equal to 38C (100.4F), Mild Pain (1 - 3), Moderate Pain (4 - 6), Severe Pain (7 - 10)  clotrimazole 1% topical cream: Apply topically to affected area every 12 hours apply to buttocks every 12 hours  Eliquis 2.5 mg oral tablet: 1 tab(s) orally every 12 hours  Flomax 0.4 mg oral capsule: 1 orally once a day (at bedtime)  magnesium hydroxide 1200 mg/15 mL oral liquid: 15 milliliter(s) orally once a day (in the morning)  pantoprazole 40 mg oral delayed release tablet: 1 tab(s) orally once a day (at bedtime)  Senna 8.6 mg oral tablet: 2 tab(s) orally once a day (at bedtime)  triamcinolone 0.1% topical cream: Apply topically to affected area every 8 hours apply to right wrist rash  Zoloft 50 mg oral tablet: 1 orally once a day (at bedtime)   acetaminophen 325 mg oral tablet: 2 tab(s) orally every 6 hours As needed Temp greater or equal to 38C (100.4F), Mild Pain (1 - 3), Moderate Pain (4 - 6), Severe Pain (7 - 10)  clotrimazole 1% topical cream: Apply topically to affected area every 12 hours apply to buttocks every 12 hours  Eliquis 2.5 mg oral tablet: 1 tab(s) orally every 12 hours  fat emulsion with fish, medium chain, olive, and soy oil 20% intravenous emulsion: intravenous once a day  Flomax 0.4 mg oral capsule: 1 orally once a day (at bedtime)  Lypholyte II/Nutrilyte II/TPN Electrolytes intravenous solution: 1 each intravenous once a day  magnesium hydroxide 1200 mg/15 mL oral liquid: 15 milliliter(s) orally once a day (in the morning)  pantoprazole 40 mg oral delayed release tablet: 1 tab(s) orally once a day (at bedtime)  triamcinolone 0.1% topical cream: Apply topically to affected area every 8 hours apply to right wrist rash  Zoloft 50 mg oral tablet: 1 orally once a day (at bedtime)   acetaminophen 325 mg oral tablet: 2 tab(s) orally every 6 hours As needed Temp greater or equal to 38C (100.4F), Mild Pain (1 - 3), Moderate Pain (4 - 6), Severe Pain (7 - 10)  clotrimazole 1% topical cream: Apply topically to affected area every 12 hours apply to buttocks every 12 hours  Eliquis 2.5 mg oral tablet: 1 tab(s) orally every 12 hours  fat emulsion with fish, medium chain, olive, and soy oil 20% intravenous emulsion: intravenous once a day  Flomax 0.4 mg oral capsule: 1 orally once a day (at bedtime)  KUB X-Ray: Please go for KUB X-ray in 2 weeks MDD: 1  Lypholyte II/Nutrilyte II/TPN Electrolytes intravenous solution: 1 each intravenous once a day  magnesium hydroxide 1200 mg/15 mL oral liquid: 15 milliliter(s) orally once a day (in the morning)  pantoprazole 40 mg oral delayed release tablet: 1 tab(s) orally once a day (at bedtime)  triamcinolone 0.1% topical cream: Apply topically to affected area every 8 hours apply to right wrist rash  Zoloft 50 mg oral tablet: 1 orally once a day (at bedtime)

## 2023-10-12 ENCOUNTER — TRANSCRIPTION ENCOUNTER (OUTPATIENT)
Age: 73
End: 2023-10-12

## 2023-10-12 LAB
ALBUMIN SERPL ELPH-MCNC: 2.6 G/DL — LOW (ref 3.3–5)
ALP SERPL-CCNC: 69 U/L — SIGNIFICANT CHANGE UP (ref 40–120)
ALT FLD-CCNC: 19 U/L — SIGNIFICANT CHANGE UP (ref 10–45)
ANION GAP SERPL CALC-SCNC: 9 MMOL/L — SIGNIFICANT CHANGE UP (ref 5–17)
APTT BLD: 31.7 SEC — SIGNIFICANT CHANGE UP (ref 24.5–35.6)
APTT BLD: 58.7 SEC — HIGH (ref 24.5–35.6)
AST SERPL-CCNC: 14 U/L — SIGNIFICANT CHANGE UP (ref 10–40)
BILIRUB SERPL-MCNC: 0.2 MG/DL — SIGNIFICANT CHANGE UP (ref 0.2–1.2)
BUN SERPL-MCNC: 12 MG/DL — SIGNIFICANT CHANGE UP (ref 7–23)
CALCIUM SERPL-MCNC: 8.9 MG/DL — SIGNIFICANT CHANGE UP (ref 8.4–10.5)
CHLORIDE SERPL-SCNC: 104 MMOL/L — SIGNIFICANT CHANGE UP (ref 96–108)
CO2 SERPL-SCNC: 25 MMOL/L — SIGNIFICANT CHANGE UP (ref 22–31)
CREAT SERPL-MCNC: 0.62 MG/DL — SIGNIFICANT CHANGE UP (ref 0.5–1.3)
EGFR: 102 ML/MIN/1.73M2 — SIGNIFICANT CHANGE UP
GLUCOSE SERPL-MCNC: 130 MG/DL — HIGH (ref 70–99)
HCT VFR BLD CALC: 28.3 % — LOW (ref 39–50)
HGB BLD-MCNC: 8.9 G/DL — LOW (ref 13–17)
MAGNESIUM SERPL-MCNC: 2.1 MG/DL — SIGNIFICANT CHANGE UP (ref 1.6–2.6)
MCHC RBC-ENTMCNC: 26.2 PG — LOW (ref 27–34)
MCHC RBC-ENTMCNC: 31.4 GM/DL — LOW (ref 32–36)
MCV RBC AUTO: 83.2 FL — SIGNIFICANT CHANGE UP (ref 80–100)
NRBC # BLD: 0 /100 WBCS — SIGNIFICANT CHANGE UP (ref 0–0)
PHOSPHATE SERPL-MCNC: 4 MG/DL — SIGNIFICANT CHANGE UP (ref 2.5–4.5)
PLATELET # BLD AUTO: 267 K/UL — SIGNIFICANT CHANGE UP (ref 150–400)
POTASSIUM SERPL-MCNC: 4.2 MMOL/L — SIGNIFICANT CHANGE UP (ref 3.5–5.3)
POTASSIUM SERPL-SCNC: 4.2 MMOL/L — SIGNIFICANT CHANGE UP (ref 3.5–5.3)
PROT SERPL-MCNC: 6.6 G/DL — SIGNIFICANT CHANGE UP (ref 6–8.3)
RBC # BLD: 3.4 M/UL — LOW (ref 4.2–5.8)
RBC # FLD: 17.1 % — HIGH (ref 10.3–14.5)
SODIUM SERPL-SCNC: 138 MMOL/L — SIGNIFICANT CHANGE UP (ref 135–145)
TRIGL SERPL-MCNC: 104 MG/DL — SIGNIFICANT CHANGE UP
WBC # BLD: 6.92 K/UL — SIGNIFICANT CHANGE UP (ref 3.8–10.5)
WBC # FLD AUTO: 6.92 K/UL — SIGNIFICANT CHANGE UP (ref 3.8–10.5)

## 2023-10-12 PROCEDURE — 43246 EGD PLACE GASTROSTOMY TUBE: CPT

## 2023-10-12 DEVICE — PEG KT SAFETY 20FR: Type: IMPLANTABLE DEVICE | Status: FUNCTIONAL

## 2023-10-12 RX ORDER — I.V. FAT EMULSION 20 G/100ML
0.58 EMULSION INTRAVENOUS
Qty: 50 | Refills: 0 | Status: DISCONTINUED | OUTPATIENT
Start: 2023-10-12 | End: 2023-10-12

## 2023-10-12 RX ORDER — SODIUM CHLORIDE 0.65 %
1 AEROSOL, SPRAY (ML) NASAL ONCE
Refills: 0 | Status: COMPLETED | OUTPATIENT
Start: 2023-10-12 | End: 2023-10-12

## 2023-10-12 RX ORDER — SODIUM CHLORIDE 9 MG/ML
500 INJECTION, SOLUTION INTRAVENOUS ONCE
Refills: 0 | Status: COMPLETED | OUTPATIENT
Start: 2023-10-12 | End: 2023-10-12

## 2023-10-12 RX ORDER — ELECTROLYTE SOLUTION,INJ
1 VIAL (ML) INTRAVENOUS
Refills: 0 | Status: DISCONTINUED | OUTPATIENT
Start: 2023-10-12 | End: 2023-10-12

## 2023-10-12 RX ADMIN — Medication 1 SPRAY(S): at 23:34

## 2023-10-12 RX ADMIN — SODIUM CHLORIDE 1000 MILLILITER(S): 9 INJECTION, SOLUTION INTRAVENOUS at 00:32

## 2023-10-12 RX ADMIN — CEFTRIAXONE 100 MILLIGRAM(S): 500 INJECTION, POWDER, FOR SOLUTION INTRAMUSCULAR; INTRAVENOUS at 18:41

## 2023-10-12 RX ADMIN — PANTOPRAZOLE SODIUM 40 MILLIGRAM(S): 20 TABLET, DELAYED RELEASE ORAL at 18:43

## 2023-10-12 RX ADMIN — Medication 1 EACH: at 18:41

## 2023-10-12 RX ADMIN — PANTOPRAZOLE SODIUM 40 MILLIGRAM(S): 20 TABLET, DELAYED RELEASE ORAL at 06:00

## 2023-10-12 RX ADMIN — HEPARIN SODIUM 20.5 UNIT(S)/HR: 5000 INJECTION INTRAVENOUS; SUBCUTANEOUS at 03:10

## 2023-10-12 RX ADMIN — I.V. FAT EMULSION 20.83 GM/KG/DAY: 20 EMULSION INTRAVENOUS at 18:42

## 2023-10-12 RX ADMIN — CHLORHEXIDINE GLUCONATE 1 APPLICATION(S): 213 SOLUTION TOPICAL at 06:00

## 2023-10-12 NOTE — CHART NOTE - NSCHARTNOTEFT_GEN_A_CORE
Patient examined at bedside w/o complaints of nausea or vomiting. Patient physical exam, mildly distended, NT. No rebound or guarding. Venting PEG attached to gravity via rogers bag. Will continue to monitor

## 2023-10-12 NOTE — CHART NOTE - NSCHARTNOTEFT_GEN_A_CORE
PEG placed in endo suite for SBO given that family wants to trial conservative management for longer.    Successful placement of 20Fr Endovive PEG with outer flange at 4cm.    Plan:	  - Can use PEG for venting now  - Return to floor for further management    Jerardo (Abbie) MD Yuri  Gastroenterology Fellow  Pager (M-F 7a-5p): [phone]  Pager (after hours): Please call  for on-call fellow

## 2023-10-12 NOTE — CHART NOTE - NSCHARTNOTEFT_GEN_A_CORE
Patient examined at bedside, sleeping. NGT connected to LIWS. Physical exam unchanged distension, NT. No rebound or guarding.     Will continue to monitor.

## 2023-10-12 NOTE — DISCHARGE NOTE NURSING/CASE MANAGEMENT/SOCIAL WORK - PATIENT PORTAL LINK FT
You can access the FollowMyHealth Patient Portal offered by [facility] by registering at the following website: [email or web address]. By joining [email or web address] FollowMyHealth portal, you will also be able to view your health information using other applications (apps) compatible with our system.

## 2023-10-13 LAB
ANION GAP SERPL CALC-SCNC: 10 MMOL/L — SIGNIFICANT CHANGE UP (ref 5–17)
APTT BLD: 30.2 SEC — SIGNIFICANT CHANGE UP (ref 24.5–35.6)
BLD GP AB SCN SERPL QL: NEGATIVE — SIGNIFICANT CHANGE UP
BUN SERPL-MCNC: 14 MG/DL — SIGNIFICANT CHANGE UP (ref 7–23)
CALCIUM SERPL-MCNC: 9.2 MG/DL — SIGNIFICANT CHANGE UP (ref 8.4–10.5)
CHLORIDE SERPL-SCNC: 98 MMOL/L — SIGNIFICANT CHANGE UP (ref 96–108)
CO2 SERPL-SCNC: 26 MMOL/L — SIGNIFICANT CHANGE UP (ref 22–31)
CREAT SERPL-MCNC: 0.6 MG/DL — SIGNIFICANT CHANGE UP (ref 0.5–1.3)
EGFR: 103 ML/MIN/1.73M2 — SIGNIFICANT CHANGE UP
GLUCOSE SERPL-MCNC: 131 MG/DL — HIGH (ref 70–99)
HCT VFR BLD CALC: 28.8 % — LOW (ref 39–50)
HGB BLD-MCNC: 9 G/DL — LOW (ref 13–17)
INR BLD: 1.27 — HIGH (ref 0.85–1.18)
MAGNESIUM SERPL-MCNC: 1.9 MG/DL — SIGNIFICANT CHANGE UP (ref 1.6–2.6)
MCHC RBC-ENTMCNC: 26.2 PG — LOW (ref 27–34)
MCHC RBC-ENTMCNC: 31.3 GM/DL — LOW (ref 32–36)
MCV RBC AUTO: 83.7 FL — SIGNIFICANT CHANGE UP (ref 80–100)
NRBC # BLD: 0 /100 WBCS — SIGNIFICANT CHANGE UP (ref 0–0)
PHOSPHATE SERPL-MCNC: 3.7 MG/DL — SIGNIFICANT CHANGE UP (ref 2.5–4.5)
PLATELET # BLD AUTO: 251 K/UL — SIGNIFICANT CHANGE UP (ref 150–400)
POTASSIUM SERPL-MCNC: 4.4 MMOL/L — SIGNIFICANT CHANGE UP (ref 3.5–5.3)
POTASSIUM SERPL-SCNC: 4.4 MMOL/L — SIGNIFICANT CHANGE UP (ref 3.5–5.3)
PROTHROM AB SERPL-ACNC: 14.4 SEC — HIGH (ref 9.5–13)
RBC # BLD: 3.44 M/UL — LOW (ref 4.2–5.8)
RBC # FLD: 17 % — HIGH (ref 10.3–14.5)
RH IG SCN BLD-IMP: POSITIVE — SIGNIFICANT CHANGE UP
SODIUM SERPL-SCNC: 134 MMOL/L — LOW (ref 135–145)
WBC # BLD: 8.63 K/UL — SIGNIFICANT CHANGE UP (ref 3.8–10.5)
WBC # FLD AUTO: 8.63 K/UL — SIGNIFICANT CHANGE UP (ref 3.8–10.5)

## 2023-10-13 PROCEDURE — 71046 X-RAY EXAM CHEST 2 VIEWS: CPT | Mod: 26

## 2023-10-13 PROCEDURE — 74019 RADEX ABDOMEN 2 VIEWS: CPT | Mod: 26

## 2023-10-13 RX ORDER — I.V. FAT EMULSION 20 G/100ML
0.58 EMULSION INTRAVENOUS
Qty: 50 | Refills: 0 | Status: DISCONTINUED | OUTPATIENT
Start: 2023-10-13 | End: 2023-10-13

## 2023-10-13 RX ORDER — APIXABAN 2.5 MG/1
2.5 TABLET, FILM COATED ORAL EVERY 12 HOURS
Refills: 0 | Status: DISCONTINUED | OUTPATIENT
Start: 2023-10-13 | End: 2023-10-16

## 2023-10-13 RX ORDER — TAMSULOSIN HYDROCHLORIDE 0.4 MG/1
0.4 CAPSULE ORAL AT BEDTIME
Refills: 0 | Status: DISCONTINUED | OUTPATIENT
Start: 2023-10-13 | End: 2023-10-16

## 2023-10-13 RX ORDER — ELECTROLYTE SOLUTION,INJ
1 VIAL (ML) INTRAVENOUS
Refills: 0 | Status: DISCONTINUED | OUTPATIENT
Start: 2023-10-13 | End: 2023-10-13

## 2023-10-13 RX ORDER — BENZOCAINE AND MENTHOL 5; 1 G/100ML; G/100ML
1 LIQUID ORAL EVERY 4 HOURS
Refills: 0 | Status: DISCONTINUED | OUTPATIENT
Start: 2023-10-13 | End: 2023-10-16

## 2023-10-13 RX ORDER — ACETAMINOPHEN 500 MG
1000 TABLET ORAL ONCE
Refills: 0 | Status: DISCONTINUED | OUTPATIENT
Start: 2023-10-13 | End: 2023-10-16

## 2023-10-13 RX ADMIN — BENZOCAINE AND MENTHOL 1 LOZENGE: 5; 1 LIQUID ORAL at 04:49

## 2023-10-13 RX ADMIN — APIXABAN 2.5 MILLIGRAM(S): 2.5 TABLET, FILM COATED ORAL at 23:47

## 2023-10-13 RX ADMIN — PANTOPRAZOLE SODIUM 40 MILLIGRAM(S): 20 TABLET, DELAYED RELEASE ORAL at 05:34

## 2023-10-13 RX ADMIN — I.V. FAT EMULSION 20.83 GM/KG/DAY: 20 EMULSION INTRAVENOUS at 19:00

## 2023-10-13 RX ADMIN — CHLORHEXIDINE GLUCONATE 1 APPLICATION(S): 213 SOLUTION TOPICAL at 05:34

## 2023-10-13 RX ADMIN — PANTOPRAZOLE SODIUM 40 MILLIGRAM(S): 20 TABLET, DELAYED RELEASE ORAL at 19:03

## 2023-10-13 RX ADMIN — Medication 1 EACH: at 19:00

## 2023-10-13 RX ADMIN — CEFTRIAXONE 100 MILLIGRAM(S): 500 INJECTION, POWDER, FOR SOLUTION INTRAMUSCULAR; INTRAVENOUS at 19:01

## 2023-10-13 RX ADMIN — APIXABAN 2.5 MILLIGRAM(S): 2.5 TABLET, FILM COATED ORAL at 12:43

## 2023-10-13 RX ADMIN — TAMSULOSIN HYDROCHLORIDE 0.4 MILLIGRAM(S): 0.4 CAPSULE ORAL at 22:35

## 2023-10-13 NOTE — PROGRESS NOTE ADULT - SUBJECTIVE AND OBJECTIVE BOX
INTERVAL HPI/OVERNIGHT EVENTS: tachy 102. venting peg connected to gravity. MILEY Mildly distended/ NT. Temp 100.4F oral asym, immediate repeat temp 99.9F w/o intervention. Missed voids    SUBJECTIVE: Patient seen and examined at bedside with chief resident. Patient denies abdominal pain, nausea, vomiting. Patient is tolerating his PEG well. Complains of continued cough overnight.      apixaban 2.5 milliGRAM(s) Oral every 12 hours  cefTRIAXone   IVPB 1000 milliGRAM(s) IV Intermittent every 24 hours      Vital Signs Last 24 Hrs  T(C): 36.8 (13 Oct 2023 16:25), Max: 38 (12 Oct 2023 20:41)  T(F): 98.2 (13 Oct 2023 16:25), Max: 100.4 (12 Oct 2023 20:41)  HR: 106 (13 Oct 2023 16:25) (95 - 107)  BP: 119/82 (13 Oct 2023 16:25) (105/70 - 122/84)  BP(mean): --  RR: 17 (13 Oct 2023 16:25) (14 - 17)  SpO2: 96% (13 Oct 2023 16:25) (94% - 97%)    Parameters below as of 13 Oct 2023 16:25  Patient On (Oxygen Delivery Method): nasal cannula      I&O's Detail    12 Oct 2023 07:01  -  13 Oct 2023 07:00  --------------------------------------------------------  IN:    Fat Emulsion (Fish Oil &amp; Plant Based) 20% Infusion: 208 mL    Lactated Ringers: 700 mL    TPN (Total Parenteral Nutrition): 500 mL  Total IN: 1408 mL    OUT:    Nasogastric/Oral tube (mL): 100 mL    PEG (Percutaneous Endoscopic Gastrostomy) Tube (mL): 0 mL    Voided (mL): 50 mL  Total OUT: 150 mL    Total NET: 1258 mL          General: NAD, resting comfortably in bed  C/V: NSR  Pulm: Nonlabored breathing, no respiratory distress  Abd: mildly distended, nontender, soft, venting PEG with minimal output  Extrem: WWP, no edema, SCDs in place        LABS:                        9.0    8.63  )-----------( 251      ( 13 Oct 2023 07:41 )             28.8     10-13    134<L>  |  98  |  14  ----------------------------<  131<H>  4.4   |  26  |  0.60    Ca    9.2      13 Oct 2023 07:41  Phos  3.7     10-13  Mg     1.9     10-13    TPro  6.6  /  Alb  2.6<L>  /  TBili  0.2  /  DBili  x   /  AST  14  /  ALT  19  /  AlkPhos  69  10-12    PT/INR - ( 13 Oct 2023 07:41 )   PT: 14.4 sec;   INR: 1.27          PTT - ( 13 Oct 2023 07:41 )  PTT:30.2 sec  Urinalysis Basic - ( 13 Oct 2023 07:41 )    Color: x / Appearance: x / SG: x / pH: x  Gluc: 131 mg/dL / Ketone: x  / Bili: x / Urobili: x   Blood: x / Protein: x / Nitrite: x   Leuk Esterase: x / RBC: x / WBC x   Sq Epi: x / Non Sq Epi: x / Bacteria: x        RADIOLOGY & ADDITIONAL STUDIES:

## 2023-10-13 NOTE — CHART NOTE - NSCHARTNOTEFT_GEN_A_CORE
Admitting Diagnosis:   Patient is a 73y old  Male who presents with a chief complaint of Abdominal distention; r/o SBO (12 Oct 2023 07:30)  PAST MEDICAL & SURGICAL HISTORY:  Depression, major  Stroke  Pulmonary embolism  BPH (benign prostatic hyperplasia)  Diverticulosis    Current Nutrition Order:  NPO  **TPN via central line: 250g dextrose, 91g amino acids, 50g lipids providing 1714 calories, 91g protein, GIR of 2.03 based on actual body weight of 85.7kg, 24.6 calories/kg ideal body weight (69.kg), 1.3g protein per kg of ideal body weight (69.8kg).     PO Intake: Good (%) [   ]  Fair (50-75%) [   ] Poor (<25%) [   ] *NPO [ x ]*    GI Issues: no noted nausea, no emesis noted, distention noted, NGT has been discontinued, now pt has a venting PEG set to gravity.     Pain: no noted pain    Skin Integrity: stage I pressure ulcer to L buttocks; no edema noted; Dante: 12    Labs:   10-13    134<L>  |  98  |  14  ----------------------------<  131<H>  4.4   |  26  |  0.60    Ca    9.2      13 Oct 2023 07:41  Phos  3.7     10-13  Mg     1.9     10-13    TPro  6.6  /  Alb  2.6<L>  /  TBili  0.2  /  DBili  x   /  AST  14  /  ALT  19  /  AlkPhos  69  10-12    CAPILLARY BLOOD GLUCOSE    Medications:  MEDICATIONS  (STANDING):  apixaban 2.5 milliGRAM(s) Oral every 12 hours  cefTRIAXone   IVPB 1000 milliGRAM(s) IV Intermittent every 24 hours  chlorhexidine 2% Cloths 1 Application(s) Topical <User Schedule>  lactated ringers. 1000 milliLiter(s) (70 mL/Hr) IV Continuous <Continuous>  lipid, fat emulsion (Fish Oil and Plant Based) 20% Infusion 0.58 Gm/kG/Day (20.83 mL/Hr) IV Continuous <Continuous>  pantoprazole  Injectable 40 milliGRAM(s) IV Push every 12 hours  Parenteral Nutrition - Adult 1 Each (50 mL/Hr) TPN Continuous <Continuous>  Parenteral Nutrition - Adult 1 Each (50 mL/Hr) TPN Continuous <Continuous>    MEDICATIONS  (PRN):  acetaminophen   IVPB .. 1000 milliGRAM(s) IV Intermittent once PRN Temp greater or equal to 38C (100.4F), Moderate Pain (4 - 6)  benzocaine/menthol Lozenge 1 Lozenge Oral every 4 hours PRN Sore Throat  sodium chloride 0.9% lock flush 10 milliLiter(s) IV Push every 1 hour PRN Pre/post blood products, medications, blood draw, and to maintain line patency    Dosing Anthropometrics:   Height for BMI (FEET)	5 Feet  Height for BMI (INCHES)	8 Inch(s)  Height for BMI (CM)	172.72 Centimeter(s)  Weight for BMI (lbs)	188.9 lb  Weight for BMI (kg)	85.7 kg  Body Mass Index	              28.7     Weight Change: new weight on 10/12/23 is 85.7kg, same as admission weight; recommend that nursing obtain updated weights biweekly; RD to continue to trend.     Nutrition Focused Physical Exam: Completed [   ]  Not Pertinent [ x ]  Muscle Wasting- Temporal [   ]  Clavicle/Pectoral [   ]  Shoulder/Deltoid [   ]  Scapula [   ]  Interosseous [   ]  Quadriceps [   ]  Gastrocnemius [   ]  Fat Wasting- Orbital [   ]  Buccal [   ]  Triceps [   ]  Rib [   ]  Suspect [PCM] 2/2 to physical assessment, [poor intake], and [wt loss]; please see malnutrition chart note.    Estimated energy needs:   Weight used for calculations	ideal body weight   Estimated Energy Needs Weight (lbs)	154 lb  Estimated Energy Needs Weight (kg)	69.8 kg  Estimated Energy Needs From (jose l/kg)25   Estimated Energy Needs To (jose l/kg)	30   calories: 4974-9601 calories/day   Weight used for calculations	ideal body weight    Estimated Protein Needs Weight (lbs)	154 lb  Estimated Protein Needs Weight (kg)	69.8 kg  Estimated Protein Needs From (g/kg)	1.2   Estimated Protein Needs To (g/kg)	1.4   protein: 83.76-97.72g/day   Estimated Fluid Needs Weight (lbs)	154 lb  Estimated Fluid Needs Weight (kg)	69.8 kg  Estimated Fluid Needs From (ml/kg)	30   Estimated Fluid Needs To (ml/kg)	35   fluids: 2094-2443mL/day   Other Calculations	Based on Standards of Care pt >% ideal body weight, thus ideal body weight used for all calculations. Needs adjusted for advanced age, clinical status/condition.    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 Per family, once patient's PO intake was increased about 2 weeks ago, he developed significant abdominal distention with SOB. He was taken to an OSH in [place] where he was managed conservatively with NGT decompression for a possible SBO. Operative management was discussed but family declined citing preference to return to [facility] where initial surgery was performed. Clinical status improved and he was discharged yesterday, but when he got home and took PO pills, he again developed abdominal distention with SOB, hence presentation to the ED. Last dose of Eliquis was this AM. 10/7/23: mildly distended. 10/08/23: KUB ileus. 10/09/23: CTAP increased distention in loops of small bowel proximal to SBO. 10/10/23: no PEG d/t fever overnight. consistent wet cough. ON:  +F/-BM, soft mild distention. 10/11/23: GI-no PEG today. ON: mildly distended. NGT nonbilious minimal output. 10/12/23: venting PEG with GI. d/c ngt. ON: venting PEG connected to gravity. Mildly distended. Temp 100.4F oral asym, immediate repeat temp 99.9F.    RD visited pt at bedside for nutrition follow up evaluation. Pt's home assistant, Jacques, was present during nutrition evaluation. Pt endorsed no noted nausea or vomiting; distention noted; NGT has been discontinued, now pt has a venting PEG set to gravity. No noted pain. Stage I pressure ulcer to L buttocks; no edema noted. Pt continues on TPN at 50mL/hour. Denies pain/discomfort. Stage I L buttocks pressure ulcer, no noted edema. Labs reviewed: elevated serum Glucose (131), triglycerides within normal limits currently (104), slightly low Na (134). RD to continue to monitor trends. RD spoke to medical team during IDRs. RD spoke to pt about current plan and potential future plan regarding nutrition. Pt was relatively receptive, verbalized understanding. RD to continue to monitor and remain available per protocol. Additional nutrition recommendations below to follow.    Previous Nutrition Diagnosis: Inadequate Oral Intake related to pt's condition/clinical course as evidenced by NPO status with NGT to LIWS and need for alternate means of nutrition via TPN    Active [ x ]  Resolved [   ]    If resolved, new PES:    Goal: Pt to consistently meet at least 75% of EEE via tolerated route that is consistent with goals of care during hospital stay    Recommendations:  1. NPO   >> TPN via central line: 334g Dextrose, 91g Amino Acids, 50g 20% Lipids to provide in total 2000 calories, 91g protein, GIR of 2.7 based on actual body weight of 85.7kg, 24.6 calories/kg ideal body weight, 1.3g protein/kg ideal body weight (ideal body weight is 69.8kg)  Recommend checking TG before starting TPN and then check TG weekly. Check Mg, Phos, K daily and POC BG Q6hrs. Trend daily weights. Fluids and electrolytes per MD discretion. Start at 150g Dextrose on Day 1, 250g Dextrose on Day 2, and advance to goal of 334g Dextrose on Day 3.  2. Encourage pt to meet nutritional needs as able  3. Monitor PO intakes, trend weights (daily while on TPN), monitor skin integrity, monitor labs (electrolytes, CMP), monitor GI function  4. Encourage adherence to diet education (reinforce as able)   5. Pain and bowel regimen per team   6. Will continue to assess/honor preferences as able   7. Align nutrition interventions with goals of care at all times    Education: RD spoke to pt about current plan and potential future plan regarding nutrition. Pt was relatively receptive, verbalized understanding. RD to remain available per protocol.     Risk Level: High [ x ] Moderate [   ] Low [   ].

## 2023-10-13 NOTE — CHART NOTE - NSCHARTNOTEFT_GEN_A_CORE
Post PEG placement check performed. Insertion site c/d/i. Outer bumper retracted to 5cm, leaving ~1cm of space between bumper and skin. PEG able to freely rotate/wiggle. Can continue venting but would not feed or give meds while pt still has SBO.    We will sign off, but please page if any further questions arise.     Jerardo (Tiagostephen) MD [facility]  Gastroenterology Fellow  Pager (M-F 7a-5p): [phone]  Pager (after hours): Please call  for on-call fellow

## 2023-10-13 NOTE — CHART NOTE - NSCHARTNOTEFT_GEN_A_CORE
Patient examined at bedside w/o acute complaints. Physical exam nondistended, nontender, no rebound or guarding.     Will continue to monitor

## 2023-10-13 NOTE — CHART NOTE - NSCHARTNOTEFT_GEN_A_CORE
Patient examined at bedside, resting. Without any acute complaints. No nausea or vomiting, vented PEG to gravity. Physical exam unchanged distension/NT/ no rebound or guarding. Will continue to monitor

## 2023-10-14 LAB
ANION GAP SERPL CALC-SCNC: 9 MMOL/L — SIGNIFICANT CHANGE UP (ref 5–17)
BUN SERPL-MCNC: 14 MG/DL — SIGNIFICANT CHANGE UP (ref 7–23)
CALCIUM SERPL-MCNC: 9.1 MG/DL — SIGNIFICANT CHANGE UP (ref 8.4–10.5)
CHLORIDE SERPL-SCNC: 101 MMOL/L — SIGNIFICANT CHANGE UP (ref 96–108)
CO2 SERPL-SCNC: 27 MMOL/L — SIGNIFICANT CHANGE UP (ref 22–31)
CREAT SERPL-MCNC: 0.61 MG/DL — SIGNIFICANT CHANGE UP (ref 0.5–1.3)
EGFR: 103 ML/MIN/1.73M2 — SIGNIFICANT CHANGE UP
GLUCOSE SERPL-MCNC: 139 MG/DL — HIGH (ref 70–99)
HCT VFR BLD CALC: 28.2 % — LOW (ref 39–50)
HGB BLD-MCNC: 8.8 G/DL — LOW (ref 13–17)
MAGNESIUM SERPL-MCNC: 2 MG/DL — SIGNIFICANT CHANGE UP (ref 1.6–2.6)
MCHC RBC-ENTMCNC: 26 PG — LOW (ref 27–34)
MCHC RBC-ENTMCNC: 31.2 GM/DL — LOW (ref 32–36)
MCV RBC AUTO: 83.4 FL — SIGNIFICANT CHANGE UP (ref 80–100)
NRBC # BLD: 0 /100 WBCS — SIGNIFICANT CHANGE UP (ref 0–0)
PHOSPHATE SERPL-MCNC: 3.7 MG/DL — SIGNIFICANT CHANGE UP (ref 2.5–4.5)
PLATELET # BLD AUTO: 231 K/UL — SIGNIFICANT CHANGE UP (ref 150–400)
POTASSIUM SERPL-MCNC: 4.3 MMOL/L — SIGNIFICANT CHANGE UP (ref 3.5–5.3)
POTASSIUM SERPL-SCNC: 4.3 MMOL/L — SIGNIFICANT CHANGE UP (ref 3.5–5.3)
RBC # BLD: 3.38 M/UL — LOW (ref 4.2–5.8)
RBC # FLD: 17 % — HIGH (ref 10.3–14.5)
SODIUM SERPL-SCNC: 137 MMOL/L — SIGNIFICANT CHANGE UP (ref 135–145)
WBC # BLD: 7.95 K/UL — SIGNIFICANT CHANGE UP (ref 3.8–10.5)
WBC # FLD AUTO: 7.95 K/UL — SIGNIFICANT CHANGE UP (ref 3.8–10.5)

## 2023-10-14 RX ORDER — I.V. FAT EMULSION 20 G/100ML
0.58 EMULSION INTRAVENOUS
Qty: 50 | Refills: 0 | Status: DISCONTINUED | OUTPATIENT
Start: 2023-10-14 | End: 2023-10-14

## 2023-10-14 RX ORDER — ELECTROLYTE SOLUTION,INJ
1 VIAL (ML) INTRAVENOUS
Refills: 0 | Status: DISCONTINUED | OUTPATIENT
Start: 2023-10-14 | End: 2023-10-14

## 2023-10-14 RX ADMIN — I.V. FAT EMULSION 20.83 GM/KG/DAY: 20 EMULSION INTRAVENOUS at 17:41

## 2023-10-14 RX ADMIN — Medication 1 APPLICATION(S): at 17:40

## 2023-10-14 RX ADMIN — SODIUM CHLORIDE 70 MILLILITER(S): 9 INJECTION, SOLUTION INTRAVENOUS at 17:41

## 2023-10-14 RX ADMIN — APIXABAN 2.5 MILLIGRAM(S): 2.5 TABLET, FILM COATED ORAL at 11:21

## 2023-10-14 RX ADMIN — TAMSULOSIN HYDROCHLORIDE 0.4 MILLIGRAM(S): 0.4 CAPSULE ORAL at 21:29

## 2023-10-14 RX ADMIN — CEFTRIAXONE 100 MILLIGRAM(S): 500 INJECTION, POWDER, FOR SOLUTION INTRAMUSCULAR; INTRAVENOUS at 17:41

## 2023-10-14 RX ADMIN — PANTOPRAZOLE SODIUM 40 MILLIGRAM(S): 20 TABLET, DELAYED RELEASE ORAL at 05:57

## 2023-10-14 RX ADMIN — APIXABAN 2.5 MILLIGRAM(S): 2.5 TABLET, FILM COATED ORAL at 23:19

## 2023-10-14 RX ADMIN — CHLORHEXIDINE GLUCONATE 1 APPLICATION(S): 213 SOLUTION TOPICAL at 05:55

## 2023-10-14 RX ADMIN — Medication 1 EACH: at 17:41

## 2023-10-14 RX ADMIN — PANTOPRAZOLE SODIUM 40 MILLIGRAM(S): 20 TABLET, DELAYED RELEASE ORAL at 17:42

## 2023-10-14 NOTE — PROGRESS NOTE ADULT - SUBJECTIVE AND OBJECTIVE BOX
INCOMPLETE Overnight events: : , asym. Venting peg w. min o/p, flushed. Missed voids    SUBJECTIVE: Patient seen at bedside with chief resident. Patient's aid at bedside improvement to cough. Patient denies any nausea or vomiting. Does endorses episodes of flatus       MEDICATIONS  (STANDING):  apixaban 2.5 milliGRAM(s) Oral every 12 hours  cefTRIAXone   IVPB 1000 milliGRAM(s) IV Intermittent every 24 hours  chlorhexidine 2% Cloths 1 Application(s) Topical <User Schedule>  lactated ringers. 1000 milliLiter(s) (70 mL/Hr) IV Continuous <Continuous>  lipid, fat emulsion (Fish Oil and Plant Based) 20% Infusion 0.5834 Gm/kG/Day (20.83 mL/Hr) IV Continuous <Continuous>  pantoprazole  Injectable 40 milliGRAM(s) IV Push every 12 hours  Parenteral Nutrition - Adult 1 Each (50 mL/Hr) TPN Continuous <Continuous>  silver sulfADIAZINE 1% Cream 1 Application(s) Topical daily  tamsulosin 0.4 milliGRAM(s) Oral at bedtime    MEDICATIONS  (PRN):  acetaminophen   IVPB .. 1000 milliGRAM(s) IV Intermittent once PRN Temp greater or equal to 38C (100.4F), Moderate Pain (4 - 6)  benzocaine/menthol Lozenge 1 Lozenge Oral every 4 hours PRN Sore Throat  sodium chloride 0.9% lock flush 10 milliLiter(s) IV Push every 1 hour PRN Pre/post blood products, medications, blood draw, and to maintain line patency      Vital Signs Last 24 Hrs  T(C): 37.3 (14 Oct 2023 21:00), Max: 37.5 (13 Oct 2023 23:34)  T(F): 99.1 (14 Oct 2023 21:00), Max: 99.5 (13 Oct 2023 23:34)  HR: 99 (14 Oct 2023 21:00) (95 - 101)  BP: 127/84 (14 Oct 2023 21:00) (110/73 - 128/85)  BP(mean): 98 (14 Oct 2023 21:00) (98 - 98)  RR: 18 (14 Oct 2023 21:00) (17 - 18)  SpO2: 98% (14 Oct 2023 21:00) (97% - 99%)    Parameters below as of 14 Oct 2023 21:00  Patient On (Oxygen Delivery Method): room air        Physical Exam:  General: NAD, resting comfortably in bed  Pulmonary: Nonlabored breathing, no respiratory distress  Cardiovascular: NSR  Abdominal: soft, NT, PEG in place, mild distended   Extremities: WWP, normal strength  Neuro: A/O x 3, CNs II-XII grossly intact, no focal deficits, normal motor/sensation  Pulses: palpable distal pulses    I&O's Summary    13 Oct 2023 07:01  -  14 Oct 2023 07:00  --------------------------------------------------------  IN: 1689.6 mL / OUT: 25 mL / NET: 1664.6 mL    14 Oct 2023 07:01  -  14 Oct 2023 23:23  --------------------------------------------------------  IN: 1481.6 mL / OUT: 40 mL / NET: 1441.6 mL        LABS:                        8.8    7.95  )-----------( 231      ( 14 Oct 2023 10:33 )             28.2     10-14    137  |  101  |  14  ----------------------------<  139<H>  4.3   |  27  |  0.61    Ca    9.1      14 Oct 2023 10:33  Phos  3.7     10-14  Mg     2.0     10-14      PT/INR - ( 13 Oct 2023 07:41 )   PT: 14.4 sec;   INR: 1.27          PTT - ( 13 Oct 2023 07:41 )  PTT:30.2 sec  Urinalysis Basic - ( 14 Oct 2023 10:33 )    Color: x / Appearance: x / SG: x / pH: x  Gluc: 139 mg/dL / Ketone: x  / Bili: x / Urobili: x   Blood: x / Protein: x / Nitrite: x   Leuk Esterase: x / RBC: x / WBC x   Sq Epi: x / Non Sq Epi: x / Bacteria: x      CAPILLARY BLOOD GLUCOSE            RADIOLOGY & ADDITIONAL STUDIES:

## 2023-10-14 NOTE — CHART NOTE - NSCHARTNOTEFT_GEN_A_CORE
This exam was done with Dr. Betts between [id number]    SUBJECTIVE: Patient no current complaints.         Objective   MEDICATIONS  (STANDING):  apixaban 2.5 milliGRAM(s) Oral every 12 hours  cefTRIAXone   IVPB 1000 milliGRAM(s) IV Intermittent every 24 hours  chlorhexidine 2% Cloths 1 Application(s) Topical <User Schedule>  lactated ringers. 1000 milliLiter(s) (70 mL/Hr) IV Continuous <Continuous>  lipid, fat emulsion (Fish Oil and Plant Based) 20% Infusion 0.5834 Gm/kG/Day (20.83 mL/Hr) IV Continuous <Continuous>  pantoprazole  Injectable 40 milliGRAM(s) IV Push every 12 hours  Parenteral Nutrition - Adult 1 Each (50 mL/Hr) TPN Continuous <Continuous>  silver sulfADIAZINE 1% Cream 1 Application(s) Topical daily  tamsulosin 0.4 milliGRAM(s) Oral at bedtime    MEDICATIONS  (PRN):  acetaminophen   IVPB .. 1000 milliGRAM(s) IV Intermittent once PRN Temp greater or equal to 38C (100.4F), Moderate Pain (4 - 6)  benzocaine/menthol Lozenge 1 Lozenge Oral every 4 hours PRN Sore Throat  sodium chloride 0.9% lock flush 10 milliLiter(s) IV Push every 1 hour PRN Pre/post blood products, medications, blood draw, and to maintain line patency      Vital Signs Last 24 Hrs  T(C): 37.3 (14 Oct 2023 21:00), Max: 37.5 (13 Oct 2023 23:34)  T(F): 99.1 (14 Oct 2023 21:00), Max: 99.5 (13 Oct 2023 23:34)  HR: 99 (14 Oct 2023 21:00) (95 - 101)  BP: 127/84 (14 Oct 2023 21:00) (110/73 - 128/85)  BP(mean): 98 (14 Oct 2023 21:00) (98 - 98)  RR: 18 (14 Oct 2023 21:00) (17 - 18)  SpO2: 98% (14 Oct 2023 21:00) (97% - 99%)    Parameters below as of 14 Oct 2023 21:00  Patient On (Oxygen Delivery Method): room air        Physical Exam:  General: NAD, resting comfortably in bed  Pulmonary: Nonlabored breathing, no respiratory distress  Cardiovascular: NSR  Abdominal: soft, mild distended, PEG tube in place  Extremities: WWP, normal strength  Neuro: A/O x 3, CNs II-XII grossly intact, no focal deficits, normal motor/sensation  Pulses: palpable distal pulses    I&O's Summary    13 Oct 2023 07:01  -  14 Oct 2023 07:00  --------------------------------------------------------  IN: 1689.6 mL / OUT: 25 mL / NET: 1664.6 mL    14 Oct 2023 07:01  -  14 Oct 2023 23:26  --------------------------------------------------------  IN: 1481.6 mL / OUT: 40 mL / NET: 1441.6 mL        LABS:                        8.8    7.95  )-----------( 231      ( 14 Oct 2023 10:33 )             28.2     10-14    137  |  101  |  14  ----------------------------<  139<H>  4.3   |  27  |  0.61    Ca    9.1      14 Oct 2023 10:33  Phos  3.7     10-14  Mg     2.0     10-14      PT/INR - ( 13 Oct 2023 07:41 )   PT: 14.4 sec;   INR: 1.27          PTT - ( 13 Oct 2023 07:41 )  PTT:30.2 sec  Urinalysis Basic - ( 14 Oct 2023 10:33 )    Color: x / Appearance: x / SG: x / pH: x  Gluc: 139 mg/dL / Ketone: x  / Bili: x / Urobili: x   Blood: x / Protein: x / Nitrite: x   Leuk Esterase: x / RBC: x / WBC x   Sq Epi: x / Non Sq Epi: x / Bacteria: x      CAPILLARY BLOOD GLUCOSE

## 2023-10-14 NOTE — CHART NOTE - NSCHARTNOTEFT_GEN_A_CORE
Patient examined at bedside, no acute complaints. Patient Mildly distended, ttp around venting peg site. No rebound or guarding. Venting peg connected to gravity with minimum o.p. Will continue to monitor

## 2023-10-15 LAB
ANION GAP SERPL CALC-SCNC: 9 MMOL/L — SIGNIFICANT CHANGE UP (ref 5–17)
BUN SERPL-MCNC: 14 MG/DL — SIGNIFICANT CHANGE UP (ref 7–23)
CALCIUM SERPL-MCNC: 9 MG/DL — SIGNIFICANT CHANGE UP (ref 8.4–10.5)
CHLORIDE SERPL-SCNC: 103 MMOL/L — SIGNIFICANT CHANGE UP (ref 96–108)
CO2 SERPL-SCNC: 27 MMOL/L — SIGNIFICANT CHANGE UP (ref 22–31)
CREAT SERPL-MCNC: 0.55 MG/DL — SIGNIFICANT CHANGE UP (ref 0.5–1.3)
EGFR: 106 ML/MIN/1.73M2 — SIGNIFICANT CHANGE UP
GLUCOSE SERPL-MCNC: 153 MG/DL — HIGH (ref 70–99)
HCT VFR BLD CALC: 28.4 % — LOW (ref 39–50)
HGB BLD-MCNC: 8.8 G/DL — LOW (ref 13–17)
MAGNESIUM SERPL-MCNC: 2.2 MG/DL — SIGNIFICANT CHANGE UP (ref 1.6–2.6)
MCHC RBC-ENTMCNC: 26.3 PG — LOW (ref 27–34)
MCHC RBC-ENTMCNC: 31 GM/DL — LOW (ref 32–36)
MCV RBC AUTO: 84.8 FL — SIGNIFICANT CHANGE UP (ref 80–100)
NRBC # BLD: 0 /100 WBCS — SIGNIFICANT CHANGE UP (ref 0–0)
PHOSPHATE SERPL-MCNC: 3.7 MG/DL — SIGNIFICANT CHANGE UP (ref 2.5–4.5)
PLATELET # BLD AUTO: 235 K/UL — SIGNIFICANT CHANGE UP (ref 150–400)
POTASSIUM SERPL-MCNC: 4.4 MMOL/L — SIGNIFICANT CHANGE UP (ref 3.5–5.3)
POTASSIUM SERPL-SCNC: 4.4 MMOL/L — SIGNIFICANT CHANGE UP (ref 3.5–5.3)
RBC # BLD: 3.35 M/UL — LOW (ref 4.2–5.8)
RBC # FLD: 17.1 % — HIGH (ref 10.3–14.5)
SODIUM SERPL-SCNC: 139 MMOL/L — SIGNIFICANT CHANGE UP (ref 135–145)
WBC # BLD: 6.57 K/UL — SIGNIFICANT CHANGE UP (ref 3.8–10.5)
WBC # FLD AUTO: 6.57 K/UL — SIGNIFICANT CHANGE UP (ref 3.8–10.5)

## 2023-10-15 RX ORDER — ELECTROLYTE SOLUTION,INJ
1 VIAL (ML) INTRAVENOUS
Refills: 0 | Status: DISCONTINUED | OUTPATIENT
Start: 2023-10-15 | End: 2023-10-15

## 2023-10-15 RX ORDER — I.V. FAT EMULSION 20 G/100ML
0.58 EMULSION INTRAVENOUS
Qty: 50 | Refills: 0 | Status: DISCONTINUED | OUTPATIENT
Start: 2023-10-15 | End: 2023-10-15

## 2023-10-15 RX ADMIN — I.V. FAT EMULSION 20.83 GM/KG/DAY: 20 EMULSION INTRAVENOUS at 17:12

## 2023-10-15 RX ADMIN — SODIUM CHLORIDE 70 MILLILITER(S): 9 INJECTION, SOLUTION INTRAVENOUS at 05:51

## 2023-10-15 RX ADMIN — CEFTRIAXONE 100 MILLIGRAM(S): 500 INJECTION, POWDER, FOR SOLUTION INTRAMUSCULAR; INTRAVENOUS at 17:13

## 2023-10-15 RX ADMIN — APIXABAN 2.5 MILLIGRAM(S): 2.5 TABLET, FILM COATED ORAL at 11:30

## 2023-10-15 RX ADMIN — CHLORHEXIDINE GLUCONATE 1 APPLICATION(S): 213 SOLUTION TOPICAL at 05:50

## 2023-10-15 RX ADMIN — TAMSULOSIN HYDROCHLORIDE 0.4 MILLIGRAM(S): 0.4 CAPSULE ORAL at 21:37

## 2023-10-15 RX ADMIN — PANTOPRAZOLE SODIUM 40 MILLIGRAM(S): 20 TABLET, DELAYED RELEASE ORAL at 05:50

## 2023-10-15 RX ADMIN — Medication 1 EACH: at 17:13

## 2023-10-15 RX ADMIN — Medication 1 APPLICATION(S): at 11:30

## 2023-10-15 RX ADMIN — PANTOPRAZOLE SODIUM 40 MILLIGRAM(S): 20 TABLET, DELAYED RELEASE ORAL at 17:13

## 2023-10-15 RX ADMIN — APIXABAN 2.5 MILLIGRAM(S): 2.5 TABLET, FILM COATED ORAL at 23:21

## 2023-10-15 NOTE — CHART NOTE - NSCHARTNOTEFT_GEN_A_CORE
SUBJECTIVE: Pt was evaluated bedside with family member present. MILEY was performed bedside.       MEDICATIONS  (STANDING):  apixaban 2.5 milliGRAM(s) Oral every 12 hours  cefTRIAXone   IVPB 1000 milliGRAM(s) IV Intermittent every 24 hours  chlorhexidine 2% Cloths 1 Application(s) Topical <User Schedule>  lactated ringers. 1000 milliLiter(s) (70 mL/Hr) IV Continuous <Continuous>  lipid, fat emulsion (Fish Oil and Plant Based) 20% Infusion 0.583 Gm/kG/Day (20.83 mL/Hr) IV Continuous <Continuous>  pantoprazole  Injectable 40 milliGRAM(s) IV Push every 12 hours  Parenteral Nutrition - Adult 1 Each (50 mL/Hr) TPN Continuous <Continuous>  Parenteral Nutrition - Adult 1 Each (50 mL/Hr) TPN Continuous <Continuous>  silver sulfADIAZINE 1% Cream 1 Application(s) Topical daily  tamsulosin 0.4 milliGRAM(s) Oral at bedtime    MEDICATIONS  (PRN):  acetaminophen   IVPB .. 1000 milliGRAM(s) IV Intermittent once PRN Temp greater or equal to 38C (100.4F), Moderate Pain (4 - 6)  benzocaine/menthol Lozenge 1 Lozenge Oral every 4 hours PRN Sore Throat  sodium chloride 0.9% lock flush 10 milliLiter(s) IV Push every 1 hour PRN Pre/post blood products, medications, blood draw, and to maintain line patency      Vital Signs Last 24 Hrs  T(C): 36.9 (15 Oct 2023 08:46), Max: 37.4 (14 Oct 2023 17:01)  T(F): 98.4 (15 Oct 2023 08:46), Max: 99.4 (14 Oct 2023 17:01)  HR: 99 (15 Oct 2023 08:46) (99 - 107)  BP: 113/78 (15 Oct 2023 08:46) (113/78 - 127/84)  BP(mean): 89 (15 Oct 2023 05:23) (89 - 98)  RR: 17 (15 Oct 2023 08:46) (17 - 18)  SpO2: 98% (15 Oct 2023 08:46) (96% - 98%)    Parameters below as of 15 Oct 2023 08:46  Patient On (Oxygen Delivery Method): nasal cannula  O2 Flow (L/min): 2      PHYSICAL EXAM:  General: NAD, resting comfortably in bed  Pulmonary: Nonlabored breathing, no respiratory distress  Cardiovascular: NSR  Abdominal: soft, NT, PEG in place, mild distended   Extremities: WWP, normal strength  Neuro: A/O x 3, CNs II-XII grossly intact, no focal deficits, normal motor/sensation  Pulses: palpable distal pulses                  I&O's Detail    14 Oct 2023 07:01  -  15 Oct 2023 07:00  --------------------------------------------------------  IN:    Fat Emulsion (Fish Oil &amp; Plant Based) 20% Infusion: 249.6 mL    Lactated Ringers: 1610 mL    TPN (Total Parenteral Nutrition): 1150 mL  Total IN: 3009.6 mL    OUT:    PEG (Percutaneous Endoscopic Gastrostomy) Tube (mL): 60 mL  Total OUT: 60 mL    Total NET: 2949.6 mL      15 Oct 2023 07:01  -  15 Oct 2023 16:30  --------------------------------------------------------  IN:    Lactated Ringers: 420 mL    TPN (Total Parenteral Nutrition): 300 mL  Total IN: 720 mL    OUT:  Total OUT: 0 mL    Total NET: 720 mL          LABS:                        8.8    6.57  )-----------( 235      ( 15 Oct 2023 09:17 )             28.4     10-15    139  |  103  |  14  ----------------------------<  153<H>  4.4   |  27  |  0.55    Ca    9.0      15 Oct 2023 09:17  Phos  3.7     10-15  Mg     2.2     10-15        Urinalysis Basic - ( 15 Oct 2023 09:17 )    Color: x / Appearance: x / SG: x / pH: x  Gluc: 153 mg/dL / Ketone: x  / Bili: x / Urobili: x   Blood: x / Protein: x / Nitrite: x   Leuk Esterase: x / RBC: x / WBC x   Sq Epi: x / Non Sq Epi: x / Bacteria: x

## 2023-10-15 NOTE — PROGRESS NOTE ADULT - SUBJECTIVE AND OBJECTIVE BOX
SUBJECTIVE: Patient seen at bedside with chief resident. Patient's aid endorses episodes of flatus. Patient denies any nausea or vomiting.          MEDICATIONS  (STANDING):  apixaban 2.5 milliGRAM(s) Oral every 12 hours  cefTRIAXone   IVPB 1000 milliGRAM(s) IV Intermittent every 24 hours  chlorhexidine 2% Cloths 1 Application(s) Topical <User Schedule>  lactated ringers. 1000 milliLiter(s) (70 mL/Hr) IV Continuous <Continuous>  lipid, fat emulsion (Fish Oil and Plant Based) 20% Infusion 0.583 Gm/kG/Day (20.83 mL/Hr) IV Continuous <Continuous>  pantoprazole  Injectable 40 milliGRAM(s) IV Push every 12 hours  Parenteral Nutrition - Adult 1 Each (50 mL/Hr) TPN Continuous <Continuous>  Parenteral Nutrition - Adult 1 Each (50 mL/Hr) TPN Continuous <Continuous>  silver sulfADIAZINE 1% Cream 1 Application(s) Topical daily  tamsulosin 0.4 milliGRAM(s) Oral at bedtime    MEDICATIONS  (PRN):  acetaminophen   IVPB .. 1000 milliGRAM(s) IV Intermittent once PRN Temp greater or equal to 38C (100.4F), Moderate Pain (4 - 6)  benzocaine/menthol Lozenge 1 Lozenge Oral every 4 hours PRN Sore Throat  sodium chloride 0.9% lock flush 10 milliLiter(s) IV Push every 1 hour PRN Pre/post blood products, medications, blood draw, and to maintain line patency      Vital Signs Last 24 Hrs  T(C): 36.9 (15 Oct 2023 08:46), Max: 37.4 (14 Oct 2023 17:01)  T(F): 98.4 (15 Oct 2023 08:46), Max: 99.4 (14 Oct 2023 17:01)  HR: 99 (15 Oct 2023 08:46) (99 - 107)  BP: 113/78 (15 Oct 2023 08:46) (113/78 - 127/84)  BP(mean): 89 (15 Oct 2023 05:23) (89 - 98)  RR: 17 (15 Oct 2023 08:46) (17 - 18)  SpO2: 98% (15 Oct 2023 08:46) (96% - 98%)    Parameters below as of 15 Oct 2023 08:46  Patient On (Oxygen Delivery Method): nasal cannula  O2 Flow (L/min): 2      PHYSICAL EXAM:  General: NAD, resting comfortably in bed  Pulmonary: Nonlabored breathing, no respiratory distress  Cardiovascular: NSR  Abdominal: soft, NT, PEG in place, mild distended   Extremities: WWP, normal strength  Neuro: A/O x 3, CNs II-XII grossly intact, no focal deficits, normal motor/sensation  Pulses: palpable distal pulses                    I&O's Detail    14 Oct 2023 07:01  -  15 Oct 2023 07:00  --------------------------------------------------------  IN:    Fat Emulsion (Fish Oil &amp; Plant Based) 20% Infusion: 249.6 mL    Lactated Ringers: 1610 mL    TPN (Total Parenteral Nutrition): 1150 mL  Total IN: 3009.6 mL    OUT:    PEG (Percutaneous Endoscopic Gastrostomy) Tube (mL): 60 mL  Total OUT: 60 mL    Total NET: 2949.6 mL      15 Oct 2023 07:01  -  15 Oct 2023 14:54  --------------------------------------------------------  IN:    Lactated Ringers: 420 mL    TPN (Total Parenteral Nutrition): 300 mL  Total IN: 720 mL    OUT:  Total OUT: 0 mL    Total NET: 720 mL          LABS:                        8.8    6.57  )-----------( 235      ( 15 Oct 2023 09:17 )             28.4     10-15    139  |  103  |  14  ----------------------------<  153<H>  4.4   |  27  |  0.55    Ca    9.0      15 Oct 2023 09:17  Phos  3.7     10-15  Mg     2.2     10-15        Urinalysis Basic - ( 15 Oct 2023 09:17 )    Color: x / Appearance: x / SG: x / pH: x  Gluc: 153 mg/dL / Ketone: x  / Bili: x / Urobili: x   Blood: x / Protein: x / Nitrite: x   Leuk Esterase: x / RBC: x / WBC x   Sq Epi: x / Non Sq Epi: x / Bacteria: x        RADIOLOGY & ADDITIONAL STUDIES:

## 2023-10-15 NOTE — CHART NOTE - NSCHARTNOTESELECT_GEN_ALL_CORE
Gastroenterology
MILEY/Event Note
Nutrition Services
PEG
darell/Event Note
MILEY/Event Note
Nutrition Services
Serial Abdominal Exam/Event Note
darell/Event Note

## 2023-10-16 VITALS
RESPIRATION RATE: 18 BRPM | SYSTOLIC BLOOD PRESSURE: 112 MMHG | HEART RATE: 93 BPM | TEMPERATURE: 98 F | DIASTOLIC BLOOD PRESSURE: 75 MMHG | OXYGEN SATURATION: 95 %

## 2023-10-16 LAB
ANION GAP SERPL CALC-SCNC: 9 MMOL/L — SIGNIFICANT CHANGE UP (ref 5–17)
BUN SERPL-MCNC: 15 MG/DL — SIGNIFICANT CHANGE UP (ref 7–23)
CALCIUM SERPL-MCNC: 8.8 MG/DL — SIGNIFICANT CHANGE UP (ref 8.4–10.5)
CHLORIDE SERPL-SCNC: 101 MMOL/L — SIGNIFICANT CHANGE UP (ref 96–108)
CO2 SERPL-SCNC: 27 MMOL/L — SIGNIFICANT CHANGE UP (ref 22–31)
CREAT SERPL-MCNC: 0.57 MG/DL — SIGNIFICANT CHANGE UP (ref 0.5–1.3)
CULTURE RESULTS: SIGNIFICANT CHANGE UP
CULTURE RESULTS: SIGNIFICANT CHANGE UP
EGFR: 105 ML/MIN/1.73M2 — SIGNIFICANT CHANGE UP
GLUCOSE SERPL-MCNC: 138 MG/DL — HIGH (ref 70–99)
POTASSIUM SERPL-MCNC: 4 MMOL/L — SIGNIFICANT CHANGE UP (ref 3.5–5.3)
POTASSIUM SERPL-SCNC: 4 MMOL/L — SIGNIFICANT CHANGE UP (ref 3.5–5.3)
SODIUM SERPL-SCNC: 137 MMOL/L — SIGNIFICANT CHANGE UP (ref 135–145)
SPECIMEN SOURCE: SIGNIFICANT CHANGE UP
SPECIMEN SOURCE: SIGNIFICANT CHANGE UP

## 2023-10-16 PROCEDURE — 93970 EXTREMITY STUDY: CPT

## 2023-10-16 PROCEDURE — L8699: CPT

## 2023-10-16 PROCEDURE — 85025 COMPLETE CBC W/AUTO DIFF WBC: CPT

## 2023-10-16 PROCEDURE — 86901 BLOOD TYPING SEROLOGIC RH(D): CPT

## 2023-10-16 PROCEDURE — 87186 SC STD MICRODIL/AGAR DIL: CPT

## 2023-10-16 PROCEDURE — 84478 ASSAY OF TRIGLYCERIDES: CPT

## 2023-10-16 PROCEDURE — 84100 ASSAY OF PHOSPHORUS: CPT

## 2023-10-16 PROCEDURE — 85730 THROMBOPLASTIN TIME PARTIAL: CPT

## 2023-10-16 PROCEDURE — 36415 COLL VENOUS BLD VENIPUNCTURE: CPT

## 2023-10-16 PROCEDURE — 97162 PT EVAL MOD COMPLEX 30 MIN: CPT

## 2023-10-16 PROCEDURE — 96374 THER/PROPH/DIAG INJ IV PUSH: CPT

## 2023-10-16 PROCEDURE — 71046 X-RAY EXAM CHEST 2 VIEWS: CPT

## 2023-10-16 PROCEDURE — 87040 BLOOD CULTURE FOR BACTERIA: CPT

## 2023-10-16 PROCEDURE — 83735 ASSAY OF MAGNESIUM: CPT

## 2023-10-16 PROCEDURE — 83605 ASSAY OF LACTIC ACID: CPT

## 2023-10-16 PROCEDURE — 82962 GLUCOSE BLOOD TEST: CPT

## 2023-10-16 PROCEDURE — 80053 COMPREHEN METABOLIC PANEL: CPT

## 2023-10-16 PROCEDURE — 93005 ELECTROCARDIOGRAM TRACING: CPT

## 2023-10-16 PROCEDURE — 81001 URINALYSIS AUTO W/SCOPE: CPT

## 2023-10-16 PROCEDURE — 86850 RBC ANTIBODY SCREEN: CPT

## 2023-10-16 PROCEDURE — 99285 EMERGENCY DEPT VISIT HI MDM: CPT

## 2023-10-16 PROCEDURE — 74177 CT ABD & PELVIS W/CONTRAST: CPT | Mod: MC

## 2023-10-16 PROCEDURE — 71045 X-RAY EXAM CHEST 1 VIEW: CPT

## 2023-10-16 PROCEDURE — 80076 HEPATIC FUNCTION PANEL: CPT

## 2023-10-16 PROCEDURE — 84134 ASSAY OF PREALBUMIN: CPT

## 2023-10-16 PROCEDURE — 96375 TX/PRO/DX INJ NEW DRUG ADDON: CPT

## 2023-10-16 PROCEDURE — 87086 URINE CULTURE/COLONY COUNT: CPT

## 2023-10-16 PROCEDURE — 97110 THERAPEUTIC EXERCISES: CPT

## 2023-10-16 PROCEDURE — 80048 BASIC METABOLIC PNL TOTAL CA: CPT

## 2023-10-16 PROCEDURE — 0225U NFCT DS DNA&RNA 21 SARSCOV2: CPT

## 2023-10-16 PROCEDURE — 86900 BLOOD TYPING SEROLOGIC ABO: CPT

## 2023-10-16 PROCEDURE — 36573 INSJ PICC RS&I 5 YR+: CPT

## 2023-10-16 PROCEDURE — 85610 PROTHROMBIN TIME: CPT

## 2023-10-16 PROCEDURE — 85027 COMPLETE CBC AUTOMATED: CPT

## 2023-10-16 PROCEDURE — 74019 RADEX ABDOMEN 2 VIEWS: CPT

## 2023-10-16 RX ORDER — I.V. FAT EMULSION 20 G/100ML
0 EMULSION INTRAVENOUS
Qty: 0 | Refills: 0 | DISCHARGE
Start: 2023-10-16

## 2023-10-16 RX ORDER — ELECTROLYTE SOLUTION,INJ
1 VIAL (ML) INTRAVENOUS
Refills: 0 | Status: DISCONTINUED | OUTPATIENT
Start: 2023-10-16 | End: 2023-10-16

## 2023-10-16 RX ORDER — ELECTROLYTE SOLUTION,INJ
1 VIAL (ML) INTRAVENOUS
Qty: 0 | Refills: 0 | DISCHARGE
Start: 2023-10-16

## 2023-10-16 RX ORDER — I.V. FAT EMULSION 20 G/100ML
0.58 EMULSION INTRAVENOUS
Qty: 50 | Refills: 0 | Status: DISCONTINUED | OUTPATIENT
Start: 2023-10-16 | End: 2023-10-16

## 2023-10-16 RX ADMIN — SODIUM CHLORIDE 70 MILLILITER(S): 9 INJECTION, SOLUTION INTRAVENOUS at 05:42

## 2023-10-16 RX ADMIN — CHLORHEXIDINE GLUCONATE 1 APPLICATION(S): 213 SOLUTION TOPICAL at 05:43

## 2023-10-16 RX ADMIN — Medication 1 APPLICATION(S): at 11:13

## 2023-10-16 RX ADMIN — APIXABAN 2.5 MILLIGRAM(S): 2.5 TABLET, FILM COATED ORAL at 11:13

## 2023-10-16 RX ADMIN — PANTOPRAZOLE SODIUM 40 MILLIGRAM(S): 20 TABLET, DELAYED RELEASE ORAL at 05:40

## 2023-10-16 NOTE — PROGRESS NOTE ADULT - SUBJECTIVE AND OBJECTIVE BOX
INTERVAL HPI/OVERNIGHT EVENTS: KAREN    SUBJECTIVE: Patient seen and examined at bedside with chief resident. Patient currently denies abdominal pain, nausea, and vomiting. He states he is feeling well with no acute complaints.      apixaban 2.5 milliGRAM(s) Oral every 12 hours      Vital Signs Last 24 Hrs  T(C): 36.6 (16 Oct 2023 08:20), Max: 37.2 (15 Oct 2023 20:46)  T(F): 97.9 (16 Oct 2023 08:20), Max: 99 (15 Oct 2023 20:46)  HR: 93 (16 Oct 2023 08:20) (93 - 98)  BP: 112/75 (16 Oct 2023 08:20) (112/75 - 121/82)  BP(mean): 95 (16 Oct 2023 05:16) (94 - 95)  RR: 18 (16 Oct 2023 08:20) (18 - 18)  SpO2: 95% (16 Oct 2023 08:20) (95% - 96%)    Parameters below as of 16 Oct 2023 08:20  Patient On (Oxygen Delivery Method): room air      I&O's Detail    15 Oct 2023 07:01  -  16 Oct 2023 07:00  --------------------------------------------------------  IN:    Fat Emulsion (Fish Oil &amp; Plant Based) 20% Infusion: 312.4 mL    Lactated Ringers: 1680 mL    TPN (Total Parenteral Nutrition): 1200 mL  Total IN: 3192.4 mL    OUT:    PEG (Percutaneous Endoscopic Gastrostomy) Tube (mL): 60 mL  Total OUT: 60 mL    Total NET: 3132.4 mL          General: NAD, resting comfortably in bed  C/V: NSR  Pulm: Nonlabored breathing, no respiratory distress  Abd: soft, NT/ND, PEG in place  Extrem: WWP, no edema, SCDs in place    LABS:                        8.8    6.57  )-----------( 235      ( 15 Oct 2023 09:17 )             28.4     10-16    137  |  101  |  15  ----------------------------<  138<H>  4.0   |  27  |  0.57    Ca    8.8      16 Oct 2023 05:30  Phos  3.7     10-15  Mg     2.2     10-15        Urinalysis Basic - ( 16 Oct 2023 05:30 )    Color: x / Appearance: x / SG: x / pH: x  Gluc: 138 mg/dL / Ketone: x  / Bili: x / Urobili: x   Blood: x / Protein: x / Nitrite: x   Leuk Esterase: x / RBC: x / WBC x   Sq Epi: x / Non Sq Epi: x / Bacteria: x        RADIOLOGY & ADDITIONAL STUDIES:

## 2023-10-16 NOTE — PROGRESS NOTE ADULT - REASON FOR ADMISSION
Abdominal distention; r/o SBO

## 2023-10-16 NOTE — PROGRESS NOTE ADULT - ASSESSMENT
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 His cough has decreased in frequency and is less productive. Plan for PEG placement today with GI.    NPO/IVF  TPN via PICC  NGT TO LIWS  Ceftriaxone  No narcotics  PPI  Heparin gtt, ptt q6h -currently holding for possible procedure  SCDs/OOBA/IS  MILEY Q6H
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 Venting PEG, d/c ngt 10/12    NPO/IVF  Ceftriaxone  PICC/TPN  No narcotics  PPI  SCDs/OOBA  MILEY Q6H
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 Venting PEG, d/c ngt 10/12. Patient to return home today with PICC for TPN and venting PEG.    NPO/IVF  PICC/TPN  No narcotics  PPI  SCDs/OOBA  MILEY Q6H
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 Patient tachycardic, but HD stable on presentation. CT revealed multiple dilated loops of small bowel with air fluid levels consistent with SBO. NGT placed in the ED and patient admitted for monitoring and potential OR planning ex lap on monday.    NPO/IVF  NGT TO LIWS  PICC/TPN  No narcotics  Heparin gtt, ptt q6h  SCDs  OOBA  MILEY Q6H
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 Patient tachycardic, but HD stable on presentation. CT revealed multiple dilated loops of small bowel with air fluid levels consistent with SBO. NGT placed in the ED and patient admitted for monitoring and potential OR planning.    NPO/IVF  NGT to LIWS   CXR confirms placement  No narcotics  Heparin gtt - ptt q6h  F/u B/L LE duplex  SCDs  IS/OOBA  MILEY Q6H  Possible OR planning next week
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 Patient tachycardic, but HD stable on presentation. CT revealed multiple dilated loops of small bowel with air fluid levels consistent with SBO. NGT placed in the ED and patient admitted for monitoring and potential OR planning ex lap on monday.    NPO/IVF  CTAP oral and IV contrast  NGT TO LIWS  PICC/TPN  No narcotics  Heparin gtt, ptt q6h  SCDs  OOBA  MILEY Q6H
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 Venting PEG, d/c ngt 10/12    NPO/IVF  Ceftriaxone  PICC/TPN  No narcotics  PPI  SCDs/OOBA  MILEY Q6H
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 Venting PEG, d/c ngt 10/12. Will get a CXR to assess for continued cough.    NPO/IVF  Ceftriaxone  PICC/TPN  No narcotics  PPI  SCDs/OOBA  MILEY Q6H  CXR
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 from home after being discharged from OSH yesterday for 1 day of abdominal distention and bloating. Patient tachycardic, but HD stable on presentation. CT revealed multiple dilated loops of small bowel with air fluid levels consistent with SBO. NGT placed in the ED and patient admitted for monitoring and potential OR planning ex lap on monday.    NPO/IVF  NGT TO LIWS  PICC/TPN  No narcotics  Heparin gtt, ptt q6h  SCDs  OOBA  MILEY Q6H
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 from home after being discharged from OSH yesterday for 1 day of abdominal distention and bloating. Patient tachycardic, but HD stable on presentation. CT revealed multiple dilated loops of small bowel with air fluid levels consistent with SBO. NGT placed in the ED and patient admitted for monitoring and potential OR planning.    NPO/IVF  NGT TO LIWS  PICC/TPN  No narcotics  Heparin gtt, ptt q6h  SCDs  OOBA  MILEY Q6H

## 2023-10-24 DIAGNOSIS — B96.1 KLEBSIELLA PNEUMONIAE [K. PNEUMONIAE] AS THE CAUSE OF DISEASES CLASSIFIED ELSEWHERE: ICD-10-CM

## 2023-10-24 DIAGNOSIS — Z86.711 PERSONAL HISTORY OF PULMONARY EMBOLISM: ICD-10-CM

## 2023-10-24 DIAGNOSIS — Z79.01 LONG TERM (CURRENT) USE OF ANTICOAGULANTS: ICD-10-CM

## 2023-10-24 DIAGNOSIS — K56.50 INTESTINAL ADHESIONS [BANDS], UNSPECIFIED AS TO PARTIAL VERSUS COMPLETE OBSTRUCTION: ICD-10-CM

## 2023-10-24 DIAGNOSIS — I69.951 HEMIPLEGIA AND HEMIPARESIS FOLLOWING UNSPECIFIED CEREBROVASCULAR DISEASE AFFECTING RIGHT DOMINANT SIDE: ICD-10-CM

## 2023-10-24 DIAGNOSIS — N39.0 URINARY TRACT INFECTION, SITE NOT SPECIFIED: ICD-10-CM

## 2023-10-24 DIAGNOSIS — N40.0 BENIGN PROSTATIC HYPERPLASIA WITHOUT LOWER URINARY TRACT SYMPTOMS: ICD-10-CM

## 2023-10-24 DIAGNOSIS — R00.0 TACHYCARDIA, UNSPECIFIED: ICD-10-CM

## 2023-10-24 DIAGNOSIS — K57.30 DIVERTICULOSIS OF LARGE INTESTINE WITHOUT PERFORATION OR ABSCESS WITHOUT BLEEDING: ICD-10-CM

## 2023-10-24 DIAGNOSIS — E88.09 OTHER DISORDERS OF PLASMA-PROTEIN METABOLISM, NOT ELSEWHERE CLASSIFIED: ICD-10-CM

## 2023-10-24 DIAGNOSIS — Z79.899 OTHER LONG TERM (CURRENT) DRUG THERAPY: ICD-10-CM

## 2023-10-24 DIAGNOSIS — F32.9 MAJOR DEPRESSIVE DISORDER, SINGLE EPISODE, UNSPECIFIED: ICD-10-CM

## 2023-10-24 DIAGNOSIS — Z85.09 PERSONAL HISTORY OF MALIGNANT NEOPLASM OF OTHER DIGESTIVE ORGANS: ICD-10-CM

## 2023-11-02 ENCOUNTER — NON-APPOINTMENT (OUTPATIENT)
Age: 73
End: 2023-11-02

## 2023-11-03 DIAGNOSIS — K56.609 UNSPECIFIED INTESTINAL OBSTRUCTION, UNSPECIFIED AS TO PARTIAL VERSUS COMPLETE OBSTRUCTION: ICD-10-CM

## 2023-11-14 ENCOUNTER — NON-APPOINTMENT (OUTPATIENT)
Age: 73
End: 2023-11-14

## 2023-12-22 DIAGNOSIS — D64.9 ANEMIA, UNSPECIFIED: ICD-10-CM

## 2023-12-27 RX ORDER — PANTOPRAZOLE 40 MG/1
40 TABLET, DELAYED RELEASE ORAL DAILY
Qty: 90 | Refills: 3 | Status: ACTIVE | COMMUNITY
Start: 2023-12-27 | End: 1900-01-01

## 2024-01-19 ENCOUNTER — NON-APPOINTMENT (OUTPATIENT)
Age: 74
End: 2024-01-19

## 2024-09-11 NOTE — OCCUPATIONAL THERAPY INITIAL EVALUATION ADULT - RANGE OF MOTION EXAMINATION, LOWER EXTREMITY
[FreeTextEntry1] : recommend decreasing Cyclobenzaprine 10mg to 5mg tid, prn 2n2  drowsiness    cont PT 2-3x/week as per Spine Specialist    Trial of Hydroxyzine 25mg tid, prn (see med orders)   NF f/u 4 weeks  bilateral LE Active ROM was WFL  (within functional limits)/bilateral LE Passive ROM was WFL  (within functional limits)

## 2024-11-25 NOTE — BRIEF OPERATIVE NOTE - NSICDXBRIEFPREOP_GEN_ALL_CORE_FT
Rx declined. See phone note 8/10/24  
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

## 2024-12-03 NOTE — H&P ADULT - EYES
[FreeTextEntry1] : Low TSH -Goal TSH <2.5 during pregnancy -Explained that TSH may be suppressed due to HCG surge in early Pregnancy -Check TFTs every 4 weeks lab rx given - Dec, Jan, and Feb -Patient verbalized understanding and all questions answered -No medication needed   Left thyroid nodule -Repeat u/s in 10/2025  RTO in 10 weeks 
PERRL/EOMI/conjunctiva clear/normal
